# Patient Record
Sex: FEMALE | Race: WHITE
[De-identification: names, ages, dates, MRNs, and addresses within clinical notes are randomized per-mention and may not be internally consistent; named-entity substitution may affect disease eponyms.]

---

## 2017-04-10 ENCOUNTER — HOSPITAL ENCOUNTER (OUTPATIENT)
Dept: HOSPITAL 23 - SRAD | Age: 67
Discharge: HOME | End: 2017-04-10
Payer: SELF-PAY

## 2017-04-10 DIAGNOSIS — M51.36: ICD-10-CM

## 2017-04-10 DIAGNOSIS — M41.9: ICD-10-CM

## 2017-04-10 DIAGNOSIS — M54.5: Primary | ICD-10-CM

## 2017-06-16 ENCOUNTER — TRANSCRIBE ORDERS (OUTPATIENT)
Dept: PHYSICAL THERAPY | Facility: HOSPITAL | Age: 67
End: 2017-06-16

## 2017-06-16 DIAGNOSIS — M25.571 RIGHT ANKLE PAIN, UNSPECIFIED CHRONICITY: Primary | ICD-10-CM

## 2017-06-19 ENCOUNTER — HOSPITAL ENCOUNTER (OUTPATIENT)
Dept: HOSPITAL 23 - SRAD | Age: 67
Discharge: HOME | End: 2017-06-19
Attending: FAMILY MEDICINE
Payer: MEDICARE

## 2017-06-19 DIAGNOSIS — R06.02: Primary | ICD-10-CM

## 2017-07-12 ENCOUNTER — HOSPITAL ENCOUNTER (OUTPATIENT)
Dept: PHYSICAL THERAPY | Facility: HOSPITAL | Age: 67
Setting detail: THERAPIES SERIES
Discharge: HOME | End: 2017-07-12
Attending: ANESTHESIOLOGY

## 2017-07-12 DIAGNOSIS — R26.2 DIFFICULTY WALKING: ICD-10-CM

## 2017-07-12 DIAGNOSIS — M25.571 CHRONIC PAIN OF RIGHT ANKLE: Primary | ICD-10-CM

## 2017-07-12 DIAGNOSIS — M25.671 ANKLE JOINT STIFFNESS, BILATERAL: ICD-10-CM

## 2017-07-12 DIAGNOSIS — G89.29 CHRONIC PAIN OF RIGHT ANKLE: Primary | ICD-10-CM

## 2017-07-12 DIAGNOSIS — M25.672 ANKLE JOINT STIFFNESS, BILATERAL: ICD-10-CM

## 2017-07-12 DIAGNOSIS — R29.898 ANKLE WEAKNESS: ICD-10-CM

## 2017-07-12 PROCEDURE — 97116 GAIT TRAINING THERAPY: CPT | Performed by: PHYSICAL THERAPIST

## 2017-07-12 PROCEDURE — G8978 MOBILITY CURRENT STATUS: HCPCS

## 2017-07-12 PROCEDURE — 97162 PT EVAL MOD COMPLEX 30 MIN: CPT | Performed by: PHYSICAL THERAPIST

## 2017-07-12 PROCEDURE — G8979 MOBILITY GOAL STATUS: HCPCS

## 2017-07-13 NOTE — THERAPY EVALUATION
Outpatient Physical Therapy Ortho Initial Evaluation  [facility]     Patient Name: Marion Anderson  : 1950  MRN: [id number]  Today's Date: 2017      Visit Date: 2017    There is no problem list on file for this patient.       No past medical history on file.     No past surgical history on file.    Visit Dx:     ICD-10-CM ICD-9-CM   1. Chronic pain of right ankle M25.571 719.47    G89.29 338.29   2. Ankle joint stiffness, bilateral M25.671 719.57    M25.672    3. Ankle weakness M62.81 719.67   4. Difficulty walking R26.2 719.7             Patient History       17 1100          History    Chief Complaint Pain;Difficulty Walking;Balance Problems  -RA      Type of Pain Ankle pain  -RA      Date Current Problem(s) Began --   chronic  -RA      Brief Description of Current Complaint Patient sustained a bi-malleolar fracture at work in 2015 and underwent ankle 2 surgeries (ORIF).  She reports NWB x 6 months post surgery then issues with WC denying her therapy.  She finally did have HHPT and then OP PT at [facility] but again had issues with WC denial of further coverage.  She denies doing any exercises at home other than trying to walk but is limited by pain and SOA.  She reports wearing an ankle brace most of the time. She reports hx of L ankle fracture about a year before the R ankle fracture but it did not require surgery.    -RA      Previous treatment for THIS PROBLEM Surgery;Medication  -RA      Onset Date- PT 17  -RA      Patient/Caregiver Goals Relieve pain;Improve mobility;Improve strength  -RA      Occupation/sports/leisure activities enjoys movies, pets, and grandchildren  -RA      Patient seeing anyone else for problem(s)? Yes  -RA      How has patient tried to help current problem? --   meds, elevating leg  -RA      Results of Clinical Tests no recent imaging  -RA      Pain     Pain Location Ankle  -RA      Pain at Present 5  -RA      Pain at Best 2  -RA      Pain at  Worst 7  -RA      Pain Frequency Constant/continuous  -RA      Pain Description Sharp;Shooting;Throbbing;Tingling;Numbness  -RA      What Performance Factors Make the Current Problem(s) WORSE? driving, prolonged standing/walking  -RA      What Performance Factors Make the Current Problem(s) BETTER? changing positions, sitting, elevating leg  -RA      Tolerance Time- Standing 5-10 minutes  -RA      Tolerance Time- Walking 5-10 minutes  -RA      Difficulties with ADL's? moderate difficulty   -RA      Difficulties with recreational activities? unable to do much, limited WB activity tolerance  -RA      Services    Prior Rehab/Home Health Experiences Yes  -RA      When was the prior experience with Rehab/Home Health 2016  -RA      Where was the prior experience with Rehab/Home Health  PT   -RA      Are you currently receiving Home Health services No  -RA      Daily Activities    Primary Language English  -RA      Are you able to read Yes  -RA      Are you able to write Yes  -RA      How does patient learn best? Listening;Demonstration;Reading;Pictures/Video  -RA      Teaching needs identified Home Exercise Program;Management of Condition  -RA      Patient is concerned about/has problems with Walking;Transfers (getting out of a chair, bed);Standing;Difficulty with self care (i.e. bathing, dressing, toileting:;Climbing Stairs   balance  -RA      Pt Participated in POC and Goals Yes  -RA      Safety    Are you being hurt, hit, or frightened by anyone at home or in your life? No  -RA      Are you being neglected by a caregiver No  -RA        User Key  (r) = Recorded By, (t) = Taken By, (c) = Cosigned By    Initials Name Provider Type    RA Elissa Ortega, PT Physical Therapist                PT Ortho       07/12/17 1100    Posture/Observations    Alignment Options Genu valgus;Forward head;Rounded shoulders  -RA    Observations Edema   R>L foot/ankle/leg  -RA    Posture/Observations Comments FF trunk, increased lateral  "trunk sway B, R >L LE ER during gait  -RA    Sensation    Additional Comments hypersensitivity reported with light touch B feet/ankles/legs  -RA    Foot/Ankle Palpation    Foot/Ankle Palpation? Yes   global TTP B feet/ankles/legs  -RA    Left Ankle    Dorsiflexion ROM Details 9 degrees  -RA    Plantarflexion ROM Details 43 degrees  -RA    Inversion ROM Details 7 degrees  -RA    Eversion ROM Details 9 degrees  -RA    Right Ankle    Dorsiflexion ROM Details 0 degrees  -RA    Plantarflexion ROM Details 36 degrees  -RA    Inversion ROM Details 4 degrees  -RA    Eversion ROM Details 8 degrees  -RA    Lower Extremity    Lower Ext Manual Muscle Testing Detail B hip/knee weakness   -RA    Left Ankle/Foot    Ankle PF Gross Movement (2/5) poor;(2+/5) poor plus  -RA    Ankle Dorsiflexion Gross Movement (2+/5) poor plus;(3-/5) fair minus  -RA    Subtalar Inversion Gross Movement (2+/5) poor plus;(3-/5) fair minus  -RA    Subtalar Eversion Gross Movement (2/5) poor;(2+/5) poor plus  -RA    Right Ankle/Foot    Ankle PF Gross Movement (2/5) poor;(2-/5) poor minus  -RA    Ankle Dorsiflexion Gross Movement (2/5) poor  -RA    Subtalar Inversion Gross Movement (2/5) poor  -RA    Subtalar Eversion Gross Movement (2/5) poor  -RA    Lower Extremity Flexibility    LE Flexibility Comments tightness of B calf/leg tissues   -RA    Girth    Girth Measured? Ankle  -RA    Ankle Girth    Figure 8- Right 57.8cm  -RA    Figure 8- Left 56.7 cm  -RA    Transfers    Transfer, Comment requires B UE push to rise from sitting  -RA    Gait Assessment/Treatment    Gait, Comment slow, antalgic without AD, FF trunk, decresaed hip ext, decreased ankle DF, decreased push off, and increased lateral trunk sway B.  Suggested patient consider use of cane to help reduce WB strain on R ankle with gait - attempted use of SPC in clinic, difficulty with sequencing and stated \"it felt awkward\"  but could tell it lessened stress on R ankle/foot.  -RA    Stairs " Assessment/Treatment    Stairs, Comment NR with use of HR, Avoids if possible  -RA      User Key  (r) = Recorded By, (t) = Taken By, (c) = Cosigned By    Initials Name Provider Type    MANASA Ortega, PT Physical Therapist                            Therapy Education       07/12/17 1302          Therapy Education    Given HEP;Symptoms/condition management;Pain management;Mobility training;Edema management  -RA      Program New  -RA      How Provided Verbal;Demonstration  -RA      Provided to Patient  -RA      Level of Understanding Teach back education performed  -RA        User Key  (r) = Recorded By, (t) = Taken By, (c) = Cosigned By    Initials Name Provider Type    MANASA Ortega, PT Physical Therapist                PT OP Goals       07/12/17 1300       PT Short Term Goals    STG 1 Patient will be independent and compliant with initial HEP for ankle mobility without increased symptoms  -RA     STG 2 Patient will be able to safely enter/exit pool via steps and ambulate independently in pool.  -RA     STG 3 Patient will tolerate 40-45 minutes of aquatic exercise/activity with minimal to no increased pain/fatigue  -RA     STG 4 Patient will have >/= 5 degree improvement in ankle ROM for increased ease with functional activities  -RA     Long Term Goals    LTG 1 Patient will be independent with established HEP (aquatic and/or land) for strength/stabilization to facilitate self management of condition  -RA     LTG 2 Patient will tolerate walking/standing >/= 10-15 minutes with no AD without pain > 3-4/10 for increased ease/tolerance with community mobility/activities  -RA     LTG 3 Patient will have ankle strength >/= 3/5 in available ROM for improved stability with functional WB activity  -RA     LTG 4 Patient will report reduced functional limitations on FAAM with score >/= 30/84 (from 15/84 at al)  -RA     Time Calculation    PT Goal Re-Cert Due Date 08/11/17  -RA       User Key  (r) = Recorded By, (t)  "= Taken By, (c) = Cosigned By    Initials Name Provider Type    RA Elissa Ortega, PT Physical Therapist                PT Assessment/Plan       07/12/17 6712       PT Assessment    Functional Limitations Impaired gait;Limitation in home management;Limitations in community activities;Performance in leisure activities;Performance in self-care ADL  -RA     Impairments Gait;Pain;Muscle strength;Range of motion;Posture;Joint mobility;Endurance;Edema;Balance  -RA     Assessment Comments Patient is a 65yo F referred to therapy for chronic R ankle pain.  She has hx of R ankle fracture in November 2015 with surgical intervention (2 surgeries over in 2 weeks).  She also reports hx of L ankle fracture about a year earlier without surgical intervention.  She ambulates without AD with slow, antalgic, and compensated gait (wearing neoprene ankle brace on R).  She has visible foot/ankle/leg swelling R>L.  She has painful/decreased B ankle ROM/strength, decreased LE flexibility, and poor balance.  She has global tenderness with palpation of B feet/ankles/legs with light touch/pressure with increased tenderness around R ankle incision scars.  She also exhibits generalized core/LE weakness with functional mobility and SOA with exertion (walking to pool area).  Her FAAM score = 17.9% (where 100% = no perceived disability).  Discussed use of AD to help reduce pain with functional WB activity and had patient try using cane in clinic but indicated it \"felt awkward\" although it did seem to lessen discomfort/pressure on R ankle - explained it would take practice to get used to it.  Patient is a good candidate for trial of aquatic therapy to help reduce pain and improve ankle mobility, LE flexibility, core/LE strength/stabilization, and gait/balance.     -RA     Please refer to paper survey for additional self-reported information Yes   score = 15/84 or 17.9% (where 100% = no perceived functional disability)  -RA     Rehab Potential Good  " -RA     Patient/caregiver participated in establishment of treatment plan and goals Yes  -RA     Patient would benefit from skilled therapy intervention Yes  -RA     PT Plan    PT Frequency 2x/week  -RA     Planned CPT's? PT EVAL MOD COMPLELITY: 57954;PT GAIT TRAINING EA 15 MIN: 74083;PT THER PROC EA 15 MIN: 97772;PT NEUROMUSC RE-EDUCATION EA 15 MIN: 96550;PT MANUAL THERAPY EA 15 MIN: 54066;PT AQUATIC THERAPY EA 15 MIN: 25596;PT SELF CARE/HOME MGMT/TRAIN EA 15: 31607;PT HOT OR COLD PACK TREAT MCARE  -RA     PT Plan Comments Skilled aquatic therapy to reduce WB stress on joints with exercise/activity.  Work on ROM/flexibility, strength/stabilization, functional transfers, and gait/balance.  -RA       User Key  (r) = Recorded By, (t) = Taken By, (c) = Cosigned By    Initials Name Provider Type    MANASA Ortega PT Physical Therapist                  Exercises       07/12/17 1100          Exercise 1    Exercise Name 1 Had patient perform toe curls, 4 way ankle ROM to tolerance and encouraged to do this at home.  Patient to begin aquatic therapy to reduce stress/strain on joints with WB activity.  -RA        User Key  (r) = Recorded By, (t) = Taken By, (c) = Cosigned By    Initials Name Provider Type    MANASA Ortega PT Physical Therapist                              Outcome Measures       07/12/17 1300          FAAM    FAAM score = 15/84 or 17.9% (where 100% = no perceived functional disability)  -RA      Functional Assessment    Outcome Measure Options FAAM  -RA        User Key  (r) = Recorded By, (t) = Taken By, (c) = Cosigned By    Initials Name Provider Type    MANASA Ortega PT Physical Therapist            Time Calculation:   Start Time: 1145  Stop Time: 1240  Time Calculation (min): 55 min     Therapy Charges for Today     Code Description Service Date Service Provider Modifiers Qty    15575842260 HC GAIT TRAINING EA 15 MIN 7/12/2017 Elissa Ortega PT GP 1    31896598995 HC PT EVAL MOD  COMPLEXITY 3 7/12/2017 Elissa Ortega, PT GP 1          PT G-Codes  Outcome Measure Options: FAAM         Elissa Ortega, PT  7/12/2017

## 2017-08-01 ENCOUNTER — HOSPITAL ENCOUNTER (OUTPATIENT)
Dept: PHYSICAL THERAPY | Facility: HOSPITAL | Age: 67
Setting detail: THERAPIES SERIES
Discharge: HOME | End: 2017-08-01

## 2017-08-01 DIAGNOSIS — M25.672 ANKLE JOINT STIFFNESS, BILATERAL: ICD-10-CM

## 2017-08-01 DIAGNOSIS — R26.2 DIFFICULTY WALKING: ICD-10-CM

## 2017-08-01 DIAGNOSIS — M25.671 ANKLE JOINT STIFFNESS, BILATERAL: ICD-10-CM

## 2017-08-01 DIAGNOSIS — R29.898 ANKLE WEAKNESS: ICD-10-CM

## 2017-08-01 DIAGNOSIS — M25.571 CHRONIC PAIN OF RIGHT ANKLE: Primary | ICD-10-CM

## 2017-08-01 DIAGNOSIS — G89.29 CHRONIC PAIN OF RIGHT ANKLE: Primary | ICD-10-CM

## 2017-08-01 PROCEDURE — 97113 AQUATIC THERAPY/EXERCISES: CPT | Performed by: PHYSICAL THERAPIST

## 2017-08-01 NOTE — THERAPY TREATMENT NOTE
Outpatient Physical Therapy Ortho Treatment Note  [facility]     Patient Name: Marion Anderson  : 1950  MRN: [id number]  Today's Date: 2017      Visit Date: 2017    Visit Dx:    ICD-10-CM ICD-9-CM   1. Chronic pain of right ankle M25.571 719.47    G89.29 338.29   2. Ankle joint stiffness, bilateral M25.671 719.57    M25.672    3. Ankle weakness M62.81 719.67   4. Difficulty walking R26.2 719.7       There is no problem list on file for this patient.       No past medical history on file.     No past surgical history on file.                          PT Assessment/Plan       17 1213       PT Assessment    Assessment Comments Marion required HHA in and out of pool via rail nonreciprocally and HHA to rail assist for all mobility in pool.  -PB       User Key  (r) = Recorded By, (t) = Taken By, (c) = Cosigned By    Initials Name Provider Type    JOHN Moreno PT Physical Therapist                    Exercises       17 1100          Subjective Comments    Subjective Comments Patient nervous about not knowing what to expect first time in pool  -PB      Subjective Pain    Able to rate subjective pain? yes  -PB      Pre-Treatment Pain Level 5  -PB      Post-Treatment Pain Level 5  -PB      Aquatics    Aquatics performed? Yes  -PB      Aquatics LE    Water Walk forward;side;backward   Rail for balance  -PB      Stretch 1 Heel to toe walk at tail x 15'  -PB      Stretch 2 Seated ankle AROM 4-way x 10 B  -PB      Clams Hip circles 10/2  -PB      Hip Abd/Add 15 B  -PB      March in Place 15 B  -PB      Mini Squat 15   -PB      Toe/Heel Raises 10 B  -PB      Bicycle Seated on bench x 1 min   -PB        User Key  (r) = Recorded By, (t) = Taken By, (c) = Cosigned By    Initials Name Provider Type    JOHN Moreno PT Physical Therapist                                       Time Calculation:   Start Time: 1117  Stop Time: 1202  Time Calculation (min): 45 min    Therapy Charges for  Today     Code Description Service Date Service Provider Modifiers Qty    57996116326 HC PT AQUATIC THERAPY EA 15 MIN 8/1/2017 Jennifer Moreno, PT GP 3                    Jennifer Moreno, PT  8/1/2017

## 2017-08-03 ENCOUNTER — HOSPITAL ENCOUNTER (OUTPATIENT)
Dept: PHYSICAL THERAPY | Facility: HOSPITAL | Age: 67
Setting detail: THERAPIES SERIES
Discharge: HOME | End: 2017-08-03

## 2017-08-03 DIAGNOSIS — R26.2 DIFFICULTY WALKING: ICD-10-CM

## 2017-08-03 DIAGNOSIS — M25.672 ANKLE JOINT STIFFNESS, BILATERAL: ICD-10-CM

## 2017-08-03 DIAGNOSIS — M25.671 ANKLE JOINT STIFFNESS, BILATERAL: ICD-10-CM

## 2017-08-03 DIAGNOSIS — M25.571 CHRONIC PAIN OF RIGHT ANKLE: Primary | ICD-10-CM

## 2017-08-03 DIAGNOSIS — G89.29 CHRONIC PAIN OF RIGHT ANKLE: Primary | ICD-10-CM

## 2017-08-03 DIAGNOSIS — R29.898 ANKLE WEAKNESS: ICD-10-CM

## 2017-08-03 PROCEDURE — 97113 AQUATIC THERAPY/EXERCISES: CPT | Performed by: PHYSICAL THERAPIST

## 2017-08-03 NOTE — THERAPY TREATMENT NOTE
Outpatient Physical Therapy Ortho Treatment Note  [facility]     Patient Name: Marion Anderson  : 1950  MRN: [id number]  Today's Date: 8/3/2017      Visit Date: 2017    Visit Dx:    ICD-10-CM ICD-9-CM   1. Chronic pain of right ankle M25.571 719.47    G89.29 338.29   2. Ankle joint stiffness, bilateral M25.671 719.57    M25.672    3. Ankle weakness M62.81 719.67   4. Difficulty walking R26.2 719.7       There is no problem list on file for this patient.       No past medical history on file.     No past surgical history on file.                          PT Assessment/Plan       17 1208       PT Assessment    Assessment Comments Marion required moderate HHA support to enter pool via stairs nonreciprocally and rail and SBA with rail to exit.  She is very slow with all pool movements.  She did feel hot with exercises but better with rest and feeling air vents in room.  She is scheduled for cardiac stress test Monday and will cancel aquatic appt Tues.   -PB       User Key  (r) = Recorded By, (t) = Taken By, (c) = Cosigned By    Initials Name Provider Type    PB Jennifer Moreno, PT Physical Therapist                    Exercises       17 1200          Subjective Comments    Subjective Comments My knees were sore from squats if we cannot do them please  -PB      Subjective Pain    Able to rate subjective pain? yes  -PB      Pre-Treatment Pain Level 4  -PB      Post-Treatment Pain Level 4  -PB      Subjective Pain Comment Patient warm in pool today requiring seated rest on pool bench and sitting rest after getting out of pool  -PB      Aquatics    Aquatics performed? Yes  -PB      Aquatics LE    Water Walk forward;side;backward   Rail for balance, cues to increase step length  -PB      Stretch 1 Heel to toe walk at rail x 15'  -PB      Stretch 2 Seated ankle AROM 4-way x 10 B  -PB      Hip Abd/Add 15 B  -PB      Hip Flex/Ext 15 B  -PB      March in Place 15 B  -PB      Mini Squat Held    -PB      Toe/Heel Raises 10 B  -PB      Bicycle Seated on bench x 20  -PB      Flutter/Scissor Seated on bench x 10, 10   -PB        User Key  (r) = Recorded By, (t) = Taken By, (c) = Cosigned By    Initials Name Provider Type    PB Jennifer Moreno, PT Physical Therapist                                       Time Calculation:   Start Time: 1116  Stop Time: 1158  Time Calculation (min): 42 min    Therapy Charges for Today     Code Description Service Date Service Provider Modifiers Qty    39316421231  PT AQUATIC THERAPY EA 15 MIN 8/3/2017 Jennifer Moreno, PT GP 3                    Jennifer Moreno, PT  8/3/2017

## 2017-08-08 ENCOUNTER — APPOINTMENT (OUTPATIENT)
Dept: PHYSICAL THERAPY | Facility: HOSPITAL | Age: 67
End: 2017-08-08

## 2017-08-10 ENCOUNTER — HOSPITAL ENCOUNTER (OUTPATIENT)
Dept: PHYSICAL THERAPY | Facility: HOSPITAL | Age: 67
Setting detail: THERAPIES SERIES
Discharge: HOME | End: 2017-08-10

## 2017-08-10 DIAGNOSIS — M25.671 ANKLE JOINT STIFFNESS, BILATERAL: ICD-10-CM

## 2017-08-10 DIAGNOSIS — M25.571 CHRONIC PAIN OF RIGHT ANKLE: Primary | ICD-10-CM

## 2017-08-10 DIAGNOSIS — R26.2 DIFFICULTY WALKING: ICD-10-CM

## 2017-08-10 DIAGNOSIS — R29.898 ANKLE WEAKNESS: ICD-10-CM

## 2017-08-10 DIAGNOSIS — G89.29 CHRONIC PAIN OF RIGHT ANKLE: Primary | ICD-10-CM

## 2017-08-10 DIAGNOSIS — M25.672 ANKLE JOINT STIFFNESS, BILATERAL: ICD-10-CM

## 2017-08-10 PROCEDURE — 97113 AQUATIC THERAPY/EXERCISES: CPT | Performed by: PHYSICAL THERAPIST

## 2017-08-10 NOTE — THERAPY TREATMENT NOTE
Outpatient Physical Therapy Ortho Treatment Note  [facility]     Patient Name: Marion Anderson  : 1950  MRN: [id number]  Today's Date: 8/10/2017      Visit Date: 08/10/2017    Visit Dx:    ICD-10-CM ICD-9-CM   1. Chronic pain of right ankle M25.571 719.47    G89.29 338.29   2. Ankle joint stiffness, bilateral M25.671 719.57    M25.672    3. Ankle weakness M62.81 719.67   4. Difficulty walking R26.2 719.7       There is no problem list on file for this patient.       No past medical history on file.     No past surgical history on file.                          PT Assessment/Plan       08/10/17 1317       PT Assessment    Assessment Comments pt in and out of pool indep. Slow moving and tried to get her deep for better gait due to R knee and both ankles, but this apparently doesn't work with her BP and stent hx. Agrees to do next rx in cool water.   -VALERYK       User Key  (r) = Recorded By, (t) = Taken By, (c) = Cosigned By    Initials Name Provider Type    ZK Zorre Zeno Kimura, PT Physical Therapist                    Exercises       08/10/17 1300          Subjective Comments    Subjective Comments pt stated she got too hot last time from BP and not taking her meds but took them today prior to PT. Here 45 minutes early thinking appt was 1115.   -ZK      Subjective Pain    Able to rate subjective pain? yes  -ZK      Pre-Treatment Pain Level 4  -ZK      Post-Treatment Pain Level 3  -ZK      Subjective Pain Comment had to stop again today early after deep warm water biking. HR good at 76 and SATS 98% but BP was up to 145/97 in sitting L arm.   -ZK      Aquatics    Aquatics performed? Yes  -ZK      Aquatics LE    Water Walk forward   crowded pool today  -ZK      Stretch 3 standing gastroc  -ZK      Clams Hip circles 10/  -ZK      Hip Abd/Add 15 B  -ZK      Hip Flex/Ext 15 B  -ZK      March in Place 15 B  -ZK      Mini Squat --   L knee only  -ZK      Toe/Heel Raises 10 B  -ZK      Bicycle On LN for 5'  "then got \"overheated\"  -RICHARD        User Key  (r) = Recorded By, (t) = Taken By, (c) = Cosigned By    Initials Name Provider Type    ZK Zorre Zeno Kimura, PT Physical Therapist                                       Time Calculation:   Start Time: 1200  Stop Time: 1230  Time Calculation (min): 30 min    Therapy Charges for Today     Code Description Service Date Service Provider Modifiers Qty    12889936144  PT AQUATIC THERAPY EA 15 MIN 8/10/2017 Zorre Zeno Kimura, PT GP 2                    Zorre Zeno Kimura, PT  8/10/2017       "

## 2017-08-15 ENCOUNTER — HOSPITAL ENCOUNTER (OUTPATIENT)
Dept: PHYSICAL THERAPY | Facility: HOSPITAL | Age: 67
Setting detail: THERAPIES SERIES
Discharge: HOME | End: 2017-08-15

## 2017-08-15 DIAGNOSIS — G89.29 CHRONIC PAIN OF RIGHT ANKLE: Primary | ICD-10-CM

## 2017-08-15 DIAGNOSIS — R29.898 ANKLE WEAKNESS: ICD-10-CM

## 2017-08-15 DIAGNOSIS — M25.671 ANKLE JOINT STIFFNESS, BILATERAL: ICD-10-CM

## 2017-08-15 DIAGNOSIS — M25.571 CHRONIC PAIN OF RIGHT ANKLE: Primary | ICD-10-CM

## 2017-08-15 DIAGNOSIS — M25.672 ANKLE JOINT STIFFNESS, BILATERAL: ICD-10-CM

## 2017-08-15 DIAGNOSIS — R26.2 DIFFICULTY WALKING: ICD-10-CM

## 2017-08-15 PROCEDURE — 97113 AQUATIC THERAPY/EXERCISES: CPT | Performed by: PHYSICAL THERAPIST

## 2017-08-15 NOTE — THERAPY RE-EVALUATION
Outpatient Physical Therapy Ortho Re-Assessment  [facility]     Patient Name: Marion Anderson  : 1950  MRN: [id number]  Today's Date: 8/15/2017      Visit Date: 08/15/2017    There is no problem list on file for this patient.       No past medical history on file.     No past surgical history on file.    Visit Dx:     ICD-10-CM ICD-9-CM   1. Chronic pain of right ankle M25.571 719.47    G89.29 338.29   2. Ankle joint stiffness, bilateral M25.671 719.57    M25.672    3. Ankle weakness M62.81 719.67   4. Difficulty walking R26.2 719.7                                     PT OP Goals       08/15/17 1100       PT Short Term Goals    STG 1 Patient will be independent and compliant with initial HEP for ankle mobility without increased symptoms  -PB     STG 1 Progress Partially Met  -PB     STG 1 Progress Comments Patient performing basic ankle HEP  -PB     STG 2 Patient will be able to safely enter/exit pool via steps and ambulate independently in pool.  -PB     STG 2 Progress Progressing  -PB     STG 2 Progress Comments SBA to CGA using rail non reciprocally  -PB     STG 3 Patient will tolerate 40-45 minutes of aquatic exercise/activity with minimal to no increased pain/fatigue  -PB     STG 3 Progress Ongoing  -PB     STG 3 Progress Comments Patient with higher BP with exercise and feeling bad in warm pool  -PB     STG 4 Patient will have >/= 5 degree improvement in ankle ROM for increased ease with functional activities  -PB     STG 4 Progress Ongoing  -PB     STG 4 Progress Comments DF -6; PF 45; Inv 11, Ev 5  -PB     Long Term Goals    LTG 1 Patient will be independent with established HEP (aquatic and/or land) for strength/stabilization to facilitate self management of condition  -PB     LTG 1 Progress Ongoing  -PB     LTG 2 Patient will tolerate walking/standing >/= 10-15 minutes with no AD without pain > 3-4/10 for increased ease/tolerance with community mobility/activities  -PB     LTG 2 Progress  Ongoing  -PB     LTG 2 Progress Comments 10 min walking with aerobic fatigue and standing 10-15 min with ankle pain  -PB     LTG 3 Patient will have ankle strength >/= 3/5 in available ROM for improved stability with functional WB activity  -PB     LTG 3 Progress Comments DF 3-, PF 3-, INV 2-, EV 2-  -PB     LTG 4 Patient will report reduced functional limitations on FAAM with score >/= 30/84 (from 15/84 at eval)  -PB       User Key  (r) = Recorded By, (t) = Taken By, (c) = Cosigned By    Initials Name Provider Type    PB Jennifer Moreno, PT Physical Therapist                PT Assessment/Plan       08/15/17 1227 08/15/17 1221    PT Assessment    Functional Limitations  Impaired gait;Limitation in home management;Limitations in community activities;Performance in leisure activities;Performance in self-care ADL  -PB    Impairments  Gait;Pain;Muscle strength;Range of motion;Posture;Joint mobility;Endurance;Edema;Balance  -PB    Assessment Comments  Marion has been seen for initial eval and 4 aquatic therapy sessions for ankle rehab. She has not been able to tolerate warm water pool without increased blood pressure and feeling physically bad. Exercises were performed in cooler pool today without her feeling bad but blood pressure still increased the returning back down to near baseline.  She is progressing slowly with ankle and overall deconditioned status. She will benefit to comtinue aquatic therapy in cooler pool.  -PB    Please refer to paper survey for additional self-reported information  Yes  -PB    Rehab Potential  Good  -PB    Patient/caregiver participated in establishment of treatment plan and goals  Yes  -PB    Patient would benefit from skilled therapy intervention  Yes  -PB    PT Plan    PT Frequency  2x/week  -PB    Predicted Duration of Therapy Intervention (days/wks)  30 days   -PB    Planned CPT's? PT THER PROC EA 15 MIN: 97265;PT GAIT TRAINING EA 15 MIN: 77290;PT HOT OR COLD PACK TREAT MCARE;PT  "MANUAL THERAPY EA 15 MIN: 41494;PT AQUATIC THERAPY EA 15 MIN: 84527  -PB       User Key  (r) = Recorded By, (t) = Taken By, (c) = Cosigned By    Initials Name Provider Type    JOHN Moreno PT Physical Therapist                  Exercises       08/15/17 1100          Subjective Comments    Subjective Comments Ankle aches.    -PB      Subjective Pain    Able to rate subjective pain? yes  -PB      Pre-Treatment Pain Level 3  -PB      Subjective Pain Comment q  -PB      Aquatics    Aquatics performed? Yes  -PB      Aquatics LE    Water Walk forward;side;backward  -PB      Stretch 1 Exercise in cool pool  -PB      Stretch 2 Ankle AROM circles x 10 and DF/PF x 10  -PB      Stretch 3 Gastroc x 30\" ?  -PB      Hip Abd/Add 20 B  -PB      March in Place 20 B working on balance  -PB      Mini Squat 10  -PB      Toe/Heel Raises 10  -PB      Uni-Clock 10 with light hand support   -PB        User Key  (r) = Recorded By, (t) = Taken By, (c) = Cosigned By    Initials Name Provider Type    JOHN Moreno PT Physical Therapist                              Time Calculation:   Start Time: 5  Stop Time: 1215  Time Calculation (min): 780 min     Therapy Charges for Today     Code Description Service Date Service Provider Modifiers Qty    84794011070  PT AQUATIC THERAPY EA 15 MIN 8/15/2017 Jennifer Moreno, PT GP 4                    Jennifer Moreno, PT  8/15/2017Outpatient Rehabilitation - Wound/Debridement Initial al  [facility]     Patient Name: Marion Anderson  : 1950  MRN: [id number]  Today's Date: 8/15/2017                Admit Date: 8/15/2017    Visit Dx:    ICD-10-CM ICD-9-CM   1. Chronic pain of right ankle M25.571 719.47    G89.29 338.29   2. Ankle joint stiffness, bilateral M25.671 719.57    M25.672    3. Ankle weakness M62.81 719.67   4. Difficulty walking R26.2 719.7       There is no problem list on file for this patient.       No past medical history on file.     No past surgical history on " file.        EVALUATION            WOUND DEBRIDEMENT                    Recommendation and Plan        PT Assessment/Plan       08/15/17 1227 08/15/17 1221    PT Assessment    Functional Limitations  Impaired gait;Limitation in home management;Limitations in community activities;Performance in leisure activities;Performance in self-care ADL  -PB    Impairments  Gait;Pain;Muscle strength;Range of motion;Posture;Joint mobility;Endurance;Edema;Balance  -PB    Assessment Comments  Marion has been seen for initial eval and 4 aquatic therapy sessions for ankle rehab. She has not been able to tolerate warm water pool without increased blood pressure and feeling physically bad. Exercises were performed in cooler pool today without her feeling bad but blood pressure still increased the returning back down to near baseline.  She is progressing slowly with ankle and overall deconditioned status. She will benefit to comtinue aquatic therapy in cooler pool.  -PB    Please refer to paper survey for additional self-reported information  Yes  -PB    Rehab Potential  Good  -PB    Patient/caregiver participated in establishment of treatment plan and goals  Yes  -PB    Patient would benefit from skilled therapy intervention  Yes  -PB    PT Plan    PT Frequency  2x/week  -PB    Predicted Duration of Therapy Intervention (days/wks)  30 days   -PB    Planned CPT's? PT THER PROC EA 15 MIN: 86264;PT GAIT TRAINING EA 15 MIN: 01461;PT HOT OR COLD PACK TREAT MCARE;PT MANUAL THERAPY EA 15 MIN: 65830;PT AQUATIC THERAPY EA 15 MIN: 63675  -PB       User Key  (r) = Recorded By, (t) = Taken By, (c) = Cosigned By    Initials Name Provider Type    PB Jennifer Moreno, PT Physical Therapist            Goals      First Last          PT Short Term Goals  STG 1: Patient will be independent and compliant with initial HEP for ankle mobility without increased symptoms  STG 1 Progress: Partially Met  STG 1 Progress Comments: Patient performing basic ankle  HEP  STG 2: Patient will be able to safely enter/exit pool via steps and ambulate independently in pool.  STG 2 Progress: Progressing  STG 2 Progress Comments: SBA to CGA using rail non reciprocally  STG 3: Patient will tolerate 40-45 minutes of aquatic exercise/activity with minimal to no increased pain/fatigue  STG 3 Progress: Ongoing  STG 3 Progress Comments: Patient with higher BP with exercise and feeling bad in warm pool  STG 4: Patient will have >/= 5 degree improvement in ankle ROM for increased ease with functional activities  STG 4 Progress: Ongoing  STG 4 Progress Comments: DF -6; PF 45; Inv 11, Ev 5 PT Short Term Goals  STG 1: Patient will be independent and compliant with initial HEP for ankle mobility without increased symptoms  STG 1 Progress: Partially Met  STG 1 Progress Comments: Patient performing basic ankle HEP  STG 2: Patient will be able to safely enter/exit pool via steps and ambulate independently in pool.  STG 2 Progress: Progressing  STG 2 Progress Comments: SBA to CGA using rail non reciprocally  STG 3: Patient will tolerate 40-45 minutes of aquatic exercise/activity with minimal to no increased pain/fatigue  STG 3 Progress: Ongoing  STG 3 Progress Comments: Patient with higher BP with exercise and feeling bad in warm pool  STG 4: Patient will have >/= 5 degree improvement in ankle ROM for increased ease with functional activities  STG 4 Progress: Ongoing  STG 4 Progress Comments: DF -6; PF 45; Inv 11, Ev 5   Long Term Goals  LTG 1: Patient will be independent with established HEP (aquatic and/or land) for strength/stabilization to facilitate self management of condition  LTG 1 Progress: Ongoing  LTG 2: Patient will tolerate walking/standing >/= 10-15 minutes with no AD without pain > 3-4/10 for increased ease/tolerance with community mobility/activities  LTG 2 Progress: Ongoing  LTG 2 Progress Comments: 10 min walking with aerobic fatigue and standing 10-15 min with ankle pain  LTG 3:  Patient will have ankle strength >/= 3/5 in available ROM for improved stability with functional WB activity  LTG 3 Progress Comments: DF 3-, PF 3-, INV 2-, EV 2-  LTG 4: Patient will report reduced functional limitations on FAAM with score >/= 30/84 (from 15/84 at [facility])  Long Term Goals  LTG 1: Patient will be independent with established HEP (aquatic and/or land) for strength/stabilization to facilitate self management of condition  LTG 1 Progress: Ongoing  LTG 2: Patient will tolerate walking/standing >/= 10-15 minutes with no AD without pain > 3-4/10 for increased ease/tolerance with community mobility/activities  LTG 2 Progress: Ongoing  LTG 2 Progress Comments: 10 min walking with aerobic fatigue and standing 10-15 min with ankle pain  LTG 3: Patient will have ankle strength >/= 3/5 in available ROM for improved stability with functional WB activity  LTG 3 Progress Comments: DF 3-, PF 3-, INV 2-, EV 2-  LTG 4: Patient will report reduced functional limitations on FAAM with score >/= 30/84 (from 15/84 at [facility])                   PT OP Goals       08/15/17 1100       PT Short Term Goals    STG 1 Patient will be independent and compliant with initial HEP for ankle mobility without increased symptoms  -PB     STG 1 Progress Partially Met  -PB     STG 1 Progress Comments Patient performing basic ankle HEP  -PB     STG 2 Patient will be able to safely enter/exit pool via steps and ambulate independently in pool.  -PB     STG 2 Progress Progressing  -PB     STG 2 Progress Comments SBA to CGA using rail non reciprocally  -PB     STG 3 Patient will tolerate 40-45 minutes of aquatic exercise/activity with minimal to no increased pain/fatigue  -PB     STG 3 Progress Ongoing  -PB     STG 3 Progress Comments Patient with higher BP with exercise and feeling bad in warm pool  -PB     STG 4 Patient will have >/= 5 degree improvement in ankle ROM for increased ease with functional activities  -PB     STG 4 Progress Ongoing  -PB      STG 4 Progress Comments DF -6; PF 45; Inv 11, Ev 5  -PB     Long Term Goals    LTG 1 Patient will be independent with established HEP (aquatic and/or land) for strength/stabilization to facilitate self management of condition  -PB     LTG 1 Progress Ongoing  -PB     LTG 2 Patient will tolerate walking/standing >/= 10-15 minutes with no AD without pain > 3-4/10 for increased ease/tolerance with community mobility/activities  -PB     LTG 2 Progress Ongoing  -PB     LTG 2 Progress Comments 10 min walking with aerobic fatigue and standing 10-15 min with ankle pain  -PB     LTG 3 Patient will have ankle strength >/= 3/5 in available ROM for improved stability with functional WB activity  -PB     LTG 3 Progress Comments DF 3-, PF 3-, INV 2-, EV 2-  -PB     LTG 4 Patient will report reduced functional limitations on FAAM with score >/= 30/84 (from 15/84 at eval)  -PB       User Key  (r) = Recorded By, (t) = Taken By, (c) = Cosigned By    Initials Name Provider Type    PB Jennifer Moreno, PT Physical Therapist          Time Calculation: Start Time: 2315  Stop Time: 1215  Time Calculation (min): 780 min    Therapy Charges for Today     Code Description Service Date Service Provider Modifiers Qty    81191941669 HC PT AQUATIC THERAPY EA 15 MIN 8/15/2017 Jennifer Moreno, PT GP 4                Jennifer Moreno, PT  8/15/2017    and

## 2017-08-17 ENCOUNTER — HOSPITAL ENCOUNTER (OUTPATIENT)
Dept: PHYSICAL THERAPY | Facility: HOSPITAL | Age: 67
Setting detail: THERAPIES SERIES
Discharge: HOME | End: 2017-08-17

## 2017-08-17 DIAGNOSIS — R26.2 DIFFICULTY WALKING: ICD-10-CM

## 2017-08-17 DIAGNOSIS — G89.29 CHRONIC PAIN OF RIGHT ANKLE: Primary | ICD-10-CM

## 2017-08-17 DIAGNOSIS — R29.898 ANKLE WEAKNESS: ICD-10-CM

## 2017-08-17 DIAGNOSIS — M25.571 CHRONIC PAIN OF RIGHT ANKLE: Primary | ICD-10-CM

## 2017-08-17 DIAGNOSIS — M25.671 ANKLE JOINT STIFFNESS, BILATERAL: ICD-10-CM

## 2017-08-17 DIAGNOSIS — M25.672 ANKLE JOINT STIFFNESS, BILATERAL: ICD-10-CM

## 2017-08-17 PROCEDURE — 97113 AQUATIC THERAPY/EXERCISES: CPT | Performed by: PHYSICAL THERAPIST

## 2017-08-17 NOTE — THERAPY TREATMENT NOTE
Outpatient Physical Therapy Ortho Treatment Note  [facility]     Patient Name: Marion Anderson  : 1950  MRN: [id number]  Today's Date: 2017      Visit Date: 2017    Visit Dx:    ICD-10-CM ICD-9-CM   1. Chronic pain of right ankle M25.571 719.47    G89.29 338.29   2. Ankle joint stiffness, bilateral M25.671 719.57    M25.672    3. Ankle weakness M62.81 719.67   4. Difficulty walking R26.2 719.7       There is no problem list on file for this patient.       No past medical history on file.     No past surgical history on file.                          PT Assessment/Plan       17 1242       PT Assessment    Assessment Comments Marion's BP was normal through sessions today in cooler pool.  She feels able to walk with better balance in pool without need to hold on but still maintains a slow speed.  -PB       User Key  (r) = Recorded By, (t) = Taken By, (c) = Cosigned By    Initials Name Provider Type    JOHN Moreno, BRO Physical Therapist                    Exercises       17 1100          Subjective Comments    Subjective Comments Having trouble sleeping  -PB      Subjective Pain    Able to rate subjective pain? yes  -PB      Pre-Treatment Pain Level 4  -PB      Subjective Pain Comment Pre exercise /83; after 20 min ex 124/79; post exercise 121/79  -PB      Aquatics    Aquatics performed? Yes  -PB      Aquatics LE    Water Walk forward;side;backward  -PB      Stretch 1 Exercise in cool pool  -PB      Stretch 2 Ankle AROM circles x 10 and DF/PF x 10  -PB      Stretch 3 --  -PB      Hip Abd/Add 10 B  -PB      March in Place --  -PB      Mini Squat --  -PB      Toe/Heel Raises 10  -PB      Uni-Clock --  -PB        User Key  (r) = Recorded By, (t) = Taken By, (c) = Cosigned By    Initials Name Provider Type    JOHN Moreno PT Physical Therapist                                       Time Calculation:   Start Time: 1120  Stop Time: 1205  Time Calculation (min): 45  min    Therapy Charges for Today     Code Description Service Date Service Provider Modifiers Qty    44871070599 HC PT AQUATIC THERAPY EA 15 MIN 8/17/2017 Jennifer Moreno, PT GP 3                    Jennifer Moreno, PT  8/17/2017

## 2017-08-22 ENCOUNTER — HOSPITAL ENCOUNTER (OUTPATIENT)
Dept: PHYSICAL THERAPY | Facility: HOSPITAL | Age: 67
Setting detail: THERAPIES SERIES
Discharge: HOME | End: 2017-08-22

## 2017-08-22 DIAGNOSIS — R26.2 DIFFICULTY WALKING: ICD-10-CM

## 2017-08-22 DIAGNOSIS — M25.671 ANKLE JOINT STIFFNESS, BILATERAL: ICD-10-CM

## 2017-08-22 DIAGNOSIS — R29.898 ANKLE WEAKNESS: ICD-10-CM

## 2017-08-22 DIAGNOSIS — M25.571 CHRONIC PAIN OF RIGHT ANKLE: Primary | ICD-10-CM

## 2017-08-22 DIAGNOSIS — G89.29 CHRONIC PAIN OF RIGHT ANKLE: Primary | ICD-10-CM

## 2017-08-22 DIAGNOSIS — M25.672 ANKLE JOINT STIFFNESS, BILATERAL: ICD-10-CM

## 2017-08-22 PROCEDURE — 97113 AQUATIC THERAPY/EXERCISES: CPT | Performed by: PHYSICAL THERAPIST

## 2017-08-22 NOTE — THERAPY TREATMENT NOTE
"    Outpatient Physical Therapy Ortho Treatment Note  [facility]     Patient Name: Marion Anderson  : 1950  MRN: [id number]  Today's Date: 2017      Visit Date: 2017    Visit Dx:    ICD-10-CM ICD-9-CM   1. Chronic pain of right ankle M25.571 719.47    G89.29 338.29   2. Ankle joint stiffness, bilateral M25.671 719.57    M25.672    3. Ankle weakness M62.81 719.67   4. Difficulty walking R26.2 719.7       There is no problem list on file for this patient.       No past medical history on file.     No past surgical history on file.                          PT Assessment/Plan       17 1310       PT Assessment    Assessment Comments Normal BP through session.  She demonstrates walking a slightly faster speed in cooler pool   -PB       User Key  (r) = Recorded By, (t) = Taken By, (c) = Cosigned By    Initials Name Provider Type    JOHN Moreno, PT Physical Therapist                    Exercises       17 1300          Subjective Comments    Subjective Comments Pain in ankle standing in kitchen for 20 min this morning   -PB      Subjective Pain    Able to rate subjective pain? yes  -PB      Pre-Treatment Pain Level 3  -PB      Post-Treatment Pain Level 3  -PB      Subjective Pain Comment BP pre ex 134/82 and after ex 134/84   -PB      Aquatics    Aquatics performed? Yes  -PB      Aquatics LE    Water Walk forward;side;backward   F x 2 laps, S x 1 lap each, back x 1 lap   -PB      Stretch 1 Exercise in cool pool  -PB      Stretch 2 Ankle AROM circles x 10 and DF/PF x 10  -PB      Stretch 3 Gastroc 30\"/2 B   -PB      March in Place 20  -PB      Mini Squat 5  -PB      Toe/Heel Raises 20  -PB      Uni-Clock 10  -PB        User Key  (r) = Recorded By, (t) = Taken By, (c) = Cosigned By    Initials Name Provider Type    JOHN Moreno PT Physical Therapist                                       Time Calculation:   Start Time: 1116  Stop Time: 1200  Time Calculation (min): 44 " min    Therapy Charges for Today     Code Description Service Date Service Provider Modifiers Qty    74541721297 HC PT AQUATIC THERAPY EA 15 MIN 8/22/2017 Jennifer Moreno, PT GP 3                    Jennifer Moreno, PT  8/22/2017

## 2017-08-24 ENCOUNTER — HOSPITAL ENCOUNTER (OUTPATIENT)
Dept: PHYSICAL THERAPY | Facility: HOSPITAL | Age: 67
Setting detail: THERAPIES SERIES
Discharge: HOME | End: 2017-08-24

## 2017-08-24 DIAGNOSIS — G89.29 CHRONIC PAIN OF RIGHT ANKLE: Primary | ICD-10-CM

## 2017-08-24 DIAGNOSIS — R29.898 ANKLE WEAKNESS: ICD-10-CM

## 2017-08-24 DIAGNOSIS — R26.2 DIFFICULTY WALKING: ICD-10-CM

## 2017-08-24 DIAGNOSIS — M25.571 CHRONIC PAIN OF RIGHT ANKLE: Primary | ICD-10-CM

## 2017-08-24 DIAGNOSIS — M25.671 ANKLE JOINT STIFFNESS, BILATERAL: ICD-10-CM

## 2017-08-24 DIAGNOSIS — M25.672 ANKLE JOINT STIFFNESS, BILATERAL: ICD-10-CM

## 2017-08-24 PROCEDURE — 97113 AQUATIC THERAPY/EXERCISES: CPT | Performed by: PHYSICAL THERAPIST

## 2017-08-24 NOTE — THERAPY TREATMENT NOTE
"    Outpatient Physical Therapy Ortho Treatment Note  [facility]     Patient Name: Marion Anderson  : 1950  MRN: [id number]  Today's Date: 2017      Visit Date: 2017    Visit Dx:    ICD-10-CM ICD-9-CM   1. Chronic pain of right ankle M25.571 719.47    G89.29 338.29   2. Ankle joint stiffness, bilateral M25.671 719.57    M25.672    3. Ankle weakness M62.81 719.67   4. Difficulty walking R26.2 719.7       There is no problem list on file for this patient.       No past medical history on file.     No past surgical history on file.                          PT Assessment/Plan       17 1424       PT Assessment    Assessment Comments BP a little higher today and she more fatigues when she arrived.  She is scheduled for stress test tomorrow and pulmonolgist next week.  She requires hand support in pool for balance specific exercises and has difficulty getting out of pool with single rail support requiring another hand at top step.   -PB       User Key  (r) = Recorded By, (t) = Taken By, (c) = Cosigned By    Initials Name Provider Type    PB Jennifer Moreno, PT Physical Therapist                    Exercises       17 1100          Subjective Comments    Subjective Comments Woke up with ankle throbbing  -PB      Subjective Pain    Able to rate subjective pain? yes  -PB      Pre-Treatment Pain Level 5  -PB      Subjective Pain Comment BP pre 122/89; post 139/90  -PB      Aquatics    Aquatics performed? Yes  -PB      Aquatics LE    Water Walk forward;side;backward   F x 1 lap, S x 1 lap each, back x 1 hemant, march x 1 length   -PB      Stretch 1 Exercise in cool pool  -PB      Stretch 2 Ankle AROM circles x 10 and DF/PF x 10  -PB      Stretch 3 Gastroc 20\"/3 B   -PB      Stretch Other 1 Heel to toe walk 10/2 with hand support   -PB      Stretch Other 2 Single leg stance 10\"/3 with hand support   -PB      March in Place 20  -PB      Mini Squat 5  -PB      Toe/Heel Raises 10/2  -PB      " PicBadges-Clock 10 with hand support   -PB        User Key  (r) = Recorded By, (t) = Taken By, (c) = Cosigned By    Initials Name Provider Type    PB Jennifer Moreno, PT Physical Therapist                                       Time Calculation:   Start Time: 1116  Stop Time: 1202  Time Calculation (min): 46 min    Therapy Charges for Today     Code Description Service Date Service Provider Modifiers Qty    96076398916 HC PT AQUATIC THERAPY EA 15 MIN 8/24/2017 Jennifer Moreno, PT GP 3                    Jennifer Moreno PT  8/24/2017

## 2017-08-29 ENCOUNTER — HOSPITAL ENCOUNTER (OUTPATIENT)
Dept: PHYSICAL THERAPY | Facility: HOSPITAL | Age: 67
Setting detail: THERAPIES SERIES
Discharge: HOME | End: 2017-08-29

## 2017-08-29 DIAGNOSIS — M25.672 ANKLE JOINT STIFFNESS, BILATERAL: ICD-10-CM

## 2017-08-29 DIAGNOSIS — G89.29 CHRONIC PAIN OF RIGHT ANKLE: Primary | ICD-10-CM

## 2017-08-29 DIAGNOSIS — R29.898 ANKLE WEAKNESS: ICD-10-CM

## 2017-08-29 DIAGNOSIS — R26.2 DIFFICULTY WALKING: ICD-10-CM

## 2017-08-29 DIAGNOSIS — M25.571 CHRONIC PAIN OF RIGHT ANKLE: Primary | ICD-10-CM

## 2017-08-29 DIAGNOSIS — M25.671 ANKLE JOINT STIFFNESS, BILATERAL: ICD-10-CM

## 2017-08-29 PROCEDURE — 97113 AQUATIC THERAPY/EXERCISES: CPT | Performed by: PHYSICAL THERAPIST

## 2017-08-29 NOTE — THERAPY TREATMENT NOTE
"    Outpatient Physical Therapy Ortho Treatment Note  [facility]     Patient Name: Marion Anderson  : 1950  MRN: [id number]  Today's Date: 2017      Visit Date: 2017    Visit Dx:    ICD-10-CM ICD-9-CM   1. Chronic pain of right ankle M25.571 719.47    G89.29 338.29   2. Ankle joint stiffness, bilateral M25.671 719.57    M25.672    3. Ankle weakness M62.81 719.67   4. Difficulty walking R26.2 719.7       There is no problem list on file for this patient.       No past medical history on file.     No past surgical history on file.                          PT Assessment/Plan       17 1206       PT Assessment    Assessment Comments Marion tolerates more activity in the pool with greater ease with the exercise.  -PB       User Key  (r) = Recorded By, (t) = Taken By, (c) = Cosigned By    Initials Name Provider Type    PB Jennifer Moreno, PT Physical Therapist                    Exercises       17 1200 17 1100       Subjective Comments    Subjective Comments  Ankle hurting when I went to bed last night. I did not get a chance to prop it up.  -PB     Subjective Pain    Able to rate subjective pain?  yes  -PB     Pre-Treatment Pain Level  4  -PB     Post-Treatment Pain Level  4  -PB     Subjective Pain Comment  BP pre 124/81; post 143/88  -PB     Aquatics    Aquatics performed?  Yes  -PB     Aquatics LE    Water Walk forward;side;backward   F x 2 lap, S x 1 lap each, back x 1 lap  -PB      Stretch 1 Exercise in cool pool  -PB      Stretch 2 Ankle AROM circles x 10 and DF/PF x 10  -PB      Stretch 3 Gastroc 20\"/3 B   -PB      Stretch Other 1 Heel to toe walk 10/2 with hand support   -PB      Stretch Other 2 Single leg stance 10\"/3 with hand support   -PB      March in Place 20  -PB      Mini Squat 10  -PB      Toe/Heel Raises 10/2  -PB      Uni-Clock 10 with hand support   -PB      Step Ups Bottom step x 5 each   -PB      Exercise 1    Exercise Name 1 Issued ankle HEP  -PB      "   User Key  (r) = Recorded By, (t) = Taken By, (c) = Cosigned By    Initials Name Provider Type    PB Jennifer Moreno, PT Physical Therapist                                       Time Calculation:   Start Time: 1115  Stop Time: 1205  Time Calculation (min): 50 min    Therapy Charges for Today     Code Description Service Date Service Provider Modifiers Qty    58921947952 HC PT AQUATIC THERAPY EA 15 MIN 8/29/2017 Jennifer Moreno, PT GP 3                    Jennifer Moreno PT  8/29/2017

## 2017-08-31 ENCOUNTER — HOSPITAL ENCOUNTER (OUTPATIENT)
Dept: PHYSICAL THERAPY | Facility: HOSPITAL | Age: 67
Setting detail: THERAPIES SERIES
Discharge: HOME | End: 2017-08-31

## 2017-08-31 DIAGNOSIS — G89.29 CHRONIC PAIN OF RIGHT ANKLE: Primary | ICD-10-CM

## 2017-08-31 DIAGNOSIS — M25.671 ANKLE JOINT STIFFNESS, BILATERAL: ICD-10-CM

## 2017-08-31 DIAGNOSIS — M25.672 ANKLE JOINT STIFFNESS, BILATERAL: ICD-10-CM

## 2017-08-31 DIAGNOSIS — M25.571 CHRONIC PAIN OF RIGHT ANKLE: Primary | ICD-10-CM

## 2017-08-31 DIAGNOSIS — R26.2 DIFFICULTY WALKING: ICD-10-CM

## 2017-08-31 DIAGNOSIS — R29.898 ANKLE WEAKNESS: ICD-10-CM

## 2017-08-31 PROCEDURE — 97113 AQUATIC THERAPY/EXERCISES: CPT | Performed by: PHYSICAL THERAPIST

## 2017-08-31 PROCEDURE — G8980 MOBILITY D/C STATUS: HCPCS | Performed by: PHYSICAL THERAPIST

## 2017-08-31 PROCEDURE — G8979 MOBILITY GOAL STATUS: HCPCS | Performed by: PHYSICAL THERAPIST

## 2017-10-06 ENCOUNTER — HOSPITAL ENCOUNTER (INPATIENT)
Facility: HOSPITAL | Age: 67
LOS: 11 days | End: 2017-10-18
Attending: THORACIC SURGERY (CARDIOTHORACIC VASCULAR SURGERY) | Admitting: THORACIC SURGERY (CARDIOTHORACIC VASCULAR SURGERY)

## 2017-10-06 DIAGNOSIS — R53.1 GENERALIZED WEAKNESS: ICD-10-CM

## 2017-10-06 DIAGNOSIS — R53.81 PHYSICAL DECONDITIONING: Primary | ICD-10-CM

## 2017-10-06 DIAGNOSIS — I25.118 CORONARY ARTERY DISEASE OF NATIVE HEART WITH STABLE ANGINA PECTORIS, UNSPECIFIED VESSEL OR LESION TYPE (HCC): ICD-10-CM

## 2017-10-06 DIAGNOSIS — Z95.1 S/P CABG X 4: ICD-10-CM

## 2017-10-06 LAB
BH CV XLRA MEAS - DIST GSV THIGH DIST LEFT: 0.29 CM
BH CV XLRA MEAS - DIST GSV THIGH DIST RIGHT: 0.19 CM
BH CV XLRA MEAS - GSV KNEE DIST LEFT: 0.33 CM
BH CV XLRA MEAS - GSV KNEE DIST RIGHT: 0.21 CM
BH CV XLRA MEAS - GSV ORIGIN DIST LEFT: 0.33 CM
BH CV XLRA MEAS - GSV ORIGIN DIST RIGHT: 0.41 CM
BH CV XLRA MEAS - MID GSV THIGH  LEFT: 0.27 CM
BH CV XLRA MEAS - MID GSV THIGH  RIGHT: 0.38 CM
BH CV XLRA MEAS - PROX GSV CALF DIST RIGHT: 0.18 CM
BH CV XLRA MEAS - PROX GSV THIGH  LEFT: 0.3 CM
BH CV XLRA MEAS - PROX GSV THIGH  RIGHT: 0.32 CM

## 2017-10-07 ENCOUNTER — APPOINTMENT (OUTPATIENT)
Dept: CARDIOLOGY | Facility: HOSPITAL | Age: 67
End: 2017-10-07
Attending: THORACIC SURGERY (CARDIOTHORACIC VASCULAR SURGERY)

## 2017-10-07 ENCOUNTER — APPOINTMENT (OUTPATIENT)
Dept: GENERAL RADIOLOGY | Facility: HOSPITAL | Age: 67
End: 2017-10-07

## 2017-10-07 ENCOUNTER — APPOINTMENT (OUTPATIENT)
Dept: CARDIOLOGY | Facility: HOSPITAL | Age: 67
End: 2017-10-07

## 2017-10-07 PROBLEM — I25.118 CORONARY ARTERY DISEASE OF NATIVE HEART WITH STABLE ANGINA PECTORIS (HCC): Status: ACTIVE | Noted: 2017-10-07

## 2017-10-07 LAB
ABO GROUP BLD: NORMAL
ABO GROUP BLD: NORMAL
ALBUMIN SERPL-MCNC: 3.8 G/DL (ref 3.5–5.2)
ALBUMIN/GLOB SERPL: 1.3 G/DL
ALP SERPL-CCNC: 87 U/L (ref 39–117)
ALT SERPL W P-5'-P-CCNC: 19 U/L (ref 1–33)
ANION GAP SERPL CALCULATED.3IONS-SCNC: 13.6 MMOL/L
APTT PPP: 32 SECONDS (ref 22.7–35.4)
AST SERPL-CCNC: 23 U/L (ref 1–32)
BH CV XLRA MEAS LEFT DIST CCA EDV: 11 CM/SEC
BH CV XLRA MEAS LEFT DIST CCA PSV: 58.1 CM/SEC
BH CV XLRA MEAS LEFT PROX CCA EDV: 10.2 CM/SEC
BH CV XLRA MEAS LEFT PROX CCA PSV: 62.1 CM/SEC
BH CV XLRA MEAS LEFT PROX ECA EDV: -17.3 CM/SEC
BH CV XLRA MEAS LEFT PROX ECA PSV: -127 CM/SEC
BH CV XLRA MEAS LEFT PROX ICA PSV: 0 CM/SEC
BH CV XLRA MEAS LEFT PROX SCLA PSV: 164 CM/SEC
BH CV XLRA MEAS LEFT VERTEBRAL A EDV: 33 CM/SEC
BH CV XLRA MEAS LEFT VERTEBRAL A PSV: 88.8 CM/SEC
BH CV XLRA MEAS RIGHT DIST CCA EDV: 7.9 CM/SEC
BH CV XLRA MEAS RIGHT DIST CCA PSV: 38.5 CM/SEC
BH CV XLRA MEAS RIGHT PROX CCA EDV: 10.6 CM/SEC
BH CV XLRA MEAS RIGHT PROX CCA PSV: 84.4 CM/SEC
BH CV XLRA MEAS RIGHT PROX ECA EDV: -30.1 CM/SEC
BH CV XLRA MEAS RIGHT PROX ECA PSV: -203 CM/SEC
BH CV XLRA MEAS RIGHT PROX ICA PSV: 0 CM/SEC
BH CV XLRA MEAS RIGHT PROX SCLA PSV: 153 CM/SEC
BH CV XLRA MEAS RIGHT VERTEBRAL A EDV: 6.7 CM/SEC
BH CV XLRA MEAS RIGHT VERTEBRAL A PSV: 34.2 CM/SEC
BILIRUB SERPL-MCNC: 0.2 MG/DL (ref 0.1–1.2)
BLD GP AB SCN SERPL QL: NEGATIVE
BUN BLD-MCNC: 18 MG/DL (ref 8–23)
BUN/CREAT SERPL: 17 (ref 7–25)
CALCIUM SPEC-SCNC: 9.7 MG/DL (ref 8.6–10.5)
CHLORIDE SERPL-SCNC: 104 MMOL/L (ref 98–107)
CLOSE TME COLL+ADP + EPINEP PNL BLD: 34 % (ref 86–100)
CO2 SERPL-SCNC: 24.4 MMOL/L (ref 22–29)
CREAT BLD-MCNC: 1.06 MG/DL (ref 0.57–1)
DEPRECATED RDW RBC AUTO: 42.5 FL (ref 37–54)
ERYTHROCYTE [DISTWIDTH] IN BLOOD BY AUTOMATED COUNT: 12.6 % (ref 11.7–13)
GFR SERPL CREATININE-BSD FRML MDRD: 52 ML/MIN/1.73
GLOBULIN UR ELPH-MCNC: 3 GM/DL
GLUCOSE BLD-MCNC: 132 MG/DL (ref 65–99)
HBA1C MFR BLD: 5.64 % (ref 4.8–5.6)
HCT VFR BLD AUTO: 39 % (ref 35.6–45.5)
HGB BLD-MCNC: 13 G/DL (ref 11.9–15.5)
INR PPP: 0.99 (ref 0.9–1.1)
LEFT ARM BP: NORMAL MMHG
MCH RBC QN AUTO: 30.7 PG (ref 26.9–32)
MCHC RBC AUTO-ENTMCNC: 33.3 G/DL (ref 32.4–36.3)
MCV RBC AUTO: 92.2 FL (ref 80.5–98.2)
PLATELET # BLD AUTO: 225 10*3/MM3 (ref 140–500)
PMV BLD AUTO: 9.7 FL (ref 6–12)
POTASSIUM BLD-SCNC: 4 MMOL/L (ref 3.5–5.2)
PROT SERPL-MCNC: 6.8 G/DL (ref 6–8.5)
PROTHROMBIN TIME: 12.7 SECONDS (ref 11.7–14.2)
RBC # BLD AUTO: 4.23 10*6/MM3 (ref 3.9–5.2)
RH BLD: POSITIVE
RH BLD: POSITIVE
RIGHT ARM BP: NORMAL MMHG
SODIUM BLD-SCNC: 142 MMOL/L (ref 136–145)
WBC NRBC COR # BLD: 11.92 10*3/MM3 (ref 4.5–10.7)

## 2017-10-07 PROCEDURE — 86900 BLOOD TYPING SEROLOGIC ABO: CPT

## 2017-10-07 PROCEDURE — 93010 ELECTROCARDIOGRAM REPORT: CPT | Performed by: INTERNAL MEDICINE

## 2017-10-07 PROCEDURE — 86923 COMPATIBILITY TEST ELECTRIC: CPT

## 2017-10-07 PROCEDURE — G0378 HOSPITAL OBSERVATION PER HR: HCPCS

## 2017-10-07 PROCEDURE — 86900 BLOOD TYPING SEROLOGIC ABO: CPT | Performed by: THORACIC SURGERY (CARDIOTHORACIC VASCULAR SURGERY)

## 2017-10-07 PROCEDURE — 71020 HC CHEST PA AND LATERAL: CPT

## 2017-10-07 PROCEDURE — 80053 COMPREHEN METABOLIC PANEL: CPT | Performed by: THORACIC SURGERY (CARDIOTHORACIC VASCULAR SURGERY)

## 2017-10-07 PROCEDURE — 86850 RBC ANTIBODY SCREEN: CPT | Performed by: THORACIC SURGERY (CARDIOTHORACIC VASCULAR SURGERY)

## 2017-10-07 PROCEDURE — 85576 BLOOD PLATELET AGGREGATION: CPT | Performed by: THORACIC SURGERY (CARDIOTHORACIC VASCULAR SURGERY)

## 2017-10-07 PROCEDURE — 93970 EXTREMITY STUDY: CPT

## 2017-10-07 PROCEDURE — 93880 EXTRACRANIAL BILAT STUDY: CPT

## 2017-10-07 PROCEDURE — 86901 BLOOD TYPING SEROLOGIC RH(D): CPT | Performed by: THORACIC SURGERY (CARDIOTHORACIC VASCULAR SURGERY)

## 2017-10-07 PROCEDURE — 85610 PROTHROMBIN TIME: CPT | Performed by: THORACIC SURGERY (CARDIOTHORACIC VASCULAR SURGERY)

## 2017-10-07 PROCEDURE — 85730 THROMBOPLASTIN TIME PARTIAL: CPT | Performed by: THORACIC SURGERY (CARDIOTHORACIC VASCULAR SURGERY)

## 2017-10-07 PROCEDURE — 93005 ELECTROCARDIOGRAM TRACING: CPT | Performed by: THORACIC SURGERY (CARDIOTHORACIC VASCULAR SURGERY)

## 2017-10-07 PROCEDURE — 85027 COMPLETE CBC AUTOMATED: CPT | Performed by: THORACIC SURGERY (CARDIOTHORACIC VASCULAR SURGERY)

## 2017-10-07 PROCEDURE — 86901 BLOOD TYPING SEROLOGIC RH(D): CPT

## 2017-10-07 PROCEDURE — 83036 HEMOGLOBIN GLYCOSYLATED A1C: CPT | Performed by: THORACIC SURGERY (CARDIOTHORACIC VASCULAR SURGERY)

## 2017-10-07 RX ORDER — NITROGLYCERIN 0.4 MG/1
0.4 TABLET SUBLINGUAL
COMMUNITY
End: 2018-04-29

## 2017-10-07 RX ORDER — ALPRAZOLAM 0.25 MG/1
0.25 TABLET ORAL EVERY 8 HOURS PRN
Status: DISCONTINUED | OUTPATIENT
Start: 2017-10-07 | End: 2017-10-11

## 2017-10-07 RX ORDER — NITROGLYCERIN 0.4 MG/1
0.4 TABLET SUBLINGUAL
Status: DISCONTINUED | OUTPATIENT
Start: 2017-10-07 | End: 2017-10-12

## 2017-10-07 RX ORDER — LOSARTAN POTASSIUM 25 MG/1
25 TABLET ORAL DAILY
Status: DISCONTINUED | OUTPATIENT
Start: 2017-10-07 | End: 2017-10-12

## 2017-10-07 RX ORDER — LEVOTHYROXINE SODIUM 137 UG/1
137 TABLET ORAL DAILY
Status: DISCONTINUED | OUTPATIENT
Start: 2017-10-07 | End: 2017-10-18 | Stop reason: HOSPADM

## 2017-10-07 RX ORDER — RANOLAZINE 500 MG/1
500 TABLET, EXTENDED RELEASE ORAL 2 TIMES DAILY
COMMUNITY
End: 2017-10-31 | Stop reason: HOSPADM

## 2017-10-07 RX ORDER — ERGOCALCIFEROL 1.25 MG/1
50000 CAPSULE ORAL DAILY
COMMUNITY
End: 2017-10-31 | Stop reason: HOSPADM

## 2017-10-07 RX ORDER — POTASSIUM CHLORIDE 7.45 MG/ML
10 INJECTION INTRAVENOUS
Status: DISCONTINUED | OUTPATIENT
Start: 2017-10-07 | End: 2017-10-12

## 2017-10-07 RX ORDER — POTASSIUM CHLORIDE 750 MG/1
40 CAPSULE, EXTENDED RELEASE ORAL AS NEEDED
Status: DISCONTINUED | OUTPATIENT
Start: 2017-10-07 | End: 2017-10-12

## 2017-10-07 RX ORDER — TEMAZEPAM 7.5 MG/1
7.5 CAPSULE ORAL NIGHTLY PRN
Status: DISCONTINUED | OUTPATIENT
Start: 2017-10-07 | End: 2017-10-12

## 2017-10-07 RX ORDER — TRAMADOL HYDROCHLORIDE 50 MG/1
50 TABLET ORAL 2 TIMES DAILY
Status: DISCONTINUED | OUTPATIENT
Start: 2017-10-07 | End: 2017-10-08

## 2017-10-07 RX ORDER — ASPIRIN 81 MG/1
81 TABLET, CHEWABLE ORAL DAILY
COMMUNITY

## 2017-10-07 RX ORDER — LOSARTAN POTASSIUM 25 MG/1
25 TABLET ORAL DAILY
COMMUNITY
End: 2017-10-31 | Stop reason: HOSPADM

## 2017-10-07 RX ORDER — ROSUVASTATIN CALCIUM 20 MG/1
40 TABLET, COATED ORAL NIGHTLY
COMMUNITY
End: 2019-01-17

## 2017-10-07 RX ORDER — LEVOTHYROXINE SODIUM 137 UG/1
137 TABLET ORAL DAILY
COMMUNITY
End: 2019-01-17

## 2017-10-07 RX ORDER — DIPHENHYDRAMINE HCL 25 MG
25 CAPSULE ORAL NIGHTLY PRN
Status: DISCONTINUED | OUTPATIENT
Start: 2017-10-07 | End: 2017-10-12

## 2017-10-07 RX ORDER — ASPIRIN 81 MG/1
81 TABLET, CHEWABLE ORAL DAILY
Status: DISCONTINUED | OUTPATIENT
Start: 2017-10-07 | End: 2017-10-12

## 2017-10-07 RX ORDER — CHLORHEXIDINE GLUCONATE 0.12 MG/ML
15 RINSE ORAL EVERY 12 HOURS SCHEDULED
Status: DISPENSED | OUTPATIENT
Start: 2017-10-08 | End: 2017-10-09

## 2017-10-07 RX ORDER — POTASSIUM CHLORIDE 1.5 G/1.77G
40 POWDER, FOR SOLUTION ORAL AS NEEDED
Status: DISCONTINUED | OUTPATIENT
Start: 2017-10-07 | End: 2017-10-12

## 2017-10-07 RX ORDER — TRAMADOL HYDROCHLORIDE 50 MG/1
50 TABLET ORAL 2 TIMES DAILY
COMMUNITY
End: 2018-04-29

## 2017-10-07 RX ORDER — ATORVASTATIN CALCIUM 20 MG/1
40 TABLET, FILM COATED ORAL NIGHTLY
Status: DISCONTINUED | OUTPATIENT
Start: 2017-10-07 | End: 2017-10-08

## 2017-10-07 RX ORDER — ACETAMINOPHEN 325 MG/1
650 TABLET ORAL EVERY 4 HOURS PRN
Status: DISCONTINUED | OUTPATIENT
Start: 2017-10-07 | End: 2017-10-12

## 2017-10-07 RX ORDER — CEFAZOLIN SODIUM 2 G/100ML
2 INJECTION, SOLUTION INTRAVENOUS
Status: ACTIVE | OUTPATIENT
Start: 2017-10-07 | End: 2017-10-08

## 2017-10-07 RX ORDER — SODIUM CHLORIDE 0.9 % (FLUSH) 0.9 %
1-10 SYRINGE (ML) INJECTION AS NEEDED
Status: DISCONTINUED | OUTPATIENT
Start: 2017-10-07 | End: 2017-10-12

## 2017-10-07 RX ADMIN — ASPIRIN 81 MG: 81 TABLET, CHEWABLE ORAL at 09:31

## 2017-10-07 RX ADMIN — TRAMADOL HYDROCHLORIDE 50 MG: 50 TABLET ORAL at 18:31

## 2017-10-07 RX ADMIN — TRAMADOL HYDROCHLORIDE 50 MG: 50 TABLET ORAL at 09:30

## 2017-10-07 RX ADMIN — LOSARTAN POTASSIUM 25 MG: 25 TABLET, FILM COATED ORAL at 09:31

## 2017-10-07 RX ADMIN — METOPROLOL TARTRATE 25 MG: 25 TABLET ORAL at 18:31

## 2017-10-07 RX ADMIN — TRAMADOL HYDROCHLORIDE 50 MG: 50 TABLET ORAL at 02:16

## 2017-10-07 RX ADMIN — LEVOTHYROXINE SODIUM 137 MCG: 137 TABLET ORAL at 21:49

## 2017-10-07 RX ADMIN — ATORVASTATIN CALCIUM 40 MG: 20 TABLET, FILM COATED ORAL at 20:32

## 2017-10-07 RX ADMIN — METOPROLOL TARTRATE 25 MG: 25 TABLET ORAL at 09:31

## 2017-10-07 NOTE — PLAN OF CARE
Problem: Patient Care Overview (Adult)  Goal: Plan of Care Review  Outcome: Ongoing (interventions implemented as appropriate)    10/07/17 2293   Coping/Psychosocial Response Interventions   Plan Of Care Reviewed With patient   Patient Care Overview   Progress no change   Outcome Evaluation   Outcome Summary/Follow up Plan No cp today, vss, brilinta held vss, will continue to monitor.        Goal: Adult Individualization and Mutuality  Outcome: Ongoing (interventions implemented as appropriate)  Goal: Discharge Needs Assessment  Outcome: Ongoing (interventions implemented as appropriate)    Problem: Acute Coronary Syndrome (ACS) (Adult)  Goal: Signs and Symptoms of Listed Potential Problems Will be Absent or Manageable (Acute Coronary Syndrome)  Outcome: Ongoing (interventions implemented as appropriate)

## 2017-10-07 NOTE — PROGRESS NOTES
Patient transferred from [place]. Left Main disease; Stable but on Brlinta. Check Platelet Agg.

## 2017-10-07 NOTE — H&P
"  Patient Care Team:  Narciso Camargo MD as PCP - General (Family Medicine)    Chief complaint: chest pain    Subjective     History of Present Illness  Patient was transferred from [facility] for surgical evaluation after cardiac catheterization.  Patient has a PMH of HTN, HLD, CAD sp stents 2006, and hypothyroidism.  Patient states that she has had chest \"tightness\" along with SOB for the past year.  She stated that it occasionally would occur at rest however, mostly during exertion.  Two years ago the patient broke right ankle and right leg which has made her deconditioned so she \"chalked\" the shortness of breath to that.   She presented to her PCP with those complaints and a pulmonology work up was negative, she presented to Dr. Grant for cardiac work up and underwent a stress test and cardiac cath which prompted her transfer to [facility] for surgical work up.  Patient currently denies chest pain, SOB, or discomfort.    Review of Systems   Constitutional: Positive for activity change and fatigue. Negative for appetite change, diaphoresis, fever and unexpected weight change.   HENT: Positive for congestion and sneezing. Negative for mouth sores, nosebleeds, postnasal drip, rhinorrhea, sinus pain, trouble swallowing and voice change.    Eyes: Negative.    Respiratory: Positive for chest tightness and shortness of breath. Negative for cough, choking, wheezing and stridor.    Cardiovascular: Positive for chest pain. Negative for palpitations and leg swelling.   Gastrointestinal: Negative for abdominal distention, abdominal pain, anal bleeding, blood in stool, constipation, diarrhea, nausea and rectal pain.   Endocrine: Negative.    Genitourinary: Negative.    Musculoskeletal: Positive for arthralgias, back pain, gait problem, joint swelling and myalgias. Negative for neck pain and neck stiffness.   Skin: Negative.    Allergic/Immunologic: Negative.    Neurological: Positive for weakness and " light-headedness. Negative for dizziness, tremors, seizures, syncope, facial asymmetry, speech difficulty and headaches.   Hematological: Negative.    Psychiatric/Behavioral: Negative.         Past Medical History:   Diagnosis Date   • Arthritis    • Coronary artery disease    • Disease of thyroid gland    • Hypertension      Past Surgical History:   Procedure Laterality Date   • CARDIAC SURGERY     • FRACTURE SURGERY     • TONSILLECTOMY       Family History   Problem Relation Age of Onset   • Heart disease Mother    • Hypertension Mother    • Heart disease Father    • Hypertension Father    • Cancer Brother    • Hypertension Brother      Social History   Substance Use Topics   • Smoking status: Never Smoker   • Smokeless tobacco: Never Used   • Alcohol use No     Prescriptions Prior to Admission   Medication Sig Dispense Refill Last Dose   • aspirin 81 MG chewable tablet Chew 81 mg Daily.      • levothyroxine (SYNTHROID, LEVOTHROID) 137 MCG tablet Take 137 mcg by mouth Daily.      • losartan (COZAAR) 25 MG tablet Take 25 mg by mouth Daily.      • metoprolol tartrate (LOPRESSOR) 25 MG tablet Take 25 mg by mouth 2 (Two) Times a Day.      • nitroglycerin (NITROSTAT) 0.4 MG SL tablet Place 0.4 mg under the tongue Every 5 (Five) Minutes As Needed for Chest Pain. Take no more than 3 doses in 15 minutes.      • ranolazine (RANEXA) 500 MG 12 hr tablet Take 500 mg by mouth 2 (Two) Times a Day.      • traMADol (ULTRAM) 50 MG tablet Take 50 mg by mouth 2 (Two) Times a Day.          aspirin 81 mg Oral Daily   atorvastatin 40 mg Oral Nightly   ceFAZolin 2 g Intravenous On Call to OR   [START ON 10/8/2017] chlorhexidine 15 mL Mouth/Throat Q12H   levothyroxine 137 mcg Oral Daily   losartan 25 mg Oral Daily   [START ON 10/9/2017] metoprolol tartrate 12.5 mg Oral On Call to OR   metoprolol tartrate 25 mg Oral BID   mupirocin 1 application Each Nare Q12H   traMADol 50 mg Oral BID     Allergies:  Latex    Objective      Vital  Signs  Temp:  [97.3 °F (36.3 °C)-97.7 °F (36.5 °C)] 97.4 °F (36.3 °C)  Heart Rate:  [63-96] 68  Resp:  [12-18] 12  BP: (140-152)/(81-97) 149/81    Flowsheet Rows         First Filed Value    Admission Height      Admission Weight  216 lb 5 oz (98.1 kg) Documented at 10/07/2017 0019             Physical Exam   Constitutional: She is oriented to person, place, and time. She appears well-developed and well-nourished.   HENT:   Head: Normocephalic and atraumatic.   Mouth/Throat: No oropharyngeal exudate.   Eyes: Conjunctivae and EOM are normal. Pupils are equal, round, and reactive to light. No scleral icterus.   Neck: Normal range of motion. Neck supple.   Cardiovascular: Normal rate, regular rhythm, normal heart sounds and intact distal pulses.    Pulmonary/Chest: Effort normal and breath sounds normal.   Abdominal: Soft.   Genitourinary:   Genitourinary Comments: No lira   Neurological: She is alert and oriented to person, place, and time. No cranial nerve deficit.   Skin: Skin is warm and dry.       Results Review:   Lab Results (last 24 hours)     Procedure Component Value Units Date/Time    CBC (No Diff) [[id number]]  (Abnormal) Collected:  10/07/17 0311    Specimen:  Blood Updated:  10/07/17 0322     WBC 11.92 (H) 10*3/mm3      RBC 4.23 10*6/mm3      Hemoglobin 13.0 g/dL      Hematocrit 39.0 %      MCV 92.2 fL      MCH 30.7 pg      MCHC 33.3 g/dL      RDW 12.6 %      RDW-SD 42.5 fl      MPV 9.7 fL      Platelets 225 10*3/mm3     Platelet Function ADP [[id number]]  (Abnormal) Collected:  10/07/17 0311    Specimen:  Blood Updated:  10/07/17 0323     ADP Aggregation, % Platelet 34 (L) %     Protime-INR [[id number]]  (Normal) Collected:  10/07/17 0311    Specimen:  Blood Updated:  10/07/17 0337     Protime 12.7 Seconds      INR 0.99    aPTT [[id number]]  (Normal) Collected:  10/07/17 0311    Specimen:  Blood Updated:  10/07/17 0337     PTT 32.0 seconds     Comprehensive Metabolic Panel [[id number]]  (Abnormal) Collected:   10/07/17 0311    Specimen:  Blood Updated:  10/07/17 0349     Glucose 132 (H) mg/dL      BUN 18 mg/dL      Creatinine 1.06 (H) mg/dL      Sodium 142 mmol/L      Potassium 4.0 mmol/L      Chloride 104 mmol/L      CO2 24.4 mmol/L      Calcium 9.7 mg/dL      Total Protein 6.8 g/dL      Albumin 3.80 g/dL      ALT (SGPT) 19 U/L      AST (SGOT) 23 U/L      Alkaline Phosphatase 87 U/L      Total Bilirubin 0.2 mg/dL      eGFR Non African Amer 52 (L) mL/min/1.73      Globulin 3.0 gm/dL      A/G Ratio 1.3 g/dL      BUN/Creatinine Ratio 17.0     Anion Gap 13.6 mmol/L     Hemoglobin A1c [[id number]]  (Abnormal) Collected:  10/07/17 0311    Specimen:  Blood Updated:  10/07/17 0425     Hemoglobin A1C 5.64 (H) %     Narrative:       Hemoglobin A1C Ranges:    Increased Risk for Diabetes  5.7% to 6.4%  Diabetes                     >= 6.5%  Diabetic Goal                < 7.0%              Assessment/Plan     Active Problems:    Coronary artery disease of native heart with stable angina pectoris      Assessment & Plan    CAD- with stents 2006- on Brilinta (last dose 10/6) multi vessel disease   Left main disease   Angina   Abnormal stress test  HTN  HLD- on statin  Hypothyroidism  Frequent falls - recent fall at work 2 years ago patient broke right ankle   Deconditioned d/t fall      Platelet agg 34%   Vein mapping- ordered  Carotid duplex- ordered  U/a- ordered  A1C - 5.64    Due to patient on brilinta I foresee surgery being later next week.      Una Reddy, PANKAJ  10/07/17  11:36 AM

## 2017-10-08 PROBLEM — I25.10 CAD (CORONARY ARTERY DISEASE): Status: ACTIVE | Noted: 2017-10-08

## 2017-10-08 LAB — CLOSE TME COLL+ADP + EPINEP PNL BLD: 15 % (ref 86–100)

## 2017-10-08 PROCEDURE — 99231 SBSQ HOSP IP/OBS SF/LOW 25: CPT | Performed by: NURSE PRACTITIONER

## 2017-10-08 PROCEDURE — 85576 BLOOD PLATELET AGGREGATION: CPT | Performed by: THORACIC SURGERY (CARDIOTHORACIC VASCULAR SURGERY)

## 2017-10-08 PROCEDURE — G8979 MOBILITY GOAL STATUS: HCPCS

## 2017-10-08 PROCEDURE — G8978 MOBILITY CURRENT STATUS: HCPCS

## 2017-10-08 PROCEDURE — 97162 PT EVAL MOD COMPLEX 30 MIN: CPT

## 2017-10-08 RX ORDER — TRAMADOL HYDROCHLORIDE 50 MG/1
50 TABLET ORAL EVERY 6 HOURS PRN
Status: DISCONTINUED | OUTPATIENT
Start: 2017-10-08 | End: 2017-10-12

## 2017-10-08 RX ORDER — ROSUVASTATIN CALCIUM 40 MG/1
40 TABLET, COATED ORAL NIGHTLY
Status: DISCONTINUED | OUTPATIENT
Start: 2017-10-08 | End: 2017-10-12

## 2017-10-08 RX ORDER — ROSUVASTATIN CALCIUM 40 MG/1
40 TABLET, COATED ORAL NIGHTLY
Status: DISCONTINUED | OUTPATIENT
Start: 2017-10-08 | End: 2017-10-08

## 2017-10-08 RX ADMIN — LOSARTAN POTASSIUM 25 MG: 25 TABLET, FILM COATED ORAL at 09:46

## 2017-10-08 RX ADMIN — TEMAZEPAM 7.5 MG: 7.5 CAPSULE ORAL at 22:47

## 2017-10-08 RX ADMIN — METOPROLOL TARTRATE 25 MG: 25 TABLET ORAL at 09:46

## 2017-10-08 RX ADMIN — ACETAMINOPHEN 650 MG: 325 TABLET ORAL at 04:02

## 2017-10-08 RX ADMIN — ROSUVASTATIN CALCIUM 40 MG: 40 TABLET, COATED ORAL at 20:06

## 2017-10-08 RX ADMIN — ASPIRIN 81 MG: 81 TABLET, CHEWABLE ORAL at 09:46

## 2017-10-08 RX ADMIN — LEVOTHYROXINE SODIUM 137 MCG: 137 TABLET ORAL at 20:06

## 2017-10-08 RX ADMIN — METOPROLOL TARTRATE 37.5 MG: 25 TABLET ORAL at 18:17

## 2017-10-08 RX ADMIN — ALPRAZOLAM 0.25 MG: 0.25 TABLET ORAL at 20:09

## 2017-10-08 RX ADMIN — TRAMADOL HYDROCHLORIDE 50 MG: 50 TABLET ORAL at 18:03

## 2017-10-08 NOTE — PROGRESS NOTES
" LOS: 1 day   Patient Care Team:  Narciso Camargo MD as PCP - General (Family Medicine)    Chief Complaint: CAD    Subjective:  Symptoms:  She reports anxiety (due to financial stress).  No shortness of breath, chest pain or chest pressure.    Diet:  No nausea or vomiting.    Activity level: Normal with assistance.    Pain:  She reports no pain.          Vital Signs  Temp:  [97.4 °F (36.3 °C)-98.2 °F (36.8 °C)] 97.6 °F (36.4 °C)  Heart Rate:  [61-77] 65  Resp:  [16-20] 20  BP: (136-156)/(74-89) 141/75  Body mass index is 36 kg/(m^2).    Intake/Output Summary (Last 24 hours) at 10/08/17 0836  Last data filed at 10/07/17 2300   Gross per 24 hour   Intake              560 ml   Output              600 ml   Net              -40 ml          Last 3 weights    10/07/17  0019   Weight: 216 lb 5 oz (98.1 kg)         Objective:  General Appearance:  Comfortable and in no acute distress.    Vital signs: (most recent): Blood pressure 141/75, pulse 65, temperature 97.6 °F (36.4 °C), temperature source Oral, resp. rate 20, height 65\" (165.1 cm), weight 216 lb 5 oz (98.1 kg), SpO2 92 %.  Vital signs are normal.  No fever.              Results Review:        WBC WBC   Date Value Ref Range Status   10/07/2017 11.92 (H) 4.50 - 10.70 10*3/mm3 Final      HGB Hemoglobin   Date Value Ref Range Status   10/07/2017 13.0 11.9 - 15.5 g/dL Final      HCT Hematocrit   Date Value Ref Range Status   10/07/2017 39.0 35.6 - 45.5 % Final      Platelets Platelets   Date Value Ref Range Status   10/07/2017 225 140 - 500 10*3/mm3 Final        PT/INR:    Protime   Date Value Ref Range Status   10/07/2017 12.7 11.7 - 14.2 Seconds Final   /  INR   Date Value Ref Range Status   10/07/2017 0.99 0.90 - 1.10 Final       Sodium Sodium   Date Value Ref Range Status   10/07/2017 142 136 - 145 mmol/L Final      Potassium Potassium   Date Value Ref Range Status   10/07/2017 4.0 3.5 - 5.2 mmol/L Final      Chloride Chloride   Date Value Ref Range Status "   10/07/2017 104 98 - 107 mmol/L Final      Bicarbonate CO2   Date Value Ref Range Status   10/07/2017 24.4 22.0 - 29.0 mmol/L Final      BUN BUN   Date Value Ref Range Status   10/07/2017 18 8 - 23 mg/dL Final      Creatinine Creatinine   Date Value Ref Range Status   10/07/2017 1.06 (H) 0.57 - 1.00 mg/dL Final      Calcium Calcium   Date Value Ref Range Status   10/07/2017 9.7 8.6 - 10.5 mg/dL Final      Magnesium No results found for: MG         aspirin 81 mg Oral Daily   atorvastatin 40 mg Oral Nightly   chlorhexidine 15 mL Mouth/Throat Q12H   levothyroxine 137 mcg Oral Daily   losartan 25 mg Oral Daily   [START ON 10/9/2017] metoprolol tartrate 12.5 mg Oral On Call to OR   metoprolol tartrate 25 mg Oral BID   mupirocin 1 application Each Nare Q12H   traMADol 50 mg Oral BID            Patient Active Problem List   Diagnosis Code   • Coronary artery disease of native heart with stable angina pectoris I25.118       Assessment & Plan  CAD- with stents 2006- on Brilinta (last dose 10/6) multi vessel disease                        Left main disease - 50%             LAD proximal 100%             Circumflex 70%             RCA 99%  Angina   Abnormal stress test  HTN  HLD- on statin  Hypothyroidism  Frequent falls - recent fall at work 2 years ago patient broke right ankle   Deconditioned d/t fall - will consult PT to evaluate pre surgery          Vein mapping- adequate for bypass  Carotid duplex- Proximal right internal carotid artery occlusion.     Right internal carotid artery stenosis totally occluded.     Proximal left internal carotid artery occlusion.  U/a- ordered  A1C - 5.64       Platelet agg 15% - will have to wait for surgery   Currently denies chest pain, hemodynamically stable  Worried about her not being able to cash SS check and not paying her bills d/t admission to the hospital -will speak with nurse about checking with social work       PANKAJ Crouch  10/08/17  8:36 AM    Patient was on  Brilinta, usually requires up to 7 days for effect to dissipate; will follow with serial platelet agg assessments.

## 2017-10-08 NOTE — PLAN OF CARE
Problem: Patient Care Overview (Adult)  Goal: Plan of Care Review  Outcome: Ongoing (interventions implemented as appropriate)    10/08/17 0244   Coping/Psychosocial Response Interventions   Plan Of Care Reviewed With patient   Patient Care Overview   Progress no change   Outcome Evaluation   Outcome Summary/Follow up Plan patient denies any complaints at this time, VSS, awaiting plan for CABG, patient remains SR on the monitor. will continue to monitor.

## 2017-10-08 NOTE — PLAN OF CARE
Problem: Patient Care Overview (Adult)  Goal: Plan of Care Review  Outcome: Outcome(s) achieved Date Met:  10/08/17    10/08/17 1122   Coping/Psychosocial Response Interventions   Plan Of Care Reviewed With patient   Patient Care Overview   Progress improving   Outcome Evaluation   Outcome Summary/Follow up Plan PT eval completed and it is suspected that pt is at or very close to baseline activity. Pt had been seen by OPPT prior to intial admit to imprve mobility and inc fucntinal indep. Pt had progressed from rwx to no AD and has been without an device x 6 weeks. Gait is slow and guarded but functional. Pt was strongly encouraged to increase mobility while in hospital and amb in hallway with staff or family. Pt with no skilled needs for acute PT at this time but would recommend a reorder after surgery. Pt is in full agreement.

## 2017-10-08 NOTE — PLAN OF CARE
Problem: Patient Care Overview (Adult)  Goal: Plan of Care Review  Outcome: Ongoing (interventions implemented as appropriate)    10/08/17 1750   Coping/Psychosocial Response Interventions   Plan Of Care Reviewed With patient   Patient Care Overview   Progress improving   Outcome Evaluation   Outcome Summary/Follow up Plan VSS, PT TO GO TO FOR HEART SURGERY AS SOON AS KAVYA IS OUT OF HER SYSTEM.. NO C/O CP TODAY. WILL CONTINUE TO MONITOR.        Goal: Adult Individualization and Mutuality  Outcome: Ongoing (interventions implemented as appropriate)  Goal: Discharge Needs Assessment  Outcome: Ongoing (interventions implemented as appropriate)    Problem: Acute Coronary Syndrome (ACS) (Adult)  Goal: Signs and Symptoms of Listed Potential Problems Will be Absent or Manageable (Acute Coronary Syndrome)  Outcome: Ongoing (interventions implemented as appropriate)    Problem: Pain, Acute (Adult)  Goal: Identify Related Risk Factors and Signs and Symptoms  Outcome: Ongoing (interventions implemented as appropriate)  Goal: Acceptable Pain Control/Comfort Level  Outcome: Ongoing (interventions implemented as appropriate)

## 2017-10-08 NOTE — THERAPY DISCHARGE NOTE
Acute Care - Physical Therapy Initial Eval/Discharge  [facility]     Patient Name: Marion Anderson  : 1950  MRN: [id number]  Today's Date: 10/8/2017   Onset of Illness/Injury or Date of Surgery Date: 10/06/17  Date of Referral to PT: 10/08/17  Referring Physician: Wanda      Admit Date: 10/6/2017    Visit Dx:    ICD-10-CM ICD-9-CM   1. Physical deconditioning R53.81 799.3   2. Coronary artery disease of native heart with stable angina pectoris, unspecified vessel or lesion type I25.118 414.01     413.9     Patient Active Problem List   Diagnosis   • Coronary artery disease of native heart with stable angina pectoris     Past Medical History:   Diagnosis Date   • Arthritis    • Coronary artery disease    • Disease of thyroid gland    • Hypertension      Past Surgical History:   Procedure Laterality Date   • CARDIAC SURGERY     • FRACTURE SURGERY      ankle. leg   • TONSILLECTOMY            PT ASSESSMENT (last 72 hours)      PT Evaluation       10/08/17 1030 10/07/17 0053    Rehab Evaluation    Document Type evaluation;discharge summary  -     Subjective Information no complaints;agree to therapy   Pt anxious re electric bill.  -     Patient Effort, Rehab Treatment good  -     Patient Effort, Rehab Treatment Comment limited by dec endurance  -     Symptoms Noted During/After Treatment none  -     General Information    Patient Profile Review yes  -     Onset of Illness/Injury or Date of Surgery Date 10/06/17  -     Referring Physician Wanda  -     General Observations Pt resting comfortably in chair.  Per nursing, pt has been up ad jack in room without concerns.  -     Pertinent History Of Current Problem Pt underwent cardiac cath in [place] earlier in week, transferred to [facility] for surgical workup.  -     Prior Level of Function independent:;all household mobility;community mobility   mobility was guarded but safe - denied falls.    -     Equipment Currently Used at Home  wheelchair;walker, standard   per pt, she only uses raised toilet seat.  -     Plans/Goals Discussed With patient;agreed upon  -     Risks Reviewed patient:;increased discomfort;change in vital signs;LOB  -     Benefits Reviewed patient:;improve function;increase independence;increase strength;increase balance  -     Barriers to Rehab medically complex;previous functional deficit   Mobility limited x 2 years.  Off of walker 6 weeks.  -     Living Environment    Lives With significant other  - significant other  -DF    Living Arrangements house  -KP house  -DF    Home Accessibility no concerns  -KP no concerns  -DF    Stair Railings at Home none  - none  -DF    Type of Financial/Environmental Concern --   worried about electric bill  - none  -DF    Transportation Available car;family or friend will provide  - car;family or friend will provide  -    Clinical Impression    Date of Referral to PT 10/08/17  -     PT Diagnosis deconditioning  -     Functional Level At Time Of Evaluation Sup/Mod indep  -     Patient/Family Goals Statement Go home prior to surgery  -     Criteria for Skilled Therapeutic Interventions Met current level of function same as previous level of function;no;does not meet criteria for skilled intervention  -     Pain Assessment    Pain Assessment No/denies pain  -     Vision Assessment/Intervention    Visual Impairment WFL  -     Cognitive Assessment/Intervention    Current Cognitive/Communication Assessment functional  -     Orientation Status oriented x 4  -     Follows Commands/Answers Questions able to follow single-step instructions;able to follow multi-step instructions;100% of the time  -     Personal Safety fully aware of deficits   PT suggested possible use of cane but pt refused.  -     Personal Safety Interventions supervised activity;nonskid shoes/slippers when out of bed  -     Short/Long Term Memory intact short term memory;intact long term  memory  -     ROM (Range of Motion)    General ROM no range of motion deficits identified   Min dec ROM in L LE due to multiple surgeries.  -     MMT (Manual Muscle Testing)    General MMT Assessment no strength deficits identified   L LE more weak than R due to multiple surgeries.  -     Bed Mobility, Assessment/Treatment    Bed Mobility, Comment Per pt and nursing, she is indep with bed mobility.   Up in chair for eval.  -     Transfer Assessment/Treatment    Transfers, Sit-Stand Green conditional independence  -KP     Transfers, Stand-Sit Green conditional independence  -KP     Transfer, Safety Issues step length decreased;weight-shifting ability decreased  -     Transfer, Comment guarded but safe  -     Gait Assessment/Treatment    Gait, Green Level supervision required  -     Gait, Distance (Feet) 120  -     Gait, Gait Deviations shane decreased;double stance time increased;stride width increased;toe-to-floor clearance decreased;weight-shifting ability decreased  -     Gait, Safety Issues step length decreased;weight-shifting ability decreased  -     Gait, Comment Wide NADEEN, guarded gait with little push off or heel strike.  -     Stairs Assessment/Treatment    Stairs, Green Level not tested   pt has no stairs at home.  -     Sensory Assessment/Intervention    Sensory Impairment --   denies deficits  -KP     Positioning and Restraints    Pre-Treatment Position sitting in chair/recliner  -     Post Treatment Position chair  -KP     In Chair sitting;call light within reach;encouraged to call for assist;[place] [facility]  -       10/07/17 0032       General Information    Equipment Currently Used at Home none  -DF       User Key  (r) = Recorded By, (t) = Taken By, (c) = Cosigned By    Initials Name Provider Type    RAHAT Brady, BRO Physical Therapist    NATHANAEL Kay, RN Registered Nurse          Physical Therapy Education     Title: PT OT SLP Therapies  (Done)     Topic: Physical Therapy (Done)     Point: Mobility training (Done)    Learning Progress Summary    Learner Readiness Method Response Comment Documented by Status   Patient Eager E,TB JUAN ANTONIO ANGEL Encouraged pt to ambulate in hallways with staff or family at least tid to improve activity tolerance.  Discussed use of AD but pt feel that that would be a step backward and wpould prefer to go without 9we will revisit this after surgery).  10/08/17 1120 Done               Point: Precautions (Done)    Learning Progress Summary    Learner Readiness Method Response Comment Documented by Status   Patient Eager E,TB JUAN ANTONIO ANGEL Encouraged pt to ambulate in hallways with staff or family at least tid to improve activity tolerance.  Discussed use of AD but pt feel that that would be a step backward and wpould prefer to go without 9we will revisit this after surgery).  10/08/17 1120 Done                      User Key     Initials Effective Dates Name Provider Type Discipline     12/01/15 -  Faiza Brady, PT Physical Therapist PT                PT Recommendation and Plan  Anticipated Discharge Disposition:  (TBD after surgery)  PT Frequency: evaluation only  Plan of Care Review  Plan Of Care Reviewed With: patient  Progress: improving  Outcome Summary/Follow up Plan: PT eval completed and it is suspected that pt is at or very close to baseline activity.  Pt had been seen by OPPT prior to intial admit to imprve mobility and inc fucntinal indep.  Pt had progressed from rwx to no AD and has been without an device x 6 weeks.  Gait is slow and guarded but functional.  Pt was strongly encouraged to increase mobility while in hospital and amb in hallway with staff or family.  Pt with no skilled needs for aute PT at this time but would recommend a reorder after surgery.                  Outcome Measures       10/08/17 1100          How much help from another person do you currently need...    Turning from your back to your side  while in flat bed without using bedrails? 4  -KP      Moving from lying on back to sitting on the side of a flat bed without bedrails? 4  -KP      Moving to and from a bed to a chair (including a wheelchair)? 4  -KP      Standing up from a chair using your arms (e.g., wheelchair, bedside chair)? 4  -KP      Climbing 3-5 steps with a railing? 3  -KP      To walk in hospital room? 4  -KP      AM-PAC 6 Clicks Score 23  -KP      Functional Assessment    Outcome Measure Options AM-PAC 6 Clicks Basic Mobility (PT)  -KP        User Key  (r) = Recorded By, (t) = Taken By, (c) = Cosigned By    Initials Name Provider Type    RAHAT Brady PT Physical Therapist           Time Calculation:         PT Charges       10/08/17 1127          Time Calculation    Start Time 1030  -KP      Stop Time 1055  -KP      Time Calculation (min) 25 min  -KP      PT Received On 10/08/17  -KP        User Key  (r) = Recorded By, (t) = Taken By, (c) = Cosigned By    Initials Name Provider Type    RAHAT Brady PT Physical Therapist          Therapy Charges for Today     Code Description Service Date Service Provider Modifiers Qty    95642616822 HC PT MOBILITY CURRENT 10/8/2017 Faiza Brady, PT GP, CI 1    66387371143 HC PT MOBILITY PROJECTED 10/8/2017 Faiza Brady PT GP, CI 1    09381836982 HC PT EVAL MOD COMPLEXITY 2 10/8/2017 Faiza Brady, BRO GP 1          PT G-Codes  PT Professional Judgement Used?: Yes  Outcome Measure Options: AM-PAC 6 Clicks Basic Mobility (PT)  Score: 23  Functional Limitation: Mobility: Walking and moving around  Mobility: Walking and Moving Around Current Status (): At least 1 percent but less than 20 percent impaired, limited or restricted  Mobility: Walking and Moving Around Goal Status (): At least 1 percent but less than 20 percent impaired, limited or restricted    PT Discharge Summary  Anticipated Discharge Disposition:  (TBD after surgery)  Reason for Discharge: At baseline  function  Discharge Destination:  (TBD)    Faiza Brady, PT  10/8/2017

## 2017-10-09 LAB — CLOSE TME COLL+ADP + EPINEP PNL BLD: 26 % (ref 86–100)

## 2017-10-09 PROCEDURE — 85576 BLOOD PLATELET AGGREGATION: CPT | Performed by: THORACIC SURGERY (CARDIOTHORACIC VASCULAR SURGERY)

## 2017-10-09 RX ADMIN — LEVOTHYROXINE SODIUM 137 MCG: 137 TABLET ORAL at 20:22

## 2017-10-09 RX ADMIN — ASPIRIN 81 MG: 81 TABLET, CHEWABLE ORAL at 09:08

## 2017-10-09 RX ADMIN — METOPROLOL TARTRATE 37.5 MG: 25 TABLET ORAL at 09:08

## 2017-10-09 RX ADMIN — ALPRAZOLAM 0.25 MG: 0.25 TABLET ORAL at 18:50

## 2017-10-09 RX ADMIN — ROSUVASTATIN CALCIUM 40 MG: 40 TABLET, COATED ORAL at 20:22

## 2017-10-09 RX ADMIN — METOPROLOL TARTRATE 37.5 MG: 25 TABLET ORAL at 17:46

## 2017-10-09 RX ADMIN — LOSARTAN POTASSIUM 25 MG: 25 TABLET, FILM COATED ORAL at 09:09

## 2017-10-09 NOTE — PROGRESS NOTES
" LOS: 2 days   Patient Care Team:  Narciso Camargo MD as PCP - General (Family Medicine)    Chief Complaint: CAD    Subjective  No complaints  Vital Signs  Temp:  [97.5 °F (36.4 °C)-98.4 °F (36.9 °C)] 98.4 °F (36.9 °C)  Heart Rate:  [75-80] 75  Resp:  [18] 18  BP: (135-161)/(65-94) 135/65  Body mass index is 36 kg/(m^2).    Intake/Output Summary (Last 24 hours) at 10/09/17 1544  Last data filed at 10/08/17 1726   Gross per 24 hour   Intake                0 ml   Output              300 ml   Net             -300 ml            Last 3 weights    10/07/17  0019   Weight: 216 lb 5 oz (98.1 kg)         Objective:  General Appearance:  Comfortable and in no acute distress.    Vital signs: (most recent): Blood pressure 135/65, pulse 75, temperature 98.4 °F (36.9 °C), temperature source Oral, resp. rate 18, height 65\" (165.1 cm), weight 216 lb 5 oz (98.1 kg), SpO2 92 %.  Vital signs are normal.  No fever.    Output: Producing urine and producing stool.    HEENT: Normal HEENT exam.    Lungs:  Normal respiratory rate and normal effort.  She is not in respiratory distress.  Breath sounds clear to auscultation.  No wheezes.    Heart: Normal rate.  Regular rhythm.  S1 normal and S2 normal.    Abdomen: Abdomen is soft and non-distended.  Bowel sounds are normal.   There is no epigastric area or no suprapubic area tenderness.  There is no guarding.   There is no mass. There is no splenomegaly. There is no hepatomegaly.   Extremities: Normal range of motion.  There is no deformity or dependent edema.    Pulses: Distal pulses are intact.    Neurological: Patient is alert and oriented to person, place and time.  Patient has normal reflexes, normal muscle tone and normal coordination.    Pupils:  Pupils are equal, round, and reactive to light.  Pupils are equal.   Skin:  Warm and dry.  No rash or ecchymosis.             Results Review:        WBC WBC   Date Value Ref Range Status   10/07/2017 11.92 (H) 4.50 - 10.70 10*3/mm3 Final    "   HGB Hemoglobin   Date Value Ref Range Status   10/07/2017 13.0 11.9 - 15.5 g/dL Final      HCT Hematocrit   Date Value Ref Range Status   10/07/2017 39.0 35.6 - 45.5 % Final      Platelets Platelets   Date Value Ref Range Status   10/07/2017 225 140 - 500 10*3/mm3 Final        PT/INR:    Protime   Date Value Ref Range Status   10/07/2017 12.7 11.7 - 14.2 Seconds Final   /  INR   Date Value Ref Range Status   10/07/2017 0.99 0.90 - 1.10 Final       Sodium Sodium   Date Value Ref Range Status   10/07/2017 142 136 - 145 mmol/L Final      Potassium Potassium   Date Value Ref Range Status   10/07/2017 4.0 3.5 - 5.2 mmol/L Final      Chloride Chloride   Date Value Ref Range Status   10/07/2017 104 98 - 107 mmol/L Final      Bicarbonate CO2   Date Value Ref Range Status   10/07/2017 24.4 22.0 - 29.0 mmol/L Final      BUN BUN   Date Value Ref Range Status   10/07/2017 18 8 - 23 mg/dL Final      Creatinine Creatinine   Date Value Ref Range Status   10/07/2017 1.06 (H) 0.57 - 1.00 mg/dL Final      Calcium Calcium   Date Value Ref Range Status   10/07/2017 9.7 8.6 - 10.5 mg/dL Final      Magnesium No results found for: MG         aspirin 81 mg Oral Daily   levothyroxine 137 mcg Oral Daily   losartan 25 mg Oral Daily   metoprolol tartrate 37.5 mg Oral BID   rosuvastatin 40 mg Oral Nightly              Patient Active Problem List   Diagnosis Code   • Coronary artery disease of native heart with stable angina pectoris I25.118   • CAD (coronary artery disease) I25.10       Assessment & Plan   CAD- with stents 2006- on Brilinta (last dose 10/6) multi vessel disease                        Left main disease - 50%                                  LAD proximal 100%                                  Circumflex 70%                                  RCA 99%  Angina   Abnormal stress test  HTN  HLD- on statin  Hypothyroidism  Frequent falls - recent fall at work 2 years ago patient broke right ankle   Deconditioned d/t fall - will consult  PT to evaluate pre surgery     Vein mapping- adequate for bypass  Carotid duplex- Proximal right internal carotid artery occlusion.                                               Right internal carotid artery stenosis totally occluded.                                               Proximal left internal carotid artery occlusion.  U/a- ordered  A1C - 5.64  Platelet agg 15% - will have to wait for surgery   Currently denies chest pain, hemodynamically stable        Una Reddy, PANKAJ  10/09/17  3:44 PM

## 2017-10-09 NOTE — PLAN OF CARE
Problem: Patient Care Overview (Adult)  Goal: Plan of Care Review  Outcome: Ongoing (interventions implemented as appropriate)    10/09/17 0351   Coping/Psychosocial Response Interventions   Plan Of Care Reviewed With patient   Patient Care Overview   Progress progress towards functional goals is fair   Outcome Evaluation   Outcome Summary/Follow up Plan pt has rested well. no c/o chest pain. for open heart surgery tentatively later this week willc ontt o monitor

## 2017-10-10 LAB
ARTERIAL PATENCY WRIST A: POSITIVE
ATMOSPHERIC PRESS: 750.7 MMHG
BACTERIA UR QL AUTO: ABNORMAL /HPF
BASE EXCESS BLDA CALC-SCNC: 6.2 MMOL/L (ref 0–2)
BDY SITE: ABNORMAL
BILIRUB UR QL STRIP: NEGATIVE
CLARITY UR: CLEAR
CLOSE TME COLL+ADP + EPINEP PNL BLD: 36 % (ref 86–100)
COD CRY URNS QL: ABNORMAL /HPF
COLOR UR: YELLOW
GLUCOSE UR STRIP-MCNC: NEGATIVE MG/DL
HCO3 BLDA-SCNC: 29.8 MMOL/L (ref 22–28)
HGB UR QL STRIP.AUTO: NEGATIVE
HYALINE CASTS UR QL AUTO: ABNORMAL /LPF
KETONES UR QL STRIP: NEGATIVE
LEUKOCYTE ESTERASE UR QL STRIP.AUTO: ABNORMAL
MODALITY: ABNORMAL
NITRITE UR QL STRIP: NEGATIVE
PCO2 BLDA: 38.6 MM HG (ref 35–45)
PH BLDA: 7.5 PH UNITS (ref 7.35–7.45)
PH UR STRIP.AUTO: 7 [PH] (ref 5–8)
PO2 BLDA: 83.2 MM HG (ref 80–100)
PROT UR QL STRIP: NEGATIVE
RBC # UR: ABNORMAL /HPF
REF LAB TEST METHOD: ABNORMAL
SAO2 % BLDCOA: 97.1 % (ref 92–99)
SP GR UR STRIP: 1.01 (ref 1–1.03)
SQUAMOUS #/AREA URNS HPF: ABNORMAL /HPF
TOTAL RATE: 16 BREATHS/MINUTE
TRANS CELLS #/AREA URNS HPF: ABNORMAL /HPF
UROBILINOGEN UR QL STRIP: ABNORMAL
WBC UR QL AUTO: ABNORMAL /HPF

## 2017-10-10 PROCEDURE — 82803 BLOOD GASES ANY COMBINATION: CPT

## 2017-10-10 PROCEDURE — 87186 SC STD MICRODIL/AGAR DIL: CPT | Performed by: THORACIC SURGERY (CARDIOTHORACIC VASCULAR SURGERY)

## 2017-10-10 PROCEDURE — 36600 WITHDRAWAL OF ARTERIAL BLOOD: CPT

## 2017-10-10 PROCEDURE — 85576 BLOOD PLATELET AGGREGATION: CPT | Performed by: THORACIC SURGERY (CARDIOTHORACIC VASCULAR SURGERY)

## 2017-10-10 PROCEDURE — 87086 URINE CULTURE/COLONY COUNT: CPT | Performed by: THORACIC SURGERY (CARDIOTHORACIC VASCULAR SURGERY)

## 2017-10-10 PROCEDURE — 94799 UNLISTED PULMONARY SVC/PX: CPT

## 2017-10-10 PROCEDURE — 87147 CULTURE TYPE IMMUNOLOGIC: CPT | Performed by: THORACIC SURGERY (CARDIOTHORACIC VASCULAR SURGERY)

## 2017-10-10 PROCEDURE — 81001 URINALYSIS AUTO W/SCOPE: CPT | Performed by: THORACIC SURGERY (CARDIOTHORACIC VASCULAR SURGERY)

## 2017-10-10 RX ADMIN — ROSUVASTATIN CALCIUM 40 MG: 40 TABLET, COATED ORAL at 20:24

## 2017-10-10 RX ADMIN — METOPROLOL TARTRATE 37.5 MG: 25 TABLET ORAL at 17:55

## 2017-10-10 RX ADMIN — METOPROLOL TARTRATE 37.5 MG: 25 TABLET ORAL at 08:12

## 2017-10-10 RX ADMIN — ALPRAZOLAM 0.25 MG: 0.25 TABLET ORAL at 22:33

## 2017-10-10 RX ADMIN — LEVOTHYROXINE SODIUM 137 MCG: 137 TABLET ORAL at 20:24

## 2017-10-10 RX ADMIN — ASPIRIN 81 MG: 81 TABLET, CHEWABLE ORAL at 08:12

## 2017-10-10 RX ADMIN — LOSARTAN POTASSIUM 25 MG: 25 TABLET, FILM COATED ORAL at 08:12

## 2017-10-10 RX ADMIN — TRAMADOL HYDROCHLORIDE 50 MG: 50 TABLET ORAL at 00:58

## 2017-10-10 NOTE — PROGRESS NOTES
" LOS: 3 days   Patient Care Team:  Narciso Camargo MD as PCP - General (Family Medicine)    Chief Complaint: CAD    Subjective  Patient is anxious about SS check and being in the hospital     Vital Signs  Temp:  [97.5 °F (36.4 °C)-98.4 °F (36.9 °C)] 98.4 °F (36.9 °C)  Heart Rate:  [70-78] 70  Resp:  [16-18] 18  BP: (142-156)/(69-84) 142/69  Body mass index is 36 kg/(m^2).    Intake/Output Summary (Last 24 hours) at 10/10/17 1331  Last data filed at 10/09/17 2046   Gross per 24 hour   Intake                0 ml   Output              300 ml   Net             -300 ml              Last 3 weights    10/07/17  0019   Weight: 216 lb 5 oz (98.1 kg)         Objective:  General Appearance:  Comfortable and in no acute distress.    Vital signs: (most recent): Blood pressure 142/69, pulse 70, temperature 98.4 °F (36.9 °C), temperature source Oral, resp. rate 18, height 65\" (165.1 cm), weight 216 lb 5 oz (98.1 kg), SpO2 100 %.  Vital signs are normal.  No fever.    Output: Producing urine and producing stool.    HEENT: Normal HEENT exam.    Lungs:  Normal respiratory rate and normal effort.  Breath sounds clear to auscultation.    Heart: Normal rate.  Regular rhythm.  S1 normal and S2 normal.  No murmur, gallop or friction rub.   Chest: No chest wall tenderness.  Symmetric chest wall expansion.   Abdomen: Abdomen is soft and non-distended.  There are no signs of ascites.  Bowel sounds are normal.   There is no abdominal tenderness.   There is no mass. There is no splenomegaly. There is no hepatomegaly.   Extremities: Normal range of motion.    Pulses: Distal pulses are intact.    Neurological: Patient is alert and oriented to person, place and time.  Patient has normal reflexes, normal muscle tone and normal coordination.    Pupils:  Pupils are equal, round, and reactive to light.  Pupils are equal.   Skin:  Warm and dry.  No rash or cyanosis.             Results Review:        WBC No results found for: WBC   HGB No results " found for: HGB   HCT No results found for: HCT   Platelets No results found for: PLT     PT/INR:  No results found for: PROTIME/No results found for: INR    Sodium No results found for: NA   Potassium No results found for: K   Chloride No results found for: CL   Bicarbonate No results found for: CO2   BUN No results found for: BUN   Creatinine No results found for: CREATININE   Calcium No results found for: CALCIUM   Magnesium No results found for: MG         aspirin 81 mg Oral Daily   levothyroxine 137 mcg Oral Daily   losartan 25 mg Oral Daily   metoprolol tartrate 37.5 mg Oral BID   rosuvastatin 40 mg Oral Nightly              Patient Active Problem List   Diagnosis Code   • Coronary artery disease of native heart with stable angina pectoris I25.118   • CAD (coronary artery disease) I25.10       Assessment & Plan    CAD- with stents 2006- on Brilinta (last dose 10/6) multi vessel disease                        Left main disease - 50%                                  LAD proximal 100%                                  Circumflex 70%                                  RCA 99%  Angina   Abnormal stress test  HTN  HLD- on statin  Hypothyroidism  Frequent falls - recent fall at work 2 years ago patient broke right ankle   Deconditioned d/t fall - will consult PT to evaluate pre surgery      Vein mapping- adequate for bypass  Carotid duplex- Proximal right internal carotid artery occlusion.                                               Right internal carotid artery stenosis totally occluded.                                               Proximal left internal carotid artery occlusion.  U/a- large+3 leuk eterase, trace bacteria, negative nitrites-patient asymptomatic   A1C - 5.64  Platelet agg 36% - will have to wait for surgery   Currently denies chest pain, hemodynamically stable         PANKAJ Crouch  10/10/17  1:31 PM

## 2017-10-10 NOTE — PLAN OF CARE
Problem: Patient Care Overview (Adult)  Goal: Plan of Care Review  Outcome: Ongoing (interventions implemented as appropriate)    10/10/17 0409   Coping/Psychosocial Response Interventions   Plan Of Care Reviewed With patient   Patient Care Overview   Progress progress towards functional goals is fair   Outcome Evaluation   Outcome Summary/Follow up Plan pt vital signs stable no c/o chest pain will cont to monitor .

## 2017-10-10 NOTE — PLAN OF CARE
Problem: Patient Care Overview (Adult)  Goal: Plan of Care Review  Outcome: Ongoing (interventions implemented as appropriate)    10/10/17 2636   Coping/Psychosocial Response Interventions   Plan Of Care Reviewed With patient   Patient Care Overview   Progress no change   Outcome Evaluation   Outcome Summary/Follow up Plan UA sent; appears positive and sent off for micro. Results called to [facility] with Dr. Muñoz; no new orders at this time. VSS. ABG ordered. PT appears flat today. Spoke with daughter and wilmer about Living will.        Goal: Adult Individualization and Mutuality  Outcome: Ongoing (interventions implemented as appropriate)  Goal: Discharge Needs Assessment  Outcome: Ongoing (interventions implemented as appropriate)    Problem: Acute Coronary Syndrome (ACS) (Adult)  Goal: Signs and Symptoms of Listed Potential Problems Will be Absent or Manageable (Acute Coronary Syndrome)  Outcome: Ongoing (interventions implemented as appropriate)    Problem: Pain, Acute (Adult)  Goal: Identify Related Risk Factors and Signs and Symptoms  Outcome: Ongoing (interventions implemented as appropriate)  Goal: Acceptable Pain Control/Comfort Level  Outcome: Ongoing (interventions implemented as appropriate)

## 2017-10-11 ENCOUNTER — APPOINTMENT (OUTPATIENT)
Dept: CT IMAGING | Facility: HOSPITAL | Age: 67
End: 2017-10-11

## 2017-10-11 LAB
ABO + RH BLD: NORMAL
ABO + RH BLD: NORMAL
ABO GROUP BLD: NORMAL
BH BB BLOOD EXPIRATION DATE: NORMAL
BH BB BLOOD EXPIRATION DATE: NORMAL
BH BB BLOOD TYPE BARCODE: 5100
BH BB BLOOD TYPE BARCODE: 5100
BH BB DISPENSE STATUS: NORMAL
BH BB DISPENSE STATUS: NORMAL
BH BB PRODUCT CODE: NORMAL
BH BB PRODUCT CODE: NORMAL
BH BB UNIT NUMBER: NORMAL
BH BB UNIT NUMBER: NORMAL
BLD GP AB SCN SERPL QL: NEGATIVE
CLOSE TME COLL+ADP + EPINEP PNL BLD: 60 % (ref 86–100)
RH BLD: POSITIVE
UNIT  ABO: NORMAL
UNIT  ABO: NORMAL
UNIT  RH: NORMAL
UNIT  RH: NORMAL

## 2017-10-11 PROCEDURE — 86923 COMPATIBILITY TEST ELECTRIC: CPT

## 2017-10-11 PROCEDURE — 70498 CT ANGIOGRAPHY NECK: CPT

## 2017-10-11 PROCEDURE — 86900 BLOOD TYPING SEROLOGIC ABO: CPT | Performed by: NURSE PRACTITIONER

## 2017-10-11 PROCEDURE — 85576 BLOOD PLATELET AGGREGATION: CPT | Performed by: THORACIC SURGERY (CARDIOTHORACIC VASCULAR SURGERY)

## 2017-10-11 PROCEDURE — 70496 CT ANGIOGRAPHY HEAD: CPT

## 2017-10-11 PROCEDURE — 86850 RBC ANTIBODY SCREEN: CPT | Performed by: NURSE PRACTITIONER

## 2017-10-11 PROCEDURE — 0 IOPAMIDOL 61 % SOLUTION: Performed by: THORACIC SURGERY (CARDIOTHORACIC VASCULAR SURGERY)

## 2017-10-11 PROCEDURE — 86901 BLOOD TYPING SEROLOGIC RH(D): CPT | Performed by: NURSE PRACTITIONER

## 2017-10-11 RX ORDER — ALPRAZOLAM 0.25 MG/1
0.25 TABLET ORAL EVERY 6 HOURS PRN
Status: DISCONTINUED | OUTPATIENT
Start: 2017-10-11 | End: 2017-10-12

## 2017-10-11 RX ORDER — CHLORHEXIDINE GLUCONATE 0.12 MG/ML
15 RINSE ORAL EVERY 12 HOURS SCHEDULED
Status: COMPLETED | OUTPATIENT
Start: 2017-10-11 | End: 2017-10-12

## 2017-10-11 RX ORDER — CEFAZOLIN SODIUM 2 G/100ML
2 INJECTION, SOLUTION INTRAVENOUS
Status: DISCONTINUED | OUTPATIENT
Start: 2017-10-11 | End: 2017-10-11

## 2017-10-11 RX ORDER — CEFAZOLIN SODIUM 2 G/100ML
2 INJECTION, SOLUTION INTRAVENOUS
Status: DISCONTINUED | OUTPATIENT
Start: 2017-10-12 | End: 2017-10-12

## 2017-10-11 RX ORDER — CHLORHEXIDINE GLUCONATE 500 MG/1
1 CLOTH TOPICAL EVERY 12 HOURS
Status: COMPLETED | OUTPATIENT
Start: 2017-10-11 | End: 2017-10-12

## 2017-10-11 RX ADMIN — LEVOTHYROXINE SODIUM 137 MCG: 137 TABLET ORAL at 23:55

## 2017-10-11 RX ADMIN — METOPROLOL TARTRATE 37.5 MG: 25 TABLET ORAL at 18:00

## 2017-10-11 RX ADMIN — ALPRAZOLAM 0.25 MG: 0.25 TABLET ORAL at 10:50

## 2017-10-11 RX ADMIN — ROSUVASTATIN CALCIUM 40 MG: 40 TABLET, COATED ORAL at 22:29

## 2017-10-11 RX ADMIN — LOSARTAN POTASSIUM 25 MG: 25 TABLET, FILM COATED ORAL at 08:48

## 2017-10-11 RX ADMIN — TRAMADOL HYDROCHLORIDE 50 MG: 50 TABLET ORAL at 08:47

## 2017-10-11 RX ADMIN — METOPROLOL TARTRATE 37.5 MG: 25 TABLET ORAL at 08:47

## 2017-10-11 RX ADMIN — NITROGLYCERIN 0.4 MG: 0.4 TABLET SUBLINGUAL at 09:02

## 2017-10-11 RX ADMIN — CHLORHEXIDINE GLUCONATE 15 ML: 1.2 RINSE ORAL at 23:55

## 2017-10-11 RX ADMIN — ASPIRIN 81 MG: 81 TABLET, CHEWABLE ORAL at 08:48

## 2017-10-11 RX ADMIN — IOPAMIDOL 95 ML: 612 INJECTION, SOLUTION INTRAVENOUS at 11:47

## 2017-10-11 RX ADMIN — ALPRAZOLAM 0.25 MG: 0.25 TABLET ORAL at 21:10

## 2017-10-11 NOTE — PLAN OF CARE
Problem: Patient Care Overview (Adult)  Goal: Plan of Care Review  Outcome: Ongoing (interventions implemented as appropriate)    10/11/17 0339   Coping/Psychosocial Response Interventions   Plan Of Care Reviewed With patient   Patient Care Overview   Progress progress towards functional goals is fair   Outcome Evaluation   Outcome Summary/Follow up Plan pt for surgery thursday depending on pts platelet aggregation level

## 2017-10-11 NOTE — CONSULTS
Name: Marion Anderson ADMIT: 10/6/2017   : 1950  PCP: Narciso Camargo MD    MRN: [id number] LOS: 4 days   AGE/SEX: 67 y.o. female  ROOM: 8/1     Inpatient Consult to Vascular Surgery  Consult performed by: SAMMY CRUZ  Consult ordered by: SUZANNE DONATO MD     LOS: 4 days   Patient Care Team:  Narciso Camargo MD as PCP - General (Family Medicine)    Subjective     History of Present Illness  67 y.o. female with coronary artery disease who is planning on having coronary artery bypass surgery.  She was originally transferred from an outside hospital in [place] on 10/6/2017 for surgical reconstruction.  She has a long-standing history of carotid artery disease.  She had a single carotid artery occlusion and she was followed by Dr. [place] until she was no longer able to see him because of some sort of insurance issue.  The patient also has a long-standing history of hypercholesterolemia but admits to not taking her medication over time because of cost concerns.  She has multiple family members who had peripheral vascular and cardiovascular disease.  She was originally told that they wanted to monitor her single open internal carotid artery but she doesn't remember which side.  She then reestablish care with Dr. Jus Dykes at [facility] and was found to have bilateral internal carotid artery occlusions.  He appropriately recommend no surgical reconstructions for arteries that were already completely occluded.  We will consulted to discuss with the patient risk involved with regards to her carotid disease and if any options were available to her.  She denies any signs or symptoms of the consistent with remote or recent CVA, TIA, or amaurosis fugax.  She does have some lightheadedness occasionally but should believe that's associated with her blood pressure.  She says she has reasonable blood pressure control at home and feels like it's been a bit  elevated since she's been in the hospital this time.        Review of Systems   Constitutional: Positive for fatigue. Negative for fever.   HENT: Negative.    Eyes: Negative.    Respiratory: Positive for chest tightness and shortness of breath.    Cardiovascular: Positive for chest pain. Negative for leg swelling.   Gastrointestinal: Negative.    Endocrine: Negative.    Genitourinary: Negative.    Musculoskeletal: Negative.    Skin: Negative.    Allergic/Immunologic: Negative.    Neurological: Negative.    Hematological: Negative.    Psychiatric/Behavioral: Negative.        Past Medical History:   Diagnosis Date   • Arthritis    • Coronary artery disease    • Disease of thyroid gland    • Hypertension        Past Surgical History:   Procedure Laterality Date   • CARDIAC SURGERY     • FRACTURE SURGERY      ankle. leg   • TONSILLECTOMY         Family History   Problem Relation Age of Onset   • Heart disease Mother    • Hypertension Mother    • Heart disease Father    • Hypertension Father    • Cancer Brother    • Hypertension Brother        Social History   Substance Use Topics   • Smoking status: Never Smoker   • Smokeless tobacco: Never Used   • Alcohol use No       Allergies: Latex    Prescriptions Prior to Admission   Medication Sig Dispense Refill Last Dose   • aspirin 81 MG chewable tablet Chew 81 mg Daily.      • levothyroxine (SYNTHROID, LEVOTHROID) 137 MCG tablet Take 137 mcg by mouth Daily.      • losartan (COZAAR) 25 MG tablet Take 25 mg by mouth Daily.      • metoprolol tartrate (LOPRESSOR) 25 MG tablet Take 25 mg by mouth 2 (Two) Times a Day.      • Multiple Vitamins-Minerals (MULTIVITAMIN ADULT PO) Take 1 tablet by mouth Daily.      • nitroglycerin (NITROSTAT) 0.4 MG SL tablet Place 0.4 mg under the tongue Every 5 (Five) Minutes As Needed for Chest Pain. Take no more than 3 doses in 15 minutes.      • ranolazine (RANEXA) 500 MG 12 hr tablet Take 500 mg by mouth 2 (Two) Times a Day.      • rosuvastatin  (CRESTOR) 20 MG tablet Take 40 mg by mouth Every Night.      • traMADol (ULTRAM) 50 MG tablet Take 50 mg by mouth 2 (Two) Times a Day.      • vitamin D (ERGOCALCIFEROL) 10940 units capsule capsule Take 50,000 Units by mouth Daily.          aspirin 81 mg Oral Daily   [START ON 10/12/2017] ceFAZolin 2 g Intravenous On Call to OR   chlorhexidine 15 mL Mouth/Throat Q12H   Chlorhexidine Gluconate Cloth 1 application Topical Q12H   levothyroxine 137 mcg Oral Daily   losartan 25 mg Oral Daily   [START ON 10/12/2017] metoprolol tartrate 12.5 mg Oral On Call to OR   metoprolol tartrate 37.5 mg Oral BID   mupirocin 1 application Each Nare Q12H   rosuvastatin 40 mg Oral Nightly        •  acetaminophen  •  ALPRAZolam  •  diphenhydrAMINE  •  nitroglycerin  •  nitroglycerin  •  potassium chloride **OR** potassium chloride **OR** potassium chloride  •  sodium chloride  •  temazepam  •  traMADol      Objective   Temp:  [97.7 °F (36.5 °C)-98.4 °F (36.9 °C)] 97.8 °F (36.6 °C)  Heart Rate:  [70-99] 99  Resp:  [16-18] 16  BP: (113-168)/(69-93) 113/79    I/O this shift:  In: 240 [P.O.:240]  Out: -        Physical Exam   Constitutional: She is oriented to person, place, and time. She appears well-developed and well-nourished.   HENT:   Head: Normocephalic and atraumatic.   Eyes: EOM are normal. Pupils are equal, round, and reactive to light.   Neck: Neck supple.   Cardiovascular: Normal rate and regular rhythm.    Pulses:       Carotid pulses are 2+ on the right side, and 2+ on the left side.       Radial pulses are 2+ on the right side, and 2+ on the left side.        Dorsalis pedis pulses are 2+ on the right side, and 2+ on the left side.   Pulmonary/Chest: Effort normal. No respiratory distress. She has no wheezes.   Abdominal: Soft. She exhibits no distension. There is no tenderness.   Musculoskeletal: She exhibits no edema or tenderness.   Neurological: She is alert and oriented to person, place, and time.   Skin: Skin is warm and  dry.         Results from last 7 days  Lab Units 10/07/17  0311   WBC 10*3/mm3 11.92*   HEMOGLOBIN g/dL 13.0   PLATELETS 10*3/mm3 225     Results from last 7 days  Lab Units 10/07/17  0311   SODIUM mmol/L 142   POTASSIUM mmol/L 4.0   CHLORIDE mmol/L 104   CO2 mmol/L 24.4   BUN mg/dL 18   CREATININE mg/dL 1.06*   GLUCOSE mg/dL 132*   Estimated Creatinine Clearance: 60.5 mL/min (by C-G formula based on Cr of 1.06).  Results from last 7 days  Lab Units 10/07/17  0311   PROTIME Seconds 12.7   INR  0.99       Imaging Studies:  Carotid Duplex:  Interpretation Summary   · Proximal right internal carotid artery occlusion.  · Right internal carotid artery stenosis totally occluded.  · Proximal left internal carotid artery occlusion.     CTA    1. There is a 2.2 x 1 cm old infarct in the anterior inferior lateral  right frontal lobe in the right middle cerebral artery territory and  there is an additional 1.8 x 1.0 cm old lacunar type infarct extending  from the head of right caudate nucleus through the anterior limb of the  right internal capsule into the anteromedial right putamen.      2. There are prominent calcified plaques involving the aortic arch  extending into the origin proximal left subclavian artery mildly  narrowing the origin proximal left subclavian artery and there is an  ulcerated plaque mild to moderately narrowing the proximal left  subclavian artery. There is calcified plaque mildly narrowing the left  vertebral artery origin. The left vertebral artery is a dominant  vertebral artery and there is an additional mixed calcified and  noncalcified plaque involving the posterior wall of the proximal  intracranial segment of the left vertebral artery that mildly narrows  the dominant left vertebral artery at this location. The contralateral  right vertebral artery is smaller in diameter than the left but is  widely patent from its origin to where it pierces the dura and extends  intracranially and at this  location there is a mixed calcified and  noncalcified plaque that moderate to severely narrows the right  vertebral artery. Beyond this plaque it is widely patent to the  vertebrobasilar junction.      3. There are scattered calcified and noncalcified plaques mildly  narrowing the mid and distal common carotid arteries. There is a  circumferential mixed calcified and noncalcified plaque that moderately  narrows the left common carotid bifurcation by 60-70% and extends into  the origin of the left internal carotid artery and occludes the left  internal carotid artery. The cervical, petrous, cavernous and immediate  supracavernous segment of the left internal carotid artery is completely  occluded. There is reconstitution of the juxta clinoid segment of the  left internal carotid artery by collateral circulation from the [place]. There is a circumferential mixed calcified and noncalcified  plaque that moderately narrows the right common carotid bifurcation by  40-50%, extends into the origin and occludes the right internal carotid  artery at its origin and the cervical, petrous and cavernous segment of  the right internal carotid artery is occluded and there is  reconstitution of the right internal carotid artery at the junction of  cavernous and supracavernous segments by a right ophthalmic artery.  Therefore the blood supply to the brain is very tenuous as there is  bilateral cervical, petrous and cavernous segment internal carotid  occlusion, moderate to severe stenosis at the junction of the cervical  and intracranial segment of the right vertebral artery and mild stenosis  in the dominant left vertebral artery. There are hypoplastic or atretic  posterior communicating arteries. There are small diameter A1 segments  to the anterior cerebral arteries with a small anterior communicating  artery.         Active Hospital Problems (** Indicates Principal Problem)    Diagnosis Date Noted   • CAD (coronary  artery disease) [I25.10] 10/08/2017   • Coronary artery disease of native heart with stable angina pectoris [I25.118] 10/07/2017      Resolved Hospital Problems    Diagnosis Date Noted Date Resolved   No resolved problems to display.     Problem Points:  4:  Patient has a new problem, with additional work-up planned  Total problem points:4 or more    Data Points:  1:  I have reviewed or order clinical lab test  1:  I have reviewed or order radiology test (except heart catheterization or echo)  2:  I have reviewed and summation of old records and/or discussed the patients care with another health care provider  Total data points:4 or more    Risk Points:  High:  Major elective surgery with risk factors or emergent surgery    MDM Prob point Data point Risk   SF 1 1 Minimal   Low 2 2 Low   Mod 3 3 Moderate   High 4 4 High     Code MDM History Exam Time   49239 SF/Low Detailed Detailed 30   33133 Mod Comprehensive Comprehensive 50   84930 High Comprehensive Comprehensive 70     Detailed history:  4 elements HPI or status of 3 chronic problems; 2-9 system ROS  Comprehensive:  4 elements HPI or status of 3 chronic problems;  10 system ROS    Detailed Exam:  12 findings from any organ system  Comprehensive Exam:  2 findings from each of 9 systems.     [place]    Assessment/Plan     Active Problems:    Coronary artery disease of native heart with stable angina pectoris    CAD (coronary artery disease)        67 y.o. female with coronary artery disease and known bilateral internal carotid artery occlusions.  The patient has never had symptoms of a stroke.  This is despite long-standing carotid artery disease.  I reviewed the CT angiogram today.  Her bilateral internal carotid arteries are occluded in the cervical, petrous, and cavernous segments.  There are no real options for reconstruction here.  The majority of her intracranial circulation is based from vertebral arteries of which the left is dominant.    From a  carotid surgery standpoint there are no options for reconstruction.  She will be at increased risk for stroke, particularly if she becomes hypotensive.  I will discuss her situation with Dr. Muñoz to assist with him making decisions about how to manage her perioperatively.    I discussed the patients findings and my recommendations with patient, family and nursing staff.  Please call my office with any question: [phone]    Jimenez Prado MD  10/11/17  10:54 AM

## 2017-10-11 NOTE — PROGRESS NOTES
" LOS: 4 days   Patient Care Team:  Narciso Camargo MD as PCP - General (Family Medicine)    Chief Complaint: CAD  Subjective  Patient denies chest pain  Vital Signs  Temp:  [97.7 °F (36.5 °C)-98.1 °F (36.7 °C)] 97.7 °F (36.5 °C)  Heart Rate:  [70-99] 99  Resp:  [16-17] 16  BP: (113-168)/() 126/102  Body mass index is 36 kg/(m^2).    Intake/Output Summary (Last 24 hours) at 10/11/17 1453  Last data filed at 10/11/17 0858   Gross per 24 hour   Intake              240 ml   Output                0 ml   Net              240 ml     I/O this shift:  In: 240 [P.O.:240]  Out: -       Last 3 weights    10/07/17  0019   Weight: 216 lb 5 oz (98.1 kg)         Objective:  General Appearance:  Comfortable and in no acute distress.    Vital signs: (most recent): Blood pressure (!) 126/102, pulse 99, temperature 97.7 °F (36.5 °C), temperature source Oral, resp. rate 16, height 65\" (165.1 cm), weight 216 lb 5 oz (98.1 kg), SpO2 93 %.  Vital signs are normal.  No fever.    Output: Producing urine and producing stool.    HEENT: Normal HEENT exam.    Lungs:  Normal respiratory rate.  She is not in respiratory distress.  Breath sounds clear to auscultation.    Heart: Normal rate.  Regular rhythm.  S1 normal and S2 normal.  No murmur.   Abdomen: Abdomen is soft.  Bowel sounds are normal.   There is no abdominal tenderness.     Extremities: Normal range of motion.    Pulses: Distal pulses are intact.    Neurological: Patient is alert and oriented to person, place and time.    Pupils:  Pupils are equal, round, and reactive to light.    Skin:  Warm and dry.              Results Review:        WBC No results found for: WBC   HGB No results found for: HGB   HCT No results found for: HCT   Platelets No results found for: PLT     PT/INR:  No results found for: PROTIME/No results found for: INR    Sodium No results found for: NA   Potassium No results found for: K   Chloride No results found for: CL   Bicarbonate No results found for: " CO2   BUN No results found for: BUN   Creatinine No results found for: CREATININE   Calcium No results found for: CALCIUM   Magnesium No results found for: MG         aspirin 81 mg Oral Daily   [START ON 10/12/2017] ceFAZolin 2 g Intravenous On Call to OR   chlorhexidine 15 mL Mouth/Throat Q12H   Chlorhexidine Gluconate Cloth 1 application Topical Q12H   levothyroxine 137 mcg Oral Daily   losartan 25 mg Oral Daily   [START ON 10/12/2017] metoprolol tartrate 12.5 mg Oral On Call to OR   metoprolol tartrate 37.5 mg Oral BID   mupirocin 1 application Each Nare Q12H   rosuvastatin 40 mg Oral Nightly              Patient Active Problem List   Diagnosis Code   • Coronary artery disease of native heart with stable angina pectoris I25.118   • CAD (coronary artery disease) I25.10       Assessment & Plan    CAD- with stents 2006- on Brilinta (last dose 10/6) multi vessel disease                                  Left main disease - 50%                                  LAD proximal 100%                                  Circumflex 70%                                  RCA 99%  Angina   Abnormal stress test  HTN  HLD- on statin  Hypothyroidism  Frequent falls - recent fall at work 2 years ago patient broke right ankle   Deconditioned d/t fall - will consult PT to evaluate pre surgery    Vein mapping- adequate for bypass  Carotid duplex-       Proximal right internal carotid artery occlusion.      Right internal carotid artery stenosis totally occluded.      Proximal left internal carotid artery occlusion.      CTA carotids and head- bilateral internal carotid arteries are occluded in the cervical, petrous, and cavernous segments   Vertebral arteries providing majority of intracranial circulation-- Left is dominant  Vascular consulted- no options from there standpoint for revascularization increased risk for stroke especially if she gets hypotensive    U/a- large+3 leuk eterase, trace bacteria, negative nitrites-patient  asymptomatic   A1C - 5.64  Platelet agg 60% - planning on surgery tomorrow  Currently denies chest pain, hemodynamically stable       PANKAJ Crouch  10/11/17  2:53 PM

## 2017-10-11 NOTE — PROGRESS NOTES
Discharge Planning Assessment  [facility]     Patient Name: Marion Anderson  MRN: [id number]  Today's Date: 10/11/2017    Admit Date: 10/6/2017          Discharge Needs Assessment       10/11/17 0733    Living Environment    Lives With significant other    Living Arrangements house    Home Accessibility no concerns    Stair Railings at Home none    Type of Financial/Environmental Concern other (see comments)   concerned she will not have access to her social security check while she is hospitalized.    Transportation Available car;family or friend will provide    Living Environment    Provides Primary Care For pet(s)    Quality Of Family Relationships supportive;involved;helpful    Able to Return to Prior Living Arrangements yes    Discharge Needs Assessment    Concerns To Be Addressed financial/insurance concerns    Equipment Currently Used at Home raised toilet;bath bench;walker, standard;wheelchair   Pt does not uese the walker or w/c all the time.    Discharge Facility/Level Of Care Needs home with home health    Discharge Disposition still a patient    Discharge Contact Information if Applicable Fadi Thomas s.o. [phone]            Discharge Plan       10/11/17 0717    Case Management/Social Work Plan    Plan F/U after sx for HH choice.  Plan is home with HH.      Patient/Family In Agreement With Plan yes    Additional Comments Spoke with Pt and s.o. Fadi Thomas [phone] at bedside.  CCP introduced self and role.  Pt confirmed information on face sheet.  Pt stated she is [employer], retired & drives at times.  Pt lives with s.o. in [place], [place].  Pt states she has used HH in past however does not remember the agency name.  Pt denies past subacute rehab and DME.  Pt confirms pharmacy as [facility] in [address] [facility].  Pt is requesting assistance with helping her daughter Carly Fernández obtain access to her social security check.  CCP explained to Pt she would have to contact her  bank/financial institution to ask what they would need from her to allow dtr to access Pt funds.   was called to Pt room to assist in completing a POA and notarizing it for Pt.  CCP advised if her bank needed a letter stating she was admitted then CCP would assist in obtaining this letter for her.  Hospital plan is for Pt to have open heart surgery one day this week.  Pt states she hopes to discharge home with 24 hour assistance of her sabbey Aponte.  Pt would home health to follow.  Pt has not decided on a HH agency at this time.  HH list provided to Pt at bedside.  CCP following         Discharge Placement     No information found                Demographic Summary       10/11/17 0732    Referral Information    Admission Type inpatient    Arrived From admitted as an inpatient    Referral Source nursing    Reason For Consult discharge planning;financial concerns    Contact Information    Permission Granted to Share Information With family/designee    Primary Care Physician Information    Name Narciso Camargo MD            Functional Status       10/11/17 0732    Functional Status Current    Ambulation 0-->independent    Transferring 0-->independent    Toileting 0-->independent    Bathing 0-->independent    Dressing 0-->independent    Eating 0-->independent    Communication 0-->understands/communicates without difficulty    Swallowing (if score 2 or more for any item, consult Rehab Services) 0-->swallows foods/liquids without difficulty    Change in Functional Status Since Onset of Current Illness/Injury no    Functional Status Prior    Ambulation 0-->independent    Transferring 0-->independent    Toileting 0-->independent    Bathing 0-->independent    Dressing 0-->independent    Eating 0-->independent    Communication 0-->understands/communicates without difficulty    Cognitive/Perceptual/Developmental    Current Mental Status/Cognitive Functioning no deficits noted            Psychosocial     None             Abuse/Neglect     None            Legal     None            Substance Abuse     None            Patient Forms     None          María Tiwari RN

## 2017-10-11 NOTE — NURSING NOTE
"Date of First Steps ACP interview: 10/10/2017  Location of interview: 2208  First Steps ACP Facilitator: Mary Ramirez  Referral Source: [facility] patient - Cardiology Service  Present for facilitation: Patient and Healthcare Agent - Caleb    SUMMARY OF ADVANCE CARE PLANNING DISCUSSION:  Marion presents for First Steps facilitation. We reviewed purpose and goals for advance care planning.    I reviewed with Marion qualities to consider when choosing a healthcare agent. Marion had selected  Carly as her healthcare agent. I encouraged Marion to discuss her preferences for future care with healthcare agents and others close to her.    Marion describes past experiences dealing with friends or family who have been seriously ill: her mother.   From these experiences Marion learned people that have to make the final decisions need support.  She felt alone and very pressured to make a decision for her mother about a feeding tube placement.  She got the feeding tube and had it for four years, never got \"better\".  Marion now feels that she \"didn't make a good decision.\"    Each section of the advance care/living will document was reviewed and discussed.    Advance care/living will document finished during this facilitation? yes    Time spent on preparation, facilitation and documentation was over 120 minutes.      Mary Ramirez  "

## 2017-10-11 NOTE — PLAN OF CARE
Problem: Patient Care Overview (Adult)  Goal: Plan of Care Review  Outcome: Ongoing (interventions implemented as appropriate)    10/11/17 1816   Coping/Psychosocial Response Interventions   Plan Of Care Reviewed With patient   Patient Care Overview   Progress no change   Outcome Evaluation   Outcome Summary/Follow up Plan PT has had all of pre-op studies. Consents signed. VSS. Dr. Prado with vascular consulted for bilateral carotid stenosis. He is going to speak with Dr. Muñoz and discuss surgery. She is not a carotid surgery candidate but plan is to still proceed with CABG. Dr. Muñoz still to come see this evening. May have Xanax Q6H PRN and Tramadol for pain.        Goal: Adult Individualization and Mutuality  Outcome: Ongoing (interventions implemented as appropriate)  Goal: Discharge Needs Assessment  Outcome: Ongoing (interventions implemented as appropriate)    Problem: Acute Coronary Syndrome (ACS) (Adult)  Goal: Signs and Symptoms of Listed Potential Problems Will be Absent or Manageable (Acute Coronary Syndrome)  Outcome: Ongoing (interventions implemented as appropriate)    Problem: Pain, Acute (Adult)  Goal: Identify Related Risk Factors and Signs and Symptoms  Outcome: Ongoing (interventions implemented as appropriate)  Goal: Acceptable Pain Control/Comfort Level  Outcome: Ongoing (interventions implemented as appropriate)

## 2017-10-12 ENCOUNTER — ANESTHESIA (OUTPATIENT)
Dept: PERIOP | Facility: HOSPITAL | Age: 67
End: 2017-10-12

## 2017-10-12 ENCOUNTER — APPOINTMENT (OUTPATIENT)
Dept: GENERAL RADIOLOGY | Facility: HOSPITAL | Age: 67
End: 2017-10-12

## 2017-10-12 ENCOUNTER — ANESTHESIA EVENT (OUTPATIENT)
Dept: PERIOP | Facility: HOSPITAL | Age: 67
End: 2017-10-12

## 2017-10-12 ENCOUNTER — APPOINTMENT (OUTPATIENT)
Dept: PERIOP | Facility: HOSPITAL | Age: 67
End: 2017-10-12
Attending: ANESTHESIOLOGY

## 2017-10-12 LAB
ACT BLD: 136 SECONDS (ref 82–152)
ACT BLD: 164 SECONDS (ref 82–152)
ACT BLD: 610 SECONDS (ref 82–152)
ACT BLD: 670 SECONDS (ref 82–152)
ACT BLD: 747 SECONDS (ref 82–152)
ACT BLD: 786 SECONDS (ref 82–152)
ALBUMIN SERPL-MCNC: 3.5 G/DL (ref 3.5–5.2)
ALBUMIN SERPL-MCNC: 3.7 G/DL (ref 3.5–5.2)
ALBUMIN SERPL-MCNC: 4.3 G/DL (ref 3.5–5.2)
ALBUMIN/GLOB SERPL: 1 G/DL
ALP SERPL-CCNC: 87 U/L (ref 39–117)
ALT SERPL W P-5'-P-CCNC: 20 U/L (ref 1–33)
ANION GAP SERPL CALCULATED.3IONS-SCNC: 10.2 MMOL/L
ANION GAP SERPL CALCULATED.3IONS-SCNC: 12.1 MMOL/L
ANION GAP SERPL CALCULATED.3IONS-SCNC: 14.1 MMOL/L
ANION GAP SERPL CALCULATED.3IONS-SCNC: 15 MMOL/L
APTT PPP: 43.8 SECONDS (ref 22.7–35.4)
ARTERIAL PATENCY WRIST A: ABNORMAL
AST SERPL-CCNC: 19 U/L (ref 1–32)
ATMOSPHERIC PRESS: 754 MMHG
BACTERIA SPEC AEROBE CULT: ABNORMAL
BASE EXCESS BLDA CALC-SCNC: -1 MMOL/L (ref -5–5)
BASE EXCESS BLDA CALC-SCNC: 2 MMOL/L (ref -5–5)
BASE EXCESS BLDA CALC-SCNC: 2.4 MMOL/L (ref 0–2)
BASE EXCESS BLDA CALC-SCNC: 3 MMOL/L (ref -5–5)
BASE EXCESS BLDA CALC-SCNC: 3 MMOL/L (ref -5–5)
BASE EXCESS BLDA CALC-SCNC: 4 MMOL/L (ref -5–5)
BASE EXCESS BLDA CALC-SCNC: 5 MMOL/L (ref -5–5)
BASOPHILS # BLD AUTO: 0.03 10*3/MM3 (ref 0–0.2)
BASOPHILS # BLD AUTO: 0.04 10*3/MM3 (ref 0–0.2)
BASOPHILS # BLD AUTO: 0.04 10*3/MM3 (ref 0–0.2)
BASOPHILS NFR BLD AUTO: 0.2 % (ref 0–1.5)
BASOPHILS NFR BLD AUTO: 0.2 % (ref 0–1.5)
BASOPHILS NFR BLD AUTO: 0.5 % (ref 0–1.5)
BDY SITE: ABNORMAL
BILIRUB SERPL-MCNC: 0.3 MG/DL (ref 0.1–1.2)
BUN BLD-MCNC: 14 MG/DL (ref 8–23)
BUN BLD-MCNC: 15 MG/DL (ref 8–23)
BUN BLD-MCNC: 19 MG/DL (ref 8–23)
BUN BLD-MCNC: 19 MG/DL (ref 8–23)
BUN/CREAT SERPL: 14.4 (ref 7–25)
BUN/CREAT SERPL: 16.1 (ref 7–25)
BUN/CREAT SERPL: 17.9 (ref 7–25)
BUN/CREAT SERPL: 18.1 (ref 7–25)
CA-I BLD-MCNC: 5.1 MG/DL (ref 4.6–5.4)
CA-I SERPL ISE-MCNC: 1.28 MMOL/L (ref 1.1–1.35)
CALCIUM SPEC-SCNC: 9 MG/DL (ref 8.6–10.5)
CALCIUM SPEC-SCNC: 9.1 MG/DL (ref 8.6–10.5)
CALCIUM SPEC-SCNC: 9.6 MG/DL (ref 8.6–10.5)
CALCIUM SPEC-SCNC: 9.9 MG/DL (ref 8.6–10.5)
CHLORIDE SERPL-SCNC: 102 MMOL/L (ref 98–107)
CHLORIDE SERPL-SCNC: 103 MMOL/L (ref 98–107)
CHLORIDE SERPL-SCNC: 107 MMOL/L (ref 98–107)
CHLORIDE SERPL-SCNC: 108 MMOL/L (ref 98–107)
CLOSE TME COLL+ADP + EPINEP PNL BLD: 82 % (ref 86–100)
CO2 BLDA-SCNC: 26 MMOL/L (ref 24–29)
CO2 BLDA-SCNC: 28 MMOL/L (ref 24–29)
CO2 BLDA-SCNC: 28 MMOL/L (ref 24–29)
CO2 BLDA-SCNC: 29 MMOL/L (ref 24–29)
CO2 BLDA-SCNC: 29 MMOL/L (ref 24–29)
CO2 BLDA-SCNC: 30 MMOL/L (ref 24–29)
CO2 SERPL-SCNC: 22 MMOL/L (ref 22–29)
CO2 SERPL-SCNC: 22.9 MMOL/L (ref 22–29)
CO2 SERPL-SCNC: 26.9 MMOL/L (ref 22–29)
CO2 SERPL-SCNC: 29.8 MMOL/L (ref 22–29)
CREAT BLD-MCNC: 0.93 MG/DL (ref 0.57–1)
CREAT BLD-MCNC: 0.97 MG/DL (ref 0.57–1)
CREAT BLD-MCNC: 1.05 MG/DL (ref 0.57–1)
CREAT BLD-MCNC: 1.06 MG/DL (ref 0.57–1)
DEPRECATED RDW RBC AUTO: 39.7 FL (ref 37–54)
DEPRECATED RDW RBC AUTO: 40.1 FL (ref 37–54)
DEPRECATED RDW RBC AUTO: 41.4 FL (ref 37–54)
EOSINOPHIL # BLD AUTO: 0.02 10*3/MM3 (ref 0–0.7)
EOSINOPHIL # BLD AUTO: 0.15 10*3/MM3 (ref 0–0.7)
EOSINOPHIL # BLD AUTO: 0.54 10*3/MM3 (ref 0–0.7)
EOSINOPHIL NFR BLD AUTO: 0.1 % (ref 0.3–6.2)
EOSINOPHIL NFR BLD AUTO: 0.8 % (ref 0.3–6.2)
EOSINOPHIL NFR BLD AUTO: 6.5 % (ref 0.3–6.2)
ERYTHROCYTE [DISTWIDTH] IN BLOOD BY AUTOMATED COUNT: 12.3 % (ref 11.7–13)
ERYTHROCYTE [DISTWIDTH] IN BLOOD BY AUTOMATED COUNT: 12.4 % (ref 11.7–13)
ERYTHROCYTE [DISTWIDTH] IN BLOOD BY AUTOMATED COUNT: 12.5 % (ref 11.7–13)
GFR SERPL CREATININE-BSD FRML MDRD: 52 ML/MIN/1.73
GFR SERPL CREATININE-BSD FRML MDRD: 52 ML/MIN/1.73
GFR SERPL CREATININE-BSD FRML MDRD: 57 ML/MIN/1.73
GFR SERPL CREATININE-BSD FRML MDRD: 60 ML/MIN/1.73
GLOBULIN UR ELPH-MCNC: 3.5 GM/DL
GLUCOSE BLD-MCNC: 108 MG/DL (ref 65–99)
GLUCOSE BLD-MCNC: 108 MG/DL (ref 65–99)
GLUCOSE BLD-MCNC: 178 MG/DL (ref 65–99)
GLUCOSE BLD-MCNC: 181 MG/DL (ref 65–99)
GLUCOSE BLDC GLUCOMTR-MCNC: 106 MG/DL (ref 70–130)
GLUCOSE BLDC GLUCOMTR-MCNC: 117 MG/DL (ref 70–130)
GLUCOSE BLDC GLUCOMTR-MCNC: 117 MG/DL (ref 70–130)
GLUCOSE BLDC GLUCOMTR-MCNC: 132 MG/DL (ref 70–130)
GLUCOSE BLDC GLUCOMTR-MCNC: 136 MG/DL (ref 70–130)
GLUCOSE BLDC GLUCOMTR-MCNC: 142 MG/DL (ref 70–130)
GLUCOSE BLDC GLUCOMTR-MCNC: 173 MG/DL (ref 70–130)
GLUCOSE BLDC GLUCOMTR-MCNC: 184 MG/DL (ref 70–130)
GLUCOSE BLDC GLUCOMTR-MCNC: 199 MG/DL (ref 70–130)
HCO3 BLDA-SCNC: 24.9 MMOL/L (ref 22–26)
HCO3 BLDA-SCNC: 25.8 MMOL/L (ref 22–28)
HCO3 BLDA-SCNC: 27 MMOL/L (ref 22–26)
HCO3 BLDA-SCNC: 27.1 MMOL/L (ref 22–26)
HCO3 BLDA-SCNC: 27.7 MMOL/L (ref 22–26)
HCO3 BLDA-SCNC: 27.7 MMOL/L (ref 22–26)
HCO3 BLDA-SCNC: 29 MMOL/L (ref 22–26)
HCT VFR BLD AUTO: 31.3 % (ref 35.6–45.5)
HCT VFR BLD AUTO: 32.3 % (ref 35.6–45.5)
HCT VFR BLD AUTO: 37.6 % (ref 35.6–45.5)
HCT VFR BLDA CALC: 25 % (ref 38–51)
HCT VFR BLDA CALC: 25 % (ref 38–51)
HCT VFR BLDA CALC: 26 % (ref 38–51)
HCT VFR BLDA CALC: 26 % (ref 38–51)
HCT VFR BLDA CALC: 27 % (ref 38–51)
HCT VFR BLDA CALC: 34 % (ref 38–51)
HGB BLD-MCNC: 10.6 G/DL (ref 11.9–15.5)
HGB BLD-MCNC: 11 G/DL (ref 11.9–15.5)
HGB BLD-MCNC: 12.4 G/DL (ref 11.9–15.5)
HGB BLDA-MCNC: 11.6 G/DL (ref 12–17)
HGB BLDA-MCNC: 8.5 G/DL (ref 12–17)
HGB BLDA-MCNC: 8.5 G/DL (ref 12–17)
HGB BLDA-MCNC: 8.8 G/DL (ref 12–17)
HGB BLDA-MCNC: 8.8 G/DL (ref 12–17)
HGB BLDA-MCNC: 9.2 G/DL (ref 12–17)
HOROWITZ INDEX BLD+IHG-RTO: 100 %
IMM GRANULOCYTES # BLD: 0.02 10*3/MM3 (ref 0–0.03)
IMM GRANULOCYTES # BLD: 0.07 10*3/MM3 (ref 0–0.03)
IMM GRANULOCYTES # BLD: 0.08 10*3/MM3 (ref 0–0.03)
IMM GRANULOCYTES NFR BLD: 0.2 % (ref 0–0.5)
IMM GRANULOCYTES NFR BLD: 0.4 % (ref 0–0.5)
IMM GRANULOCYTES NFR BLD: 0.4 % (ref 0–0.5)
INR PPP: 1.26 (ref 0.9–1.1)
INR PPP: 1.39 (ref 0.9–1.1)
LYMPHOCYTES # BLD AUTO: 0.98 10*3/MM3 (ref 0.9–4.8)
LYMPHOCYTES # BLD AUTO: 1.25 10*3/MM3 (ref 0.9–4.8)
LYMPHOCYTES # BLD AUTO: 2.26 10*3/MM3 (ref 0.9–4.8)
LYMPHOCYTES NFR BLD AUTO: 27.3 % (ref 19.6–45.3)
LYMPHOCYTES NFR BLD AUTO: 5.1 % (ref 19.6–45.3)
LYMPHOCYTES NFR BLD AUTO: 6.6 % (ref 19.6–45.3)
MAGNESIUM SERPL-MCNC: 2.6 MG/DL (ref 1.6–2.4)
MAGNESIUM SERPL-MCNC: 2.9 MG/DL (ref 1.6–2.4)
MCH RBC QN AUTO: 30 PG (ref 26.9–32)
MCH RBC QN AUTO: 30.1 PG (ref 26.9–32)
MCH RBC QN AUTO: 30.2 PG (ref 26.9–32)
MCHC RBC AUTO-ENTMCNC: 33 G/DL (ref 32.4–36.3)
MCHC RBC AUTO-ENTMCNC: 33.9 G/DL (ref 32.4–36.3)
MCHC RBC AUTO-ENTMCNC: 34.1 G/DL (ref 32.4–36.3)
MCV RBC AUTO: 88.7 FL (ref 80.5–98.2)
MCV RBC AUTO: 88.7 FL (ref 80.5–98.2)
MCV RBC AUTO: 91.3 FL (ref 80.5–98.2)
MODALITY: ABNORMAL
MONOCYTES # BLD AUTO: 0.92 10*3/MM3 (ref 0.2–1.2)
MONOCYTES # BLD AUTO: 1.33 10*3/MM3 (ref 0.2–1.2)
MONOCYTES # BLD AUTO: 1.72 10*3/MM3 (ref 0.2–1.2)
MONOCYTES NFR BLD AUTO: 11.1 % (ref 5–12)
MONOCYTES NFR BLD AUTO: 7 % (ref 5–12)
MONOCYTES NFR BLD AUTO: 9 % (ref 5–12)
NEUTROPHILS # BLD AUTO: 16.21 10*3/MM3 (ref 1.9–8.1)
NEUTROPHILS # BLD AUTO: 16.35 10*3/MM3 (ref 1.9–8.1)
NEUTROPHILS # BLD AUTO: 4.49 10*3/MM3 (ref 1.9–8.1)
NEUTROPHILS NFR BLD AUTO: 54.4 % (ref 42.7–76)
NEUTROPHILS NFR BLD AUTO: 85 % (ref 42.7–76)
NEUTROPHILS NFR BLD AUTO: 85.2 % (ref 42.7–76)
O2 A-A PPRESDIFF RESPIRATORY: 0.6 MMHG
PCO2 BLDA: 34.6 MM HG (ref 35–45)
PCO2 BLDA: 35 MM HG (ref 35–45)
PCO2 BLDA: 40.1 MM HG (ref 35–45)
PCO2 BLDA: 44.5 MM HG (ref 35–45)
PCO2 BLDA: 44.6 MM HG (ref 35–45)
PCO2 BLDA: 45.6 MM HG (ref 35–45)
PCO2 BLDA: 46.2 MM HG (ref 35–45)
PEEP RESPIRATORY: 7.5 CM[H2O]
PH BLDA: 7.36 PH UNITS (ref 7.35–7.6)
PH BLDA: 7.39 PH UNITS (ref 7.35–7.6)
PH BLDA: 7.39 PH UNITS (ref 7.35–7.6)
PH BLDA: 7.4 PH UNITS (ref 7.35–7.6)
PH BLDA: 7.44 PH UNITS (ref 7.35–7.6)
PH BLDA: 7.48 PH UNITS (ref 7.35–7.45)
PH BLDA: 7.51 PH UNITS (ref 7.35–7.6)
PHOSPHATE SERPL-MCNC: 2 MG/DL (ref 2.5–4.5)
PHOSPHATE SERPL-MCNC: 3.5 MG/DL (ref 2.5–4.5)
PLAT MORPH BLD: NORMAL
PLATELET # BLD AUTO: 194 10*3/MM3 (ref 140–500)
PLATELET # BLD AUTO: 213 10*3/MM3 (ref 140–500)
PLATELET # BLD AUTO: 236 10*3/MM3 (ref 140–500)
PMV BLD AUTO: 9.4 FL (ref 6–12)
PMV BLD AUTO: 9.7 FL (ref 6–12)
PMV BLD AUTO: 9.7 FL (ref 6–12)
PO2 BLDA: 108 MMHG (ref 80–105)
PO2 BLDA: 404 MMHG (ref 80–105)
PO2 BLDA: 459 MMHG (ref 80–105)
PO2 BLDA: 459.6 MM HG (ref 80–100)
PO2 BLDA: 510 MMHG (ref 80–105)
PO2 BLDA: 535 MMHG (ref 80–105)
PO2 BLDA: 64 MMHG (ref 80–105)
POTASSIUM BLD-SCNC: 3.6 MMOL/L (ref 3.5–5.2)
POTASSIUM BLD-SCNC: 4 MMOL/L (ref 3.5–5.2)
POTASSIUM BLD-SCNC: 4.2 MMOL/L (ref 3.5–5.2)
POTASSIUM BLD-SCNC: 4.3 MMOL/L (ref 3.5–5.2)
POTASSIUM BLDA-SCNC: 4 MMOL/L (ref 3.5–4.9)
POTASSIUM BLDA-SCNC: 4.1 MMOL/L (ref 3.5–4.9)
POTASSIUM BLDA-SCNC: 4.3 MMOL/L (ref 3.5–4.9)
POTASSIUM BLDA-SCNC: 4.3 MMOL/L (ref 3.5–4.9)
POTASSIUM BLDA-SCNC: 4.4 MMOL/L (ref 3.5–4.9)
POTASSIUM BLDA-SCNC: 5.1 MMOL/L (ref 3.5–4.9)
PROT SERPL-MCNC: 7 G/DL (ref 6–8.5)
PROTHROMBIN TIME: 15.3 SECONDS (ref 11.7–14.2)
PROTHROMBIN TIME: 16.6 SECONDS (ref 11.7–14.2)
RBC # BLD AUTO: 3.53 10*6/MM3 (ref 3.9–5.2)
RBC # BLD AUTO: 3.64 10*6/MM3 (ref 3.9–5.2)
RBC # BLD AUTO: 4.12 10*6/MM3 (ref 3.9–5.2)
RBC MORPH BLD: NORMAL
SAO2 % BLDA: 100 % (ref 95–98)
SAO2 % BLDA: 91 % (ref 95–98)
SAO2 % BLDA: 98 % (ref 95–98)
SAO2 % BLDCOA: 100 % (ref 92–99)
SET MECH RESP RATE: 14
SODIUM BLD-SCNC: 142 MMOL/L (ref 136–145)
SODIUM BLD-SCNC: 142 MMOL/L (ref 136–145)
SODIUM BLD-SCNC: 144 MMOL/L (ref 136–145)
SODIUM BLD-SCNC: 145 MMOL/L (ref 136–145)
TOTAL RATE: 14 BREATHS/MINUTE
VENTILATOR MODE: AC
VT ON VENT VENT: 550 ML
WBC MORPH BLD: NORMAL
WBC NRBC COR # BLD: 19.06 10*3/MM3 (ref 4.5–10.7)
WBC NRBC COR # BLD: 19.17 10*3/MM3 (ref 4.5–10.7)
WBC NRBC COR # BLD: 8.27 10*3/MM3 (ref 4.5–10.7)

## 2017-10-12 PROCEDURE — 02UG0JZ SUPPLEMENT MITRAL VALVE WITH SYNTHETIC SUBSTITUTE, OPEN APPROACH: ICD-10-PCS | Performed by: THORACIC SURGERY (CARDIOTHORACIC VASCULAR SURGERY)

## 2017-10-12 PROCEDURE — 25010000002 ALBUMIN HUMAN 5% PER 50 ML: Performed by: THORACIC SURGERY (CARDIOTHORACIC VASCULAR SURGERY)

## 2017-10-12 PROCEDURE — 93005 ELECTROCARDIOGRAM TRACING: CPT | Performed by: THORACIC SURGERY (CARDIOTHORACIC VASCULAR SURGERY)

## 2017-10-12 PROCEDURE — P9046 ALBUMIN (HUMAN), 25%, 20 ML: HCPCS

## 2017-10-12 PROCEDURE — 25010000002 PROPOFOL 10 MG/ML EMULSION: Performed by: ANESTHESIOLOGY

## 2017-10-12 PROCEDURE — 94799 UNLISTED PULMONARY SVC/PX: CPT

## 2017-10-12 PROCEDURE — 33519 CABG ARTERY-VEIN THREE: CPT | Performed by: THORACIC SURGERY (CARDIOTHORACIC VASCULAR SURGERY)

## 2017-10-12 PROCEDURE — 82803 BLOOD GASES ANY COMBINATION: CPT

## 2017-10-12 PROCEDURE — 93010 ELECTROCARDIOGRAM REPORT: CPT | Performed by: INTERNAL MEDICINE

## 2017-10-12 PROCEDURE — 85018 HEMOGLOBIN: CPT

## 2017-10-12 PROCEDURE — 85025 COMPLETE CBC W/AUTO DIFF WBC: CPT | Performed by: THORACIC SURGERY (CARDIOTHORACIC VASCULAR SURGERY)

## 2017-10-12 PROCEDURE — 82330 ASSAY OF CALCIUM: CPT | Performed by: THORACIC SURGERY (CARDIOTHORACIC VASCULAR SURGERY)

## 2017-10-12 PROCEDURE — 25010000002 HEPARIN (PORCINE) PER 1000 UNITS: Performed by: ANESTHESIOLOGY

## 2017-10-12 PROCEDURE — 80069 RENAL FUNCTION PANEL: CPT | Performed by: THORACIC SURGERY (CARDIOTHORACIC VASCULAR SURGERY)

## 2017-10-12 PROCEDURE — C1713 ANCHOR/SCREW BN/BN,TIS/BN: HCPCS | Performed by: THORACIC SURGERY (CARDIOTHORACIC VASCULAR SURGERY)

## 2017-10-12 PROCEDURE — 85730 THROMBOPLASTIN TIME PARTIAL: CPT | Performed by: THORACIC SURGERY (CARDIOTHORACIC VASCULAR SURGERY)

## 2017-10-12 PROCEDURE — 25010000002 INSULIN REGULAR HUMAN PER 5 UNITS: Performed by: THORACIC SURGERY (CARDIOTHORACIC VASCULAR SURGERY)

## 2017-10-12 PROCEDURE — 93318 ECHO TRANSESOPHAGEAL INTRAOP: CPT | Performed by: ANESTHESIOLOGY

## 2017-10-12 PROCEDURE — C1751 CATH, INF, PER/CENT/MIDLINE: HCPCS | Performed by: ANESTHESIOLOGY

## 2017-10-12 PROCEDURE — 25010000002 MAGNESIUM SULFATE PER 500 MG OF MAGNESIUM: Performed by: ANESTHESIOLOGY

## 2017-10-12 PROCEDURE — 71010 HC CHEST PA OR AP: CPT

## 2017-10-12 PROCEDURE — P9041 ALBUMIN (HUMAN),5%, 50ML: HCPCS | Performed by: THORACIC SURGERY (CARDIOTHORACIC VASCULAR SURGERY)

## 2017-10-12 PROCEDURE — 25010000002 MIDAZOLAM PER 1 MG: Performed by: ANESTHESIOLOGY

## 2017-10-12 PROCEDURE — C1729 CATH, DRAINAGE: HCPCS | Performed by: THORACIC SURGERY (CARDIOTHORACIC VASCULAR SURGERY)

## 2017-10-12 PROCEDURE — 25010000002 HEPARIN (PORCINE) PER 1000 UNITS

## 2017-10-12 PROCEDURE — 33533 CABG ARTERIAL SINGLE: CPT | Performed by: PHYSICIAN ASSISTANT

## 2017-10-12 PROCEDURE — 33519 CABG ARTERY-VEIN THREE: CPT | Performed by: PHYSICIAN ASSISTANT

## 2017-10-12 PROCEDURE — 25010000002 ONDANSETRON PER 1 MG: Performed by: ANESTHESIOLOGY

## 2017-10-12 PROCEDURE — 82947 ASSAY GLUCOSE BLOOD QUANT: CPT

## 2017-10-12 PROCEDURE — 85576 BLOOD PLATELET AGGREGATION: CPT | Performed by: THORACIC SURGERY (CARDIOTHORACIC VASCULAR SURGERY)

## 2017-10-12 PROCEDURE — 25010000002 ALBUMIN HUMAN 25% PER 50 ML

## 2017-10-12 PROCEDURE — 85347 COAGULATION TIME ACTIVATED: CPT

## 2017-10-12 PROCEDURE — 25010000002 HEPARIN (PORCINE) PER 1000 UNITS: Performed by: THORACIC SURGERY (CARDIOTHORACIC VASCULAR SURGERY)

## 2017-10-12 PROCEDURE — 25010000003 POTASSIUM CHLORIDE 10 MEQ/100ML SOLUTION: Performed by: THORACIC SURGERY (CARDIOTHORACIC VASCULAR SURGERY)

## 2017-10-12 PROCEDURE — A4648 IMPLANTABLE TISSUE MARKER: HCPCS | Performed by: THORACIC SURGERY (CARDIOTHORACIC VASCULAR SURGERY)

## 2017-10-12 PROCEDURE — 25010000002 CEFOXITIN PER 1 G: Performed by: ANESTHESIOLOGY

## 2017-10-12 PROCEDURE — 33508 ENDOSCOPIC VEIN HARVEST: CPT | Performed by: THORACIC SURGERY (CARDIOTHORACIC VASCULAR SURGERY)

## 2017-10-12 PROCEDURE — 3E080GC INTRODUCTION OF OTHER THERAPEUTIC SUBSTANCE INTO HEART, OPEN APPROACH: ICD-10-PCS | Performed by: THORACIC SURGERY (CARDIOTHORACIC VASCULAR SURGERY)

## 2017-10-12 PROCEDURE — 25010000003 CEFAZOLIN IN DEXTROSE 2-4 GM/100ML-% SOLUTION: Performed by: THORACIC SURGERY (CARDIOTHORACIC VASCULAR SURGERY)

## 2017-10-12 PROCEDURE — 85014 HEMATOCRIT: CPT

## 2017-10-12 PROCEDURE — 25010000003 PROTAMINE SULFATE PER 10 MG: Performed by: THORACIC SURGERY (CARDIOTHORACIC VASCULAR SURGERY)

## 2017-10-12 PROCEDURE — 5A1221Z PERFORMANCE OF CARDIAC OUTPUT, CONTINUOUS: ICD-10-PCS | Performed by: THORACIC SURGERY (CARDIOTHORACIC VASCULAR SURGERY)

## 2017-10-12 PROCEDURE — 85007 BL SMEAR W/DIFF WBC COUNT: CPT | Performed by: THORACIC SURGERY (CARDIOTHORACIC VASCULAR SURGERY)

## 2017-10-12 PROCEDURE — 82962 GLUCOSE BLOOD TEST: CPT

## 2017-10-12 PROCEDURE — 25010000002 PHENYLEPHRINE PER 1 ML: Performed by: ANESTHESIOLOGY

## 2017-10-12 PROCEDURE — 25010000002 VANCOMYCIN PER 500 MG

## 2017-10-12 PROCEDURE — 06BQ4ZZ EXCISION OF LEFT SAPHENOUS VEIN, PERCUTANEOUS ENDOSCOPIC APPROACH: ICD-10-PCS | Performed by: THORACIC SURGERY (CARDIOTHORACIC VASCULAR SURGERY)

## 2017-10-12 PROCEDURE — 33533 CABG ARTERIAL SINGLE: CPT | Performed by: THORACIC SURGERY (CARDIOTHORACIC VASCULAR SURGERY)

## 2017-10-12 PROCEDURE — 33426 REPAIR OF MITRAL VALVE: CPT | Performed by: THORACIC SURGERY (CARDIOTHORACIC VASCULAR SURGERY)

## 2017-10-12 PROCEDURE — 25010000002 DEXAMETHASONE PER 1 MG: Performed by: ANESTHESIOLOGY

## 2017-10-12 PROCEDURE — 25010000002 FENTANYL CITRATE (PF) 100 MCG/2ML SOLUTION: Performed by: ANESTHESIOLOGY

## 2017-10-12 PROCEDURE — 25010000002 MORPHINE PER 10 MG: Performed by: THORACIC SURGERY (CARDIOTHORACIC VASCULAR SURGERY)

## 2017-10-12 PROCEDURE — 02100Z9 BYPASS CORONARY ARTERY, ONE ARTERY FROM LEFT INTERNAL MAMMARY, OPEN APPROACH: ICD-10-PCS | Performed by: THORACIC SURGERY (CARDIOTHORACIC VASCULAR SURGERY)

## 2017-10-12 PROCEDURE — 82607 VITAMIN B-12: CPT | Performed by: PSYCHIATRY & NEUROLOGY

## 2017-10-12 PROCEDURE — 021209W BYPASS CORONARY ARTERY, THREE ARTERIES FROM AORTA WITH AUTOLOGOUS VENOUS TISSUE, OPEN APPROACH: ICD-10-PCS | Performed by: THORACIC SURGERY (CARDIOTHORACIC VASCULAR SURGERY)

## 2017-10-12 PROCEDURE — 33426 REPAIR OF MITRAL VALVE: CPT | Performed by: PHYSICIAN ASSISTANT

## 2017-10-12 PROCEDURE — 94002 VENT MGMT INPAT INIT DAY: CPT

## 2017-10-12 PROCEDURE — 25010000002 PAPAVERINE PER 60 MG: Performed by: THORACIC SURGERY (CARDIOTHORACIC VASCULAR SURGERY)

## 2017-10-12 PROCEDURE — 83735 ASSAY OF MAGNESIUM: CPT | Performed by: THORACIC SURGERY (CARDIOTHORACIC VASCULAR SURGERY)

## 2017-10-12 PROCEDURE — 80053 COMPREHEN METABOLIC PANEL: CPT | Performed by: THORACIC SURGERY (CARDIOTHORACIC VASCULAR SURGERY)

## 2017-10-12 PROCEDURE — 85610 PROTHROMBIN TIME: CPT | Performed by: THORACIC SURGERY (CARDIOTHORACIC VASCULAR SURGERY)

## 2017-10-12 PROCEDURE — 33508 ENDOSCOPIC VEIN HARVEST: CPT | Performed by: PHYSICIAN ASSISTANT

## 2017-10-12 PROCEDURE — 25010000002 METOCLOPRAMIDE PER 10 MG: Performed by: THORACIC SURGERY (CARDIOTHORACIC VASCULAR SURGERY)

## 2017-10-12 DEVICE — IMPLANTABLE DEVICE: Type: IMPLANTABLE DEVICE | Site: HEART | Status: FUNCTIONAL

## 2017-10-12 RX ORDER — PROPOFOL 10 MG/ML
VIAL (ML) INTRAVENOUS AS NEEDED
Status: DISCONTINUED | OUTPATIENT
Start: 2017-10-12 | End: 2017-10-12 | Stop reason: SURG

## 2017-10-12 RX ORDER — FUROSEMIDE 10 MG/ML
40 INJECTION INTRAMUSCULAR; INTRAVENOUS EVERY 6 HOURS PRN
Status: DISCONTINUED | OUTPATIENT
Start: 2017-10-12 | End: 2017-10-13

## 2017-10-12 RX ORDER — BISACODYL 10 MG
10 SUPPOSITORY, RECTAL RECTAL DAILY PRN
Status: DISCONTINUED | OUTPATIENT
Start: 2017-10-13 | End: 2017-10-18 | Stop reason: HOSPADM

## 2017-10-12 RX ORDER — ACETAMINOPHEN 325 MG/1
650 TABLET ORAL EVERY 4 HOURS PRN
Status: DISCONTINUED | OUTPATIENT
Start: 2017-10-12 | End: 2017-10-18 | Stop reason: HOSPADM

## 2017-10-12 RX ORDER — MAGNESIUM SULFATE HEPTAHYDRATE 40 MG/ML
4 INJECTION, SOLUTION INTRAVENOUS AS NEEDED
Status: DISCONTINUED | OUTPATIENT
Start: 2017-10-12 | End: 2017-10-18 | Stop reason: HOSPADM

## 2017-10-12 RX ORDER — POTASSIUM CHLORIDE 7.45 MG/ML
10 INJECTION INTRAVENOUS
Status: DISCONTINUED | OUTPATIENT
Start: 2017-10-12 | End: 2017-10-18 | Stop reason: HOSPADM

## 2017-10-12 RX ORDER — EPHEDRINE SULFATE 50 MG/ML
INJECTION, SOLUTION INTRAVENOUS AS NEEDED
Status: DISCONTINUED | OUTPATIENT
Start: 2017-10-12 | End: 2017-10-12 | Stop reason: SURG

## 2017-10-12 RX ORDER — ROCURONIUM BROMIDE 10 MG/ML
INJECTION, SOLUTION INTRAVENOUS AS NEEDED
Status: DISCONTINUED | OUTPATIENT
Start: 2017-10-12 | End: 2017-10-12 | Stop reason: SURG

## 2017-10-12 RX ORDER — MAGNESIUM SULFATE HEPTAHYDRATE 500 MG/ML
INJECTION, SOLUTION INTRAMUSCULAR; INTRAVENOUS AS NEEDED
Status: DISCONTINUED | OUTPATIENT
Start: 2017-10-12 | End: 2017-10-12 | Stop reason: SURG

## 2017-10-12 RX ORDER — BISACODYL 5 MG/1
10 TABLET, DELAYED RELEASE ORAL DAILY PRN
Status: DISCONTINUED | OUTPATIENT
Start: 2017-10-12 | End: 2017-10-18 | Stop reason: HOSPADM

## 2017-10-12 RX ORDER — HYDROCODONE BITARTRATE AND ACETAMINOPHEN 5; 325 MG/1; MG/1
2 TABLET ORAL EVERY 4 HOURS PRN
Status: DISCONTINUED | OUTPATIENT
Start: 2017-10-12 | End: 2017-10-13 | Stop reason: SDUPTHER

## 2017-10-12 RX ORDER — FAMOTIDINE 10 MG/ML
20 INJECTION, SOLUTION INTRAVENOUS
Status: DISCONTINUED | OUTPATIENT
Start: 2017-10-12 | End: 2017-10-12 | Stop reason: HOSPADM

## 2017-10-12 RX ORDER — POTASSIUM CHLORIDE 750 MG/1
40 CAPSULE, EXTENDED RELEASE ORAL AS NEEDED
Status: DISCONTINUED | OUTPATIENT
Start: 2017-10-12 | End: 2017-10-18 | Stop reason: HOSPADM

## 2017-10-12 RX ORDER — MAGNESIUM SULFATE HEPTAHYDRATE 40 MG/ML
2 INJECTION, SOLUTION INTRAVENOUS AS NEEDED
Status: DISCONTINUED | OUTPATIENT
Start: 2017-10-12 | End: 2017-10-18 | Stop reason: HOSPADM

## 2017-10-12 RX ORDER — MILRINONE LACTATE 0.2 MG/ML
.25-.75 INJECTION, SOLUTION INTRAVENOUS CONTINUOUS PRN
Status: DISCONTINUED | OUTPATIENT
Start: 2017-10-12 | End: 2017-10-13

## 2017-10-12 RX ORDER — POTASSIUM CHLORIDE 29.8 MG/ML
20 INJECTION INTRAVENOUS
Status: DISCONTINUED | OUTPATIENT
Start: 2017-10-12 | End: 2017-10-18 | Stop reason: HOSPADM

## 2017-10-12 RX ORDER — ALBUMIN, HUMAN INJ 5% 5 %
1500 SOLUTION INTRAVENOUS AS NEEDED
Status: DISPENSED | OUTPATIENT
Start: 2017-10-12 | End: 2017-10-13

## 2017-10-12 RX ORDER — SODIUM CHLORIDE, SODIUM LACTATE, POTASSIUM CHLORIDE, CALCIUM CHLORIDE 600; 310; 30; 20 MG/100ML; MG/100ML; MG/100ML; MG/100ML
9 INJECTION, SOLUTION INTRAVENOUS CONTINUOUS PRN
Status: DISCONTINUED | OUTPATIENT
Start: 2017-10-12 | End: 2017-10-12 | Stop reason: HOSPADM

## 2017-10-12 RX ORDER — SODIUM CHLORIDE 0.9 % (FLUSH) 0.9 %
1-10 SYRINGE (ML) INJECTION AS NEEDED
Status: DISCONTINUED | OUTPATIENT
Start: 2017-10-12 | End: 2017-10-12 | Stop reason: HOSPADM

## 2017-10-12 RX ORDER — PANTOPRAZOLE SODIUM 40 MG/1
40 TABLET, DELAYED RELEASE ORAL DAILY
Status: DISCONTINUED | OUTPATIENT
Start: 2017-10-12 | End: 2017-10-14 | Stop reason: ALTCHOICE

## 2017-10-12 RX ORDER — SODIUM CHLORIDE 9 MG/ML
30 INJECTION, SOLUTION INTRAVENOUS CONTINUOUS PRN
Status: DISCONTINUED | OUTPATIENT
Start: 2017-10-12 | End: 2017-10-18 | Stop reason: HOSPADM

## 2017-10-12 RX ORDER — PAPAVERINE HYDROCHLORIDE 30 MG/ML
INJECTION INTRAMUSCULAR; INTRAVENOUS AS NEEDED
Status: DISCONTINUED | OUTPATIENT
Start: 2017-10-12 | End: 2017-10-12 | Stop reason: HOSPADM

## 2017-10-12 RX ORDER — CHLORHEXIDINE GLUCONATE 0.12 MG/ML
15 RINSE ORAL EVERY 12 HOURS
Status: DISCONTINUED | OUTPATIENT
Start: 2017-10-12 | End: 2017-10-16

## 2017-10-12 RX ORDER — FAMOTIDINE 10 MG/ML
20 INJECTION, SOLUTION INTRAVENOUS DAILY
Status: DISCONTINUED | OUTPATIENT
Start: 2017-10-12 | End: 2017-10-14 | Stop reason: ALTCHOICE

## 2017-10-12 RX ORDER — MORPHINE SULFATE 2 MG/ML
1 INJECTION, SOLUTION INTRAMUSCULAR; INTRAVENOUS EVERY 4 HOURS PRN
Status: DISCONTINUED | OUTPATIENT
Start: 2017-10-12 | End: 2017-10-17

## 2017-10-12 RX ORDER — MIDAZOLAM HYDROCHLORIDE 1 MG/ML
2 INJECTION INTRAMUSCULAR; INTRAVENOUS
Status: DISCONTINUED | OUTPATIENT
Start: 2017-10-12 | End: 2017-10-13

## 2017-10-12 RX ORDER — DEXAMETHASONE SODIUM PHOSPHATE 4 MG/ML
INJECTION, SOLUTION INTRA-ARTICULAR; INTRALESIONAL; INTRAMUSCULAR; INTRAVENOUS; SOFT TISSUE AS NEEDED
Status: DISCONTINUED | OUTPATIENT
Start: 2017-10-12 | End: 2017-10-12 | Stop reason: SURG

## 2017-10-12 RX ORDER — SENNA AND DOCUSATE SODIUM 50; 8.6 MG/1; MG/1
2 TABLET, FILM COATED ORAL NIGHTLY
Status: DISCONTINUED | OUTPATIENT
Start: 2017-10-13 | End: 2017-10-18 | Stop reason: HOSPADM

## 2017-10-12 RX ORDER — PROTAMINE SULFATE 10 MG/ML
INJECTION, SOLUTION INTRAVENOUS AS NEEDED
Status: DISCONTINUED | OUTPATIENT
Start: 2017-10-12 | End: 2017-10-12 | Stop reason: HOSPADM

## 2017-10-12 RX ORDER — DOPAMINE HYDROCHLORIDE 160 MG/100ML
2-20 INJECTION, SOLUTION INTRAVENOUS CONTINUOUS PRN
Status: DISCONTINUED | OUTPATIENT
Start: 2017-10-12 | End: 2017-10-13

## 2017-10-12 RX ORDER — POTASSIUM CHLORIDE 1.5 G/1.77G
40 POWDER, FOR SOLUTION ORAL AS NEEDED
Status: DISCONTINUED | OUTPATIENT
Start: 2017-10-12 | End: 2017-10-18 | Stop reason: HOSPADM

## 2017-10-12 RX ORDER — PROPOFOL 10 MG/ML
VIAL (ML) INTRAVENOUS CONTINUOUS PRN
Status: DISCONTINUED | OUTPATIENT
Start: 2017-10-12 | End: 2017-10-12 | Stop reason: SURG

## 2017-10-12 RX ORDER — CEFAZOLIN SODIUM 2 G/100ML
2 INJECTION, SOLUTION INTRAVENOUS EVERY 8 HOURS
Status: COMPLETED | OUTPATIENT
Start: 2017-10-12 | End: 2017-10-14

## 2017-10-12 RX ORDER — ASPIRIN 81 MG/1
81 TABLET ORAL DAILY
Status: DISCONTINUED | OUTPATIENT
Start: 2017-10-13 | End: 2017-10-13

## 2017-10-12 RX ORDER — MAGNESIUM SULFATE 1 G/100ML
1 INJECTION INTRAVENOUS EVERY 8 HOURS
Status: DISCONTINUED | OUTPATIENT
Start: 2017-10-12 | End: 2017-10-13

## 2017-10-12 RX ORDER — SODIUM CHLORIDE 0.9 % (FLUSH) 0.9 %
30 SYRINGE (ML) INJECTION ONCE AS NEEDED
Status: DISCONTINUED | OUTPATIENT
Start: 2017-10-12 | End: 2017-10-18 | Stop reason: HOSPADM

## 2017-10-12 RX ORDER — SODIUM CHLORIDE 9 MG/ML
30 INJECTION, SOLUTION INTRAVENOUS CONTINUOUS
Status: DISCONTINUED | OUTPATIENT
Start: 2017-10-12 | End: 2017-10-18 | Stop reason: HOSPADM

## 2017-10-12 RX ORDER — DEXMEDETOMIDINE HYDROCHLORIDE 4 UG/ML
.2-1.5 INJECTION, SOLUTION INTRAVENOUS
Status: DISCONTINUED | OUTPATIENT
Start: 2017-10-12 | End: 2017-10-13

## 2017-10-12 RX ORDER — METOCLOPRAMIDE HYDROCHLORIDE 5 MG/ML
10 INJECTION INTRAMUSCULAR; INTRAVENOUS EVERY 6 HOURS
Status: DISCONTINUED | OUTPATIENT
Start: 2017-10-12 | End: 2017-10-13

## 2017-10-12 RX ORDER — PROMETHAZINE HYDROCHLORIDE 25 MG/ML
12.5 INJECTION, SOLUTION INTRAMUSCULAR; INTRAVENOUS EVERY 6 HOURS PRN
Status: DISCONTINUED | OUTPATIENT
Start: 2017-10-12 | End: 2017-10-18 | Stop reason: HOSPADM

## 2017-10-12 RX ORDER — ONDANSETRON 2 MG/ML
4 INJECTION INTRAMUSCULAR; INTRAVENOUS EVERY 6 HOURS PRN
Status: DISCONTINUED | OUTPATIENT
Start: 2017-10-12 | End: 2017-10-18 | Stop reason: HOSPADM

## 2017-10-12 RX ORDER — FENTANYL CITRATE 50 UG/ML
25 INJECTION, SOLUTION INTRAMUSCULAR; INTRAVENOUS
Status: DISCONTINUED | OUTPATIENT
Start: 2017-10-12 | End: 2017-10-12 | Stop reason: HOSPADM

## 2017-10-12 RX ORDER — NALOXONE HCL 0.4 MG/ML
0.4 VIAL (ML) INJECTION
Status: DISCONTINUED | OUTPATIENT
Start: 2017-10-12 | End: 2017-10-18 | Stop reason: HOSPADM

## 2017-10-12 RX ORDER — LIDOCAINE HYDROCHLORIDE 40 MG/ML
SOLUTION TOPICAL AS NEEDED
Status: DISCONTINUED | OUTPATIENT
Start: 2017-10-12 | End: 2017-10-12 | Stop reason: SURG

## 2017-10-12 RX ORDER — PROMETHAZINE HYDROCHLORIDE 25 MG/1
12.5 TABLET ORAL EVERY 6 HOURS PRN
Status: DISCONTINUED | OUTPATIENT
Start: 2017-10-12 | End: 2017-10-18 | Stop reason: HOSPADM

## 2017-10-12 RX ORDER — TRANEXAMIC ACID 100 MG/ML
INJECTION, SOLUTION INTRAVENOUS AS NEEDED
Status: DISCONTINUED | OUTPATIENT
Start: 2017-10-12 | End: 2017-10-12 | Stop reason: SURG

## 2017-10-12 RX ORDER — ALPRAZOLAM 0.25 MG/1
0.25 TABLET ORAL EVERY 8 HOURS PRN
Status: DISCONTINUED | OUTPATIENT
Start: 2017-10-12 | End: 2017-10-17

## 2017-10-12 RX ORDER — ONDANSETRON 2 MG/ML
INJECTION INTRAMUSCULAR; INTRAVENOUS AS NEEDED
Status: DISCONTINUED | OUTPATIENT
Start: 2017-10-12 | End: 2017-10-12 | Stop reason: SURG

## 2017-10-12 RX ORDER — SUFENTANIL CITRATE 50 UG/ML
INJECTION EPIDURAL; INTRAVENOUS AS NEEDED
Status: DISCONTINUED | OUTPATIENT
Start: 2017-10-12 | End: 2017-10-12 | Stop reason: SURG

## 2017-10-12 RX ORDER — ATORVASTATIN CALCIUM 20 MG/1
40 TABLET, FILM COATED ORAL NIGHTLY
Status: DISCONTINUED | OUTPATIENT
Start: 2017-10-12 | End: 2017-10-18 | Stop reason: HOSPADM

## 2017-10-12 RX ORDER — MIDAZOLAM HYDROCHLORIDE 1 MG/ML
1 INJECTION INTRAMUSCULAR; INTRAVENOUS
Status: DISCONTINUED | OUTPATIENT
Start: 2017-10-12 | End: 2017-10-12 | Stop reason: HOSPADM

## 2017-10-12 RX ORDER — CYCLOBENZAPRINE HCL 10 MG
10 TABLET ORAL EVERY 8 HOURS PRN
Status: DISCONTINUED | OUTPATIENT
Start: 2017-10-13 | End: 2017-10-13

## 2017-10-12 RX ORDER — OXYCODONE HYDROCHLORIDE 5 MG/1
10 TABLET ORAL EVERY 4 HOURS PRN
Status: DISCONTINUED | OUTPATIENT
Start: 2017-10-12 | End: 2017-10-18 | Stop reason: HOSPADM

## 2017-10-12 RX ORDER — CEFOXITIN 2 G/1
INJECTION, POWDER, FOR SOLUTION INTRAVENOUS AS NEEDED
Status: DISCONTINUED | OUTPATIENT
Start: 2017-10-12 | End: 2017-10-12 | Stop reason: SURG

## 2017-10-12 RX ORDER — SODIUM CHLORIDE 9 MG/ML
INJECTION, SOLUTION INTRAVENOUS CONTINUOUS PRN
Status: DISCONTINUED | OUTPATIENT
Start: 2017-10-12 | End: 2017-10-12 | Stop reason: SURG

## 2017-10-12 RX ORDER — HEPARIN SODIUM 1000 [USP'U]/ML
INJECTION, SOLUTION INTRAVENOUS; SUBCUTANEOUS AS NEEDED
Status: DISCONTINUED | OUTPATIENT
Start: 2017-10-12 | End: 2017-10-12 | Stop reason: SURG

## 2017-10-12 RX ORDER — HEPARIN SODIUM 5000 [USP'U]/ML
INJECTION, SOLUTION INTRAVENOUS; SUBCUTANEOUS AS NEEDED
Status: DISCONTINUED | OUTPATIENT
Start: 2017-10-12 | End: 2017-10-12 | Stop reason: HOSPADM

## 2017-10-12 RX ORDER — NITROGLYCERIN 20 MG/100ML
5-200 INJECTION INTRAVENOUS CONTINUOUS PRN
Status: DISCONTINUED | OUTPATIENT
Start: 2017-10-12 | End: 2017-10-13

## 2017-10-12 RX ORDER — ACETAMINOPHEN 650 MG/1
650 SUPPOSITORY RECTAL EVERY 4 HOURS PRN
Status: DISCONTINUED | OUTPATIENT
Start: 2017-10-12 | End: 2017-10-13

## 2017-10-12 RX ORDER — VECURONIUM BROMIDE 1 MG/ML
INJECTION, POWDER, LYOPHILIZED, FOR SOLUTION INTRAVENOUS AS NEEDED
Status: DISCONTINUED | OUTPATIENT
Start: 2017-10-12 | End: 2017-10-12 | Stop reason: SURG

## 2017-10-12 RX ORDER — MIDAZOLAM HYDROCHLORIDE 1 MG/ML
2 INJECTION INTRAMUSCULAR; INTRAVENOUS
Status: DISCONTINUED | OUTPATIENT
Start: 2017-10-12 | End: 2017-10-12 | Stop reason: HOSPADM

## 2017-10-12 RX ADMIN — METOPROLOL TARTRATE 12.5 MG: 25 TABLET ORAL at 09:36

## 2017-10-12 RX ADMIN — MIDAZOLAM 1 MG: 1 INJECTION INTRAMUSCULAR; INTRAVENOUS at 10:48

## 2017-10-12 RX ADMIN — FENTANYL CITRATE 25 MCG: 50 INJECTION INTRAMUSCULAR; INTRAVENOUS at 10:44

## 2017-10-12 RX ADMIN — CEFOXITIN 2 G: 2 INJECTION, POWDER, FOR SOLUTION INTRAVENOUS at 13:35

## 2017-10-12 RX ADMIN — PHENYLEPHRINE HYDROCHLORIDE 200 MCG: 10 INJECTION INTRAVENOUS at 13:11

## 2017-10-12 RX ADMIN — FENTANYL CITRATE 25 MCG: 50 INJECTION INTRAMUSCULAR; INTRAVENOUS at 10:48

## 2017-10-12 RX ADMIN — FAMOTIDINE 20 MG: 10 INJECTION INTRAVENOUS at 10:30

## 2017-10-12 RX ADMIN — TRANEXAMIC ACID 300 MG: 100 INJECTION, SOLUTION INTRAVENOUS at 14:59

## 2017-10-12 RX ADMIN — SUFENTANIL CITRATE 50 MCG: 50 INJECTION, SOLUTION EPIDURAL; INTRAVENOUS at 13:09

## 2017-10-12 RX ADMIN — CHLORHEXIDINE GLUCONATE 15 ML: 1.2 RINSE ORAL at 09:35

## 2017-10-12 RX ADMIN — MUPIROCIN 1 APPLICATION: 20 OINTMENT TOPICAL at 18:48

## 2017-10-12 RX ADMIN — ALBUMIN HUMAN 250 ML: 0.05 INJECTION, SOLUTION INTRAVENOUS at 22:01

## 2017-10-12 RX ADMIN — CEFOXITIN 2 G: 2 INJECTION, POWDER, FOR SOLUTION INTRAVENOUS at 16:09

## 2017-10-12 RX ADMIN — VECURONIUM BROMIDE 2 MG: 1 INJECTION, POWDER, LYOPHILIZED, FOR SOLUTION INTRAVENOUS at 14:00

## 2017-10-12 RX ADMIN — SUFENTANIL CITRATE 50 MCG: 50 INJECTION, SOLUTION EPIDURAL; INTRAVENOUS at 13:50

## 2017-10-12 RX ADMIN — PROPOFOL 120 MG: 10 INJECTION, EMULSION INTRAVENOUS at 13:09

## 2017-10-12 RX ADMIN — EPHEDRINE SULFATE 10 MG: 50 INJECTION INTRAMUSCULAR; INTRAVENOUS; SUBCUTANEOUS at 14:09

## 2017-10-12 RX ADMIN — PROPOFOL 50 MCG/KG/MIN: 10 INJECTION, EMULSION INTRAVENOUS at 14:20

## 2017-10-12 RX ADMIN — SODIUM CHLORIDE 30 ML/HR: 9 INJECTION, SOLUTION INTRAVENOUS at 18:30

## 2017-10-12 RX ADMIN — MUPIROCIN 1 APPLICATION: 20 OINTMENT TOPICAL at 00:58

## 2017-10-12 RX ADMIN — LIDOCAINE HYDROCHLORIDE 1 EACH: 40 SPRAY LARYNGEAL; TRANSTRACHEAL at 13:13

## 2017-10-12 RX ADMIN — MIDAZOLAM 5 MG: 1 INJECTION INTRAMUSCULAR; INTRAVENOUS at 13:09

## 2017-10-12 RX ADMIN — SODIUM CHLORIDE 2.8 UNITS/HR: 9 INJECTION, SOLUTION INTRAVENOUS at 20:56

## 2017-10-12 RX ADMIN — FENTANYL CITRATE 25 MCG: 50 INJECTION INTRAMUSCULAR; INTRAVENOUS at 11:02

## 2017-10-12 RX ADMIN — PHENYLEPHRINE HYDROCHLORIDE 100 MCG: 10 INJECTION INTRAVENOUS at 13:59

## 2017-10-12 RX ADMIN — PHENYLEPHRINE HYDROCHLORIDE 200 MCG: 10 INJECTION INTRAVENOUS at 13:15

## 2017-10-12 RX ADMIN — SUFENTANIL CITRATE 50 MCG: 50 INJECTION, SOLUTION EPIDURAL; INTRAVENOUS at 16:10

## 2017-10-12 RX ADMIN — VECURONIUM BROMIDE 5 MG: 1 INJECTION, POWDER, LYOPHILIZED, FOR SOLUTION INTRAVENOUS at 15:53

## 2017-10-12 RX ADMIN — MIDAZOLAM 1 MG: 1 INJECTION INTRAMUSCULAR; INTRAVENOUS at 10:43

## 2017-10-12 RX ADMIN — METOPROLOL TARTRATE 12.5 MG: 25 TABLET ORAL at 06:00

## 2017-10-12 RX ADMIN — CEFAZOLIN SODIUM 2 G: 2 INJECTION, SOLUTION INTRAVENOUS at 19:06

## 2017-10-12 RX ADMIN — TRANEXAMIC ACID 1000 MG: 100 INJECTION, SOLUTION INTRAVENOUS at 13:45

## 2017-10-12 RX ADMIN — MORPHINE SULFATE 0.5 MG: 2 INJECTION, SOLUTION INTRAMUSCULAR; INTRAVENOUS at 23:59

## 2017-10-12 RX ADMIN — PHENYLEPHRINE HYDROCHLORIDE 1 MCG/KG/MIN: 10 INJECTION, SOLUTION INTRAMUSCULAR; INTRAVENOUS; SUBCUTANEOUS at 13:10

## 2017-10-12 RX ADMIN — MIDAZOLAM 1 MG: 1 INJECTION INTRAMUSCULAR; INTRAVENOUS at 10:35

## 2017-10-12 RX ADMIN — CHLORHEXIDINE GLUCONATE 1 APPLICATION: 500 CLOTH TOPICAL at 08:30

## 2017-10-12 RX ADMIN — MIDAZOLAM 2 MG: 1 INJECTION INTRAMUSCULAR; INTRAVENOUS at 11:03

## 2017-10-12 RX ADMIN — VECURONIUM BROMIDE 10 MG: 1 INJECTION, POWDER, LYOPHILIZED, FOR SOLUTION INTRAVENOUS at 13:09

## 2017-10-12 RX ADMIN — POTASSIUM CHLORIDE 10 MEQ: 7.46 INJECTION, SOLUTION INTRAVENOUS at 23:28

## 2017-10-12 RX ADMIN — MUPIROCIN 1 APPLICATION: 20 OINTMENT TOPICAL at 09:35

## 2017-10-12 RX ADMIN — ONDANSETRON 4 MG: 2 INJECTION INTRAMUSCULAR; INTRAVENOUS at 16:09

## 2017-10-12 RX ADMIN — PHENYLEPHRINE HYDROCHLORIDE 100 MCG: 10 INJECTION INTRAVENOUS at 13:30

## 2017-10-12 RX ADMIN — CHLORHEXIDINE GLUCONATE 1 APPLICATION: 500 CLOTH TOPICAL at 00:02

## 2017-10-12 RX ADMIN — PHENYLEPHRINE HYDROCHLORIDE 200 MCG: 10 INJECTION INTRAVENOUS at 13:09

## 2017-10-12 RX ADMIN — DEXAMETHASONE SODIUM PHOSPHATE 8 MG: 4 INJECTION, SOLUTION INTRAMUSCULAR; INTRAVENOUS at 13:46

## 2017-10-12 RX ADMIN — PHENYLEPHRINE HYDROCHLORIDE 100 MCG: 10 INJECTION INTRAVENOUS at 14:20

## 2017-10-12 RX ADMIN — ALPRAZOLAM 0.25 MG: 0.25 TABLET ORAL at 06:00

## 2017-10-12 RX ADMIN — FAMOTIDINE 20 MG: 10 INJECTION, SOLUTION INTRAVENOUS at 18:45

## 2017-10-12 RX ADMIN — SUFENTANIL CITRATE 50 MCG: 50 INJECTION, SOLUTION EPIDURAL; INTRAVENOUS at 13:49

## 2017-10-12 RX ADMIN — ROCURONIUM BROMIDE 50 MG: 10 INJECTION INTRAVENOUS at 16:40

## 2017-10-12 RX ADMIN — SODIUM CHLORIDE: 9 INJECTION, SOLUTION INTRAVENOUS at 12:53

## 2017-10-12 RX ADMIN — TEMAZEPAM 7.5 MG: 7.5 CAPSULE ORAL at 01:17

## 2017-10-12 RX ADMIN — TRANEXAMIC ACID 300 MG: 100 INJECTION, SOLUTION INTRAVENOUS at 16:57

## 2017-10-12 RX ADMIN — PHENYLEPHRINE HYDROCHLORIDE 200 MCG: 10 INJECTION INTRAVENOUS at 13:24

## 2017-10-12 RX ADMIN — PROPOFOL 40 MG: 10 INJECTION, EMULSION INTRAVENOUS at 14:11

## 2017-10-12 RX ADMIN — MAGNESIUM SULFATE HEPTAHYDRATE 2 G: 500 INJECTION, SOLUTION INTRAMUSCULAR; INTRAVENOUS at 15:53

## 2017-10-12 RX ADMIN — TRANEXAMIC ACID 300 MG: 100 INJECTION, SOLUTION INTRAVENOUS at 15:59

## 2017-10-12 RX ADMIN — FENTANYL CITRATE 25 MCG: 50 INJECTION INTRAMUSCULAR; INTRAVENOUS at 10:31

## 2017-10-12 RX ADMIN — METOCLOPRAMIDE 10 MG: 5 INJECTION, SOLUTION INTRAMUSCULAR; INTRAVENOUS at 18:46

## 2017-10-12 RX ADMIN — SUFENTANIL CITRATE 50 MCG: 50 INJECTION, SOLUTION EPIDURAL; INTRAVENOUS at 14:12

## 2017-10-12 RX ADMIN — PROPOFOL 40 MG: 10 INJECTION, EMULSION INTRAVENOUS at 13:50

## 2017-10-12 RX ADMIN — VECURONIUM BROMIDE 3 MG: 1 INJECTION, POWDER, LYOPHILIZED, FOR SOLUTION INTRAVENOUS at 14:22

## 2017-10-12 RX ADMIN — HEPARIN SODIUM 30000 UNITS: 1000 INJECTION, SOLUTION INTRAVENOUS; SUBCUTANEOUS at 14:19

## 2017-10-12 NOTE — NURSING NOTE
"Met with daughters x 2 and pt's fiaguilar Fadi William.  Discussed benefits of cardiac rehab and provided Phase II information packet which includes a red cover sheet with general information about cardiac rehab, [facility] Cardiac Rehab Programs handout, and [place] [facility] Letter article entitled \" Cardiac Rehab is Often the Best Medicine for Recovery\" that stresses the importance of cardiac rehab after a heart event.  Also provided [employer] booklet \"Understanding Cardiac Rehabilitation.\" She lives out [facility] closest to [facility], so I provided contact information for that cardiac rehab program. Encouraged them to call to set up her first visit as soon as they know when home health will be concluding their visits.  I did discuss that pt cannot participate in outpt cardiac rehab while she is still being followed by home health and encouraged them to check with her insurance company re: copays, deductibles, etc.   They verbalized understanding of all information.    "

## 2017-10-12 NOTE — ANESTHESIA PROCEDURE NOTES
Procedure Performed: Emergent/Open-Heart Anesthesia FILEMON     Start Time:        End Time:        General Procedure Information  FILEMON Placed for monitoring purposes only -- This is not a diagnostic FILEMON

## 2017-10-12 NOTE — PLAN OF CARE
Problem: Patient Care Overview (Adult)  Goal: Plan of Care Review  Outcome: Ongoing (interventions implemented as appropriate)    10/12/17 2254   Coping/Psychosocial Response Interventions   Plan Of Care Reviewed With patient   Patient Care Overview   Progress progress toward functional goals as expected   Outcome Evaluation   Outcome Summary/Follow up Plan After seeing pt last night Dr. ferraro stated pt was not sure if she wanted to have surgery, approx 12am this morning pt stated she will proceed with surgery, prep completed, consent signed, pt noted anxious and having difficulty sleeping, xananx and sleeping med given, pt rested well , VSS       Goal: Discharge Needs Assessment  Outcome: Ongoing (interventions implemented as appropriate)    Problem: Acute Coronary Syndrome (ACS) (Adult)  Goal: Signs and Symptoms of Listed Potential Problems Will be Absent or Manageable (Acute Coronary Syndrome)  Outcome: Ongoing (interventions implemented as appropriate)    Problem: Pain, Acute (Adult)  Goal: Identify Related Risk Factors and Signs and Symptoms  Outcome: Ongoing (interventions implemented as appropriate)  Goal: Acceptable Pain Control/Comfort Level  Outcome: Ongoing (interventions implemented as appropriate)

## 2017-10-12 NOTE — OP NOTE
[facility] CARDIAC SURGERY OP NOTE    Preop Diagnosis: Severe coronary artery disease with in-stent stenosis and occlusion. Brilinta therapy.  Bilateral carotid 100% occlusion.  Old strokes.    Postop Diagnosis: Same with moderate severe ischemic mitral incompetence King 1.      IndicationsThis patient was incapacitated with shortness of air and chest pain from severe coronary artery disease.  She was on Brilinta therapy and we held it allowing the platelet aggregation to rise.  She had bilateral 100% carotid occlusions and no vascular therapies indicated with these lesions.  This was a high risk situation because of the neural vascular disease but there is no option for medical or percutaneous therapy so we discussed this risk in detail and she understood and decided to go ahead with surgery.STS Risk was discussed with the patient. I have discussed the Heater Cooler Infection situation with the patient and family. They understand and wish to proceed.we planned to do full neuro monitoring and maintain higher perfusion pressures during the surgery.      Procedure: CABG ×4.  Pedicle LIMA graft to mid LAD.  Vein grafts to right coronary artery, ramus intermedius, first diagonal branch the LAD.  Mitral valve repair with a 24 mm Medtronic 3-D rigid ring.  Temporary cardiopulmonary bypass.  Antegrade and retrograde cold blood cardioplegia with warm reperfusion.  Full neurologic monitoring.  Transesophageal echo by anesthesia.    Surgeon: Ramos Doherty [facility] and Carol Hernandez PAC    Anesthesia: GET    Findings : There was moderate to severe mitral incompetence with multiple jets.  This valve needed to be repaired.  The right coronary artery was 2-1/2 mm.  The 2 other artery branches were 2 mm.  The LAD was 2 mm.  The vein was harvested from the left leg with the endoscope and was 4-1/2 mm and excellent.  The NURA was a nice 2 mm vessel with  excellent blood flow.  On the CT angiogram there was no stenosis in the left subclavian so we use the mammary.  We maintain higher perfusion pressure while on bypass with good brain readings by neuro monitoring and I was pleased with this.    Operative Procedure: a primary median sternotomy was made while the left greater saphenous vein was harvested with the endoscope.  The left NURA was harvested from the chest wall as a pedicle graft.  Cardio pulmonary bypass was then established for 100 minutes drifting 28°C for brain protection with appropriate flow rates and good perfusion pressures.  Aorta was crossclamped for 76 minutes and we gave 800 cc of antegrade cold blood cardioplegia and then a liter of retrograde cold blood cardioplegia.  Doses were repeated every 10-15 minutes to good effect.  The left atrium was open on the right side and the valves examined and we placed our 2-0 Ethibond sutures and a 24 mm ring was sewn into place.  It was fixed in place with the cor knot device and with pressure testing there was no leak.  The left atrium was closed with running 3-0 Prolene.  3 veins were then anastomosed the ascending aorta with 6-0 Prolene and marked with washers.  There were then sewn the right coronary artery ramus intermedius and the diagonal branch with 7-0 Prolene.  The mammary was sewn to the LAD with 7-0 Prolene.  A warm dose of retrograde cardioplegia was given and there was strong suction on the aortic needle vent the cross-clamp was released.  The patient was rewarmed and complete de-airing performed.  Cardio pulmonary bypass was then easily weaned and discontinued.  Decannulation was affected the usual devices were placed and hemostasis was obtained.  We applied vancomycin enriched platelet rich plasma.  The sternum was closed with #7 and double #6 wires.  The sponge needle and instrument counts noted be correct.  Neuro monitoring was satisfactory.  Transesophageal echo was good with no mitral  incompetence.  LV function was improved.  The sponge needle and instrument counts noted be correct and she went the open-heart recovery room in stable condition.    Complications: None    Tubes: 3    Epicardial Wires: 3    Blood Loss: Minimal    Aortic X time: 76min    CPB time: 100min     Specimen: none    Condition: Stable      Patient Care Team:  Narciso Camargo MD as PCP - General (Family Medicine)        Ramos Muñoz MD  10/12/2017  5:07 PM

## 2017-10-12 NOTE — ANESTHESIA PROCEDURE NOTES
Arterial Line    Patient location during procedure: holding area   Line placed for hemodynamic monitoring and ABGs/Labs/ISTAT.  Performed By   Anesthesiologist: GREGORIA JAMES  Preanesthetic Checklist  Completed: patient identified, site marked, surgical consent, pre-op evaluation, timeout performed, IV checked, risks and benefits discussed and monitors and equipment checked  Arterial Line Prep   Prep: ChloraPrep  Patient monitoring: blood pressure monitoring, continuous pulse oximetry and EKG  Arterial Line Procedure   Laterality:left  Location:  radial artery  Catheter size: 20 G   Guidance: palpation technique  Number of attempts: 1  Successful placement: yes          Post Assessment   Dressing Type: occlusive dressing applied, secured with tape and wrist guard applied.   Complications no  Circ/Move/Sens Assessment: normal.   Patient Tolerance: patient tolerated the procedure well with no apparent complications

## 2017-10-12 NOTE — ANESTHESIA PROCEDURE NOTES
Central Line    Patient location during procedure: holding area  Indications: central pressure monitoring and vascular access  Staff  Anesthesiologist: GREGORIA JAMES  Preanesthetic Checklist  Completed: patient identified, site marked, surgical consent, pre-op evaluation, timeout performed, IV checked, risks and benefits discussed and monitors and equipment checked  Central Line Prep  Sterile Tech:cap, gloves, mask, sterile barriers and gown  Prep: chloraprep  Patient monitoring: blood pressure monitoring, continuous pulse oximetry and EKG  Central Line Procedure  Laterality:right  Location:internal jugular  Catheter Type:Cordis and Elnora-Gauri  Catheter Size:9 Fr  Guidance:palpation technique  Assessment  Post procedure:biopatch applied, line sutured and occlusive dressing applied  Assessement:blood return through all ports  Complications:yes  Patient Tolerance:patient tolerated the procedure well with no apparent complications  Additional Notes  Initial latex free swan balloon ruptured req another swan-gauri placement

## 2017-10-12 NOTE — ANESTHESIA PREPROCEDURE EVALUATION
Anesthesia Evaluation     Patient summary reviewed   NPO Solid Status: > 8 hours       Airway   Mallampati: II  Neck ROM: full  no difficulty expected  Dental      Pulmonary    Cardiovascular     Rhythm: regular    (+) hypertension, CAD, angina,       Neuro/Psych  (+) headaches,      ROS Comment: Bilateral total internal carotid occlusions (non-operable)  GI/Hepatic/Renal/Endo    (+) obesity,      Musculoskeletal     Abdominal    Substance History      OB/GYN          Other   (+) arthritis                             Anesthesia Plan    ASA 4     general   (Art line /SGC FILEMON/post-op vent/neuro-monitoring intra-op/maintain MAP normal range)  intravenous induction   Anesthetic plan and risks discussed with patient.  Use of blood products discussed with patient .

## 2017-10-12 NOTE — ANESTHESIA PROCEDURE NOTES
Airway  Urgency: elective    Airway not difficult    General Information and Staff    Patient location during procedure: OR  Anesthesiologist: MICHAEL SAMANIEGO    Indications and Patient Condition  Indications for airway management: airway protection    Preoxygenated: yes  MILS maintained throughout  Mask difficulty assessment: 1 - vent by mask    Final Airway Details  Final airway type: endotracheal airway      Successful airway: ETT  Cuffed: yes   Successful intubation technique: direct laryngoscopy  Facilitating devices/methods: anterior pressure/BURP  Endotracheal tube insertion site: oral  Blade: Vladimir  Blade size: #3  ETT size: 7.5 mm  Cormack-Lehane Classification: grade IIb - view of arytenoids or posterior of glottis only  Placement verified by: chest auscultation and capnometry   Measured from: lips  ETT to lips (cm): 21  Number of attempts at approach: 1

## 2017-10-13 ENCOUNTER — APPOINTMENT (OUTPATIENT)
Dept: CT IMAGING | Facility: HOSPITAL | Age: 67
End: 2017-10-13
Attending: PSYCHIATRY & NEUROLOGY

## 2017-10-13 ENCOUNTER — APPOINTMENT (OUTPATIENT)
Dept: GENERAL RADIOLOGY | Facility: HOSPITAL | Age: 67
End: 2017-10-13

## 2017-10-13 LAB
ALBUMIN SERPL-MCNC: 4.4 G/DL (ref 3.5–5.2)
ANION GAP SERPL CALCULATED.3IONS-SCNC: 16.5 MMOL/L
ARTERIAL PATENCY WRIST A: ABNORMAL
ARTERIAL PATENCY WRIST A: ABNORMAL
ATMOSPHERIC PRESS: 753.4 MMHG
ATMOSPHERIC PRESS: 753.6 MMHG
BASE EXCESS BLDA CALC-SCNC: -2.5 MMOL/L (ref 0–2)
BASE EXCESS BLDA CALC-SCNC: -2.6 MMOL/L (ref 0–2)
BASOPHILS # BLD AUTO: 0.01 10*3/MM3 (ref 0–0.2)
BASOPHILS NFR BLD AUTO: 0.1 % (ref 0–1.5)
BDY SITE: ABNORMAL
BDY SITE: ABNORMAL
BUN BLD-MCNC: 18 MG/DL (ref 8–23)
BUN/CREAT SERPL: 16.7 (ref 7–25)
CALCIUM SPEC-SCNC: 9.2 MG/DL (ref 8.6–10.5)
CHLORIDE SERPL-SCNC: 110 MMOL/L (ref 98–107)
CHOLEST SERPL-MCNC: 97 MG/DL (ref 0–200)
CO2 SERPL-SCNC: 20.5 MMOL/L (ref 22–29)
CREAT BLD-MCNC: 1.08 MG/DL (ref 0.57–1)
DEPRECATED RDW RBC AUTO: 41.3 FL (ref 37–54)
EOSINOPHIL # BLD AUTO: 0.01 10*3/MM3 (ref 0–0.7)
EOSINOPHIL NFR BLD AUTO: 0.1 % (ref 0.3–6.2)
ERYTHROCYTE [DISTWIDTH] IN BLOOD BY AUTOMATED COUNT: 12.7 % (ref 11.7–13)
GFR SERPL CREATININE-BSD FRML MDRD: 51 ML/MIN/1.73
GLUCOSE BLD-MCNC: 157 MG/DL (ref 65–99)
GLUCOSE BLDC GLUCOMTR-MCNC: 113 MG/DL (ref 70–130)
GLUCOSE BLDC GLUCOMTR-MCNC: 132 MG/DL (ref 70–130)
GLUCOSE BLDC GLUCOMTR-MCNC: 133 MG/DL (ref 70–130)
GLUCOSE BLDC GLUCOMTR-MCNC: 145 MG/DL (ref 70–130)
GLUCOSE BLDC GLUCOMTR-MCNC: 150 MG/DL (ref 70–130)
GLUCOSE BLDC GLUCOMTR-MCNC: 151 MG/DL (ref 70–130)
GLUCOSE BLDC GLUCOMTR-MCNC: 153 MG/DL (ref 70–130)
GLUCOSE BLDC GLUCOMTR-MCNC: 153 MG/DL (ref 70–130)
GLUCOSE BLDC GLUCOMTR-MCNC: 181 MG/DL (ref 70–130)
GLUCOSE BLDC GLUCOMTR-MCNC: 184 MG/DL (ref 70–130)
HBA1C MFR BLD: 5.2 % (ref 4.8–5.6)
HBA1C MFR BLD: 5.5 % (ref 4.8–5.6)
HCO3 BLDA-SCNC: 22.4 MMOL/L (ref 22–28)
HCO3 BLDA-SCNC: 23.5 MMOL/L (ref 22–28)
HCT VFR BLD AUTO: 29.2 % (ref 35.6–45.5)
HDLC SERPL-MCNC: 22 MG/DL (ref 40–60)
HGB BLD-MCNC: 9.7 G/DL (ref 11.9–15.5)
HOROWITZ INDEX BLD+IHG-RTO: 40 %
HOROWITZ INDEX BLD+IHG-RTO: 40 %
IMM GRANULOCYTES # BLD: 0.1 10*3/MM3 (ref 0–0.03)
IMM GRANULOCYTES NFR BLD: 0.5 % (ref 0–0.5)
LDLC SERPL CALC-MCNC: 56 MG/DL (ref 0–100)
LDLC/HDLC SERPL: 2.54 {RATIO}
LYMPHOCYTES # BLD AUTO: 1.68 10*3/MM3 (ref 0.9–4.8)
LYMPHOCYTES NFR BLD AUTO: 8.9 % (ref 19.6–45.3)
MCH RBC QN AUTO: 29.8 PG (ref 26.9–32)
MCHC RBC AUTO-ENTMCNC: 33.2 G/DL (ref 32.4–36.3)
MCV RBC AUTO: 89.8 FL (ref 80.5–98.2)
MODALITY: ABNORMAL
MODALITY: ABNORMAL
MONOCYTES # BLD AUTO: 1.6 10*3/MM3 (ref 0.2–1.2)
MONOCYTES NFR BLD AUTO: 8.5 % (ref 5–12)
NEUTROPHILS # BLD AUTO: 15.45 10*3/MM3 (ref 1.9–8.1)
NEUTROPHILS NFR BLD AUTO: 81.9 % (ref 42.7–76)
NRBC BLD MANUAL-RTO: 0 /100 WBC (ref 0–0)
O2 A-A PPRESDIFF RESPIRATORY: 0.5 MMHG
O2 A-A PPRESDIFF RESPIRATORY: 0.5 MMHG
PCO2 BLDA: 38.9 MM HG (ref 35–45)
PCO2 BLDA: 44.6 MM HG (ref 35–45)
PEEP RESPIRATORY: 7.5 CM[H2O]
PEEP RESPIRATORY: 7.5 CM[H2O]
PH BLDA: 7.33 PH UNITS (ref 7.35–7.45)
PH BLDA: 7.37 PH UNITS (ref 7.35–7.45)
PHOSPHATE SERPL-MCNC: 2.8 MG/DL (ref 2.5–4.5)
PLATELET # BLD AUTO: 204 10*3/MM3 (ref 140–500)
PMV BLD AUTO: 9.4 FL (ref 6–12)
PO2 BLDA: 110.7 MM HG (ref 80–100)
PO2 BLDA: 117.9 MM HG (ref 80–100)
POTASSIUM BLD-SCNC: 4.3 MMOL/L (ref 3.5–5.2)
PSV: 8 CMH2O
RBC # BLD AUTO: 3.25 10*6/MM3 (ref 3.9–5.2)
SAO2 % BLDCOA: 97.9 % (ref 92–99)
SAO2 % BLDCOA: 98.5 % (ref 92–99)
SET MECH RESP RATE: 12
SET MECH RESP RATE: 31
SODIUM BLD-SCNC: 147 MMOL/L (ref 136–145)
TOTAL RATE: 12 BREATHS/MINUTE
TOTAL RATE: 31 BREATHS/MINUTE
TRIGL SERPL-MCNC: 96 MG/DL (ref 0–150)
TSH SERPL DL<=0.05 MIU/L-ACNC: 0.09 MIU/ML (ref 0.27–4.2)
TSH SERPL DL<=0.05 MIU/L-ACNC: 0.12 MIU/ML (ref 0.27–4.2)
VENTILATOR MODE: ABNORMAL
VENTILATOR MODE: AC
VIT B12 BLD-MCNC: 419 PG/ML (ref 211–946)
VIT B12 BLD-MCNC: 461 PG/ML (ref 211–946)
VLDLC SERPL-MCNC: 19.2 MG/DL (ref 5–40)
VT ON VENT VENT: 372 ML
VT ON VENT VENT: 550 ML
WBC NRBC COR # BLD: 18.85 10*3/MM3 (ref 4.5–10.7)

## 2017-10-13 PROCEDURE — 82962 GLUCOSE BLOOD TEST: CPT

## 2017-10-13 PROCEDURE — 93005 ELECTROCARDIOGRAM TRACING: CPT | Performed by: THORACIC SURGERY (CARDIOTHORACIC VASCULAR SURGERY)

## 2017-10-13 PROCEDURE — 82803 BLOOD GASES ANY COMBINATION: CPT

## 2017-10-13 PROCEDURE — 25010000002 MORPHINE PER 10 MG: Performed by: THORACIC SURGERY (CARDIOTHORACIC VASCULAR SURGERY)

## 2017-10-13 PROCEDURE — 25010000002 ENOXAPARIN PER 10 MG: Performed by: THORACIC SURGERY (CARDIOTHORACIC VASCULAR SURGERY)

## 2017-10-13 PROCEDURE — 94799 UNLISTED PULMONARY SVC/PX: CPT

## 2017-10-13 PROCEDURE — 25010000003 CEFAZOLIN IN DEXTROSE 2-4 GM/100ML-% SOLUTION: Performed by: THORACIC SURGERY (CARDIOTHORACIC VASCULAR SURGERY)

## 2017-10-13 PROCEDURE — 82607 VITAMIN B-12: CPT | Performed by: PSYCHIATRY & NEUROLOGY

## 2017-10-13 PROCEDURE — 99223 1ST HOSP IP/OBS HIGH 75: CPT | Performed by: PSYCHIATRY & NEUROLOGY

## 2017-10-13 PROCEDURE — 83036 HEMOGLOBIN GLYCOSYLATED A1C: CPT | Performed by: PSYCHIATRY & NEUROLOGY

## 2017-10-13 PROCEDURE — 84443 ASSAY THYROID STIM HORMONE: CPT | Performed by: PSYCHIATRY & NEUROLOGY

## 2017-10-13 PROCEDURE — 25010000003 POTASSIUM CHLORIDE 10 MEQ/100ML SOLUTION: Performed by: THORACIC SURGERY (CARDIOTHORACIC VASCULAR SURGERY)

## 2017-10-13 PROCEDURE — 93010 ELECTROCARDIOGRAM REPORT: CPT | Performed by: INTERNAL MEDICINE

## 2017-10-13 PROCEDURE — 71010 HC CHEST PA OR AP: CPT

## 2017-10-13 PROCEDURE — 25010000002 ALBUMIN HUMAN 5% PER 50 ML: Performed by: THORACIC SURGERY (CARDIOTHORACIC VASCULAR SURGERY)

## 2017-10-13 PROCEDURE — 94003 VENT MGMT INPAT SUBQ DAY: CPT

## 2017-10-13 PROCEDURE — P9041 ALBUMIN (HUMAN),5%, 50ML: HCPCS | Performed by: THORACIC SURGERY (CARDIOTHORACIC VASCULAR SURGERY)

## 2017-10-13 PROCEDURE — 80061 LIPID PANEL: CPT | Performed by: PSYCHIATRY & NEUROLOGY

## 2017-10-13 PROCEDURE — 99024 POSTOP FOLLOW-UP VISIT: CPT | Performed by: PHYSICIAN ASSISTANT

## 2017-10-13 PROCEDURE — 97110 THERAPEUTIC EXERCISES: CPT

## 2017-10-13 PROCEDURE — 25010000002 METOCLOPRAMIDE PER 10 MG: Performed by: THORACIC SURGERY (CARDIOTHORACIC VASCULAR SURGERY)

## 2017-10-13 PROCEDURE — 80069 RENAL FUNCTION PANEL: CPT | Performed by: THORACIC SURGERY (CARDIOTHORACIC VASCULAR SURGERY)

## 2017-10-13 PROCEDURE — 85025 COMPLETE CBC W/AUTO DIFF WBC: CPT | Performed by: THORACIC SURGERY (CARDIOTHORACIC VASCULAR SURGERY)

## 2017-10-13 PROCEDURE — 97161 PT EVAL LOW COMPLEX 20 MIN: CPT

## 2017-10-13 PROCEDURE — 70450 CT HEAD/BRAIN W/O DYE: CPT

## 2017-10-13 RX ORDER — TRAMADOL HYDROCHLORIDE 50 MG/1
50 TABLET ORAL EVERY 6 HOURS PRN
Status: DISCONTINUED | OUTPATIENT
Start: 2017-10-13 | End: 2017-10-13

## 2017-10-13 RX ORDER — TRAMADOL HYDROCHLORIDE 50 MG/1
50 TABLET ORAL EVERY 6 HOURS PRN
Status: DISCONTINUED | OUTPATIENT
Start: 2017-10-13 | End: 2017-10-18 | Stop reason: HOSPADM

## 2017-10-13 RX ORDER — HYDROCODONE BITARTRATE AND ACETAMINOPHEN 5; 325 MG/1; MG/1
2 TABLET ORAL EVERY 4 HOURS PRN
Status: DISCONTINUED | OUTPATIENT
Start: 2017-10-13 | End: 2017-10-16 | Stop reason: SDUPTHER

## 2017-10-13 RX ORDER — NICOTINE POLACRILEX 4 MG
15 LOZENGE BUCCAL
Status: DISCONTINUED | OUTPATIENT
Start: 2017-10-13 | End: 2017-10-18 | Stop reason: HOSPADM

## 2017-10-13 RX ORDER — ASPIRIN 325 MG
325 TABLET, DELAYED RELEASE (ENTERIC COATED) ORAL DAILY
Status: DISCONTINUED | OUTPATIENT
Start: 2017-10-14 | End: 2017-10-15

## 2017-10-13 RX ORDER — DEXTROSE MONOHYDRATE 25 G/50ML
25 INJECTION, SOLUTION INTRAVENOUS
Status: DISCONTINUED | OUTPATIENT
Start: 2017-10-13 | End: 2017-10-18 | Stop reason: HOSPADM

## 2017-10-13 RX ADMIN — POTASSIUM CHLORIDE 10 MEQ: 7.46 INJECTION, SOLUTION INTRAVENOUS at 02:10

## 2017-10-13 RX ADMIN — HYDROCODONE BITARTRATE AND ACETAMINOPHEN 1 TABLET: 5; 325 TABLET ORAL at 08:44

## 2017-10-13 RX ADMIN — ACETAMINOPHEN 650 MG: 325 TABLET ORAL at 15:45

## 2017-10-13 RX ADMIN — ALBUMIN HUMAN 1500 ML: 0.05 INJECTION, SOLUTION INTRAVENOUS at 02:58

## 2017-10-13 RX ADMIN — POTASSIUM CHLORIDE 10 MEQ: 7.46 INJECTION, SOLUTION INTRAVENOUS at 03:50

## 2017-10-13 RX ADMIN — TRAMADOL HYDROCHLORIDE 50 MG: 50 TABLET, FILM COATED ORAL at 20:18

## 2017-10-13 RX ADMIN — CLEVIPIDINE 2 MG/HR: 0.5 EMULSION INTRAVENOUS at 03:17

## 2017-10-13 RX ADMIN — POTASSIUM CHLORIDE 10 MEQ: 7.46 INJECTION, SOLUTION INTRAVENOUS at 01:07

## 2017-10-13 RX ADMIN — OXYCODONE HYDROCHLORIDE 5 MG: 5 TABLET ORAL at 15:45

## 2017-10-13 RX ADMIN — LEVOTHYROXINE SODIUM 137 MCG: 137 TABLET ORAL at 22:15

## 2017-10-13 RX ADMIN — OXYCODONE HYDROCHLORIDE 5 MG: 5 TABLET ORAL at 22:15

## 2017-10-13 RX ADMIN — CEFAZOLIN SODIUM 2 G: 2 INJECTION, SOLUTION INTRAVENOUS at 01:30

## 2017-10-13 RX ADMIN — ENOXAPARIN SODIUM 40 MG: 40 INJECTION SUBCUTANEOUS at 17:54

## 2017-10-13 RX ADMIN — SENNOSIDES AND DOCUSATE SODIUM 2 TABLET: 8.6; 5 TABLET ORAL at 20:14

## 2017-10-13 RX ADMIN — ATORVASTATIN CALCIUM 40 MG: 20 TABLET, FILM COATED ORAL at 20:14

## 2017-10-13 RX ADMIN — CEFAZOLIN SODIUM 2 G: 2 INJECTION, SOLUTION INTRAVENOUS at 17:54

## 2017-10-13 RX ADMIN — MORPHINE SULFATE 1 MG: 2 INJECTION, SOLUTION INTRAMUSCULAR; INTRAVENOUS at 15:27

## 2017-10-13 RX ADMIN — OXYCODONE HYDROCHLORIDE 5 MG: 5 TABLET ORAL at 18:06

## 2017-10-13 RX ADMIN — METOPROLOL TARTRATE 12.5 MG: 25 TABLET ORAL at 20:14

## 2017-10-13 RX ADMIN — MORPHINE SULFATE 1 MG: 2 INJECTION, SOLUTION INTRAMUSCULAR; INTRAVENOUS at 09:55

## 2017-10-13 RX ADMIN — CEFAZOLIN SODIUM 2 G: 2 INJECTION, SOLUTION INTRAVENOUS at 11:30

## 2017-10-13 RX ADMIN — METOCLOPRAMIDE 10 MG: 5 INJECTION, SOLUTION INTRAMUSCULAR; INTRAVENOUS at 12:11

## 2017-10-13 RX ADMIN — METOPROLOL TARTRATE 12.5 MG: 25 TABLET ORAL at 15:19

## 2017-10-13 RX ADMIN — ACETAMINOPHEN 650 MG: 650 SUPPOSITORY RECTAL at 01:34

## 2017-10-13 NOTE — CONSULTS
Encounter Date: 10/13/2017    CHIEF COMPLAINT: left side weakness    Patient Active Problem List   Diagnosis   • Coronary artery disease of native heart with stable angina pectoris   • CAD (coronary artery disease)       PRESENT ILLNESS:    Portions of this notes is copied from previous physician encounters, reviewed and edited appropriately.    Background:     Marion Anderson is a 67 y.o. female history of coronary artery disease, known bilateral carotid occlusion who had cardiac bypass surgery yesterday complaining of some left-sided weakness.  Patient's boyfriend present.  Patient has long-standing left-sided mild weakness even before surgery.  She had ankle injury and she is bedbound since 1 year.  Also her left arm is slightly weak at baseline.  She had preop imaging of the head and neck angiogram which revealed bilateral occluded carotid.     Yesterday she had surgery and today morning on nurse's assessment the found her to have some left-sided weakness affecting the arm and leg.  She was also confused earlier today.  On arrival by the bedside patient has some repetitive speech about her pain.  Otherwise she denies any weakness even though compared to the right patient has reduced activity in the left.  Patient definitely had some antigravity in the left upper extremity.    Review of systems   No neck stiffness  No photophobia  All systems reviewed and are negative.         Past Medical History:   Diagnosis Date   • Arthritis    • Coronary artery disease    • Disease of thyroid gland    • Hypertension    • PONV (postoperative nausea and vomiting)    • Spinal headache        Past Surgical History:   Procedure Laterality Date   • CARDIAC SURGERY     • CORONARY ARTERY BYPASS GRAFT N/A 10/12/2017    Procedure: FILEMON STERNOTOMY CORONARY ARTERY BYPASS GRAFT TIMES 4  USING LEFT INTERNAL MAMMARY ARTERY AND LEFT GREATER SAPHENOUS VEIN GRAFT PER  ENDOSCOPIC VEIN HARVESTING, MITRAL VALVE REPAIR AND PRP;  Surgeon:  Ramos Muñoz MD;  Location: [facility];  Service:    • FRACTURE SURGERY      ankle. leg   • TONSILLECTOMY         Current Facility-Administered Medications   Medication Dose Route Frequency Provider Last Rate Last Dose   • acetaminophen (TYLENOL) suppository 650 mg  650 mg Rectal Q4H PRN Ramos Muñoz MD   650 mg at 10/13/17 0134   • acetaminophen (TYLENOL) tablet 650 mg  650 mg Oral Q4H PRN Ramos Muñoz MD       • albumin human 5 % bottle 1,500 mL  1,500 mL Intravenous PRN Ramos Muñoz  mL/hr at 10/13/17 0258 1,500 mL at 10/13/17 0258   • ALPRAZolam (XANAX) tablet 0.25 mg  0.25 mg Oral Q8H PRN Ramos Muñoz MD       • aspirin EC tablet 81 mg  81 mg Oral Daily Ramos Muñoz MD       • atorvastatin (LIPITOR) tablet 40 mg  40 mg Oral Nightly Ramos Muñoz MD       • bisacodyl (DULCOLAX) EC tablet 10 mg  10 mg Oral Daily PRN Ramos Muñoz MD       • bisacodyl (DULCOLAX) suppository 10 mg  10 mg Rectal Daily PRN Ramos Muñoz MD       • ceFAZolin in dextrose (ANCEF) IVPB solution 2 g  2 g Intravenous Q8H Ramos Muñoz MD   2 g at 10/13/17 0130   • chlorhexidine (PERIDEX) 0.12 % solution 15 mL  15 mL Mouth/Throat Q12H Ramos Muñoz MD       • clevidipine (CLEVIPREX) infusion 0.5 mg/mL  2-32 mg/hr Intravenous Continuous PRN Ramos Muñoz MD   Stopped at 10/13/17 0325   • cyclobenzaprine (FLEXERIL) tablet 10 mg  10 mg Oral Q8H PRN Ramos Muñoz MD       • dexmedetomidine (PRECEDEX) 400 mcg/100 mL (4 mcg/mL) infusion  0.2-1.5 mcg/kg/hr Intravenous Titrated Ramos Muñoz MD       • DOPamine 400 mg/250 mL (1.6 mg/mL) infusion  2-20 mcg/kg/min Intravenous Continuous PRN Ramos Muñoz MD       • enoxaparin (LOVENOX) syringe 40 mg  40 mg Subcutaneous Daily Ramos Muñoz MD       • EPINEPHrine (ADRENALIN) 5 mg in sodium chloride 0.9 % 250 mL (0.02 mg/mL) infusion  0.02-0.3 mcg/kg/min Intravenous Continuous PRN Ramos Muñoz MD        • pantoprazole (PROTONIX) EC tablet 40 mg  40 mg Oral Daily Ramos Muñoz MD        Or   • famotidine (PEPCID) injection 20 mg  20 mg Intravenous Daily Ramos Muñoz MD   20 mg at 10/12/17 1845   • furosemide (LASIX) injection 40 mg  40 mg Intravenous Q6H PRN Ramos Muñoz MD       • HYDROcodone-acetaminophen (NORCO) 5-325 MG per tablet 2 tablet  2 tablet Oral Q4H PRN Ramos Muñoz MD   1 tablet at 10/13/17 0844   • insulin regular (HumuLIN R,NovoLIN R) 100 Units in sodium chloride 0.9 % 100 mL (1 Units/mL) infusion  0-50 Units/hr Intravenous Continuous PRN Ramos Muñoz MD   Stopped at 10/13/17 0945   • levothyroxine (SYNTHROID, LEVOTHROID) tablet 137 mcg  137 mcg Oral Daily Ramos Muñoz MD   137 mcg at 10/11/17 2355   • magnesium hydroxide (MILK OF MAGNESIA) suspension 2400 mg/10mL 10 mL  10 mL Oral Daily PRN Ramos Muñoz MD       • Magnesium Sulfate 2 gram Bolus, followed by 8 gram infusion (total Mg dose 10 grams)- Mg less than or equal to 1mg/dL  2 g Intravenous PRN Ramos Muñoz MD        Or   • Magnesium Sulfate 6 gram Infusion (2 gm x 3) -Mg 1.1 -1.5 mg/dL  2 g Intravenous PRN Ramos Muñoz MD        Or   • magnesium sulfate 4 gram infusion- Mg 1.6-1.9 mg/dL  4 g Intravenous PRN Ramos Muñoz MD       • magnesium sulfate in D5W 1g/100mL (PREMIX)  1 g Intravenous Q8H Ramos Muñoz MD       • metoclopramide (REGLAN) injection 10 mg  10 mg Intravenous Q6H Ramos Muñoz MD   Stopped at 10/13/17 0600   • metoprolol tartrate (LOPRESSOR) tablet 12.5 mg  12.5 mg Oral Q12H Ramos Muñoz MD   Stopped at 10/12/17 2100   • midazolam (VERSED) injection 2 mg  2 mg Intravenous Q1H PRN Ramos Muñoz MD       • milrinone 20 mg/100 mL (0.2 mg/mL) in 5 % dextrose infusion  0.25-0.75 mcg/kg/min Intravenous Continuous PRN Ramos Muñoz MD       • morphine injection 1 mg  1 mg Intravenous Q4H PRN Ramos Muñoz MD   1 mg at 10/13/17 0955    And   •  naloxone (NARCAN) injection 0.4 mg  0.4 mg Intravenous Q5 Min PRN Ramos Muñoz MD       • morphine injection 4 mg  4 mg Intravenous Q30 Min PRN Ramos Muñoz MD       • mupirocin (BACTROBAN) 2 % nasal ointment 1 application  1 application Each Nare Daily Ramos Muñoz MD       • mupirocin (BACTROBAN) 2 % nasal ointment   Each Nare BID Ramos Muñoz MD   1 application at 10/12/17 1848   • niCARdipine (CARDENE) 25 mg/250 mL (0.1 mg/mL) 0.9% NS VTB infusion  5-15 mg/hr Intravenous Continuous PRN Ramos Muñoz MD       • nitroglycerin 50 mg/250 mL (0.2 mg/mL) infusion  5-200 mcg/min Intravenous Continuous PRN Ramos Muñoz MD       • norepinephrine (LEVOPHED) 8 mg/250 mL (32 mcg/mL) in sodium chloride 0.9% infusion (premix)  0.02-0.3 mcg/kg/min Intravenous Continuous PRN Ramos Muñoz MD       • ondansetron (ZOFRAN) injection 4 mg  4 mg Intravenous Q6H PRN Ramos Muñoz MD       • oxyCODONE (ROXICODONE) immediate release tablet 10 mg  10 mg Oral Q4H PRN Ramos Muñoz MD       • phenylephrine (EUSEBIA-SYNEPHRINE) 50 mg in sodium chloride 0.9 % 250 mL (0.2 mg/mL) infusion  0.2-3 mcg/kg/min Intravenous Continuous PRN Ramos Muñoz MD 11.76 mL/hr at 10/12/17 2120 0.4 mcg/kg/min at 10/12/17 2120   • potassium chloride (MICRO-K) CR capsule 40 mEq  40 mEq Oral PRN Ramos Muñoz MD        Or   • potassium chloride (KLOR-CON) packet 40 mEq  40 mEq Oral PRN Ramos Muñoz MD       • potassium chloride 10 mEq in 100 mL IVPB  10 mEq Intravenous Q1H PRN Ramos Muñoz MD        Or   • potassium chloride 10 mEq in 100 mL IVPB  10 mEq Intravenous Q1H PRN Ramos Muñoz  mL/hr at 10/13/17 0350 10 mEq at 10/13/17 0350   • potassium chloride 20 mEq in 50 mL IVPB  20 mEq Intravenous Q1H PRN Ramos Muñoz MD       • potassium chloride 20 mEq in 50 mL IVPB  20 mEq Intravenous Q1H PRN Ramos Muñoz MD       • potassium chloride 20 mEq in 50 mL IVPB  20 mEq  Intravenous Q1H PRN Ramos Muñoz MD       • promethazine (PHENERGAN) tablet 12.5 mg  12.5 mg Oral Q6H PRN Ramos Muñoz MD        Or   • promethazine (PHENERGAN) injection 12.5 mg  12.5 mg Intravenous Q6H PRN Ramos Muñoz MD       • propofol (DIPRIVAN) infusion 10 mg/mL 100 mL  5-50 mcg/kg/min Intravenous Continuous PRN Ramos Muñoz MD   Stopped at 10/12/17 2042   • sennosides-docusate sodium (SENOKOT-S) 8.6-50 MG tablet 2 tablet  2 tablet Oral Nightly Ramos Muñoz MD       • sodium chloride 0.9 % flush 30 mL  30 mL Intravenous Once PRN Ramos Muñoz MD       • sodium chloride 0.9 % infusion  30 mL/hr Intravenous Continuous Ramos Muñoz MD 30 mL/hr at 10/12/17 1830 30 mL/hr at 10/12/17 1830   • sodium chloride 0.9 % infusion  30 mL/hr Intravenous Continuous PRN Ramos Muñoz MD       • vasopressin (PITRESSIN) 20 Units in sodium chloride 0.9 % 100 mL (0.2 Units/mL) infusion  0.02-0.1 Units/min Intravenous Continuous PRN Ramos Muñoz MD           Allergies   Allergen Reactions   • Latex Itching and Other (See Comments)     REDNESS WITH SKIN CONTACT       Social History     Social History   • Marital status: Single     Spouse name: N/A   • Number of children: N/A   • Years of education: N/A     Occupational History   • Not on file.     Social History Main Topics   • Smoking status: Never Smoker   • Smokeless tobacco: Never Used   • Alcohol use No   • Drug use: No   • Sexual activity: Not on file     Other Topics Concern   • Not on file     Social History Narrative       Family History   Problem Relation Age of Onset   • Heart disease Mother    • Hypertension Mother    • Heart disease Father    • Hypertension Father    • Cancer Brother    • Hypertension Brother        Family Status   Relation Status   • Mother    • Father    • Brother        No current facility-administered medications on file prior to encounter.      No current outpatient prescriptions on file prior to  encounter.           PHYSICAL EXAMINATION:    Vitals: Patient Vitals for the past 4 hrs:   BP Temp Temp src Pulse Resp SpO2   10/13/17 0800 131/68 (!) 100.6 °F (38.1 °C) Core - 18 -   10/13/17 0700 134/74 - - 101 - 99 %     Temp:  [97.7 °F (36.5 °C)-100.6 °F (38.1 °C)] 100.6 °F (38.1 °C)  Heart Rate:  [] 101  Resp:  [11-22] 18  BP: ()/(44-85) 131/68  Arterial Line BP: ()/(49-88) 122/49  FiO2 (%):  [40 %-100 %] 40 %    General:  pleasant in mild acute distress due to back pain which is chronic.  HEENT: No pallor or icterus. Neck supple. No Carotid bruits.  Heart: S1 and S2 normal with regular rhythm.  No murmur.       GENERAL NEUROLOGIC EXAM     Mental Status: Awake alert and not oriented to person place and time. Language memory, attention, concentration, fund of knowledge difficult to assess as patient does not participate.     Cranial nerves:  Fundi were normal bilaterally.  Visual fields were full to confrontation.  Pupils are equal regular and reactive to light. Extraocular movements are intact. Facial sensation was normal and symmetrical bilaterally. Muscles of facial expression were strong and symmetric.  rest of the cranial could not be examined as patient does not participate.       Motor: Normal tone and bulk with no involuntary movements or pronator drift.    Strength normal 3/5 in left upper and lower extremities.     Sensation: Light touch, pin prick, vibration and joint position sense were normal in the upper and lower extremities.     Reflexes: 1+ bilaterally symmetrical with down going toes.    Coordination and gait cannot be tested as patient does not participate as of altered mental status and weakness.     Labs:     Lab Results   Component Value Date    HGB 9.7 (L) 10/13/2017    HCT 29.2 (L) 10/13/2017    WBC 18.85 (H) 10/13/2017     10/13/2017     Lab Results   Component Value Date    BUN 18 10/13/2017    CALCIUM 9.2 10/13/2017     (H) 10/13/2017    K 4.3 10/13/2017      (H) 10/13/2017    CO2 20.5 (L) 10/13/2017     Lab Results   Component Value Date    ALT 20 10/12/2017    AST 19 10/12/2017    BILITOT 0.3 10/12/2017     Lab Results   Component Value Date    PHOS 2.8 10/13/2017    MG 2.6 (H) 10/12/2017    PROTIME 15.3 (H) 10/12/2017    INR 1.26 (H) 10/12/2017     No components found for: POCGLUC  No components found for: A1C  No results found for: HDL, LDL  No components found for: B12  No results found for: TSH    Lab Results (last 24 hours)     Procedure Component Value Units Date/Time    POC Activated Clotting Time [[id number]]  (Abnormal) Collected:  10/12/17 1307    Specimen:  Blood Updated:  10/12/17 1635     Activated Clotting Time  164 (H) Seconds       Serial Number: [id number]:  [id number]       POC OR Panel, ISTAT [[id number]]  (Abnormal) Collected:  10/12/17 1308    Specimen:  Blood Updated:  10/12/17 1635     Potassium 4.0 mmol/L      Total CO2 28 mmol/L       Serial Number: [id number]:  [id number]        Hemoglobin 11.6 (L) g/dL      Hematocrit 34 (L) %      pH, Arterial 7.44 pH units      HCO3, Arterial 27.1 (H) mmol/L      Base Excess 3.0000 mmol/L      O2 Saturation, Arterial 98 %      pO2, Arterial 108 (H) mmHg      pCO2, Arterial 40.1 mm Hg      Glucose 106 mg/dL     POC Activated Clotting Time [[id number]]  (Abnormal) Collected:  10/12/17 1425    Specimen:  Blood Updated:  10/12/17 1636     Activated Clotting Time  670 (H) Seconds       Serial Number: [id number]:  [id number]       POC Activated Clotting Time [[id number]]  (Abnormal) Collected:  10/12/17 1444    Specimen:  Blood Updated:  10/12/17 1636     Activated Clotting Time  786 (H) Seconds       Serial Number: [id number]:  [id number]       POC OR Panel, ISTAT [[id number]]  (Abnormal) Collected:  10/12/17 1445    Specimen:  Blood Updated:  10/12/17 1636     Potassium 4.4 mmol/L      Total CO2 30 (H) mmol/L       Serial Number: [id number]:  [id number]        Hemoglobin 8.8 (L) g/dL      Hematocrit 26 (L) %       pH, Arterial 7.40 pH units      HCO3, Arterial 29.0 (H) mmol/L      Base Excess 4.0000 mmol/L      O2 Saturation, Arterial 100 (H) %      pO2, Arterial 535 (H) mmHg      pCO2, Arterial 46.2 (H) mm Hg      Glucose 117 mg/dL     POC OR Panel, ISTAT [[id number]]  (Abnormal) Collected:  10/12/17 1450    Specimen:  Blood Updated:  10/12/17 1637     Potassium 4.3 mmol/L      Total CO2 26 mmol/L       Serial Number: [id number]:  [id number]        Hemoglobin 8.8 (L) g/dL      Hematocrit 26 (L) %      pH, Arterial 7.36 pH units      HCO3, Arterial 24.9 mmol/L      Base Excess -1.0000 mmol/L      O2 Saturation, Arterial 91 (L) %      pO2, Arterial 64 (L) mmHg      pCO2, Arterial 44.5 mm Hg      Glucose 117 mg/dL     POC Activated Clotting Time [[id number]]  (Abnormal) Collected:  10/12/17 1514    Specimen:  Blood Updated:  10/12/17 1637     Activated Clotting Time  747 (H) Seconds       Serial Number: [id number]:  [id number]       POC OR Panel, ISTAT [[id number]]  (Abnormal) Collected:  10/12/17 1515    Specimen:  Blood Updated:  10/12/17 1637     Potassium 4.1 mmol/L      Total CO2 29 mmol/L       Serial Number: [id number]:  5485        Hemoglobin 8.5 (L) g/dL      Hematocrit 25 (L) %      pH, Arterial 7.39 pH units      HCO3, Arterial 27.7 (H) mmol/L      Base Excess 3.0000 mmol/L      O2 Saturation, Arterial 100 (H) %      pO2, Arterial 510 (H) mmHg      pCO2, Arterial 45.6 (H) mm Hg      Glucose 136 (H) mg/dL     POC Activated Clotting Time [[id number]]  (Abnormal) Collected:  10/12/17 1540    Specimen:  Blood Updated:  10/12/17 1637     Activated Clotting Time  610 (H) Seconds       Serial Number: [id number]:  [id number]       POC OR Panel, ISTAT [[id number]]  (Abnormal) Collected:  10/12/17 1540    Specimen:  Blood Updated:  10/12/17 1637     Potassium 5.1 (H) mmol/L      Total CO2 28 mmol/L       Serial Number: [id number]:  [id number]        Hemoglobin 9.2 (L) g/dL      Hematocrit 27 (L) %      pH, Arterial 7.39 pH units       HCO3, Arterial 27.0 (H) mmol/L      Base Excess 2.0000 mmol/L      O2 Saturation, Arterial 100 (H) %      pO2, Arterial 459 (H) mmHg      pCO2, Arterial 44.6 mm Hg      Glucose 142 (H) mg/dL     POC Activated Clotting Time [[id number]]  (Normal) Collected:  10/12/17 1621    Specimen:  Blood Updated:  10/12/17 1637     Activated Clotting Time  136 Seconds       Serial Number: [id number]:  [id number]       POC OR Panel, ISTAT [[id number]]  (Abnormal) Collected:  10/12/17 1622    Specimen:  Blood Updated:  10/12/17 1638     Potassium 4.3 mmol/L      Total CO2 29 mmol/L       Serial Number: [id number]:  5485        Hemoglobin 8.5 (L) g/dL      Hematocrit 25 (L) %      pH, Arterial 7.51 pH units      HCO3, Arterial 27.7 (H) mmol/L      Base Excess 5.0000 mmol/L      O2 Saturation, Arterial 100 (H) %      pO2, Arterial 404 (H) mmHg      pCO2, Arterial 35.0 mm Hg      Glucose 132 (H) mg/dL     CBC & Differential [[id number]] Collected:  10/12/17 1812    Specimen:  Blood Updated:  10/12/17 1835    Narrative:       The following orders were created for panel order CBC & Differential.  Procedure                               Abnormality         Status                     ---------                               -----------         ------                     CBC Auto Differential[[id number]]        Abnormal            Final result                 Please view results for these tests on the individual orders.    CBC Auto Differential [[id number]]  (Abnormal) Collected:  10/12/17 1812    Specimen:  Blood Updated:  10/12/17 1835     WBC 19.06 (H) 10*3/mm3      RBC 3.64 (L) 10*6/mm3      Hemoglobin 11.0 (L) g/dL      Hematocrit 32.3 (L) %      MCV 88.7 fL      MCH 30.2 pg      MCHC 34.1 g/dL      RDW 12.3 %      RDW-SD 39.7 fl      MPV 9.7 fL      Platelets 194 10*3/mm3      Neutrophil % 85.0 (H) %      Lymphocyte % 6.6 (L) %      Monocyte % 7.0 %      Eosinophil % 0.8 %      Basophil % 0.2 %      Immature Grans % 0.4 %       Neutrophils, Absolute 16.21 (H) 10*3/mm3      Lymphocytes, Absolute 1.25 10*3/mm3      Monocytes, Absolute 1.33 (H) 10*3/mm3      Eosinophils, Absolute 0.15 10*3/mm3      Basophils, Absolute 0.04 10*3/mm3      Immature Grans, Absolute 0.08 (H) 10*3/mm3     aPTT [[id number]]  (Abnormal) Collected:  10/12/17 1812    Specimen:  Blood Updated:  10/12/17 1844     PTT 43.8 (H) seconds     Protime-INR [[id number]]  (Abnormal) Collected:  10/12/17 1812    Specimen:  Blood Updated:  10/12/17 1844     Protime 16.6 (H) Seconds      INR 1.39 (H)    Magnesium [[id number]]  (Abnormal) Collected:  10/12/17 1812    Specimen:  Blood Updated:  10/12/17 1901     Magnesium 2.9 (H) mg/dL     Renal Function Panel [[id number]]  (Abnormal) Collected:  10/12/17 1812    Specimen:  Blood Updated:  10/12/17 1905     Glucose 178 (H) mg/dL      BUN 15 mg/dL      Creatinine 0.93 mg/dL      Sodium 144 mmol/L      Potassium 4.0 mmol/L      Chloride 107 mmol/L      CO2 22.9 mmol/L      Calcium 9.1 mg/dL      Albumin 3.70 g/dL      Phosphorus 2.0 (L) mg/dL      Anion Gap 14.1 mmol/L      BUN/Creatinine Ratio 16.1     eGFR Non African Amer 60 (L) mL/min/1.73     Blood Gas, Arterial [[id number]]  (Abnormal) Collected:  10/12/17 1907    Specimen:  Arterial Blood Updated:  10/12/17 1909     Site Arterial Line     Ean's Test N/A     pH, Arterial 7.480 (H) pH units      pCO2, Arterial 34.6 (L) mm Hg      pO2, Arterial 459.6 (H) mm Hg      HCO3, Arterial 25.8 mmol/L      Base Excess, Arterial 2.4 (H) mmol/L      O2 Saturation Calculated 100.0 (H) %      A-a Gradiant 0.6 mmHg      Barometric Pressure for Blood Gas 754.0 mmHg      Modality Adult Vent     FIO2 100 %      Ventilator Mode AC     Set Tidal Volume 550     Set Mech Resp Rate 14     Rate 14 Breaths/minute      PEEP 7.5    Narrative:       sat 100% Meter: [id number] : [id number] Veronika Epps    Calcium, Ionized [[id number]]  (Normal) Collected:  10/12/17 1812    Specimen:  Blood Updated:   10/12/17 1920     Ionized Calcium 1.28 mmol/L      Ionized Calcium 5.1 mg/dL     POC Glucose Fingerstick [[id number]]  (Abnormal) Collected:  10/12/17 2045    Specimen:  Blood Updated:  10/12/17 2056     Glucose 199 (H) mg/dL     Narrative:       Meter: [id number] : [id number] Alanna Cuadra RN    POC Glucose Fingerstick [[id number]]  (Abnormal) Collected:  10/12/17 2211    Specimen:  Blood Updated:  10/12/17 2212     Glucose 173 (H) mg/dL     Narrative:       Meter: [id number] : [id number] Alanna Cuadra RN    Protime-INR [[id number]]  (Abnormal) Collected:  10/12/17 2212    Specimen:  Blood Updated:  10/12/17 2242     Protime 15.3 (H) Seconds      INR 1.26 (H)    CBC Auto Differential [[id number]]  (Abnormal) Collected:  10/12/17 2212    Specimen:  Blood Updated:  10/12/17 2245     WBC 19.17 (H) 10*3/mm3      RBC 3.53 (L) 10*6/mm3      Hemoglobin 10.6 (L) g/dL      Hematocrit 31.3 (L) %      MCV 88.7 fL      MCH 30.0 pg      MCHC 33.9 g/dL      RDW 12.5 %      RDW-SD 40.1 fl      MPV 9.7 fL      Platelets 213 10*3/mm3      Neutrophil % 85.2 (H) %      Lymphocyte % 5.1 (L) %      Monocyte % 9.0 %      Eosinophil % 0.1 (L) %      Basophil % 0.2 %      Immature Grans % 0.4 %      Neutrophils, Absolute 16.35 (H) 10*3/mm3      Lymphocytes, Absolute 0.98 10*3/mm3      Monocytes, Absolute 1.72 (H) 10*3/mm3      Eosinophils, Absolute 0.02 10*3/mm3      Basophils, Absolute 0.03 10*3/mm3      Immature Grans, Absolute 0.07 (H) 10*3/mm3     CBC & Differential [[id number]] Collected:  10/12/17 2212    Specimen:  Blood Updated:  10/12/17 2245    Narrative:       The following orders were created for panel order CBC & Differential.  Procedure                               Abnormality         Status                     ---------                               -----------         ------                     Scan Slide[[id number]]                   Normal              Final result               CBC Auto Differential[[id number]]         Abnormal            Final result                 Please view results for these tests on the individual orders.    Scan Slide [[id number]]  (Normal) Collected:  10/12/17 2212    Specimen:  Blood Updated:  10/12/17 2245     RBC Morphology Normal     WBC Morphology Normal     Platelet Morphology Normal    Magnesium [[id number]]  (Abnormal) Collected:  10/12/17 2212    Specimen:  Blood Updated:  10/12/17 2251     Magnesium 2.6 (H) mg/dL     Renal Function Panel [[id number]]  (Abnormal) Collected:  10/12/17 2212    Specimen:  Blood Updated:  10/12/17 2259     Glucose 181 (H) mg/dL      BUN 14 mg/dL      Creatinine 0.97 mg/dL      Sodium 145 mmol/L      Potassium 3.6 mmol/L      Chloride 108 (H) mmol/L      CO2 22.0 mmol/L      Calcium 9.0 mg/dL      Albumin 4.30 g/dL      Phosphorus 3.5 mg/dL      Anion Gap 15.0 mmol/L      BUN/Creatinine Ratio 14.4     eGFR Non African Amer 57 (L) mL/min/1.73     POC Glucose Fingerstick [[id number]]  (Abnormal) Collected:  10/12/17 2305    Specimen:  Blood Updated:  10/12/17 2307     Glucose 184 (H) mg/dL     Narrative:       Meter: [id number] : [id number] Alanna Cuadra RN    Blood Gas, Arterial [[id number]]  (Abnormal) Collected:  10/13/17 0033    Specimen:  Arterial Blood Updated:  10/13/17 0035     Site Arterial Line     Ean's Test N/A     pH, Arterial 7.369 pH units      pCO2, Arterial 38.9 mm Hg      pO2, Arterial 117.9 (H) mm Hg      HCO3, Arterial 22.4 mmol/L      Base Excess, Arterial -2.6 (L) mmol/L      O2 Saturation Calculated 98.5 %      A-a Gradiant 0.5 mmHg      Barometric Pressure for Blood Gas 753.6 mmHg      Modality Adult Vent     FIO2 40 %      Ventilator Mode AC     Set Tidal Volume 550     Set Mech Resp Rate 12     Rate 12 Breaths/minute      PEEP 7.5    Narrative:       sat 99% Meter: [id number] : [id number] Veronika Epps    POC Glucose Fingerstick [[id number]]  (Abnormal) Collected:  10/13/17 0057    Specimen:  Blood Updated:  10/13/17 0058     Glucose  181 (H) mg/dL     Narrative:       Meter: [id number] : [id number] Alanna Cuadra RN    POC Glucose Fingerstick [[id number]]  (Abnormal) Collected:  10/13/17 0216    Specimen:  Blood Updated:  10/13/17 0218     Glucose 184 (H) mg/dL     Narrative:       Meter: [id number] : [id number] Alanna Cuadra RN    POC Glucose Fingerstick [[id number]]  (Abnormal) Collected:  10/13/17 0303    Specimen:  Blood Updated:  10/13/17 0304     Glucose 153 (H) mg/dL     Narrative:       Meter: [id number] : [id number] Alanna Cuadra RN    Blood Gas, Arterial [[id number]]  (Abnormal) Collected:  10/13/17 0313    Specimen:  Arterial Blood Updated:  10/13/17 0316     Site Arterial Line     Ean's Test N/A     pH, Arterial 7.330 (L) pH units      pCO2, Arterial 44.6 mm Hg      pO2, Arterial 110.7 (H) mm Hg      HCO3, Arterial 23.5 mmol/L      Base Excess, Arterial -2.5 (L) mmol/L      O2 Saturation Calculated 97.9 %      A-a Gradiant 0.5 mmHg      Barometric Pressure for Blood Gas 753.4 mmHg      Modality Adult Vent     FIO2 40 %      Ventilator Mode CPAP/PS     Set Tidal Volume 372     Set Mech Resp Rate 31     Rate 31 Breaths/minute      PEEP 7.5     PSV 8 cmH2O     Narrative:       sat 99% Meter: [id number] : [id number] Veronika Epps    CBC Auto Differential [[id number]]  (Abnormal) Collected:  10/13/17 0331    Specimen:  Blood Updated:  10/13/17 0344     WBC 18.85 (H) 10*3/mm3      RBC 3.25 (L) 10*6/mm3      Hemoglobin 9.7 (L) g/dL      Hematocrit 29.2 (L) %      MCV 89.8 fL      MCH 29.8 pg      MCHC 33.2 g/dL      RDW 12.7 %      RDW-SD 41.3 fl      MPV 9.4 fL      Platelets 204 10*3/mm3      Neutrophil % 81.9 (H) %      Lymphocyte % 8.9 (L) %      Monocyte % 8.5 %      Eosinophil % 0.1 (L) %      Basophil % 0.1 %      Immature Grans % 0.5 %      Neutrophils, Absolute 15.45 (H) 10*3/mm3      Lymphocytes, Absolute 1.68 10*3/mm3      Monocytes, Absolute 1.60 (H) 10*3/mm3      Eosinophils, Absolute 0.01 10*3/mm3       Basophils, Absolute 0.01 10*3/mm3      Immature Grans, Absolute 0.10 (H) 10*3/mm3      nRBC 0.0 /100 WBC     CBC & Differential [[id number]] Collected:  10/13/17 0331    Specimen:  Blood Updated:  10/13/17 0344    Narrative:       The following orders were created for panel order CBC & Differential.  Procedure                               Abnormality         Status                     ---------                               -----------         ------                     Scan Slide[[id number]]                                                                  CBC Auto Differential[[id number]]        Abnormal            Final result                 Please view results for these tests on the individual orders.    Renal Function Panel [[id number]]  (Abnormal) Collected:  10/13/17 0330    Specimen:  Blood Updated:  10/13/17 0406     Glucose 157 (H) mg/dL      BUN 18 mg/dL      Creatinine 1.08 (H) mg/dL      Sodium 147 (H) mmol/L      Potassium 4.3 mmol/L      Chloride 110 (H) mmol/L      CO2 20.5 (L) mmol/L      Calcium 9.2 mg/dL      Albumin 4.40 g/dL      Phosphorus 2.8 mg/dL      Anion Gap 16.5 mmol/L      BUN/Creatinine Ratio 16.7     eGFR Non African Amer 51 (L) mL/min/1.73     POC Glucose Fingerstick [[id number]]  (Abnormal) Collected:  10/13/17 0401    Specimen:  Blood Updated:  10/13/17 0420     Glucose 153 (H) mg/dL     Narrative:       Meter: [id number] : [id number] Alanna Cuadra RN    POC Glucose Fingerstick [[id number]]  (Abnormal) Collected:  10/13/17 0527    Specimen:  Blood Updated:  10/13/17 0528     Glucose 150 (H) mg/dL     Narrative:       Meter: [id number] : [id number] Alanna Cuadra RN    POC Glucose Fingerstick [[id number]]  (Abnormal) Collected:  10/13/17 0619    Specimen:  Blood Updated:  10/13/17 0621     Glucose 145 (H) mg/dL     Narrative:       Meter: [id number] : [id number] Alanna Cuadra RN    POC Glucose Fingerstick [[id number]]  (Normal) Collected:  10/13/17 0756    Specimen:  Blood  Updated:  10/13/17 0758     Glucose 113 mg/dL     Narrative:       Meter: [id number] : [id number] Chaya Sibley RN          .  Imaging Results (last 24 hours)     Procedure Component Value Units Date/Time    CT Angiogram Head With & Without Contrast [[id number]] Collected:  10/11/17 1441     Updated:  10/12/17 1042    Narrative:       CONTRAST ENHANCED CT ANGIOGRAM OF THE HEAD AND NECK 10/11/2017     CLINICAL HISTORY: Bilateral carotid stenosis.     TECHNIQUE: Spiral CT images were obtained from the top of the aortic  arch up through the great vessels of the head and neck during arterial  phase of contrast and images were reformatted and submitted in 1 mm  thick axial CT section. 1 mm thick sagittal and coronal reconstructions  were performed as well as 3D reconstructions to complete the CT  angiogram of the head and neck. Subsequently spiral CT images were  obtained from the base of the skull to the vertex delayed following  intravenous contrast and these images were reformatted and submitted in  3 mm thick axial CT sections with brain algorithm.     There are no prior studies for comparison.     FINDINGS:     BRAIN: Images through the brain demonstrate a focal area of  encephalomalacia consistent with an old infarct measuring 1.1 x 2.2 cm  in size in the anterior inferior lateral right frontal lobe in the right  middle cerebral artery territory. There is focal triangular old infarct  extending from the anterior limb of the right internal capsule into the  genu of the right internal capsule and the anterior medial right  putamen. It measures 1.8 x 1 cm in size. Tiny old lacunar infarct in the  genu of the left internal capsule. The remainder of the brain parenchyma  is normal in attenuation. The ventricles are normal in size. There is no  focal mass effect and no midline shift. No extra-axial fluid collections  are identified. There is no evidence of acute intracranial hemorrhage.  No abnormal areas of  enhancement are seen in the head. The paranasal  sinuses and mastoid air cells and middle ear cavities are clear. The  nasopharynx, oropharynx, hypopharynx, true cords and subglottic airway  are normal in appearance. The thyroid gland enhances homogeneously and  is normal in appearance. The lung apices are clear. The parotid,  , parapharyngeal and submandibular spaces are symmetric and  are normal in appearance. Mild cervical spondylosis is present with  prominent arthritic changes at the atlantodental interval. There is also  some disc space narrowing and degenerative endplate changes at C5-6,  minimal posterior endplate spurring and there is moderate right facet  overgrowth, mild right bony foraminal narrowing at C6-7.     CT angiogram images through the neck demonstrate prominent calcified  atherosclerotic plaques throughout the visualized superior aspect of the  aortic arch. Mixed calcified and noncalcified plaque mildly narrows the  left subclavian artery origin. There is some ulceration in a  noncalcified plaque in the medial aspect of the proximal left internal  carotid artery. There is calcified plaque mildly narrowing the left  vertebral artery origin. Beyond this plaque the left vertebral artery  appears to be widely patent throughout its cervical segment without  stenosis. There is calcified plaque mildly narrowing the proximal  intracranial segment, otherwise no additional stenosis into the  vertebrobasilar junction. The left common carotid origin is normal in  appearance. No stenosis is seen in the left common carotid artery. There  is mixed calcified and noncalcified plaque that moderately narrows the  left common carotid bifurcation by 60%, extends into the origin of the  left internal carotid artery and there is occlusion of the left internal  carotid artery at its origin. A nest 6 stenosis of left internal carotid  artery is not given, due to the fact that the left internal carotid  artery  is occluded. There is no enhancement in the cervical, petrous,  cavernous or immediate supracavernous segments of the left internal  carotid artery indicating that the segments are occluded. There is  reconstitution of the juxta clinoid segment of the left internal carotid  to the internal carotid terminus. The brachycephalic artery origin is  normal in appearance. No stenosis is seen in the brachycephalic artery.  Its bifurcation into the right subclavian, common carotid artery is  normal in appearance. No stenosis is seen in the right subclavian  artery. The right vertebral artery origin is normal in appearance. No  stenosis is seen in the right vertebral artery from its origin and  throughout the cervical segment there is a mixed calcified and  noncalcified plaque that moderate to severely narrows the proximal  intracranial segment of the distal right vertebral artery. Beyond this  it is widely patent to the vertebrobasilar junction. The right common  carotid origin is normal in appearance. Calcified plaques mildly narrow  the mid right common carotid artery and there is circumferential  noncalcified plaque that moderately narrows the right common carotid  bifurcation, extends into the origin of the right internal carotid  artery and there is occlusion of the right internal carotid artery at  its origin. There is no contrast enhancement within the lumen of the  cervical, petrous, cavernous segment of the right internal carotid  artery. There is reconstitution of the right internal carotid artery at  the junction of the cavernous and supracavernous segment at the level of  the right ophthalmic artery origin.      CT angiogram images through the head demonstrate mixed calcified and  noncalcified plaque focally moderately to severely narrowing the distal  right vertebral artery as it pierces the dura and extends  intracranially. Beyond this it is widely patent to the vertebrobasilar  junction. There is calcified  plaque mildly narrowing the proximal  intracranial segment of the distal left vertebral artery, otherwise the  left vertebral artery is widely patent to the vertebrobasilar junction.  The basilar artery and the basilar tip is normal in appearance. The  posterior cerebral and superior cerebellar arteries are within normal  limits bilaterally. The upper cervical, petrous, cavernous segment of  the right internal carotid is occluded. There is reconstitution of the  right internal carotid at the right ophthalmic artery origin and then it  is diminutive in size but patent to the carotid terminus. The upper  cervical, petrous, cavernous and immediate supracavernous segment of the  left internal carotid is occluded. The supracavernous segment of the  left internal carotid artery is reconstituted in the juxta clinoid level  and there is a very tiny diameter to the internal carotid artery lumen  to the level of the left internal carotid artery terminus. The anterior  cerebral arteries are diminutive in size but the A1 segments are patent.  The anterior communicating artery origin is normal in appearance. The  middle cerebral arteries and middle cerebral artery trifurcations are  within normal limits.       Impression:          1. There is a 2.2 x 1 cm old infarct in the anterior inferior lateral  right frontal lobe in the right middle cerebral artery territory and  there is an additional 1.8 x 1.0 cm old lacunar type infarct extending  from the head of right caudate nucleus through the anterior limb of the  right internal capsule into the anteromedial right putamen.     2. There are prominent calcified plaques involving the aortic arch  extending into the origin proximal left subclavian artery mildly  narrowing the origin proximal left subclavian artery and there is an  ulcerated plaque mild to moderately narrowing the proximal left  subclavian artery. There is calcified plaque mildly narrowing the left  vertebral artery  origin. The left vertebral artery is a dominant  vertebral artery and there is an additional mixed calcified and  noncalcified plaque involving the posterior wall of the proximal  intracranial segment of the left vertebral artery that mildly narrows  the dominant left vertebral artery at this location. The contralateral  right vertebral artery is smaller in diameter than the left but is  widely patent from its origin to where it pierces the dura and extends  intracranially and at this location there is a mixed calcified and  noncalcified plaque that moderate to severely narrows the right  vertebral artery. Beyond this plaque it is widely patent to the  vertebrobasilar junction.     3. There are scattered calcified and noncalcified plaques mildly  narrowing the mid and distal common carotid arteries. There is a  circumferential mixed calcified and noncalcified plaque that moderately  narrows the left common carotid bifurcation by 60-70% and extends into  the origin of the left internal carotid artery and occludes the left  internal carotid artery. The cervical, petrous, cavernous and immediate  supracavernous segment of the left internal carotid artery is completely  occluded. There is reconstitution of the juxta clinoid segment of the  left internal carotid artery by collateral circulation from the [place]  [place]. There is a circumferential mixed calcified and noncalcified  plaque that moderately narrows the right common carotid bifurcation by  40-50%, extends into the origin and occludes the right internal carotid  artery at its origin and the cervical, petrous and cavernous segment of  the right internal carotid artery is occluded and there is  reconstitution of the right internal carotid artery at the junction of  cavernous and supracavernous segments by a right ophthalmic artery.  Therefore the blood supply to the brain is very tenuous as there is  bilateral cervical, petrous and cavernous segment internal  carotid  occlusion, moderate to severe stenosis at the junction of the cervical  and intracranial segment of the right vertebral artery and mild stenosis  in the dominant left vertebral artery. There are hypoplastic or atretic  posterior communicating arteries. There are small diameter A1 segments  to the anterior cerebral arteries with a small anterior communicating  artery.      The results of this study were communicated to PANKAJ Flynn by  telephone 10/11/2017 at 1:00 PM.     Radiation dose reduction techniques were utilized, including automated  exposure control and exposure modulation based on body size.     This report was finalized on 10/12/2017 10:39 AM by Dr. Kyler Egan MD.       CT Angiogram Carotids [[id number]] Collected:  10/11/17 1441     Updated:  10/12/17 1042    Narrative:       CONTRAST ENHANCED CT ANGIOGRAM OF THE HEAD AND NECK 10/11/2017     CLINICAL HISTORY: Bilateral carotid stenosis.     TECHNIQUE: Spiral CT images were obtained from the top of the aortic  arch up through the great vessels of the head and neck during arterial  phase of contrast and images were reformatted and submitted in 1 mm  thick axial CT section. 1 mm thick sagittal and coronal reconstructions  were performed as well as 3D reconstructions to complete the CT  angiogram of the head and neck. Subsequently spiral CT images were  obtained from the base of the skull to the vertex delayed following  intravenous contrast and these images were reformatted and submitted in  3 mm thick axial CT sections with brain algorithm.     There are no prior studies for comparison.     FINDINGS:     BRAIN: Images through the brain demonstrate a focal area of  encephalomalacia consistent with an old infarct measuring 1.1 x 2.2 cm  in size in the anterior inferior lateral right frontal lobe in the right  middle cerebral artery territory. There is focal triangular old infarct  extending from the anterior limb of the right internal  capsule into the  genu of the right internal capsule and the anterior medial right  putamen. It measures 1.8 x 1 cm in size. Tiny old lacunar infarct in the  genu of the left internal capsule. The remainder of the brain parenchyma  is normal in attenuation. The ventricles are normal in size. There is no  focal mass effect and no midline shift. No extra-axial fluid collections  are identified. There is no evidence of acute intracranial hemorrhage.  No abnormal areas of enhancement are seen in the head. The paranasal  sinuses and mastoid air cells and middle ear cavities are clear. The  nasopharynx, oropharynx, hypopharynx, true cords and subglottic airway  are normal in appearance. The thyroid gland enhances homogeneously and  is normal in appearance. The lung apices are clear. The parotid,  , parapharyngeal and submandibular spaces are symmetric and  are normal in appearance. Mild cervical spondylosis is present with  prominent arthritic changes at the atlantodental interval. There is also  some disc space narrowing and degenerative endplate changes at C5-6,  minimal posterior endplate spurring and there is moderate right facet  overgrowth, mild right bony foraminal narrowing at C6-7.     CT angiogram images through the neck demonstrate prominent calcified  atherosclerotic plaques throughout the visualized superior aspect of the  aortic arch. Mixed calcified and noncalcified plaque mildly narrows the  left subclavian artery origin. There is some ulceration in a  noncalcified plaque in the medial aspect of the proximal left internal  carotid artery. There is calcified plaque mildly narrowing the left  vertebral artery origin. Beyond this plaque the left vertebral artery  appears to be widely patent throughout its cervical segment without  stenosis. There is calcified plaque mildly narrowing the proximal  intracranial segment, otherwise no additional stenosis into the  vertebrobasilar junction. The left  common carotid origin is normal in  appearance. No stenosis is seen in the left common carotid artery. There  is mixed calcified and noncalcified plaque that moderately narrows the  left common carotid bifurcation by 60%, extends into the origin of the  left internal carotid artery and there is occlusion of the left internal  carotid artery at its origin. A nest 6 stenosis of left internal carotid  artery is not given, due to the fact that the left internal carotid  artery is occluded. There is no enhancement in the cervical, petrous,  cavernous or immediate supracavernous segments of the left internal  carotid artery indicating that the segments are occluded. There is  reconstitution of the juxta clinoid segment of the left internal carotid  to the internal carotid terminus. The brachycephalic artery origin is  normal in appearance. No stenosis is seen in the brachycephalic artery.  Its bifurcation into the right subclavian, common carotid artery is  normal in appearance. No stenosis is seen in the right subclavian  artery. The right vertebral artery origin is normal in appearance. No  stenosis is seen in the right vertebral artery from its origin and  throughout the cervical segment there is a mixed calcified and  noncalcified plaque that moderate to severely narrows the proximal  intracranial segment of the distal right vertebral artery. Beyond this  it is widely patent to the vertebrobasilar junction. The right common  carotid origin is normal in appearance. Calcified plaques mildly narrow  the mid right common carotid artery and there is circumferential  noncalcified plaque that moderately narrows the right common carotid  bifurcation, extends into the origin of the right internal carotid  artery and there is occlusion of the right internal carotid artery at  its origin. There is no contrast enhancement within the lumen of the  cervical, petrous, cavernous segment of the right internal carotid  artery. There is  reconstitution of the right internal carotid artery at  the junction of the cavernous and supracavernous segment at the level of  the right ophthalmic artery origin.      CT angiogram images through the head demonstrate mixed calcified and  noncalcified plaque focally moderately to severely narrowing the distal  right vertebral artery as it pierces the dura and extends  intracranially. Beyond this it is widely patent to the vertebrobasilar  junction. There is calcified plaque mildly narrowing the proximal  intracranial segment of the distal left vertebral artery, otherwise the  left vertebral artery is widely patent to the vertebrobasilar junction.  The basilar artery and the basilar tip is normal in appearance. The  posterior cerebral and superior cerebellar arteries are within normal  limits bilaterally. The upper cervical, petrous, cavernous segment of  the right internal carotid is occluded. There is reconstitution of the  right internal carotid at the right ophthalmic artery origin and then it  is diminutive in size but patent to the carotid terminus. The upper  cervical, petrous, cavernous and immediate supracavernous segment of the  left internal carotid is occluded. The supracavernous segment of the  left internal carotid artery is reconstituted in the juxta clinoid level  and there is a very tiny diameter to the internal carotid artery lumen  to the level of the left internal carotid artery terminus. The anterior  cerebral arteries are diminutive in size but the A1 segments are patent.  The anterior communicating artery origin is normal in appearance. The  middle cerebral arteries and middle cerebral artery trifurcations are  within normal limits.       Impression:          1. There is a 2.2 x 1 cm old infarct in the anterior inferior lateral  right frontal lobe in the right middle cerebral artery territory and  there is an additional 1.8 x 1.0 cm old lacunar type infarct extending  from the head of right  caudate nucleus through the anterior limb of the  right internal capsule into the anteromedial right putamen.     2. There are prominent calcified plaques involving the aortic arch  extending into the origin proximal left subclavian artery mildly  narrowing the origin proximal left subclavian artery and there is an  ulcerated plaque mild to moderately narrowing the proximal left  subclavian artery. There is calcified plaque mildly narrowing the left  vertebral artery origin. The left vertebral artery is a dominant  vertebral artery and there is an additional mixed calcified and  noncalcified plaque involving the posterior wall of the proximal  intracranial segment of the left vertebral artery that mildly narrows  the dominant left vertebral artery at this location. The contralateral  right vertebral artery is smaller in diameter than the left but is  widely patent from its origin to where it pierces the dura and extends  intracranially and at this location there is a mixed calcified and  noncalcified plaque that moderate to severely narrows the right  vertebral artery. Beyond this plaque it is widely patent to the  vertebrobasilar junction.     3. There are scattered calcified and noncalcified plaques mildly  narrowing the mid and distal common carotid arteries. There is a  circumferential mixed calcified and noncalcified plaque that moderately  narrows the left common carotid bifurcation by 60-70% and extends into  the origin of the left internal carotid artery and occludes the left  internal carotid artery. The cervical, petrous, cavernous and immediate  supracavernous segment of the left internal carotid artery is completely  occluded. There is reconstitution of the juxta clinoid segment of the  left internal carotid artery by collateral circulation from the [place]. There is a circumferential mixed calcified and noncalcified  plaque that moderately narrows the right common carotid bifurcation  by  40-50%, extends into the origin and occludes the right internal carotid  artery at its origin and the cervical, petrous and cavernous segment of  the right internal carotid artery is occluded and there is  reconstitution of the right internal carotid artery at the junction of  cavernous and supracavernous segments by a right ophthalmic artery.  Therefore the blood supply to the brain is very tenuous as there is  bilateral cervical, petrous and cavernous segment internal carotid  occlusion, moderate to severe stenosis at the junction of the cervical  and intracranial segment of the right vertebral artery and mild stenosis  in the dominant left vertebral artery. There are hypoplastic or atretic  posterior communicating arteries. There are small diameter A1 segments  to the anterior cerebral arteries with a small anterior communicating  artery.      The results of this study were communicated to PANKAJ Flynn by  telephone 10/11/2017 at 1:00 PM.     Radiation dose reduction techniques were utilized, including automated  exposure control and exposure modulation based on body size.     This report was finalized on 10/12/2017 10:39 AM by Dr. Kyler Egan MD.       XR Chest 1 View [[id number]] Collected:  10/12/17 1207     Updated:  10/12/17 1213    Narrative:       XR CHEST 1 VW-     HISTORY: Female who is 67 years-old,  line placement     TECHNIQUE: Frontal view of the chest     COMPARISON: 10/07/2017     FINDINGS: Right IJ Garfield-Gauri catheter extends to the pulmonary trunk.  Heart size is borderline. Pulmonary vasculature is slightly congested.  Minimal likely atelectasis at the left lung base. No pleural effusion or  pneumothorax. Otherwise stable.       Impression:       Right IJ catheter, no pneumothorax. Minimal left basilar  likely atelectasis. Borderline heart size is slightly pulmonary vascular  congestion.     This report was finalized on 10/12/2017 12:10 PM by Dr. Killian Cardoso MD.       XR  Chest 1 View [[id number]] Collected:  10/12/17 1901     Updated:  10/12/17 1908    Narrative:       XR CHEST 1 VW-     HISTORY: Female who is 67 years-old,  postoperative evaluation     TECHNIQUE: Frontal view of the chest     COMPARISON: 10/02/2017 1128 hours     FINDINGS: Endotracheal tube tip is 4.4 cm above the micky. The right IJ  Butte-Gauri catheter is changed in position, appears to extend to the  cavoatrial junction region and then loop back, tip in the region of the  proximal superior vena cava. Bilateral chest tubes are seen. Cardiac  valve marker and sternotomy wires are noted. Heart size is normal. No  focal pulmonary consolidation, pleural effusion, or pneumothorax is  seen, limited by supine positioning. No acute osseous process.       Impression:       Postsurgical changes. Changed position of the Butte-Gauri  catheter as described.     Discussed by telephone with the patient's nurse, Laureen,  at time of  interpretation, 1905, 10/12/2017.     This report was finalized on 10/12/2017 7:05 PM by Dr. Killian Cardoso MD.       XR Chest 1 View [[id number]] Collected:  10/13/17 0621     Updated:  10/13/17 0621    Narrative:       PORTABLE CHEST X-RAY     CLINICAL HISTORY: Post-Op Heart Surgery; R53.81-Other malaise;  I25.118-Atherosclerotic heart disease of native coronary artery with  other forms of angina pectoris.     COMPARISON: 10/12/2017.     FINDINGS: Portable AP view of the chest was obtained with overlying  monitor leads in place. ET tube has been removed. Remaining life support  lines are unchanged in position including the Butte-Gauri catheter which  appears to be looped in the superior vena cava region. Lungs are under  aerated with increasing basilar atelectasis, left greater than right and  left effusion. No definite significant pneumothorax. Heart size and  vascularity are felt to be normal considering technique.       Impression:       Interval extubation with increasing basilar atelectasis  and  left effusion.                      For this problem, the following was ordered:  Orders Placed This Encounter   Procedures   • Urine Culture - Urine, Urine, Clean Catch   • XR Chest PA & Lateral   • CT Angiogram Carotids   • CT Angiogram Head With & Without Contrast   • XR Chest 1 View   • XR Chest 1 View   • XR Chest 1 View   • XR Chest PA & Lateral   • CT Head Without Contrast   • Comprehensive Metabolic Panel   • Hemoglobin A1c   • Protime-INR   • aPTT   • Urinalysis With / Culture If Indicated - Urine, Clean Catch   • Blood Gas, Arterial   • CBC (No Diff)   • Basic Metabolic Panel   • CBC Auto Differential   • Urinalysis, Microscopic Only - Urine, Clean Catch   • Blood Gas, Arterial   • Blood Gas, Arterial   • Basic Metabolic Panel   • CBC Auto Differential   • Comprehensive Metabolic Panel   • Blood Gas, Arterial   • Protime-INR   • aPTT   • Calcium, Ionized   • Blood Gas, Arterial   • CBC (No Diff)   • Renal Function Panel   • Magnesium   • Potassium   • CBC (No Diff)   • Basic Metabolic Panel   • Renal Function Panel   • Magnesium   • Protime-INR   • aPTT   • Protime-INR   • Fibrinogen   • Potassium   • CBC Auto Differential   • Blood Gas, Arterial   • CBC Auto Differential   • Scan Slide   • Blood Gas, Arterial   • Magnesium   • Renal Function Panel   • CBC Auto Differential   • Blood Gas, Arterial   • Scan Slide   • NPO Diet   • Place Compression Stockings (TEDs)   • Remove & Replace Compression Stockings (TEDS) Daily   • Place Sequential Compression Device   • Maintain Sequential Compression Device   • Vital Signs & Post-Op Checks Every 15 Minutes x2, Every 30 Minutes x4   • Vital Signs Every Hour Until 24 Hours Post Op   • Vital Signs   • Check Peripheral Pulses Every 1 Hour x4   • Check Peripheral Pulses Every 4 Hours   • Check Peripheral Pulses As Needed   • Intake & Output Every 15 Minutes x2, Every 30 Minutes x4   • Intake & Output Every Hour Until 24 Hours Post Op   • Intake & Output   •  Cardiac Output Parameters On Admission, Then Every 1 Hour x4   • Cardiac Output Parameters Every 2 Hours Until 24 Hours Post Op   • Cardiac Output Parameters PRN Until 24 Hours Post-Op   • Cardiac Monitoring   • Continuous Pulse Oximetry   • Pacemaker Settings - Initiate for Heart Rate Less Than 60 And / Or Hemodynamically Unstable   • Turn Patient Every 2 Hours or Begin Bed Rotation Once Hemodynamically Stable   • Advance PROM to AROM   • Dangle at Bedside After Extubation   • Up in Chair As Tolerated After Extubation   • Up in Chair for Meals   • Ambulate Patient   • Insert Nasogastric Tube If Indicated & Not Already in Place   • Discontinue NG After Extubation   • Continue Indwelling Urinary Catheter   • Assess Need for Indwelling Urinary Catheter - Follow Removal Protocol   • Catheter Care   • Chest & Mediastinal Tubes to 20cm Continuous Suction   • Begin Rewarming with Thermal Unit As Needed For Temp Less Than 96F   • ACE Wraps to Vein [place] [facility] Site for 24 Hours, Then Apply ARTI Hose   • Heart Hugger Vest   • Keep Chest Incisions Covered Until Extubated   • Change Chest Dressing Daily While Intubated   • If Chest Dressing Removed During Chest Tube Removal, Clean Incision With Normal Saline & Redress Until 48 Hours Post Op   • Remove Midsternal Dressing on POD 3. Patient May Shower With Dressing On. If Dressing Becomes Loose or Wet Remove on POD 2   • Incision Care - Cleanse With Normal Saline & 4x4 Gauze Using Sterile Technique   • Leave Incisions Open to Air Unless Draining or Edges Are Not Approximated   • Cleanse Incision With Normal Saline and Redress With Dry 4x4 Gauze if Incision is Draining   • Notify Surgeon if Drainage From Surgical Incision Site   • Chest Tube & Pacing Wire Sites - Cleanse With Normal Saline & 4x4 Gauze Using Sterile Technique   • Change Chest Tube Dressings PRN to Keep Dry - Do NOT Reinforce   • ISOLATE PACER WIRES   • Clean Midsternal Incision & Chest Tube Sites With Hibiclens  Beginning on POD 3   • Notify Provider - Urine Output   • Notify Provider - Chest Tube Output   • Initial Ventilator Settings Per Pulmonologist / Anesthesiologist   • Advance Diet as Tolerated   • RN to Place Order For STAT Chest X-Ray When Chest Tubes Are Removed   • Full Code   • Inpatient Consult to Spiritual Care   • Inpatient Consult to Case Management    • Inpatient Consult to Case Management    • Inpatient Consult to Vascular Surgery   • Cardiac Rehab Evaluation and Enrollment   • Consult to Case Management /    • Inpatient Consult to Neurology   • PT Consult: Eval & Treat   • PT Consult: Eval & Treat   • Oxygen Therapy- Nasal Cannula; 2 LPM; Titrate for SPO2: 90%, 92%   • Wean & Extubate Per Rapid Wean and Extubation Protocol   • Incentive Spirometry   • EZ-Pap   • Patient May Use Home CPAP / BIPAP For Sleep   • Patient May Use Home CPAP / BIPAP As Needed   • Wean & Extubate Per Rapid Wean & Extubate Protocol   • OSCILLATING POSITIVE EXIRATORY PRESSURE (OPEP)   • POC Activated Clotting Time   • POC OR Panel, ISTAT   • POC Activated Clotting Time   • POC Activated Clotting Time   • POC OR Panel, ISTAT   • POC OR Panel, ISTAT   • POC Activated Clotting Time   • POC OR Panel, ISTAT   • POC Activated Clotting Time   • POC OR Panel, ISTAT   • POC Activated Clotting Time   • POC OR Panel, ISTAT   • POC Glucose Fingerstick   • POC Glucose Fingerstick   • POC Glucose Fingerstick   • POC Glucose Fingerstick   • POC Glucose Fingerstick   • POC Glucose Fingerstick   • POC Glucose Fingerstick   • POC Glucose Fingerstick   • POC Glucose Fingerstick   • POC Glucose Fingerstick   • ECG 12 Lead   • ECG 12 Lead   • ECG 12 Lead   • Type & Screen   • Prepare RBC, 2 Units   • ABO RH Specimen Verification   • Type & Screen   • Prepare RBC, 2 Units   • Initiate Observation Status   • Inpatient Admission   • Transfer Patient       Problem List Items Addressed This Visit     Coronary  artery disease of native heart with stable angina pectoris    Relevant Medications    metoprolol tartrate (LOPRESSOR) 25 MG tablet    ranolazine (RANEXA) 500 MG 12 hr tablet    nitroglycerin (NITROSTAT) 0.4 MG SL tablet    Other Relevant Orders    Case Request (Completed)    Case Request (Completed)          ASSESSMENT/PLAN: This is a 67 y.o. female who has   Past Medical History:   Diagnosis Date   • Arthritis    • Coronary artery disease    • Disease of thyroid gland    • Hypertension    • PONV (postoperative nausea and vomiting)    • Spinal headache     presents with Left-sided weakness.  CT head done today does not show any acute infarct.  But it does show a remote infarct in the right high convexity.    1.  Possible recrudescence of old CVA in the right versus new small ischemic stroke: Cannot perform MRI brain.  - Aspirin for now  - Patient needs to be on dual antiplatelet therapy (aspirin 81 mg + Plavix 75 mg) given extensive atherosclerosis leading to stenosis and occlusion both intra-and extracranial vessels on long-term once she is medically stable  - PTOT swallow  - Stroke labs: A1c 5.5 TSH 0.1, LDL 56, B12 461  - Recent FILEMON done but no report.    2.  Chronic lower back pain:  - Baclofen and Tylenol    Discussed with patient and family and also team and explained the above plan of care.      Thank you for allowing us to participate in the care of this patient.    Hemanth Parekh MD  10/13/17  10:19 AM

## 2017-10-13 NOTE — THERAPY EVALUATION
Acute Care - Physical Therapy Initial Evaluation  [facility]     Patient Name: Marion Anderson  : 1950  MRN: [id number]  Today's Date: 10/13/2017   Onset of Illness/Injury or Date of Surgery Date: (P) 10/12/17  Date of Referral to PT: 10/08/17  Referring Physician: Muñoz (P)      Admit Date: 10/6/2017     Visit Dx:    ICD-10-CM ICD-9-CM   1. Physical deconditioning R53.81 799.3   2. Coronary artery disease of native heart with stable angina pectoris, unspecified vessel or lesion type I25.118 414.01     413.9   3. Generalized weakness R53.1 780.79     Patient Active Problem List   Diagnosis   • Coronary artery disease of native heart with stable angina pectoris   • CAD (coronary artery disease)     Past Medical History:   Diagnosis Date   • Arthritis    • Coronary artery disease    • Disease of thyroid gland    • Hypertension    • PONV (postoperative nausea and vomiting)    • Spinal headache      Past Surgical History:   Procedure Laterality Date   • CARDIAC SURGERY     • CORONARY ARTERY BYPASS GRAFT N/A 10/12/2017    Procedure: FILEMON STERNOTOMY CORONARY ARTERY BYPASS GRAFT TIMES 4  USING LEFT INTERNAL MAMMARY ARTERY AND LEFT GREATER SAPHENOUS VEIN GRAFT PER  ENDOSCOPIC VEIN HARVESTING, MITRAL VALVE REPAIR AND PRP;  Surgeon: Ramos Muñoz MD;  Location: [facility];  Service:    • FRACTURE SURGERY      ankle. leg   • TONSILLECTOMY            PT ASSESSMENT (last 72 hours)      PT Evaluation       10/13/17 0844 10/11/17 0733    Rehab Evaluation    Document Type (P)  --  -MF     Subjective Information (P)  agree to therapy;complains of;pain  -MF     Patient Effort, Rehab Treatment (P)  adequate  -MF     Symptoms Noted During/After Treatment (P)  increased pain  -MF     Symptoms Noted Comment (P)  pt continuously verbalized pain throughout treatment   hadnt had any pain medication since being extubated at 0400  -MF     General Information    Patient Profile Review (P)  yes  -MF     Onset of  Illness/Injury or Date of Surgery Date (P)  10/12/17  -     Referring Physician (P)  Wanda  -     General Observations (P)  Pt upright in chair upon entry. very agitated, verbalizing that she is in pain. Pt very disoriented   -     Pertinent History Of Current Problem (P)  s/p CABG x 4 for CAD, PMHx of HTN, chest pain and SOA  -     Prior Level of Function (P)  independent:;all household mobility   difficult to fully assess  -     Equipment Currently Used at Home (P)  walker, standard;wheelchair  - raised toilet;bath bench;walker, standard;wheelchair   Pt does not uese the walker or w/c all the time.  -    Plans/Goals Discussed With (P)  patient  -     Barriers to Rehab (P)  cognitive status;medically complex;uncooperative  -     Living Environment    Lives With (P)  significant other  - significant other  -    Living Arrangements (P)  house  - house  -    Home Accessibility (P)  no concerns  - no concerns  -    Stair Railings at Home (P)  none  - none  -    Type of Financial/Environmental Concern  other (see comments)   concerned she will not have access to her social security check while she is hospitalized.  -    Transportation Available  car;family or friend will provide  -    Clinical Impression    Patient/Family Goals Statement (P)  pt did not state,  -     Criteria for Skilled Therapeutic Interventions Met (P)  yes;treatment indicated  -     Rehab Potential (P)  fair, will monitor progress closely  -     Vital Signs    Pre Systolic BP Rehab (P)  122  -     Pre Treatment Diastolic BP (P)  89  -     Post Systolic BP Rehab (P)  129  -     Post Treatment Diastolic BP (P)  99  -MF     Pretreatment Heart Rate (beats/min) (P)  108  -     Posttreatment Heart Rate (beats/min) (P)  117  -     Pre SpO2 (%) (P)  99  -MF     Post SpO2 (%) (P)  97  -     Pain Assessment    Pain Assessment (P)  0-10  -     Pain Score (P)  unable to assess  -     Pain Type (P)   Surgical pain  -MF     Vision Assessment/Intervention    Visual Impairment (P)  unable/difficult to assess  -MF     Cognitive Assessment/Intervention    Current Cognitive/Communication Assessment (P)  impaired  -MF     Orientation Status (P)  oriented to;person  -MF     Follows Commands/Answers Questions (P)  needs increased time;needs cueing;unable to answer questions  -     Personal Safety (P)  severe impairment;decreased awareness, need for safety  -MF     Personal Safety Interventions (P)  fall prevention program maintained  -MF     ROM (Range of Motion)    General ROM (P)  no range of motion deficits identified  -MF     MMT (Manual Muscle Testing)    General MMT Assessment (P)  other (see comments)   L>R extremity weakness.   -MF     Muscle Tone Assessment    Muscle Tone Assessment (P)  Left-Side Extremities  -MF     Left-Side Extremities Muscle Tone Assessment (P)  mildly decreased tone  -MF     Bed Mobility, Assessment/Treatment    Bed Mob, Sit to Supine, Dare (P)  moderate assist (50% patient effort);2 person assist required  -     Bed Mobility, Safety Issues (P)  cognitive deficits limit understanding;decreased use of arms for pushing/pulling  -     Bed Mobility, Impairments (P)  strength decreased;impaired balance;pain  -MF     Transfer Assessment/Treatment    Transfers, Chair-Bed Dare (P)  moderate assist (50% patient effort);2 person assist required;hand held assist  -MF     Transfers, Sit-Stand Dare (P)  moderate assist (50% patient effort);maximum assist (25% patient effort);2 person assist required  -MF     Transfers, Stand-Sit Dare (P)  moderate assist (50% patient effort);maximum assist (25% patient effort);2 person assist required  -MF     Transfer, Safety Issues (P)  step length decreased;sequencing ability decreased;weight-shifting ability decreased  -     Transfer, Impairments (P)  strength decreased;impaired balance;motor control impaired  -     Gait  Assessment/Treatment    Gait, [place] Level (P)  not tested;not appropriate to assess  -MF     Gait, Comment (P)  held 2' to pt cognitive status, not following commands  -     Motor Skills/Interventions    Additional Documentation (P)  Balance Skills Training (Group)  -MF     Balance Skills Training    Sitting-Level of Assistance (P)  Moderate assistance  -MF     Sitting-Balance Support (P)  Feet supported  -MF     Standing-Level of Assistance (P)  Moderate assistance;x2  -MF     Static Standing Balance Support (P)  Right upper extremity supported;Left upper extremity supported  -     Therapy Exercises    Exercise Protocols (P)  --   5 reps cardiac post op protocol, level 2  -MF     Positioning and Restraints    Pre-Treatment Position (P)  sitting in chair/recliner  -MF     Post Treatment Position (P)  bed  -MF     In Bed (P)  supine;patient within staff view;with nsg  -       User Key  (r) = Recorded By, (t) = Taken By, (c) = Cosigned By    Initials Name Provider Type    SS María Tiwari, RN Case Manager     Atilio Gutierrez, PT Student PT Student          Physical Therapy Education     Title: PT OT SLP Therapies (Active)     Topic: Physical Therapy (Active)     Point: Mobility training (Active)    Learning Progress Summary    Learner Readiness Method Response Comment Documented by Status   Patient Nonacceptance E NR   10/13/17 0857 Active    Eager E,TB VU,DU Encouraged pt to ambulate in hallways with staff or family at least tid to improve activity tolerance.  Discussed use of AD but pt feel that that would be a step backward and wpould prefer to go without 9we will revisit this after surgery).  10/08/17 1120 Done               Point: Home exercise program (Active)    Learning Progress Summary    Learner Readiness Method Response Comment Documented by Status   Patient Nonacceptance E NR   10/13/17 0857 Active               Point: Body mechanics (Active)    Learning Progress Summary    Learner  Readiness Method Response Comment Documented by Status   Patient Nonacceptance E NR   10/13/17 0857 Active               Point: Precautions (Active)    Learning Progress Summary    Learner Readiness Method Response Comment Documented by Status   Patient Nonacceptance E NR   10/13/17 0857 Active    Eager E,TB JUAN ANTONIO ANGEL Encouraged pt to ambulate in hallways with staff or family at least tid to improve activity tolerance.  Discussed use of AD but pt feel that that would be a step backward and wpould prefer to go without 9we will revisit this after surgery).  10/08/17 1120 Done                      User Key     Initials Effective Dates Name Provider Type Discipline     12/01/15 -  Faiza Brady, PT Physical Therapist PT     09/18/17 -  Atilio Gutierrez, PT Student PT Student PT                PT Recommendation and Plan  Anticipated Discharge Disposition: (P) skilled nursing facility  Planned Therapy Interventions: (P) balance training, bed mobility training, gait training, strengthening, transfer training, home exercise program  PT Frequency: (P) daily  Plan of Care Review  Plan Of Care Reviewed With: (P) patient  Outcome Summary/Follow up Plan: (P) Pt is s/p CABG x 4 for severe CAD and c/o SOA and chest pain. Pt was very difficult to communicate w/ and was unable to answer questions, but per previous physical therapy notes, pt's prior level of function was independent w/ household and community ambulation. Pt now is very disoriented  and has left sided weakness. Pt  able to follow commands given increased amount of time, however requires frequent verbal/tactile cueing to do so. Pt was verbalizing pain throughout evaluation. Pt presents w/ functional mobility and cognitive impairments. She will need skilled PT to address impairments and to improve independence w/ functional mobility. Probable placment in SNF following d/c.           IP PT Goals       10/13/17 0859 10/13/17 0857       Bed Mobility PT LTG    Bed  Mobility PT LTG, Date Established (P)  10/13/17  -      Bed Mobility PT LTG, Time to Achieve (P)  1 wk  -      Bed Mobility PT LTG, Activity Type (P)  all bed mobility  -      Bed Mobility PT LTG, [place] Level (P)  supervision required  -      Transfer Training 2 PT LTG    Transfer Training PT 2 LTG, Date Established (P)  10/13/17  -      Transfer Training PT 2 LTG, Time to Achieve (P)  1 wk  -MF      Transfer Training PT 2 LTG, Activity Type (P)  bed to chair /chair to bed;sit to stand/stand to sit  -      Transfer Training PT 2 LTG, [place] Level (P)  minimum assist (75% patient effort)  -MF      Transfer Training PT 2 LTG, Assist Device (P)  walker, rolling  -      Gait Training PT LTG    Gait Training Goal PT LTG, Date Established (P)  10/13/17  -      Gait Training Goal PT LTG, Time to Achieve (P)  1 wk  -      Gait Training Goal PT LTG, [place] Level (P)  minimum assist (75% patient effort);2 person assist required  -      Gait Training Goal PT LTG, Assist Device (P)  walker, rolling  -      Gait Training Goal PT LTG, Distance to Achieve (P)  20 ft  -      Cardiopulmonary PT LTG    Cardiopulmonary PT LTG, Date Established  (P)  10/13/17  -     Cardiopulmonary PT LTG, Time to Achieve  (P)  1 wk  -     Cardiopulmonary PT LTG, Level  (P)  Level III  -       User Key  (r) = Recorded By, (t) = Taken By, (c) = Cosigned By    Initials Name Provider Type    ROEL Gutierrez, PT Student PT Student                Outcome Measures       10/13/17 0905          How much help from another person do you currently need...    Turning from your back to your side while in flat bed without using bedrails? (P)  2  -MF      Moving from lying on back to sitting on the side of a flat bed without bedrails? (P)  2  -MF      Moving to and from a bed to a chair (including a wheelchair)? (P)  2  -MF      Standing up from a chair using your arms (e.g., wheelchair, bedside chair)? (P)  2  -MF       Climbing 3-5 steps with a railing? (P)  1  -MF      To walk in hospital room? (P)  1  -MF      AM-PAC 6 Clicks Score (P)  10  -      Functional Assessment    Outcome Measure Options (P)  AM-PAC 6 Clicks Basic Mobility (PT)  -        User Key  (r) = Recorded By, (t) = Taken By, (c) = Cosigned By    Initials Name Provider Type     Atilio Gutierrez, PT Student PT Student           Time Calculation:         PT Charges       10/13/17 0905          Time Calculation    Start Time (P)  0825  -      Stop Time (P)  0839  -      Time Calculation (min) (P)  14 min  -      PT Received On (P)  10/13/17  -      PT - Next Appointment (P)  10/14/17  -      PT Goal Re-Cert Due Date (P)  10/20/17  -        User Key  (r) = Recorded By, (t) = Taken By, (c) = Cosigned By    Initials Name Provider Type     Atilio Gutierrez, PT Student PT Student          Therapy Charges for Today     Code Description Service Date Service Provider Modifiers Qty    93179810021 HC PT EVAL LOW COMPLEXITY 2 10/13/2017 Atilio Gutierrez, PT Student GP 1    98837646512 HC PT THER PROC EA 15 MIN 10/13/2017 Atilio Gutierrez, PT Student GP 1    40564819857 HC PT THER SUPP EA 15 MIN 10/13/2017 Atilio Gutierrez PT Student GP 1          PT G-Codes  PT Professional Judgement Used?: Yes  Outcome Measure Options: (P) AM-PAC 6 Clicks Basic Mobility (PT)  Score: 23  Functional Limitation: Mobility: Walking and moving around  Mobility: Walking and Moving Around Current Status (): At least 1 percent but less than 20 percent impaired, limited or restricted  Mobility: Walking and Moving Around Goal Status (): At least 1 percent but less than 20 percent impaired, limited or restricted      Atilio Gutierrez PT Student  10/13/2017

## 2017-10-13 NOTE — PROGRESS NOTES
" LOS: 6 days   Patient Care Team:  Narciso Camargo MD as PCP - General (Family Medicine)    Chief Complaint: CAD    Subjective   Extubated ~4am. Very repetitive speech this am. Was not moving left upper extremity earlier this am, but now has  strength, just weaker than compared to right.     Drips: Insulin, Stewart 0.3    Vital Signs  Temp:  [97.7 °F (36.5 °C)-100.6 °F (38.1 °C)] 100.6 °F (38.1 °C)  Heart Rate:  [] 101  Resp:  [11-22] 18  BP: (111-152)/(65-85) 131/68  Arterial Line BP: ()/(49-88) 122/49  FiO2 (%):  [40 %-100 %] 40 %  Body mass index is 35.94 kg/(m^2).    Intake/Output Summary (Last 24 hours) at 10/13/17 1122  Last data filed at 10/13/17 1000   Gross per 24 hour   Intake          4960.67 ml   Output             5820 ml   Net          -859.33 ml     I/O this shift:  In: -   Out: 135 [Urine:135]     Chest tubes 150cc/60cc x 8 hours       Last 3 weights    10/07/17  0019 10/12/17  0948   Weight: 216 lb 5 oz (98.1 kg) 216 lb (98 kg)         Objective:  General Appearance:  In no acute distress (Repetitive speech).    Vital signs: (most recent): Blood pressure 131/68, pulse 101, temperature (!) 100.6 °F (38.1 °C), temperature source Core, resp. rate 18, height 65\" (165.1 cm), weight 216 lb (98 kg), SpO2 99 %.  Vital signs are normal.  No fever.    Output: Producing urine and producing stool.    HEENT: Normal HEENT exam.    Lungs:  Normal respiratory rate.  She is not in respiratory distress.  Breath sounds clear to auscultation.  There are decreased breath sounds (bases).    Heart: Normal rate.  Regular rhythm.  No murmur.   Abdomen: Abdomen is soft.  Bowel sounds are normal.   There is no abdominal tenderness.     Extremities: Normal range of motion.    Pulses: Distal pulses are intact.    Neurological: Patient is alert.  (Repetitive speech. Decreased strength LUE.).    Pupils:  Pupils are equal, round, and reactive to light.    Skin:  Warm and dry.            Results Review:        WBC " WBC   Date Value Ref Range Status   10/13/2017 18.85 (H) 4.50 - 10.70 10*3/mm3 Final   10/12/2017 19.17 (H) 4.50 - 10.70 10*3/mm3 Final   10/12/2017 19.06 (H) 4.50 - 10.70 10*3/mm3 Final   10/12/2017 8.27 4.50 - 10.70 10*3/mm3 Final      HGB Hemoglobin   Date Value Ref Range Status   10/13/2017 9.7 (L) 11.9 - 15.5 g/dL Final   10/12/2017 10.6 (L) 11.9 - 15.5 g/dL Final   10/12/2017 11.0 (L) 11.9 - 15.5 g/dL Final   10/12/2017 8.5 (L) 12.0 - 17.0 g/dL Final   10/12/2017 9.2 (L) 12.0 - 17.0 g/dL Final   10/12/2017 8.5 (L) 12.0 - 17.0 g/dL Final   10/12/2017 8.8 (L) 12.0 - 17.0 g/dL Final   10/12/2017 8.8 (L) 12.0 - 17.0 g/dL Final   10/12/2017 11.6 (L) 12.0 - 17.0 g/dL Final   10/12/2017 12.4 11.9 - 15.5 g/dL Final      HCT Hematocrit   Date Value Ref Range Status   10/13/2017 29.2 (L) 35.6 - 45.5 % Final   10/12/2017 31.3 (L) 35.6 - 45.5 % Final   10/12/2017 32.3 (L) 35.6 - 45.5 % Final   10/12/2017 25 (L) 38 - 51 % Final   10/12/2017 27 (L) 38 - 51 % Final   10/12/2017 25 (L) 38 - 51 % Final   10/12/2017 26 (L) 38 - 51 % Final   10/12/2017 26 (L) 38 - 51 % Final   10/12/2017 34 (L) 38 - 51 % Final   10/12/2017 37.6 35.6 - 45.5 % Final      Platelets Platelets   Date Value Ref Range Status   10/13/2017 204 140 - 500 10*3/mm3 Final   10/12/2017 213 140 - 500 10*3/mm3 Final   10/12/2017 194 140 - 500 10*3/mm3 Final   10/12/2017 236 140 - 500 10*3/mm3 Final        PT/INR:    Protime   Date Value Ref Range Status   10/12/2017 15.3 (H) 11.7 - 14.2 Seconds Final   10/12/2017 16.6 (H) 11.7 - 14.2 Seconds Final   /  INR   Date Value Ref Range Status   10/12/2017 1.26 (H) 0.90 - 1.10 Final   10/12/2017 1.39 (H) 0.90 - 1.10 Final       Sodium Sodium   Date Value Ref Range Status   10/13/2017 147 (H) 136 - 145 mmol/L Final   10/12/2017 145 136 - 145 mmol/L Final   10/12/2017 144 136 - 145 mmol/L Final   10/12/2017 142 136 - 145 mmol/L Final   10/12/2017 142 136 - 145 mmol/L Final      Potassium Potassium   Date Value Ref Range  Status   10/13/2017 4.3 3.5 - 5.2 mmol/L Final   10/12/2017 3.6 3.5 - 5.2 mmol/L Final   10/12/2017 4.0 3.5 - 5.2 mmol/L Final   10/12/2017 4.3 3.5 - 5.2 mmol/L Final   10/12/2017 4.2 3.5 - 5.2 mmol/L Final      Chloride Chloride   Date Value Ref Range Status   10/13/2017 110 (H) 98 - 107 mmol/L Final   10/12/2017 108 (H) 98 - 107 mmol/L Final   10/12/2017 107 98 - 107 mmol/L Final   10/12/2017 102 98 - 107 mmol/L Final   10/12/2017 103 98 - 107 mmol/L Final      Bicarbonate CO2   Date Value Ref Range Status   10/13/2017 20.5 (L) 22.0 - 29.0 mmol/L Final   10/12/2017 22.0 22.0 - 29.0 mmol/L Final   10/12/2017 22.9 22.0 - 29.0 mmol/L Final   10/12/2017 29.8 (H) 22.0 - 29.0 mmol/L Final   10/12/2017 26.9 22.0 - 29.0 mmol/L Final      BUN BUN   Date Value Ref Range Status   10/13/2017 18 8 - 23 mg/dL Final   10/12/2017 14 8 - 23 mg/dL Final   10/12/2017 15 8 - 23 mg/dL Final   10/12/2017 19 8 - 23 mg/dL Final   10/12/2017 19 8 - 23 mg/dL Final      Creatinine Creatinine   Date Value Ref Range Status   10/13/2017 1.08 (H) 0.57 - 1.00 mg/dL Final   10/12/2017 0.97 0.57 - 1.00 mg/dL Final   10/12/2017 0.93 0.57 - 1.00 mg/dL Final   10/12/2017 1.05 (H) 0.57 - 1.00 mg/dL Final   10/12/2017 1.06 (H) 0.57 - 1.00 mg/dL Final      Calcium Calcium   Date Value Ref Range Status   10/13/2017 9.2 8.6 - 10.5 mg/dL Final   10/12/2017 9.0 8.6 - 10.5 mg/dL Final   10/12/2017 9.1 8.6 - 10.5 mg/dL Final   10/12/2017 9.9 8.6 - 10.5 mg/dL Final   10/12/2017 9.6 8.6 - 10.5 mg/dL Final      Magnesium Magnesium   Date Value Ref Range Status   10/12/2017 2.6 (H) 1.6 - 2.4 mg/dL Final   10/12/2017 2.9 (H) 1.6 - 2.4 mg/dL Final            aspirin 81 mg Oral Daily   atorvastatin 40 mg Oral Nightly   ceFAZolin 2 g Intravenous Q8H   chlorhexidine 15 mL Mouth/Throat Q12H   enoxaparin 40 mg Subcutaneous Daily   pantoprazole 40 mg Oral Daily   Or      famotidine 20 mg Intravenous Daily   levothyroxine 137 mcg Oral Daily   magnesium sulfate 1 g  Intravenous Q8H   metoclopramide 10 mg Intravenous Q6H   metoprolol tartrate 12.5 mg Oral Q12H   mupirocin 1 application Each Nare Daily   mupirocin  Each Nare BID   sennosides-docusate sodium 2 tablet Oral Nightly       clevidipine 2-32 mg/hr Last Rate: Stopped (10/13/17 0325)   dexmedetomidine 0.2-1.5 mcg/kg/hr    DOPamine 2-20 mcg/kg/min    EPINEPHrine 0.02-0.3 mcg/kg/min    insulin regular infusion 1 unit/mL (CCU use) 0-50 Units/hr Last Rate: Stopped (10/13/17 0945)   milrinone 0.25-0.75 mcg/kg/min    niCARdipine 5-15 mg/hr    nitroglycerin 5-200 mcg/min    norepinephrine 0.02-0.3 mcg/kg/min    phenylephrine 0.2-3 mcg/kg/min Last Rate: 0.4 mcg/kg/min (10/12/17 2120)   propofol 5-50 mcg/kg/min Last Rate: Stopped (10/12/17 2042)   sodium chloride 30 mL/hr Last Rate: 30 mL/hr (10/12/17 1830)   sodium chloride 30 mL/hr    vasopressin 0.02-0.1 Units/min            Patient Active Problem List   Diagnosis Code   • Coronary artery disease of native heart with stable angina pectoris I25.118   • CAD (coronary artery disease) I25.10       Assessment & Plan    POD #1 s/p CABG x4, MVr d/t CAD- with stents 2006- on Brilinta (last dose 10/6) multi vessel disease                                  Left main disease - 50%                                  LAD proximal 100%                                  Circumflex 70%                                  RCA 99%  Angina   Abnormal stress test  HTN  HLD- on statin  Hypothyroidism  Frequent falls - recent fall at work 2 years ago patient broke right ankle   Deconditioned d/t fall - will consult PT to evaluate pre surgery    Vein mapping- adequate for bypass  Carotid duplex-       Proximal right internal carotid artery occlusion.      Right internal carotid artery stenosis totally occluded.      Proximal left internal carotid artery occlusion.  LUE weakness  Old stroke      CTA carotids and head- bilateral internal carotid arteries are occluded in the cervical, petrous, and cavernous  segments   Vertebral arteries providing majority of intracranial circulation-- Left is dominant  Vascular consulted- no options from there standpoint for revascularization increased risk for stroke especially if she gets hypotensive    Consulted Neuro for weakness in left upper extremity. CT head ordered. Per Dr. Parekh, CT shows old infarcts, but no new infarct or bleed. Will keep blood pressures a little higher and keep in CVR while still on Stewart. Dr. Parekh recommends full dose aspirin now and then low dose aspirin and Plavix when able.         CHARLOTTE Cheung  10/13/17  11:22 AM

## 2017-10-13 NOTE — CONSULTS
Met family yesterday while patient was in surgery.  Please see that note. Thank you for the referral!

## 2017-10-13 NOTE — PLAN OF CARE
Problem: Patient Care Overview (Adult)  Goal: Plan of Care Review    10/13/17 0859   Coping/Psychosocial Response Interventions   Plan Of Care Reviewed With patient   Outcome Evaluation   Outcome Summary/Follow up Plan Pt is s/p CABG x 4 for severe CAD and c/o SOA and chest pain. Pt was very difficult to communicate w/ and was unable to answer questions, but per previous physical therapy notes, pt's prior level of function was independent w/ household and community ambulation. Pt now is very disoriented and has left sided weakness. Pt able to follow commands given increased amount of time, however requires frequent verbal/tactile cueing to do so. Pt was verbalizing pain throughout evaluation. Pt presents w/ functional mobility and cognitive impairments. She will need skilled PT to address impairments and to improve independence w/ functional mobility. Probable placment in SNF following d/c.          Problem: Inpatient Physical Therapy  Goal: Bed Mobility Goal LTG- PT    10/13/17 0859   Bed Mobility PT LTG   Bed Mobility PT LTG, Date Established 10/13/17   Bed Mobility PT LTG, Time to Achieve 1 wk   Bed Mobility PT LTG, Activity Type all bed mobility   Bed Mobility PT LTG, [place] Level supervision required       Goal: Transfer Training Goal 2 LTG- PT    10/13/17 0859   Transfer Training 2 PT LTG   Transfer Training PT 2 LTG, Date Established 10/13/17   Transfer Training PT 2 LTG, Time to Achieve 1 wk   Transfer Training PT 2 LTG, Activity Type bed to chair /chair to bed;sit to stand/stand to sit   Transfer Training PT 2 LTG, [place] Level minimum assist (75% patient effort)   Transfer Training PT 2 LTG, Assist Device walker, rolling       Goal: Gait Training Goal LTG- PT    10/13/17 0859   Gait Training PT LTG   Gait Training Goal PT LTG, Date Established 10/13/17   Gait Training Goal PT LTG, Time to Achieve 1 wk   Gait Training Goal PT LTG, [place] Level minimum assist (75% patient effort);2 person  assist required   Gait Training Goal PT LTG, Assist Device walker, rolling   Gait Training Goal PT LTG, Distance to Achieve 20 ft

## 2017-10-14 ENCOUNTER — APPOINTMENT (OUTPATIENT)
Dept: GENERAL RADIOLOGY | Facility: HOSPITAL | Age: 67
End: 2017-10-14

## 2017-10-14 LAB
ANION GAP SERPL CALCULATED.3IONS-SCNC: 13.1 MMOL/L
BUN BLD-MCNC: 23 MG/DL (ref 8–23)
BUN/CREAT SERPL: 25 (ref 7–25)
CALCIUM SPEC-SCNC: 9.7 MG/DL (ref 8.6–10.5)
CHLORIDE SERPL-SCNC: 103 MMOL/L (ref 98–107)
CO2 SERPL-SCNC: 24.9 MMOL/L (ref 22–29)
CREAT BLD-MCNC: 0.92 MG/DL (ref 0.57–1)
DEPRECATED RDW RBC AUTO: 45.9 FL (ref 37–54)
ERYTHROCYTE [DISTWIDTH] IN BLOOD BY AUTOMATED COUNT: 13.3 % (ref 11.7–13)
GFR SERPL CREATININE-BSD FRML MDRD: 61 ML/MIN/1.73
GLUCOSE BLD-MCNC: 160 MG/DL (ref 65–99)
GLUCOSE BLDC GLUCOMTR-MCNC: 130 MG/DL (ref 70–130)
GLUCOSE BLDC GLUCOMTR-MCNC: 138 MG/DL (ref 70–130)
GLUCOSE BLDC GLUCOMTR-MCNC: 153 MG/DL (ref 70–130)
GLUCOSE BLDC GLUCOMTR-MCNC: 157 MG/DL (ref 70–130)
HCT VFR BLD AUTO: 26.9 % (ref 35.6–45.5)
HGB BLD-MCNC: 8.6 G/DL (ref 11.9–15.5)
MCH RBC QN AUTO: 30.5 PG (ref 26.9–32)
MCHC RBC AUTO-ENTMCNC: 32 G/DL (ref 32.4–36.3)
MCV RBC AUTO: 95.4 FL (ref 80.5–98.2)
PLATELET # BLD AUTO: 160 10*3/MM3 (ref 140–500)
PMV BLD AUTO: 10 FL (ref 6–12)
POTASSIUM BLD-SCNC: 4.4 MMOL/L (ref 3.5–5.2)
RBC # BLD AUTO: 2.82 10*6/MM3 (ref 3.9–5.2)
SODIUM BLD-SCNC: 141 MMOL/L (ref 136–145)
WBC NRBC COR # BLD: 15.23 10*3/MM3 (ref 4.5–10.7)

## 2017-10-14 PROCEDURE — 85027 COMPLETE CBC AUTOMATED: CPT | Performed by: THORACIC SURGERY (CARDIOTHORACIC VASCULAR SURGERY)

## 2017-10-14 PROCEDURE — 92610 EVALUATE SWALLOWING FUNCTION: CPT

## 2017-10-14 PROCEDURE — 25010000002 FUROSEMIDE PER 20 MG: Performed by: THORACIC SURGERY (CARDIOTHORACIC VASCULAR SURGERY)

## 2017-10-14 PROCEDURE — 25010000003 CEFAZOLIN IN DEXTROSE 2-4 GM/100ML-% SOLUTION: Performed by: THORACIC SURGERY (CARDIOTHORACIC VASCULAR SURGERY)

## 2017-10-14 PROCEDURE — 25010000002 ENOXAPARIN PER 10 MG: Performed by: THORACIC SURGERY (CARDIOTHORACIC VASCULAR SURGERY)

## 2017-10-14 PROCEDURE — 97110 THERAPEUTIC EXERCISES: CPT

## 2017-10-14 PROCEDURE — 80048 BASIC METABOLIC PNL TOTAL CA: CPT | Performed by: THORACIC SURGERY (CARDIOTHORACIC VASCULAR SURGERY)

## 2017-10-14 PROCEDURE — 71010 HC CHEST PA OR AP: CPT

## 2017-10-14 PROCEDURE — 93010 ELECTROCARDIOGRAM REPORT: CPT | Performed by: INTERNAL MEDICINE

## 2017-10-14 PROCEDURE — 25010000002 MORPHINE PER 10 MG: Performed by: THORACIC SURGERY (CARDIOTHORACIC VASCULAR SURGERY)

## 2017-10-14 PROCEDURE — 82962 GLUCOSE BLOOD TEST: CPT

## 2017-10-14 PROCEDURE — 63710000001 INSULIN ASPART PER 5 UNITS: Performed by: PHYSICIAN ASSISTANT

## 2017-10-14 PROCEDURE — 93005 ELECTROCARDIOGRAM TRACING: CPT | Performed by: THORACIC SURGERY (CARDIOTHORACIC VASCULAR SURGERY)

## 2017-10-14 RX ORDER — FUROSEMIDE 10 MG/ML
20 INJECTION INTRAMUSCULAR; INTRAVENOUS ONCE
Status: COMPLETED | OUTPATIENT
Start: 2017-10-14 | End: 2017-10-14

## 2017-10-14 RX ORDER — POTASSIUM CHLORIDE 750 MG/1
20 CAPSULE, EXTENDED RELEASE ORAL DAILY
Status: DISCONTINUED | OUTPATIENT
Start: 2017-10-15 | End: 2017-10-18 | Stop reason: HOSPADM

## 2017-10-14 RX ORDER — FUROSEMIDE 40 MG/1
40 TABLET ORAL DAILY
Status: DISCONTINUED | OUTPATIENT
Start: 2017-10-15 | End: 2017-10-18 | Stop reason: HOSPADM

## 2017-10-14 RX ORDER — CHOLECALCIFEROL (VITAMIN D3) 125 MCG
1000 CAPSULE ORAL DAILY
Status: DISCONTINUED | OUTPATIENT
Start: 2017-10-14 | End: 2017-10-18 | Stop reason: HOSPADM

## 2017-10-14 RX ORDER — PANTOPRAZOLE SODIUM 40 MG/1
40 TABLET, DELAYED RELEASE ORAL
Status: DISCONTINUED | OUTPATIENT
Start: 2017-10-15 | End: 2017-10-18 | Stop reason: HOSPADM

## 2017-10-14 RX ORDER — POTASSIUM CHLORIDE 750 MG/1
10 CAPSULE, EXTENDED RELEASE ORAL ONCE
Status: COMPLETED | OUTPATIENT
Start: 2017-10-14 | End: 2017-10-14

## 2017-10-14 RX ORDER — FUROSEMIDE 10 MG/ML
20 INJECTION INTRAMUSCULAR; INTRAVENOUS EVERY 12 HOURS
Status: DISCONTINUED | OUTPATIENT
Start: 2017-10-14 | End: 2017-10-14

## 2017-10-14 RX ADMIN — CEFAZOLIN SODIUM 2 G: 2 INJECTION, SOLUTION INTRAVENOUS at 03:12

## 2017-10-14 RX ADMIN — METOPROLOL TARTRATE 25 MG: 25 TABLET ORAL at 20:10

## 2017-10-14 RX ADMIN — ACETAMINOPHEN 650 MG: 325 TABLET ORAL at 18:37

## 2017-10-14 RX ADMIN — SENNOSIDES AND DOCUSATE SODIUM 2 TABLET: 8.6; 5 TABLET ORAL at 20:11

## 2017-10-14 RX ADMIN — POTASSIUM CHLORIDE 10 MEQ: 750 CAPSULE, EXTENDED RELEASE ORAL at 12:04

## 2017-10-14 RX ADMIN — ASPIRIN 325 MG: 325 TABLET, DELAYED RELEASE ORAL at 08:14

## 2017-10-14 RX ADMIN — METOPROLOL TARTRATE 12.5 MG: 25 TABLET ORAL at 08:14

## 2017-10-14 RX ADMIN — OXYCODONE HYDROCHLORIDE 10 MG: 5 TABLET ORAL at 08:15

## 2017-10-14 RX ADMIN — TRAMADOL HYDROCHLORIDE 50 MG: 50 TABLET, FILM COATED ORAL at 18:37

## 2017-10-14 RX ADMIN — FUROSEMIDE 20 MG: 10 INJECTION, SOLUTION INTRAMUSCULAR; INTRAVENOUS at 12:04

## 2017-10-14 RX ADMIN — ENOXAPARIN SODIUM 40 MG: 40 INJECTION SUBCUTANEOUS at 18:20

## 2017-10-14 RX ADMIN — TRAMADOL HYDROCHLORIDE 50 MG: 50 TABLET, FILM COATED ORAL at 03:59

## 2017-10-14 RX ADMIN — TRAMADOL HYDROCHLORIDE 50 MG: 50 TABLET, FILM COATED ORAL at 12:07

## 2017-10-14 RX ADMIN — LEVOTHYROXINE SODIUM 137 MCG: 137 TABLET ORAL at 20:11

## 2017-10-14 RX ADMIN — MORPHINE SULFATE 1 MG: 2 INJECTION, SOLUTION INTRAMUSCULAR; INTRAVENOUS at 01:34

## 2017-10-14 RX ADMIN — CYANOCOBALAMIN TAB 500 MCG 1000 MCG: 500 TAB at 12:04

## 2017-10-14 RX ADMIN — ACETAMINOPHEN 650 MG: 325 TABLET ORAL at 22:39

## 2017-10-14 RX ADMIN — PANTOPRAZOLE SODIUM 40 MG: 40 TABLET, DELAYED RELEASE ORAL at 08:15

## 2017-10-14 RX ADMIN — MUPIROCIN 1 APPLICATION: 20 OINTMENT TOPICAL at 08:15

## 2017-10-14 RX ADMIN — FUROSEMIDE 20 MG: 10 INJECTION, SOLUTION INTRAMUSCULAR; INTRAVENOUS at 03:57

## 2017-10-14 RX ADMIN — ATORVASTATIN CALCIUM 40 MG: 20 TABLET, FILM COATED ORAL at 20:11

## 2017-10-14 RX ADMIN — INSULIN ASPART 2 UNITS: 100 INJECTION, SOLUTION INTRAVENOUS; SUBCUTANEOUS at 06:33

## 2017-10-14 NOTE — PLAN OF CARE
Problem: Inpatient SLP  Goal: Dysphagia- Patient will safely consume diet as per recommendation with no signs/symptoms of aspiration    10/14/17 1736   Safely Consume Diet   Safely Consume Diet- SLP, Date Established 10/14/17   Safely Consume Diet- SLP, Time to Achieve by discharge

## 2017-10-14 NOTE — THERAPY EVALUATION
"Acute Care - Speech Language Pathology   Swallow Initial Evaluation [facility]     Patient Name: Marion Anderson  : 1950  MRN: [id number]  Today's Date: 10/14/2017  Onset of Illness/Injury or Date of Surgery Date: 10/12/17            SPEECH-LANGUAGE PATHOLOGY EVALUATION - SWALLOW  Subjective: The patient was seen on this date for a Clinical Swallow evaluation. The patient was lethargic, confused, perseverative. She continually stated \"i'm afraid\" and \"i don't know\". Inconsistent ability or willingness to follow commands, or participate in evaluation, despite encouragement from multiple family members, staff , and therapist.  The patient's history is significant for cardiac bypass, recent increase in left sided weakness with CT revealing areas of old infarct in R fontal lobe (evoling new infarct could not be r/o- MRI recommended). Recent refusal to take po, and tendency to hold food in mouth. The patient  could offer no explanation, or describe swallow difficulty. She did push food away from mouth, and would only minimally place lips on spoon.   Objective: Textures given included thin liquid and puree consistency.  Assessment: The patient refused ice chip. Over time, pt took 3 separate straw drinks of thin liquid, with inconsistent swallow delay, but no overt clinical s/s aspiration noted. Voice remained clear.  Pt did make facial grimmace x1.  Pt refused puree, or milkshake- would only open lips minimally and would not take enough of an amt to evaluate swallow response.  Daughter reported that pt has always experienced difficulty swallowing larger pills, and would crush or take smaller pills prior to hospitalization.  SLP Findings:  Cognitive dysphagia, r/o oropharyngeal dysphagia  Due to patients refusal to participate in evaluation and take po trials,  ST is unable to recommend safest diet. Would continue to monitor pts cooperation and willingness to take po,and monitor carefully for s/s aspiration. " Discussed recs w/family, and reviewed safe swallow strategies.  If s/s aspiration are noted, rec downgrade diet, or make NPO, and proceed with instrumental assessment. It  is recommended that pt be reassessed for swallow at bedside on Monday.    Recommendations: Diet Textures: thin liquid, regular consistency food- discussed w/family to encourage softer /puree foods initially if pt will take them, and proceed to preferred items if pt agreeable, but to make NPO if s/s aspiration noted.   Medications should be taken crushed with puree.   Recommended Strategies: Upright for PO and small bites and sips. Cue for prompt swallow.  Oral care before breakfast, after all meals and PRN. Constant supervision for meals.  Other Recommended Evaluations: Re-evaluation at bedside and VFSS if warranted.   Dysphagia therapy is recommended. Rationale: safe swallow- least restrictive diet.    Admit Date: 10/6/2017    Visit Dx:     ICD-10-CM ICD-9-CM   1. Physical deconditioning R53.81 799.3   2. Coronary artery disease of native heart with stable angina pectoris, unspecified vessel or lesion type I25.118 414.01     413.9   3. Generalized weakness R53.1 780.79     Patient Active Problem List   Diagnosis   • Coronary artery disease of native heart with stable angina pectoris   • CAD (coronary artery disease)     Past Medical History:   Diagnosis Date   • Arthritis    • Coronary artery disease    • Disease of thyroid gland    • Hypertension    • PONV (postoperative nausea and vomiting)    • Spinal headache      Past Surgical History:   Procedure Laterality Date   • CARDIAC SURGERY     • CORONARY ARTERY BYPASS GRAFT N/A 10/12/2017    Procedure: FILEMON STERNOTOMY CORONARY ARTERY BYPASS GRAFT TIMES 4  USING LEFT INTERNAL MAMMARY ARTERY AND LEFT GREATER SAPHENOUS VEIN GRAFT PER  ENDOSCOPIC VEIN HARVESTING, MITRAL VALVE REPAIR AND PRP;  Surgeon: Ramos Muñoz MD;  Location: [facility] OR;  Service:    • FRACTURE SURGERY      ankle. leg   •  "TONSILLECTOMY            SWALLOW EVALUATION (last 72 hours)      Swallow Evaluation       10/14/17 1724                Rehab Evaluation    Document Type evaluation  -SL        Subjective Information fatigue  -SL        Patient Effort, Rehab Treatment poor  -SL        Patient Effort, Rehab Treatment Comment pt refused most po trials, despite multiple efforts by family, therapist, and nursing  -        Symptoms Noted During/After Treatment none  -        General Information    Patient Profile Review yes  -SL        Subjective Patient Observations fatiqued, perseverative, limited verbalizations- cont stating\"i don't know\", \"i'm afraid\" but could not elaborate  -        Pertinent History Of Current Problem pt has been holding food in mouth, only taking few bites; s/p cardiac bypass  -        Current Diet Limitations thin liquids;regular solid  -SL        Precautions/Limitations, Vision WFL  -SL        Precautions/Limitations, Hearing WFL  -SL        Prior Level of Function- Communication functional in all spheres  -        Prior Level of Function- Swallowing diet modifications- foods   dtr reported pt had diff swallowing pills-would crush   -        Plans/Goals Discussed With patient;family  -        Barriers to Rehab medically complex  -        Clinical Impression    Patient's Goals For Discharge patient could not state;patient did not state  -        Family Goals For Discharge patient able to eat/drink without coughing/choking  -        SLP Swallowing Diagnosis --   r/o dysphagia; cognitive component  -        Rehab Potential/Prognosis, Swallowing fair, will monitor progress closely  -        Functional Level At Time Of Eval impaired  -        Criteria for Skilled Therapeutic Interventions Met skilled criteria for dysphagia intervention met  -        Therapy Frequency PRN  -SL        Expected Duration Therapy Session (min) 15-30 minutes  -        SLP Diet Recommendation thin liquids;soft " textures   downgrade to puree, or make npo if overt s/s aspiraiton   -        Recommended Diagnostics reassess via clinical swallow (non-instrumental exam)  -        Recommended Feeding/Eating Techniques small sips/bites;maintain upright posture during/after eating for 30 mins   encourage prompt swallow  -SL        SLP Rec. for Method of Medication Administration meds crushed in pudding/applesauce;meds via alternate route  -        Monitor For Signs Of Aspiration cough;elevated WBC count;gurgly voice;throat clearing;fever;upper respiratory infection;pneumonia;right lower lobe infiltrates  -SL        Anticipated Discharge Disposition other (see comments)  -SL        Demonstrates Need for Referral to Another Service neuropsychology services   unknown  -        Cognitive Assessment/Intervention    Current Cognitive/Communication Assessment impaired  -        Orientation Status oriented to;person;place  -        Follows Commands/Answers Questions needs repetition;needs increased time;needs cueing;able to follow single-step instructions;50% of the time  -        Personal Safety decreased insight to deficits  -        Oral Motor Structure and Function    Oral Motor Anatomy and Physiology patient demonstrates anatomy that is WNL  -        Dentition Assessment present and adequate  -        Secretion Management WNL/WFL  -SL        Volitional Swallow mild to moderate difficulties initiating volitional swallow  -SL        Volitional Cough weak volitional cough  -        Oral Motor Assessment Comment pt would not complete oral motor tasks/follow commands to complete assessment  -        General Feeding/Swallowing Observations    Current Feeding Method oral feeding methods  -        Swallow Recommendations    Eating Assistance needs constant supervision during self eating activity  -        Oral Care oral care with toothbrush and dentifrice before breakfast and after meals and PRN  -SL         Modified Eating Strategies upright positioning 90 degrees;caregiver supervision required during eating;alternate food and liquid swallows;adjust rate of eating to fit with patient's ability  -SL        Other Recommendations repeat clincial exam;meds crushed and mixed  -        Recommended Diet thin liquids;soft textures   if s/s asp noted- make NPO  -SL          User Key  (r) = Recorded By, (t) = Taken By, (c) = Cosigned By    Initials Name Effective Dates     Shira Farris MS CCC-SLP 04/13/15 -         EDUCATION  The patient has been educated in the following areas:   Cognitive Impairment Dysphagia (Swallowing Impairment).    SLP Recommendation and Plan  SLP Swallowing Diagnosis:  (r/o dysphagia; cognitive component)  SLP Diet Recommendation: thin liquids, soft textures (downgrade to puree, or make npo if overt s/s aspiraiton )  Recommended Feeding/Eating Techniques: small sips/bites, maintain upright posture during/after eating for 30 mins (encourage prompt swallow)  SLP Rec. for Method of Medication Administration: meds crushed in pudding/applesauce, meds via alternate route  Monitor For Signs Of Aspiration: cough, elevated WBC count, gurgly voice, throat clearing, fever, upper respiratory infection, pneumonia, right lower lobe infiltrates  Recommended Diagnostics: reassess via clinical swallow (non-instrumental exam)  Criteria for Skilled Therapeutic Interventions Met: skilled criteria for dysphagia intervention met  Anticipated Discharge Disposition: other (see comments)  Rehab Potential/Prognosis, Swallowing: fair, will monitor progress closely  Therapy Frequency: PRN        Demonstrates Need for Referral to Another Service: neuropsychology services (unknown)                SLP Goals       10/14/17 1736          Safely Consume Diet    Safely Consume Diet- SLP, Date Established 10/14/17  -      Safely Consume Diet- SLP, Time to Achieve by discharge  -        User Key  (r) = Recorded By, (t) = Taken By,  (c) = Cosigned By    Initials Name Provider Type    KATHERIN Farris MS CCC-SLP Speech and Language Pathologist             SLP Outcome Measures (last 72 hours)      SLP Outcome Measures       10/14/17 1700          SLP Outcome Measures    Outcome Measure Used? Adult NOMS  -SL      FCM Scores    Swallowing FCM Score 3  -SL        User Key  (r) = Recorded By, (t) = Taken By, (c) = Cosigned By    Initials Name Effective Dates    MS FABIAN BlandonSLP 04/13/15 -            Time Calculation:       Therapy Charges for Today     Code Description Service Date Service Provider Modifiers Qty    [id number]  ST EVAL ORAL PHARYNG SWALLOW 4 10/14/2017 Shira Farris MS CCC-SLP GN 1               Shira Farris MS CCC-SLP  10/14/2017

## 2017-10-14 NOTE — PROGRESS NOTES
" LOS: 7 days   Patient Care Team:  Narciso Camargo MD as PCP - General (Family Medicine)    Chief Complaint: post op f/u    Subjective    OOBTC, daughter at bedside, oriented, drowsy.  Worked with PT   Vital Signs  Temp:  [97.6 °F (36.4 °C)-98.4 °F (36.9 °C)] 98.4 °F (36.9 °C)  Heart Rate:  [] 116  Resp:  [20] 20  BP: (108-153)/() 108/63  Arterial Line BP: (125-164)/() 164/111  Body mass index is 37.69 kg/(m^2).    Intake/Output Summary (Last 24 hours) at 10/14/17 0958  Last data filed at 10/14/17 0700   Gross per 24 hour   Intake              796 ml   Output             1190 ml   Net             -394 ml          Chest tube drainage last 8 hours 40/30    Last 3 weights    10/07/17  0019 10/12/17  0948 10/14/17  0609   Weight: 216 lb 5 oz (98.1 kg) 216 lb (98 kg) 226 lb 8 oz (103 kg)         Objective:  Vital signs: (most recent): Blood pressure 108/63, pulse 116, temperature 98.4 °F (36.9 °C), temperature source Oral, resp. rate 20, height 65\" (165.1 cm), weight 226 lb 8 oz (103 kg), SpO2 96 %.  Vital signs are normal.  No fever.    Output: Producing urine and no stool output.    HEENT: Normal HEENT exam.    Lungs:  Normal respiratory rate and normal effort.  There are rhonchi and decreased breath sounds.    Heart: Normal rate.  Regular rhythm.  S1 normal and S2 normal.              Results Review:        WBC WBC   Date Value Ref Range Status   10/14/2017 15.23 (H) 4.50 - 10.70 10*3/mm3 Final   10/13/2017 18.85 (H) 4.50 - 10.70 10*3/mm3 Final   10/12/2017 19.17 (H) 4.50 - 10.70 10*3/mm3 Final   10/12/2017 19.06 (H) 4.50 - 10.70 10*3/mm3 Final   10/12/2017 8.27 4.50 - 10.70 10*3/mm3 Final      HGB Hemoglobin   Date Value Ref Range Status   10/14/2017 8.6 (L) 11.9 - 15.5 g/dL Final   10/13/2017 9.7 (L) 11.9 - 15.5 g/dL Final   10/12/2017 10.6 (L) 11.9 - 15.5 g/dL Final   10/12/2017 11.0 (L) 11.9 - 15.5 g/dL Final   10/12/2017 8.5 (L) 12.0 - 17.0 g/dL Final   10/12/2017 9.2 (L) 12.0 - 17.0 g/dL " Final   10/12/2017 8.5 (L) 12.0 - 17.0 g/dL Final   10/12/2017 8.8 (L) 12.0 - 17.0 g/dL Final   10/12/2017 8.8 (L) 12.0 - 17.0 g/dL Final   10/12/2017 11.6 (L) 12.0 - 17.0 g/dL Final   10/12/2017 12.4 11.9 - 15.5 g/dL Final      HCT Hematocrit   Date Value Ref Range Status   10/14/2017 26.9 (L) 35.6 - 45.5 % Final   10/13/2017 29.2 (L) 35.6 - 45.5 % Final   10/12/2017 31.3 (L) 35.6 - 45.5 % Final   10/12/2017 32.3 (L) 35.6 - 45.5 % Final   10/12/2017 25 (L) 38 - 51 % Final   10/12/2017 27 (L) 38 - 51 % Final   10/12/2017 25 (L) 38 - 51 % Final   10/12/2017 26 (L) 38 - 51 % Final   10/12/2017 26 (L) 38 - 51 % Final   10/12/2017 34 (L) 38 - 51 % Final   10/12/2017 37.6 35.6 - 45.5 % Final      Platelets Platelets   Date Value Ref Range Status   10/14/2017 160 140 - 500 10*3/mm3 Final   10/13/2017 204 140 - 500 10*3/mm3 Final   10/12/2017 213 140 - 500 10*3/mm3 Final   10/12/2017 194 140 - 500 10*3/mm3 Final   10/12/2017 236 140 - 500 10*3/mm3 Final        PT/INR:    Protime   Date Value Ref Range Status   10/12/2017 15.3 (H) 11.7 - 14.2 Seconds Final   10/12/2017 16.6 (H) 11.7 - 14.2 Seconds Final   /  INR   Date Value Ref Range Status   10/12/2017 1.26 (H) 0.90 - 1.10 Final   10/12/2017 1.39 (H) 0.90 - 1.10 Final       Sodium Sodium   Date Value Ref Range Status   10/14/2017 141 136 - 145 mmol/L Final   10/13/2017 147 (H) 136 - 145 mmol/L Final   10/12/2017 145 136 - 145 mmol/L Final   10/12/2017 144 136 - 145 mmol/L Final   10/12/2017 142 136 - 145 mmol/L Final   10/12/2017 142 136 - 145 mmol/L Final      Potassium Potassium   Date Value Ref Range Status   10/14/2017 4.4 3.5 - 5.2 mmol/L Final   10/13/2017 4.3 3.5 - 5.2 mmol/L Final   10/12/2017 3.6 3.5 - 5.2 mmol/L Final   10/12/2017 4.0 3.5 - 5.2 mmol/L Final   10/12/2017 4.3 3.5 - 5.2 mmol/L Final   10/12/2017 4.2 3.5 - 5.2 mmol/L Final      Chloride Chloride   Date Value Ref Range Status   10/14/2017 103 98 - 107 mmol/L Final   10/13/2017 110 (H) 98 - 107 mmol/L  Final   10/12/2017 108 (H) 98 - 107 mmol/L Final   10/12/2017 107 98 - 107 mmol/L Final   10/12/2017 102 98 - 107 mmol/L Final   10/12/2017 103 98 - 107 mmol/L Final      Bicarbonate CO2   Date Value Ref Range Status   10/14/2017 24.9 22.0 - 29.0 mmol/L Final   10/13/2017 20.5 (L) 22.0 - 29.0 mmol/L Final   10/12/2017 22.0 22.0 - 29.0 mmol/L Final   10/12/2017 22.9 22.0 - 29.0 mmol/L Final   10/12/2017 29.8 (H) 22.0 - 29.0 mmol/L Final   10/12/2017 26.9 22.0 - 29.0 mmol/L Final      BUN BUN   Date Value Ref Range Status   10/14/2017 23 8 - 23 mg/dL Final   10/13/2017 18 8 - 23 mg/dL Final   10/12/2017 14 8 - 23 mg/dL Final   10/12/2017 15 8 - 23 mg/dL Final   10/12/2017 19 8 - 23 mg/dL Final   10/12/2017 19 8 - 23 mg/dL Final      Creatinine Creatinine   Date Value Ref Range Status   10/14/2017 0.92 0.57 - 1.00 mg/dL Final   10/13/2017 1.08 (H) 0.57 - 1.00 mg/dL Final   10/12/2017 0.97 0.57 - 1.00 mg/dL Final   10/12/2017 0.93 0.57 - 1.00 mg/dL Final   10/12/2017 1.05 (H) 0.57 - 1.00 mg/dL Final   10/12/2017 1.06 (H) 0.57 - 1.00 mg/dL Final      Calcium Calcium   Date Value Ref Range Status   10/14/2017 9.7 8.6 - 10.5 mg/dL Final   10/13/2017 9.2 8.6 - 10.5 mg/dL Final   10/12/2017 9.0 8.6 - 10.5 mg/dL Final   10/12/2017 9.1 8.6 - 10.5 mg/dL Final   10/12/2017 9.9 8.6 - 10.5 mg/dL Final   10/12/2017 9.6 8.6 - 10.5 mg/dL Final      Magnesium Magnesium   Date Value Ref Range Status   10/12/2017 2.6 (H) 1.6 - 2.4 mg/dL Final   10/12/2017 2.9 (H) 1.6 - 2.4 mg/dL Final            aspirin 325 mg Oral Daily   atorvastatin 40 mg Oral Nightly   chlorhexidine 15 mL Mouth/Throat Q12H   enoxaparin 40 mg Subcutaneous Daily   pantoprazole 40 mg Oral Daily   Or      famotidine 20 mg Intravenous Daily   insulin aspart 0-7 Units Subcutaneous 4x Daily With Meals & Nightly   levothyroxine 137 mcg Oral Daily   metoprolol tartrate 12.5 mg Oral Q12H   mupirocin 1 application Each Nare Daily   sennosides-docusate sodium 2 tablet Oral  Nightly   vitamin B-12 1,000 mcg Oral Daily       sodium chloride 30 mL/hr Last Rate: 30 mL/hr (10/12/17 1830)   sodium chloride 30 mL/hr            Patient Active Problem List   Diagnosis Code   • Coronary artery disease of native heart with stable angina pectoris I25.118   • CAD (coronary artery disease) I25.10       Assessment & Plan     POD #1 s/p CABG x4, MVr d/t CAD- with stents 2006- on Brilinta (last dose 10/6) multi vessel disease                               HTN  HLD- on statin  Hypothyroidism  Frequent falls - recent fall at work 2 years ago patient broke right ankle   Deconditioned d/t fall - will consult PT to evaluate pre surgery    LUE weakness  Old stroke  Carotid        Consulted Neuro for weakness in left upper extremity. CT head ordered. Per Dr. Parekh, CT shows old infarcts, but no new infarct or bleed. Will keep blood pressures a little higher and keep in CVR while still on [place]. Dr. Parekh recommends full dose aspirin now and then low dose aspirin and Plavix when able.        Routine Care  Drowsy today  Ambulate  IS, pulmonary toilet         PANKAJ Crouch  10/14/17  9:58 AM

## 2017-10-14 NOTE — THERAPY TREATMENT NOTE
Acute Care - Physical Therapy Treatment Note  [facility]     Patient Name: Marion Anderson  : 1950  MRN: [id number]  Today's Date: 10/14/2017  Onset of Illness/Injury or Date of Surgery Date: 10/12/17  Date of Referral to PT: 10/08/17  Referring Physician: Wanda    Admit Date: 10/6/2017    Visit Dx:    ICD-10-CM ICD-9-CM   1. Physical deconditioning R53.81 799.3   2. Coronary artery disease of native heart with stable angina pectoris, unspecified vessel or lesion type I25.118 414.01     413.9   3. Generalized weakness R53.1 780.79     Patient Active Problem List   Diagnosis   • Coronary artery disease of native heart with stable angina pectoris   • CAD (coronary artery disease)               Adult Rehabilitation Note       10/14/17 0957          Rehab Assessment/Intervention    Discipline physical therapist  -SA      Document Type therapy note (daily note)  -SA      Subjective Information agree to therapy;complains of;pain  -SA      Patient Effort, Rehab Treatment adequate  -SA      Precautions/Limitations cardiac precautions;fall precautions  -SA      Recorded by [SA] Kelin Jung PT      Pain Assessment    Pain Assessment Wilhelm-Linares FACES  -SA      Wilhelm-Linares FACES Pain Rating 4  -SA      Pain Type Surgical pain  -SA      Recorded by [SA] Kelin Jung PT      Cognitive Assessment/Intervention    Current Cognitive/Communication Assessment impaired  -SA      Orientation Status oriented to;person;place  -SA      Follows Commands/Answers Questions needs increased time;needs repetition  -SA      Personal Safety decreased awareness, need for safety  -SA      Personal Safety Interventions fall prevention program maintained  -SA      Recorded by [SA] Kelin Jung PT      Bed Mobility, Assessment/Treatment    Bed Mobility, Comment up in chair  -SA      Recorded by [SA] Kelin Jung PT      Transfer Assessment/Treatment    Transfers, Sit-Stand [place] maximum assist (25% patient effort);2 person  assist required  -SA      Transfers, Stand-Sit [place] maximum assist (25% patient effort);2 person assist required  -SA      Transfer, Comment sit<>stand transfer completed 2x  -SA      Recorded by [SA] Kelin Jung PT      Gait Assessment/Treatment    Gait, [place] Level not appropriate to assess  -SA      Recorded by [SA] Kelin Jung PT      Motor Skills/Interventions    Additional Documentation Balance Skills Training (Group)  -SA      Recorded by [SA] Kelin Jung PT      Balance Skills Training    Sitting-Level of Assistance Minimum assistance;Moderate assistance  -SA      Sitting-Balance Support Feet supported  -SA      Standing-Level of Assistance Moderate assistance;x2  -SA      Static Standing Balance Support Right upper extremity supported;Left upper extremity supported  -SA      Recorded by [SA] Kelin Jung PT      Therapy Exercises    Exercise Protocols --   5 reps cardiac protocol  -SA      Recorded by [SA] Kelin Jung PT      Positioning and Restraints    Pre-Treatment Position sitting in chair/recliner  -SA      Post Treatment Position chair  -SA      In Chair reclined;call light within reach;encouraged to call for assist;with family/caregiver;RUE elevated;LUE elevated;notified nsg  -SA      Recorded by [SA] Kelin Jung PT        User Key  (r) = Recorded By, (t) = Taken By, (c) = Cosigned By    Initials Name Effective Dates    SA Kelin Jung PT 08/03/17 -                 IP PT Goals       10/13/17 0859 10/13/17 0857       Bed Mobility PT LTG    Bed Mobility PT LTG, Date Established 10/13/17  -EM (r) MF (t) EM (c)      Bed Mobility PT LTG, Time to Achieve 1 wk  -EM (r) MF (t) EM (c)      Bed Mobility PT LTG, Activity Type all bed mobility  -EM (r) MF (t) EM (c)      Bed Mobility PT LTG, [place] Level supervision required  -EM (r) MF (t) EM (c)      Transfer Training 2 PT LTG    Transfer Training PT 2 LTG, Date Established 10/13/17  -EM (r) MF (t) EM (c)      Transfer  Training PT 2 LTG, Time to Achieve 1 wk  -EM (r) MF (t) EM (c)      Transfer Training PT 2 LTG, Activity Type bed to chair /chair to bed;sit to stand/stand to sit  -EM (r) MF (t) EM (c)      Transfer Training PT 2 LTG, [place] Level minimum assist (75% patient effort)  -EM (r) MF (t) EM (c)      Transfer Training PT 2 LTG, Assist Device walker, rolling  -EM (r) MF (t) EM (c)      Gait Training PT LTG    Gait Training Goal PT LTG, Date Established 10/13/17  -EM (r) MF (t) EM (c)      Gait Training Goal PT LTG, Time to Achieve 1 wk  -EM (r) MF (t) EM (c)      Gait Training Goal PT LTG, [place] Level minimum assist (75% patient effort);2 person assist required  -EM (r) MF (t) EM (c)      Gait Training Goal PT LTG, Assist Device walker, rolling  -EM (r) MF (t) EM (c)      Gait Training Goal PT LTG, Distance to Achieve 20 ft  -EM (r) MF (t) EM (c)      Cardiopulmonary PT LTG    Cardiopulmonary PT LTG, Date Established  10/13/17  -EM (r) MF (t) EM (c)     Cardiopulmonary PT LTG, Time to Achieve  1 wk  -EM (r) MF (t) EM (c)     Cardiopulmonary PT LTG, Level  Level III  -EM (r) MF (t) EM (c)       User Key  (r) = Recorded By, (t) = Taken By, (c) = Cosigned By    Initials Name Provider Type    EM Carol Munguia, PT Physical Therapist    ROEL Gutierrez, PT Student PT Student          Physical Therapy Education     Title: PT OT SLP Therapies (Active)     Topic: Physical Therapy (Active)     Point: Mobility training (Active)    Learning Progress Summary    Learner Readiness Method Response Comment Documented by Status   Patient Acceptance E NR Educated pt on benefits of participating in PT. Daughter walked in room midway through session. Educated daughter on cardiac protocol exercises. SA 10/14/17 1002 Active    Acceptance SRIDEVI ANGEL DR 10/14/17 0832 Done    Nonacceptance E IVELISSE SEVILLA 10/13/17 0857 Active    Eager E,TB JUAN ANTONIO ANGEL Encouraged pt to ambulate in hallways with staff or family at least tid to improve activity  tolerance.  Discussed use of AD but pt feel that that would be a step backward and wpould prefer to go without 9we will revisit this after surgery).  10/08/17 1120 Done   Family Acceptance E NR Educated pt on benefits of participating in PT. Daughter walked in room midway through session. Educated daughter on cardiac protocol exercises.  10/14/17 1002 Active               Point: Home exercise program (Active)    Learning Progress Summary    Learner Readiness Method Response Comment Documented by Status   Patient Acceptance E NR Educated pt on benefits of participating in PT. Daughter walked in room midway through session. Educated daughter on cardiac protocol exercises.  10/14/17 1002 Active    Acceptance E STANLEY LAMAS 10/14/17 0832 Done    Nonacceptance E NR   10/13/17 0857 Active   Family Acceptance E NR Educated pt on benefits of participating in PT. Daughter walked in room midway through session. Educated daughter on cardiac protocol exercises.  10/14/17 1002 Active               Point: Body mechanics (Active)    Learning Progress Summary    Learner Readiness Method Response Comment Documented by Status   Patient Acceptance E NR Educated pt on benefits of participating in PT. Daughter walked in room midway through session. Educated daughter on cardiac protocol exercises.  10/14/17 1002 Active    Acceptance E STANLEY LAMAS 10/14/17 0832 Done    Nonacceptance E NR   10/13/17 0857 Active   Family Acceptance E NR Educated pt on benefits of participating in PT. Daughter walked in room midway through session. Educated daughter on cardiac protocol exercises.  10/14/17 1002 Active               Point: Precautions (Active)    Learning Progress Summary    Learner Readiness Method Response Comment Documented by Status   Patient Acceptance E NR Educated pt on benefits of participating in PT. Daughter walked in room midway through session. Educated daughter on cardiac protocol exercises.  10/14/17 1002 Active     Acceptance E VU   10/14/17 0832 Done    Nonacceptance E NR   10/13/17 0857 Active    Eager E,TB VU,DU Encouraged pt to ambulate in hallways with staff or family at least tid to improve activity tolerance.  Discussed use of AD but pt feel that that would be a step backward and wpould prefer to go without 9we will revisit this after surgery).  10/08/17 1120 Done   Family Acceptance E NR Educated pt on benefits of participating in PT. Daughter walked in room midway through session. Educated daughter on cardiac protocol exercises.  10/14/17 1002 Active                      User Key     Initials Effective Dates Name Provider Type Discipline     12/01/15 -  Faiza Brady, PT Physical Therapist PT     06/16/16 -  Jordana Umanzor, RN Registered Nurse Nurse     08/03/17 -  Kelin Jung, PT Physical Therapist PT     09/18/17 -  Atilio Gutierrez, PT Student PT Student PT                    PT Recommendation and Plan  Anticipated Discharge Disposition: skilled nursing facility  Planned Therapy Interventions: balance training, bed mobility training, gait training, strengthening, transfer training, home exercise program  PT Frequency: daily  Plan of Care Review  Plan Of Care Reviewed With: patient  Outcome Summary/Follow up Plan: Pt was lethargic during treatment session. Requiring multiple cues to complete exercises and more assist for sit to stand transfers. Reading through the treatment session, pt's daughter came in the room and stated that nursing had given the pt stronger pain medication, and she is probably still groggy from that. Daughter was very encouraging to the pt to finish her exercises. Pt will continue to benefit from skilled PT interventions.           Outcome Measures       10/14/17 1000 10/13/17 0905       How much help from another person do you currently need...    Turning from your back to your side while in flat bed without using bedrails? 2  -SA 2  -EM (r) MF (t) EM (c)     Moving from  lying on back to sitting on the side of a flat bed without bedrails? 2  -SA 2  -EM (r) MF (t) EM (c)     Moving to and from a bed to a chair (including a wheelchair)? 2  -SA 2  -EM (r) MF (t) EM (c)     Standing up from a chair using your arms (e.g., wheelchair, bedside chair)? 2  -SA 2  -EM (r) MF (t) EM (c)     Climbing 3-5 steps with a railing? 1  -SA 1  -EM (r) MF (t) EM (c)     To walk in hospital room? 1  -SA 1  -EM (r) MF (t) EM (c)     AM-PAC 6 Clicks Score 10  -SA 10  -EM (r) MF (t)     Functional Assessment    Outcome Measure Options AM-PAC 6 Clicks Basic Mobility (PT)  -SA AM-PAC 6 Clicks Basic Mobility (PT)  -EM (r) MF (t) EM (c)       User Key  (r) = Recorded By, (t) = Taken By, (c) = Cosigned By    Initials Name Provider Type    RC Munguia, PT Physical Therapist    SA Kelin Jung, PT Physical Therapist    ROEL Gutierrez, PT Student PT Student           Time Calculation:         PT Charges       10/14/17 1006          Time Calculation    Start Time 0930  -SA      Stop Time 0955  -SA      Time Calculation (min) 25 min  -      PT Received On 10/14/17  -SA      PT - Next Appointment 10/15/17  -SA        User Key  (r) = Recorded By, (t) = Taken By, (c) = Cosigned By    Initials Name Provider Type     Kelin Jung PT Physical Therapist          Therapy Charges for Today     Code Description Service Date Service Provider Modifiers Qty    40199171351 HC PT THER PROC EA 15 MIN 10/14/2017 Kelin Jung PT GP 2    84164928823 HC PT THER SUPP EA 15 MIN 10/14/2017 Kelin Jung PT GP 1          PT G-Codes  PT Professional Judgement Used?: Yes  Outcome Measure Options: AM-PAC 6 Clicks Basic Mobility (PT)  Score: 23  Functional Limitation: Mobility: Walking and moving around  Mobility: Walking and Moving Around Current Status (): At least 1 percent but less than 20 percent impaired, limited or restricted  Mobility: Walking and Moving Around Goal Status (): At least 1 percent but less  than 20 percent impaired, limited or restricted    Kelin Jung, PT  10/14/2017

## 2017-10-14 NOTE — PLAN OF CARE
Problem: Patient Care Overview (Adult)  Goal: Plan of Care Review    10/14/17 1003   Coping/Psychosocial Response Interventions   Plan Of Care Reviewed With patient   Outcome Evaluation   Outcome Summary/Follow up Plan Pt was lethargic during treatment session. Requiring multiple cues to complete exercises and more assist for sit to stand transfers. [place] through the treatment session, pt's daughter came in the room and stated that nursing had given the pt stronger pain medication, and she is probably still groggy from that. Daughter was very encouraging to the pt to finish her exercises. Pt will continue to benefit from skilled PT interventions.

## 2017-10-15 ENCOUNTER — APPOINTMENT (OUTPATIENT)
Dept: CT IMAGING | Facility: HOSPITAL | Age: 67
End: 2017-10-15

## 2017-10-15 ENCOUNTER — APPOINTMENT (OUTPATIENT)
Dept: GENERAL RADIOLOGY | Facility: HOSPITAL | Age: 67
End: 2017-10-15

## 2017-10-15 LAB
ABO + RH BLD: NORMAL
ABO + RH BLD: NORMAL
ANION GAP SERPL CALCULATED.3IONS-SCNC: 12 MMOL/L
ARTERIAL PATENCY WRIST A: ABNORMAL
ATMOSPHERIC PRESS: 753.2 MMHG
BASE EXCESS BLDA CALC-SCNC: 6.9 MMOL/L (ref 0–2)
BDY SITE: ABNORMAL
BH BB BLOOD EXPIRATION DATE: NORMAL
BH BB BLOOD EXPIRATION DATE: NORMAL
BH BB BLOOD TYPE BARCODE: 5100
BH BB BLOOD TYPE BARCODE: 5100
BH BB DISPENSE STATUS: NORMAL
BH BB DISPENSE STATUS: NORMAL
BH BB PRODUCT CODE: NORMAL
BH BB PRODUCT CODE: NORMAL
BH BB UNIT NUMBER: NORMAL
BH BB UNIT NUMBER: NORMAL
BUN BLD-MCNC: 27 MG/DL (ref 8–23)
BUN/CREAT SERPL: 37 (ref 7–25)
CALCIUM SPEC-SCNC: 10.2 MG/DL (ref 8.6–10.5)
CHLORIDE SERPL-SCNC: 102 MMOL/L (ref 98–107)
CO2 SERPL-SCNC: 28 MMOL/L (ref 22–29)
CREAT BLD-MCNC: 0.73 MG/DL (ref 0.57–1)
DEPRECATED RDW RBC AUTO: 45.7 FL (ref 37–54)
ERYTHROCYTE [DISTWIDTH] IN BLOOD BY AUTOMATED COUNT: 13.5 % (ref 11.7–13)
GAS FLOW AIRWAY: 1 LPM
GFR SERPL CREATININE-BSD FRML MDRD: 80 ML/MIN/1.73
GLUCOSE BLD-MCNC: 117 MG/DL (ref 65–99)
GLUCOSE BLDC GLUCOMTR-MCNC: 113 MG/DL (ref 70–130)
GLUCOSE BLDC GLUCOMTR-MCNC: 117 MG/DL (ref 70–130)
GLUCOSE BLDC GLUCOMTR-MCNC: 145 MG/DL (ref 70–130)
HCO3 BLDA-SCNC: 30.6 MMOL/L (ref 22–28)
HCT VFR BLD AUTO: 26.5 % (ref 35.6–45.5)
HGB BLD-MCNC: 8.3 G/DL (ref 11.9–15.5)
MCH RBC QN AUTO: 29.5 PG (ref 26.9–32)
MCHC RBC AUTO-ENTMCNC: 31.3 G/DL (ref 32.4–36.3)
MCV RBC AUTO: 94.3 FL (ref 80.5–98.2)
MODALITY: ABNORMAL
PCO2 BLDA: 39.1 MM HG (ref 35–45)
PH BLDA: 7.5 PH UNITS (ref 7.35–7.45)
PLATELET # BLD AUTO: 185 10*3/MM3 (ref 140–500)
PMV BLD AUTO: 9.8 FL (ref 6–12)
PO2 BLDA: 62.2 MM HG (ref 80–100)
POTASSIUM BLD-SCNC: 3.6 MMOL/L (ref 3.5–5.2)
RBC # BLD AUTO: 2.81 10*6/MM3 (ref 3.9–5.2)
SAO2 % BLDCOA: 93.4 % (ref 92–99)
SET MECH RESP RATE: 20
SODIUM BLD-SCNC: 142 MMOL/L (ref 136–145)
TOTAL RATE: 20 BREATHS/MINUTE
UNIT  ABO: NORMAL
UNIT  ABO: NORMAL
UNIT  RH: NORMAL
UNIT  RH: NORMAL
WBC NRBC COR # BLD: 14.92 10*3/MM3 (ref 4.5–10.7)

## 2017-10-15 PROCEDURE — 36600 WITHDRAWAL OF ARTERIAL BLOOD: CPT

## 2017-10-15 PROCEDURE — 82803 BLOOD GASES ANY COMBINATION: CPT

## 2017-10-15 PROCEDURE — 71020 HC CHEST PA AND LATERAL: CPT

## 2017-10-15 PROCEDURE — 80048 BASIC METABOLIC PNL TOTAL CA: CPT | Performed by: THORACIC SURGERY (CARDIOTHORACIC VASCULAR SURGERY)

## 2017-10-15 PROCEDURE — 25010000002 ONDANSETRON PER 1 MG: Performed by: THORACIC SURGERY (CARDIOTHORACIC VASCULAR SURGERY)

## 2017-10-15 PROCEDURE — 85027 COMPLETE CBC AUTOMATED: CPT | Performed by: THORACIC SURGERY (CARDIOTHORACIC VASCULAR SURGERY)

## 2017-10-15 PROCEDURE — 25010000002 NALOXONE PER 1 MG: Performed by: THORACIC SURGERY (CARDIOTHORACIC VASCULAR SURGERY)

## 2017-10-15 PROCEDURE — 82962 GLUCOSE BLOOD TEST: CPT

## 2017-10-15 PROCEDURE — 94799 UNLISTED PULMONARY SVC/PX: CPT

## 2017-10-15 PROCEDURE — 97110 THERAPEUTIC EXERCISES: CPT

## 2017-10-15 PROCEDURE — 25010000002 ENOXAPARIN PER 10 MG: Performed by: THORACIC SURGERY (CARDIOTHORACIC VASCULAR SURGERY)

## 2017-10-15 RX ORDER — ASPIRIN 81 MG/1
81 TABLET ORAL DAILY
Status: DISCONTINUED | OUTPATIENT
Start: 2017-10-16 | End: 2017-10-17

## 2017-10-15 RX ORDER — NALOXONE HYDROCHLORIDE 1 MG/ML
0.4 INJECTION INTRAMUSCULAR; INTRAVENOUS; SUBCUTANEOUS
Status: DISPENSED | OUTPATIENT
Start: 2017-10-15 | End: 2017-10-15

## 2017-10-15 RX ADMIN — TRAMADOL HYDROCHLORIDE 50 MG: 50 TABLET, FILM COATED ORAL at 00:49

## 2017-10-15 RX ADMIN — ALPRAZOLAM 0.25 MG: 0.25 TABLET ORAL at 04:59

## 2017-10-15 RX ADMIN — CYANOCOBALAMIN TAB 500 MCG 1000 MCG: 500 TAB at 09:55

## 2017-10-15 RX ADMIN — METOPROLOL TARTRATE 37.5 MG: 25 TABLET ORAL at 09:56

## 2017-10-15 RX ADMIN — ONDANSETRON 4 MG: 2 INJECTION INTRAMUSCULAR; INTRAVENOUS at 05:05

## 2017-10-15 RX ADMIN — PANTOPRAZOLE SODIUM 40 MG: 40 TABLET, DELAYED RELEASE ORAL at 05:02

## 2017-10-15 RX ADMIN — TRAMADOL HYDROCHLORIDE 50 MG: 50 TABLET, FILM COATED ORAL at 22:59

## 2017-10-15 RX ADMIN — TICAGRELOR 60 MG: 60 TABLET ORAL at 17:29

## 2017-10-15 RX ADMIN — ENOXAPARIN SODIUM 40 MG: 40 INJECTION SUBCUTANEOUS at 17:29

## 2017-10-15 RX ADMIN — NALOXONE HYDROCHLORIDE 0.4 MG: 0.4 INJECTION, SOLUTION INTRAMUSCULAR; INTRAVENOUS; SUBCUTANEOUS at 18:35

## 2017-10-15 RX ADMIN — FUROSEMIDE 40 MG: 40 TABLET ORAL at 09:56

## 2017-10-15 RX ADMIN — POTASSIUM CHLORIDE 20 MEQ: 750 CAPSULE, EXTENDED RELEASE ORAL at 09:56

## 2017-10-15 RX ADMIN — ASPIRIN 325 MG: 325 TABLET, DELAYED RELEASE ORAL at 09:56

## 2017-10-15 RX ADMIN — METOPROLOL TARTRATE 37.5 MG: 25 TABLET ORAL at 22:59

## 2017-10-15 RX ADMIN — MUPIROCIN 1 APPLICATION: 20 OINTMENT TOPICAL at 09:56

## 2017-10-15 RX ADMIN — NALOXONE HYDROCHLORIDE 0.4 MG: 0.4 INJECTION, SOLUTION INTRAMUSCULAR; INTRAVENOUS; SUBCUTANEOUS at 18:36

## 2017-10-15 NOTE — THERAPY TREATMENT NOTE
Acute Care - Physical Therapy Treatment Note  [facility]     Patient Name: Marion Anderson  : 1950  MRN: [id number]  Today's Date: 10/15/2017  Onset of Illness/Injury or Date of Surgery Date: 10/12/17  Date of Referral to PT: 10/08/17  Referring Physician: Wanda    Admit Date: 10/6/2017    Visit Dx:    ICD-10-CM ICD-9-CM   1. Physical deconditioning R53.81 799.3   2. Coronary artery disease of native heart with stable angina pectoris, unspecified vessel or lesion type I25.118 414.01     413.9   3. Generalized weakness R53.1 780.79     Patient Active Problem List   Diagnosis   • Coronary artery disease of native heart with stable angina pectoris   • CAD (coronary artery disease)               Adult Rehabilitation Note       10/15/17 1012 10/14/17 0957       Rehab Assessment/Intervention    Discipline physical therapist  -SA physical therapist  -SA     Document Type therapy note (daily note)  -SA therapy note (daily note)  -SA     Subjective Information agree to therapy;complains of;pain  -SA agree to therapy;complains of;pain  -SA     Patient Effort, Rehab Treatment  adequate  -SA     Precautions/Limitations  cardiac precautions;fall precautions  -SA     Recorded by [SA] Kelin Jung, PT [SA] Kelin Jung, PT     Pain Assessment    Pain Assessment Wilhelm-Linares FACES  -SA Wilhelm-Baker FACES  -SA     Wilhelm-Linares FACES Pain Rating 4  -SA 4  -SA     Pain Type  Surgical pain  -SA     Pain Intervention(s) Repositioned;Ambulation/increased activity;Rest  -SA      Response to Interventions tolerated  -SA      Recorded by [SA] Kelin Jung, PT [SA] Kelin Jung, PT     Cognitive Assessment/Intervention    Current Cognitive/Communication Assessment functional  -SA impaired  -SA     Orientation Status oriented to;person;place;time  -SA oriented to;person;place  -SA     Follows Commands/Answers Questions needs repetition;needs increased time  -SA needs increased time;needs repetition  -SA     Personal Safety decreased  awareness, need for safety  -SA decreased awareness, need for safety  -SA     Personal Safety Interventions fall prevention program maintained;nonskid shoes/slippers when out of bed  -SA fall prevention program maintained  -SA     Recorded by [SA] Kelin Jung PT [SA] Kelin Jugn PT     Bed Mobility, Assessment/Treatment    Bed Mobility, Roll Right, [place] dependent (less than 25% patient effort);2 person assist required  -SA      Bed Mob, Supine to Sit, [place] dependent (less than 25% patient effort);2 person assist required  -SA      Bed Mobility, Comment  up in chair  -SA     Recorded by [SA] Kelin Jung PT [SA] Kelin Jung PT     Transfer Assessment/Treatment    Transfers, Sit-Stand [place] moderate assist (50% patient effort);2 person assist required  -SA maximum assist (25% patient effort);2 person assist required  -SA     Transfers, Stand-Sit [place] moderate assist (50% patient effort);2 person assist required  -SA maximum assist (25% patient effort);2 person assist required  -SA     Transfer, Comment sit<>stand transfer completed 3x  -SA sit<>stand transfer completed 2x  -SA     Recorded by [SA] Kelin Jung PT [SA] Kelin Jung PT     Gait Assessment/Treatment    Gait, [place] Level moderate assist (50% patient effort);maximum assist (25% patient effort);2 person assist required  -SA not appropriate to assess  -SA     Gait, Distance (Feet) 2  -SA      Gait, Gait Deviations bilateral:;shane decreased;decreased heel strike;narrow base;weight-shifting ability decreased   shuffling  -SA      Recorded by [SA] Kelin Jung PT [SA] Kelin Jung PT     Motor Skills/Interventions    Additional Documentation Balance Skills Training (Group)  -SA Balance Skills Training (Group)  -SA     Recorded by [SA] Kelin Jung PT [SA] Kelin Jung PT     Balance Skills Training    Sitting-Level of Assistance Minimum assistance;Moderate assistance  -SA Minimum assistance;Moderate  assistance  -SA     Sitting-Balance Support Feet supported  - Feet supported  -     Sitting-Balance Activities --   orienting to midline  -SA      Sitting # of Minutes 2  -SA      Standing-Level of Assistance  Moderate assistance;x2  -SA     Static Standing Balance Support  Right upper extremity supported;Left upper extremity supported  -SA     Recorded by [SA] Kelin Jung PT [SA] Kelin Jung PT     Therapy Exercises    Bilateral Lower Extremities AAROM:;5 reps;ankle pumps/circles;LAQ  -SA      Exercise Protocols --   cardiac protocol, 5 reps  -SA --   5 reps cardiac protocol  -SA     Recorded by [SA] Kelin Jung PT [SA] Kelin Jung PT     Positioning and Restraints    Pre-Treatment Position sitting in chair/recliner  -SA sitting in chair/recliner  -SA     Post Treatment Position chair  -SA chair  -SA     In Chair notified nsg;reclined;call light within reach;encouraged to call for assist;with family/caregiver   HALEIGH Silveira notified  -SA reclined;call light within reach;encouraged to call for assist;with family/caregiver;RUE elevated;LUE elevated;notified nsg  -SA     Recorded by [SA] Kelin Jung PT [SA] Kelin Jung PT       User Key  (r) = Recorded By, (t) = Taken By, (c) = Cosigned By    Initials Name Effective Dates     Kelin Jung PT 08/03/17 -                 IP PT Goals       10/13/17 0859 10/13/17 0857       Bed Mobility PT LTG    Bed Mobility PT LTG, Date Established 10/13/17  -EM (r) MF (t) EM (c)      Bed Mobility PT LTG, Time to Achieve 1 wk  -EM (r) MF (t) EM (c)      Bed Mobility PT LTG, Activity Type all bed mobility  -EM (r) MF (t) EM (c)      Bed Mobility PT LTG, [place] Level supervision required  -EM (r) MF (t) EM (c)      Transfer Training 2 PT LTG    Transfer Training PT 2 LTG, Date Established 10/13/17  -EM (r) MF (t) EM (c)      Transfer Training PT 2 LTG, Time to Achieve 1 wk  -EM (r) MF (t) EM (c)      Transfer Training PT 2 LTG, Activity Type bed to chair /chair to  bed;sit to stand/stand to sit  -EM (r) MF (t) EM (c)      Transfer Training PT 2 LTG, [place] Level minimum assist (75% patient effort)  -EM (r) MF (t) EM (c)      Transfer Training PT 2 LTG, Assist Device walker, rolling  -EM (r) MF (t) EM (c)      Gait Training PT LTG    Gait Training Goal PT LTG, Date Established 10/13/17  -EM (r) MF (t) EM (c)      Gait Training Goal PT LTG, Time to Achieve 1 wk  -EM (r) MF (t) EM (c)      Gait Training Goal PT LTG, [place] Level minimum assist (75% patient effort);2 person assist required  -EM (r) MF (t) EM (c)      Gait Training Goal PT LTG, Assist Device walker, rolling  -EM (r) MF (t) EM (c)      Gait Training Goal PT LTG, Distance to Achieve 20 ft  -EM (r) MF (t) EM (c)      Cardiopulmonary PT LTG    Cardiopulmonary PT LTG, Date Established  10/13/17  -EM (r) MF (t) EM (c)     Cardiopulmonary PT LTG, Time to Achieve  1 wk  -EM (r) MF (t) EM (c)     Cardiopulmonary PT LTG, Level  Level III  -EM (r) MF (t) EM (c)       User Key  (r) = Recorded By, (t) = Taken By, (c) = Cosigned By    Initials Name Provider Type    EM Carol Munguia, PT Physical Therapist    MF Atilio Gutierrez, PT Student PT Student          Physical Therapy Education     Title: PT OT SLP Therapies (Active)     Topic: Physical Therapy (Active)     Point: Mobility training (Active)    Learning Progress Summary    Learner Readiness Method Response Comment Documented by Status   Patient Acceptance E NR   10/15/17 1027 Active    Acceptance E NR Educated pt on benefits of participating in PT. Daughter walked in room midway through session. Educated daughter on cardiac protocol exercises.  10/14/17 1002 Active    Acceptance E STANLEY LAMAS 10/14/17 0832 Done    Nonacceptance E NR   10/13/17 0857 Active    Eager E,TB VU,DU Encouraged pt to ambulate in hallways with staff or family at least tid to improve activity tolerance.  Discussed use of AD but pt feel that that would be a step backward and wpould  prefer to go without 9we will revisit this after surgery).  10/08/17 1120 Done   Family Acceptance E NR Educated pt on benefits of participating in PT. Daughter walked in room midway through session. Educated daughter on cardiac protocol exercises.  10/14/17 1002 Active               Point: Home exercise program (Active)    Learning Progress Summary    Learner Readiness Method Response Comment Documented by Status   Patient Acceptance E NR   10/15/17 1027 Active    Acceptance E NR Educated pt on benefits of participating in PT. Daughter walked in room midway through session. Educated daughter on cardiac protocol exercises.  10/14/17 1002 Active    Acceptance E STANLEY LAMAS 10/14/17 0832 Done    Nonacceptance E NR   10/13/17 0857 Active   Family Acceptance E NR Educated pt on benefits of participating in PT. Daughter walked in room midway through session. Educated daughter on cardiac protocol exercises.  10/14/17 1002 Active               Point: Body mechanics (Active)    Learning Progress Summary    Learner Readiness Method Response Comment Documented by Status   Patient Acceptance E NR   10/15/17 1027 Active    Acceptance E NR Educated pt on benefits of participating in PT. Daughter walked in room midway through session. Educated daughter on cardiac protocol exercises.  10/14/17 1002 Active    Acceptance E STANLEY LAMAS 10/14/17 0832 Done    Nonacceptance E NR   10/13/17 0857 Active   Family Acceptance E NR Educated pt on benefits of participating in PT. Daughter walked in room midway through session. Educated daughter on cardiac protocol exercises.  10/14/17 1002 Active               Point: Precautions (Active)    Learning Progress Summary    Learner Readiness Method Response Comment Documented by Status   Patient Acceptance E NR   10/15/17 1027 Active    Acceptance E NR Educated pt on benefits of participating in PT. Daughter walked in room midway through session. Educated daughter on cardiac protocol  exercises.  10/14/17 1002 Active    Acceptance E VU   10/14/17 0832 Done    Nonacceptance E NR   10/13/17 0857 Active    Eager E,TB VU,DU Encouraged pt to ambulate in hallways with staff or family at least tid to improve activity tolerance.  Discussed use of AD but pt feel that that would be a step backward and wpould prefer to go without 9we will revisit this after surgery).  10/08/17 1120 Done   Family Acceptance E NR Educated pt on benefits of participating in PT. Daughter walked in room midway through session. Educated daughter on cardiac protocol exercises.  10/14/17 1002 Active                      User Key     Initials Effective Dates Name Provider Type Discipline     12/01/15 -  Faiza Brady, PT Physical Therapist PT     06/16/16 -  Jordana Umanzor, RN Registered Nurse Nurse     08/03/17 -  Kelin Jung, PT Physical Therapist PT     09/18/17 -  Atilio Gutierrez, PT Student PT Student PT                    PT Recommendation and Plan  Anticipated Discharge Disposition: skilled nursing facility  Planned Therapy Interventions: balance training, bed mobility training, gait training, strengthening, transfer training, home exercise program  PT Frequency: daily  Plan of Care Review  Plan Of Care Reviewed With: patient  Progress: progress toward functional goals is gradual  Outcome Summary/Follow up Plan: Pt drowsy today. Able to complete sit<>stand transfers from bed with decreased assist compared to yesterday. Dependent for bed mobility. Requierd assist of two to take a few steps towards the chair. Pt will continue to benefit from skilled PT interventions.           Outcome Measures       10/15/17 1000 10/14/17 1000 10/13/17 0905    How much help from another person do you currently need...    Turning from your back to your side while in flat bed without using bedrails? 1  -SA 2  -SA 2  -EM (r) MF (t) EM (c)    Moving from lying on back to sitting on the side of a flat bed without bedrails? 1   -SA 2  -SA 2  -EM (r) MF (t) EM (c)    Moving to and from a bed to a chair (including a wheelchair)? 2  -SA 2  -SA 2  -EM (r) MF (t) EM (c)    Standing up from a chair using your arms (e.g., wheelchair, bedside chair)? 2  -SA 2  -SA 2  -EM (r) MF (t) EM (c)    Climbing 3-5 steps with a railing? 1  -SA 1  -SA 1  -EM (r) MF (t) EM (c)    To walk in hospital room? 1  -SA 1  -SA 1  -EM (r) MF (t) EM (c)    AM-PAC 6 Clicks Score 8  -SA 10  -SA 10  -EM (r) MF (t)    Functional Assessment    Outcome Measure Options AM-PAC 6 Clicks Basic Mobility (PT)  -SA AM-PAC 6 Clicks Basic Mobility (PT)  -SA AM-PAC 6 Clicks Basic Mobility (PT)  -EM (r) MF (t) EM (c)      User Key  (r) = Recorded By, (t) = Taken By, (c) = Cosigned By    Initials Name Provider Type    EM Carol Munguia, PT Physical Therapist    SA Kelin Jung PT Physical Therapist    ROEL Gutierrez, PT Student PT Student           Time Calculation:         PT Charges       10/15/17 1032 10/15/17 0929       Time Calculation    Start Time 0959  -      Stop Time 1012  -      Time Calculation (min) 13 min  -      PT Received On 10/15/17  -      PT - Next Appointment 10/16/17  - 10/16/17  -SA       User Key  (r) = Recorded By, (t) = Taken By, (c) = Cosigned By    Initials Name Provider Type     Kelin Jung PT Physical Therapist          Therapy Charges for Today     Code Description Service Date Service Provider Modifiers Qty    09957946663 HC PT THER PROC EA 15 MIN 10/14/2017 Kelin Jung PT GP 2    30411360005 HC PT THER SUPP EA 15 MIN 10/14/2017 Kelin Jung PT GP 1    36679896983 HC PT THER PROC EA 15 MIN 10/15/2017 Kelin Jung PT GP 1    66182601202 HC PT THER SUPP EA 15 MIN 10/15/2017 Kelin Jung PT GP 1          PT G-Codes  PT Professional Judgement Used?: Yes  Outcome Measure Options: AM-PAC 6 Clicks Basic Mobility (PT)  Score: 23  Functional Limitation: Mobility: Walking and moving around  Mobility: Walking and Moving Around Current  Status (): At least 1 percent but less than 20 percent impaired, limited or restricted  Mobility: Walking and Moving Around Goal Status (): At least 1 percent but less than 20 percent impaired, limited or restricted    Kelin Jung, PT  10/15/2017

## 2017-10-15 NOTE — CODE DOCUMENTATION
Patient was speaking appropriately the past couple of days and up with assist. We will call Dr Parekh for his input.

## 2017-10-15 NOTE — CODE DOCUMENTATION
"Narcan administered. Patient just repeats \"Oh God.\" still not answering questions appropriately.  "

## 2017-10-15 NOTE — PLAN OF CARE
Problem: Patient Care Overview (Adult)  Goal: Plan of Care Review    10/15/17 1028   Coping/Psychosocial Response Interventions   Plan Of Care Reviewed With patient   Patient Care Overview   Progress progress towards functional goals is fair   Outcome Evaluation   Outcome Summary/Follow up Plan Pt drowsy today. Able to complete sit<>stand transfers from bed with decreased assist compared to yesterday. Dependent for bed mobility. Requierd assist of two to take a few steps towards the chair. Pt will continue to benefit from skilled PT interventions.

## 2017-10-15 NOTE — PLAN OF CARE
Problem: Patient Care Overview (Adult)  Goal: Plan of Care Review  Outcome: Ongoing (interventions implemented as appropriate)    10/15/17 0408   Coping/Psychosocial Response Interventions   Plan Of Care Reviewed With patient   Patient Care Overview   Progress progress towards functional goals is fair   Outcome Evaluation   Outcome Summary/Follow up Plan Patient remains rowsy at times. Very reluctant to taker her medication by mouth. Pain well controlled at this time. Vitals are stable. Will continue to monitor.        Goal: Adult Individualization and Mutuality  Outcome: Ongoing (interventions implemented as appropriate)  Goal: Discharge Needs Assessment  Outcome: Ongoing (interventions implemented as appropriate)    Problem: Acute Coronary Syndrome (ACS) (Adult)  Goal: Signs and Symptoms of Listed Potential Problems Will be Absent or Manageable (Acute Coronary Syndrome)  Outcome: Ongoing (interventions implemented as appropriate)    Problem: Pain, Acute (Adult)  Goal: Identify Related Risk Factors and Signs and Symptoms  Outcome: Ongoing (interventions implemented as appropriate)  Goal: Acceptable Pain Control/Comfort Level  Outcome: Ongoing (interventions implemented as appropriate)    Problem: Perioperative Period (Adult)  Goal: Signs and Symptoms of Listed Potential Problems Will be Absent or Manageable (Perioperative Period)  Outcome: Ongoing (interventions implemented as appropriate)    Problem: Cardiac Surgery (Adult)  Goal: Signs and Symptoms of Listed Potential Problems Will be Absent or Manageable (Cardiac Surgery)  Outcome: Ongoing (interventions implemented as appropriate)

## 2017-10-15 NOTE — SIGNIFICANT NOTE
10/15/17 0929   Rehab Treatment   Discipline physical therapist   Treatment Not Performed patient unavailable for treatment  (Transport in room taking pt to x-ray. PT will check back today if time allows, if not PT will check back tomorrow. )   Recommendation   PT - Next Appointment 10/16/17

## 2017-10-15 NOTE — CODE DOCUMENTATION
Spoke with Dr Parekh. He is aware of no narcotics, still wanted naloxone ordered 0.4 mg times two. Wanted an abg since patient's O2 was disconnected.

## 2017-10-15 NOTE — CODE DOCUMENTATION
We will leave patient on the unit and she will go for a head ct in the am. Dr Parekh is aware of conditions.

## 2017-10-15 NOTE — PLAN OF CARE
Patient examined by the bedside.    Still has some back pain otherwise stable follow simple commands.    Left-sided weakness as before but slightly better especially in the upper extremity.    Assessment and plan:       This is a 67 y.o. female who has    Medical History         Past Medical History:   Diagnosis Date   • Arthritis     • Coronary artery disease     • Disease of thyroid gland     • Hypertension     • PONV (postoperative nausea and vomiting)     • Spinal headache         presents with Left-sided weakness.  CT head done today does not show any acute infarct.  But it does show a remote infarct in the right high convexity.     1.  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  - Will benefit with rehabilitation placement.     2.  Chronic lower back pain:  - Tramadol and Tylenol    3.  Mild B12 deficiency: Replaced.     We will sign off please call us back with questions

## 2017-10-15 NOTE — PROGRESS NOTES
" LOS: 8 days   Patient Care Team:  Narciso Camargo MD as PCP - General (Family Medicine)    Chief Complaint: post op f/u    Subjective  Family at bedside, repeated words, left upper extremity weak.  Oriented to person, place, time and situation.    Vital Signs  Temp:  [98.2 °F (36.8 °C)-98.7 °F (37.1 °C)] 98.2 °F (36.8 °C)  Heart Rate:  [102-117] 102  Resp:  [16-20] 16  BP: (108-128)/(50-73) 114/50  Body mass index is 37.69 kg/(m^2).    Intake/Output Summary (Last 24 hours) at 10/15/17 0743  Last data filed at 10/14/17 2100   Gross per 24 hour   Intake              220 ml   Output              700 ml   Net             -480 ml            Last 3 weights    10/07/17  0019 10/12/17  0948 10/14/17  0609   Weight: 216 lb 5 oz (98.1 kg) 216 lb (98 kg) 226 lb 8 oz (103 kg)         Objective:  General Appearance:  Comfortable and in no acute distress.    Vital signs: (most recent): Blood pressure 123/72, pulse 107, temperature 98.1 °F (36.7 °C), temperature source Oral, resp. rate 16, height 65\" (165.1 cm), weight 226 lb 8 oz (103 kg), SpO2 97 %.  Vital signs are normal.  No fever.    Output: Producing urine and no stool output.    HEENT: Normal HEENT exam.    Lungs:  Normal respiratory rate and normal effort.  Breath sounds clear to auscultation.    Heart: Tachycardia.  Regular rhythm.  S1 normal and S2 normal.    Chest: (Sternotomy CDI, stable)  Abdomen: Abdomen is soft.  Hypoactive bowel sounds.   There is no abdominal tenderness.     Extremities: Normal range of motion.    Pulses: Distal pulses are intact.    Neurological: Patient is alert and oriented to person, place and time.    Pupils:  Pupils are equal, round, and reactive to light.    Skin:  Warm and dry.              Results Review:        WBC WBC   Date Value Ref Range Status   10/15/2017 14.92 (H) 4.50 - 10.70 10*3/mm3 Final   10/14/2017 15.23 (H) 4.50 - 10.70 10*3/mm3 Final   10/13/2017 18.85 (H) 4.50 - 10.70 10*3/mm3 Final   10/12/2017 19.17 (H) 4.50 - " 10.70 10*3/mm3 Final   10/12/2017 19.06 (H) 4.50 - 10.70 10*3/mm3 Final   10/12/2017 8.27 4.50 - 10.70 10*3/mm3 Final      HGB Hemoglobin   Date Value Ref Range Status   10/15/2017 8.3 (L) 11.9 - 15.5 g/dL Final   10/14/2017 8.6 (L) 11.9 - 15.5 g/dL Final   10/13/2017 9.7 (L) 11.9 - 15.5 g/dL Final   10/12/2017 10.6 (L) 11.9 - 15.5 g/dL Final   10/12/2017 11.0 (L) 11.9 - 15.5 g/dL Final   10/12/2017 8.5 (L) 12.0 - 17.0 g/dL Final   10/12/2017 9.2 (L) 12.0 - 17.0 g/dL Final   10/12/2017 8.5 (L) 12.0 - 17.0 g/dL Final   10/12/2017 8.8 (L) 12.0 - 17.0 g/dL Final   10/12/2017 8.8 (L) 12.0 - 17.0 g/dL Final   10/12/2017 11.6 (L) 12.0 - 17.0 g/dL Final   10/12/2017 12.4 11.9 - 15.5 g/dL Final      HCT Hematocrit   Date Value Ref Range Status   10/15/2017 26.5 (L) 35.6 - 45.5 % Final   10/14/2017 26.9 (L) 35.6 - 45.5 % Final   10/13/2017 29.2 (L) 35.6 - 45.5 % Final   10/12/2017 31.3 (L) 35.6 - 45.5 % Final   10/12/2017 32.3 (L) 35.6 - 45.5 % Final   10/12/2017 25 (L) 38 - 51 % Final   10/12/2017 27 (L) 38 - 51 % Final   10/12/2017 25 (L) 38 - 51 % Final   10/12/2017 26 (L) 38 - 51 % Final   10/12/2017 26 (L) 38 - 51 % Final   10/12/2017 34 (L) 38 - 51 % Final   10/12/2017 37.6 35.6 - 45.5 % Final      Platelets Platelets   Date Value Ref Range Status   10/15/2017 185 140 - 500 10*3/mm3 Final   10/14/2017 160 140 - 500 10*3/mm3 Final   10/13/2017 204 140 - 500 10*3/mm3 Final   10/12/2017 213 140 - 500 10*3/mm3 Final   10/12/2017 194 140 - 500 10*3/mm3 Final   10/12/2017 236 140 - 500 10*3/mm3 Final        PT/INR:    Protime   Date Value Ref Range Status   10/12/2017 15.3 (H) 11.7 - 14.2 Seconds Final   10/12/2017 16.6 (H) 11.7 - 14.2 Seconds Final   /  INR   Date Value Ref Range Status   10/12/2017 1.26 (H) 0.90 - 1.10 Final   10/12/2017 1.39 (H) 0.90 - 1.10 Final       Sodium Sodium   Date Value Ref Range Status   10/15/2017 142 136 - 145 mmol/L Final   10/14/2017 141 136 - 145 mmol/L Final   10/13/2017 147 (H) 136 - 145  mmol/L Final   10/12/2017 145 136 - 145 mmol/L Final   10/12/2017 144 136 - 145 mmol/L Final   10/12/2017 142 136 - 145 mmol/L Final   10/12/2017 142 136 - 145 mmol/L Final      Potassium Potassium   Date Value Ref Range Status   10/15/2017 3.6 3.5 - 5.2 mmol/L Final   10/14/2017 4.4 3.5 - 5.2 mmol/L Final   10/13/2017 4.3 3.5 - 5.2 mmol/L Final   10/12/2017 3.6 3.5 - 5.2 mmol/L Final   10/12/2017 4.0 3.5 - 5.2 mmol/L Final   10/12/2017 4.3 3.5 - 5.2 mmol/L Final   10/12/2017 4.2 3.5 - 5.2 mmol/L Final      Chloride Chloride   Date Value Ref Range Status   10/15/2017 102 98 - 107 mmol/L Final   10/14/2017 103 98 - 107 mmol/L Final   10/13/2017 110 (H) 98 - 107 mmol/L Final   10/12/2017 108 (H) 98 - 107 mmol/L Final   10/12/2017 107 98 - 107 mmol/L Final   10/12/2017 102 98 - 107 mmol/L Final   10/12/2017 103 98 - 107 mmol/L Final      Bicarbonate CO2   Date Value Ref Range Status   10/15/2017 28.0 22.0 - 29.0 mmol/L Final   10/14/2017 24.9 22.0 - 29.0 mmol/L Final   10/13/2017 20.5 (L) 22.0 - 29.0 mmol/L Final   10/12/2017 22.0 22.0 - 29.0 mmol/L Final   10/12/2017 22.9 22.0 - 29.0 mmol/L Final   10/12/2017 29.8 (H) 22.0 - 29.0 mmol/L Final   10/12/2017 26.9 22.0 - 29.0 mmol/L Final      BUN BUN   Date Value Ref Range Status   10/15/2017 27 (H) 8 - 23 mg/dL Final   10/14/2017 23 8 - 23 mg/dL Final   10/13/2017 18 8 - 23 mg/dL Final   10/12/2017 14 8 - 23 mg/dL Final   10/12/2017 15 8 - 23 mg/dL Final   10/12/2017 19 8 - 23 mg/dL Final   10/12/2017 19 8 - 23 mg/dL Final      Creatinine Creatinine   Date Value Ref Range Status   10/15/2017 0.73 0.57 - 1.00 mg/dL Final   10/14/2017 0.92 0.57 - 1.00 mg/dL Final   10/13/2017 1.08 (H) 0.57 - 1.00 mg/dL Final   10/12/2017 0.97 0.57 - 1.00 mg/dL Final   10/12/2017 0.93 0.57 - 1.00 mg/dL Final   10/12/2017 1.05 (H) 0.57 - 1.00 mg/dL Final   10/12/2017 1.06 (H) 0.57 - 1.00 mg/dL Final      Calcium Calcium   Date Value Ref Range Status   10/15/2017 10.2 8.6 - 10.5 mg/dL Final    10/14/2017 9.7 8.6 - 10.5 mg/dL Final   10/13/2017 9.2 8.6 - 10.5 mg/dL Final   10/12/2017 9.0 8.6 - 10.5 mg/dL Final   10/12/2017 9.1 8.6 - 10.5 mg/dL Final   10/12/2017 9.9 8.6 - 10.5 mg/dL Final   10/12/2017 9.6 8.6 - 10.5 mg/dL Final      Magnesium Magnesium   Date Value Ref Range Status   10/12/2017 2.6 (H) 1.6 - 2.4 mg/dL Final   10/12/2017 2.9 (H) 1.6 - 2.4 mg/dL Final            aspirin 325 mg Oral Daily   atorvastatin 40 mg Oral Nightly   chlorhexidine 15 mL Mouth/Throat Q12H   enoxaparin 40 mg Subcutaneous Daily   furosemide 40 mg Oral Daily   insulin aspart 0-7 Units Subcutaneous 4x Daily With Meals & Nightly   levothyroxine 137 mcg Oral Daily   metoprolol tartrate 25 mg Oral Q12H   mupirocin 1 application Each Nare Daily   pantoprazole 40 mg Oral Q AM   potassium chloride 20 mEq Oral Daily   sennosides-docusate sodium 2 tablet Oral Nightly   vitamin B-12 1,000 mcg Oral Daily       sodium chloride 30 mL/hr Last Rate: 30 mL/hr (10/12/17 1830)   sodium chloride 30 mL/hr            Patient Active Problem List   Diagnosis Code   • Coronary artery disease of native heart with stable angina pectoris I25.118   • CAD (coronary artery disease) I25.10       Assessment & Plan   POD #2 s/p CABG x4, MVr d/t CAD- with stents 2006- on Brilinta (last dose 10/6) multi vessel disease                               HTN  HLD- on statin  Hypothyroidism  Frequent falls - recent fall at work 2 years ago patient broke right ankle   Deconditioned d/t fall - will consult PT to evaluate pre surgery    LUE weakness  Old stroke  Severe carotid stenosis bilaterally           10/13 Consulted Neuro for weakness in left upper extremity. CT head ordered. Per Dr. Parekh, CT shows old infarcts, but no new infarct or bleed.  Dr. Parekh recommends full dose aspirin now and then low dose aspirin and Plavix when able.          Routine Care  D/c wires, chest tubes, lira yesterday  Drowsy still just received medication for anxiety and family  stated it made her drowsy  Ambulate  IS, pulmonary toilet              Una Reddy, APRN  10/15/17  7:43 AM

## 2017-10-16 ENCOUNTER — APPOINTMENT (OUTPATIENT)
Dept: CT IMAGING | Facility: HOSPITAL | Age: 67
End: 2017-10-16
Attending: PSYCHIATRY & NEUROLOGY

## 2017-10-16 LAB
ABO GROUP BLD: NORMAL
ANION GAP SERPL CALCULATED.3IONS-SCNC: 12.1 MMOL/L
BILIRUB UR QL STRIP: NEGATIVE
BLD GP AB SCN SERPL QL: NEGATIVE
BUN BLD-MCNC: 27 MG/DL (ref 8–23)
BUN/CREAT SERPL: 35.5 (ref 7–25)
CALCIUM SPEC-SCNC: 9.2 MG/DL (ref 8.6–10.5)
CHLORIDE SERPL-SCNC: 100 MMOL/L (ref 98–107)
CLARITY UR: ABNORMAL
CO2 SERPL-SCNC: 29.9 MMOL/L (ref 22–29)
COLOR UR: YELLOW
CREAT BLD-MCNC: 0.76 MG/DL (ref 0.57–1)
DEPRECATED RDW RBC AUTO: 45.1 FL (ref 37–54)
ERYTHROCYTE [DISTWIDTH] IN BLOOD BY AUTOMATED COUNT: 13.4 % (ref 11.7–13)
GFR SERPL CREATININE-BSD FRML MDRD: 76 ML/MIN/1.73
GLUCOSE BLD-MCNC: 117 MG/DL (ref 65–99)
GLUCOSE BLDC GLUCOMTR-MCNC: 117 MG/DL (ref 70–130)
GLUCOSE BLDC GLUCOMTR-MCNC: 119 MG/DL (ref 70–130)
GLUCOSE BLDC GLUCOMTR-MCNC: 119 MG/DL (ref 70–130)
GLUCOSE BLDC GLUCOMTR-MCNC: 96 MG/DL (ref 70–130)
GLUCOSE UR STRIP-MCNC: NEGATIVE MG/DL
HCT VFR BLD AUTO: 23 % (ref 35.6–45.5)
HGB BLD-MCNC: 7.4 G/DL (ref 11.9–15.5)
HGB UR QL STRIP.AUTO: NEGATIVE
KETONES UR QL STRIP: NEGATIVE
LEUKOCYTE ESTERASE UR QL STRIP.AUTO: NEGATIVE
MCH RBC QN AUTO: 30.3 PG (ref 26.9–32)
MCHC RBC AUTO-ENTMCNC: 32.2 G/DL (ref 32.4–36.3)
MCV RBC AUTO: 94.3 FL (ref 80.5–98.2)
NITRITE UR QL STRIP: NEGATIVE
PH UR STRIP.AUTO: 5.5 [PH] (ref 5–8)
PLATELET # BLD AUTO: 202 10*3/MM3 (ref 140–500)
PMV BLD AUTO: 9.5 FL (ref 6–12)
POTASSIUM BLD-SCNC: 3.7 MMOL/L (ref 3.5–5.2)
PROT UR QL STRIP: NEGATIVE
RBC # BLD AUTO: 2.44 10*6/MM3 (ref 3.9–5.2)
RH BLD: POSITIVE
SODIUM BLD-SCNC: 142 MMOL/L (ref 136–145)
SP GR UR STRIP: 1.02 (ref 1–1.03)
UROBILINOGEN UR QL STRIP: ABNORMAL
WBC NRBC COR # BLD: 11.51 10*3/MM3 (ref 4.5–10.7)

## 2017-10-16 PROCEDURE — 99221 1ST HOSP IP/OBS SF/LOW 40: CPT | Performed by: NURSE PRACTITIONER

## 2017-10-16 PROCEDURE — 86850 RBC ANTIBODY SCREEN: CPT | Performed by: NURSE PRACTITIONER

## 2017-10-16 PROCEDURE — 70450 CT HEAD/BRAIN W/O DYE: CPT

## 2017-10-16 PROCEDURE — 25010000002 LEVETRIRACETAM PER 10 MG: Performed by: NURSE PRACTITIONER

## 2017-10-16 PROCEDURE — 86900 BLOOD TYPING SEROLOGIC ABO: CPT | Performed by: NURSE PRACTITIONER

## 2017-10-16 PROCEDURE — 36430 TRANSFUSION BLD/BLD COMPNT: CPT

## 2017-10-16 PROCEDURE — 81003 URINALYSIS AUTO W/O SCOPE: CPT | Performed by: NURSE PRACTITIONER

## 2017-10-16 PROCEDURE — 99233 SBSQ HOSP IP/OBS HIGH 50: CPT | Performed by: NURSE PRACTITIONER

## 2017-10-16 PROCEDURE — 85027 COMPLETE CBC AUTOMATED: CPT | Performed by: THORACIC SURGERY (CARDIOTHORACIC VASCULAR SURGERY)

## 2017-10-16 PROCEDURE — 92610 EVALUATE SWALLOWING FUNCTION: CPT

## 2017-10-16 PROCEDURE — 86923 COMPATIBILITY TEST ELECTRIC: CPT

## 2017-10-16 PROCEDURE — P9016 RBC LEUKOCYTES REDUCED: HCPCS

## 2017-10-16 PROCEDURE — 82962 GLUCOSE BLOOD TEST: CPT

## 2017-10-16 PROCEDURE — 80048 BASIC METABOLIC PNL TOTAL CA: CPT | Performed by: THORACIC SURGERY (CARDIOTHORACIC VASCULAR SURGERY)

## 2017-10-16 PROCEDURE — 86900 BLOOD TYPING SEROLOGIC ABO: CPT

## 2017-10-16 PROCEDURE — 99024 POSTOP FOLLOW-UP VISIT: CPT | Performed by: NURSE PRACTITIONER

## 2017-10-16 PROCEDURE — 86901 BLOOD TYPING SEROLOGIC RH(D): CPT | Performed by: NURSE PRACTITIONER

## 2017-10-16 RX ADMIN — FUROSEMIDE 40 MG: 40 TABLET ORAL at 10:19

## 2017-10-16 RX ADMIN — LEVOTHYROXINE SODIUM 137 MCG: 137 TABLET ORAL at 20:42

## 2017-10-16 RX ADMIN — ATORVASTATIN CALCIUM 40 MG: 20 TABLET, FILM COATED ORAL at 20:42

## 2017-10-16 RX ADMIN — LEVETIRACETAM 250 MG: 500 INJECTION, SOLUTION, CONCENTRATE INTRAVENOUS at 20:43

## 2017-10-16 RX ADMIN — METOPROLOL TARTRATE 37.5 MG: 25 TABLET ORAL at 20:42

## 2017-10-16 RX ADMIN — LEVETIRACETAM 250 MG: 500 INJECTION, SOLUTION, CONCENTRATE INTRAVENOUS at 11:17

## 2017-10-16 RX ADMIN — METOPROLOL TARTRATE 37.5 MG: 25 TABLET ORAL at 10:17

## 2017-10-16 RX ADMIN — CYANOCOBALAMIN TAB 500 MCG 1000 MCG: 500 TAB at 10:16

## 2017-10-16 NOTE — SIGNIFICANT NOTE
10/16/17 1201   Rehab Treatment   Discipline physical therapist   Treatment Not Performed unable to treat, medical status change  (rapid response called last night, pt with change in status, SDH on CT, will hold off today and follow up tomorrow)   Recommendation   PT - Next Appointment 10/17/17

## 2017-10-16 NOTE — PROGRESS NOTES
"DOS: 10/16/2017  NAME: Marion Anderson   : 1950  PCP: Narciso Camargo MD  No chief complaint on file.    CC: left-sided weakness and confusion.     Subjective: A rapid was called on patient last night per RN due to worsening confusion, not following commands. She has improved. She just finished with therapy and is eating lunch, daughter at bedside.     Objective:  Vital signs: /70  Pulse 97  Temp 98.3 °F (36.8 °C) (Oral)   Resp 16  Ht 65\" (165.1 cm)  Wt 223 lb 8 oz (101 kg)  LMP Comment: postmenopausal  SpO2 96%  BMI 37.19 kg/m2      Physical Exam:  GENERAL: NAD  HEENT: Normocephalic, atraumatic   COR: RRR, fresh sternotomy wound  Resp: Even and unlabored  Extremities: No signs of distal embolization. TEDs and SCDs in place.    Neurological:   MS: Drowsy but easily arousable. Some decreased attention and concentration. Naming, repetition and reading intact. . Language normal. No neglect. Higher integrative function normal  CN: II-XII normal  Motor: Normal strength and tone on the right. Strength on left 3-4/5.   Sensory: Intact    Results Review:     I reviewed the patient's new clinical results.    Current Medications:    aspirin 81 mg Oral Daily   atorvastatin 40 mg Oral Nightly   chlorhexidine 15 mL Mouth/Throat Q12H   enoxaparin 40 mg Subcutaneous Daily   furosemide 40 mg Oral Daily   insulin aspart 0-7 Units Subcutaneous 4x Daily With Meals & Nightly   levETIRAcetam 250 mg Intravenous Q12H   levothyroxine 137 mcg Oral Daily   metoprolol tartrate 37.5 mg Oral Q12H   mupirocin 1 application Each Nare Daily   pantoprazole 40 mg Oral Q AM   potassium chloride 20 mEq Oral Daily   sennosides-docusate sodium 2 tablet Oral Nightly   ticagrelor 60 mg Oral BID   vitamin B-12 1,000 mcg Oral Daily       sodium chloride 30 mL/hr Last Rate: 30 mL/hr (10/12/17 1830)   sodium chloride 30 mL/hr        Medications Reviewed: Changed made    Laboratory results:  Lab Results   Component Value Date    " GLUCOSE 117 (H) 10/16/2017    CALCIUM 9.2 10/16/2017     10/16/2017    K 3.7 10/16/2017    CO2 29.9 (H) 10/16/2017     10/16/2017    BUN 27 (H) 10/16/2017    CREATININE 0.76 10/16/2017    EGFRIFNONA 76 10/16/2017    BCR 35.5 (H) 10/16/2017    ANIONGAP 12.1 10/16/2017     Lab Results   Component Value Date    WBC 11.51 (H) 10/16/2017    HGB 7.4 (L) 10/16/2017    HCT 23.0 (L) 10/16/2017    MCV 94.3 10/16/2017     10/16/2017        Results from last 7 days  Lab Units 10/13/17  0330   CHOLESTEROL mg/dL 97     Lab Results   Component Value Date    INR 1.26 (H) 10/12/2017    INR 1.39 (H) 10/12/2017    INR 0.99 10/07/2017    PROTIME 15.3 (H) 10/12/2017    PROTIME 16.6 (H) 10/12/2017    PROTIME 12.7 10/07/2017     No components found for: B12  Lab Results   Component Value Date    TSH 0.091 (L) 10/13/2017     Lab Results   Component Value Date    LDLCALC 56 10/13/2017     Lab Results   Component Value Date    HGBA1C 5.20 10/13/2017       Review and interpretation of imaging:  CT 10/16/17: There is a small acute subdural hematoma appreciated along the right  aspect of the falx cerebri predominantly medial to the right parietal  lobe, although there is a smaller component medial to the posterior  right frontal lobe. For the most part, the subdural hematoma is similar  in size when compared to the prior study of 10/13/2017. However, it may  be slightly larger in greatest AP diameter when compared to the prior  exam.    Impression: Ms. Anderson is a 68 yo RHW female with CAD, HTN, HLD and known bilateral carotid occlusion who was transferred from [place] on 10/6. She underwent CABG on 10/12 followed by left-sided weakness. I discussed her imaging with Dr. Parekh which CT head on 10/13 was negative for any acute ischemic stroke but does show remote infarct and follow-up CT demonstrates SDH. She cannot get a MRI brain. She is currently on ASA and Brillinta which are being held pending neurosurgery  "input.regarding continuation of antiplatelet therapy. She eventually needs to be on dual antiplatelet therapy. Will also start Keppra 250 BID. Continue neurochecks. PT/OT/ST.     Plan:  Antithrombotics being held pending [facility] input  Lipitor 80 mg  Hydrate  Neurochecks  Non-pharmacological DVT prophylaxis  EKG Tele  PT/OT/ST  Stroke Education  Blood pressure control to <130/80  Goal LDL <70-recommend high dose statins-   Serum glucose < 140    I have discussed the above with Dr. Bryant, RN, patient,and family.  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 By the time Dr. Parekh and I arrived at bedside, patient with improvement. Likely encephalopathic. Will repeat head CT in the morning. Patient's Hg 7.4 today, per cardiothoracic NP patient to be transfused 2 units. Will also check urinalysis.   "

## 2017-10-16 NOTE — PROGRESS NOTES
"Adult Nutrition  Assessment/PES    Patient Name:  Marion Anderson  YOB: 1950  MRN: [id number]  Admit Date:  10/6/2017    Assessment Date:  10/16/2017    Comments:    LOS day 9 - chart reviewed. Post-op issues noted and poor intake as a result. Will continue to follow.           Reason for Assessment       10/16/17 0928    Reason for Assessment    Reason For Assessment/Visit length of stay    Cardiac CAD;CABG              Nutrition/Diet History       10/16/17 0929    Nutrition/Diet History    Other Neuro consulted (rapid/code called last night). CT of head pending.             Anthropometrics       10/16/17 0929    Anthropometrics    RD Documented Current Weight  101 kg (223 lb)    RD Documented Weight on Admission 98 kg (216 lb)    Anthropometrics (Special Considerations)    Height Used for Calculations 1.651 m (5' 5\")    RD Calculated BMI (kg/m2) 37    Body Mass Index (BMI)    BMI Grade 35 - 39.9 - obesity - grade II            Labs/Tests/Procedures/Meds       10/16/17 0931    Labs/Tests/Procedures/Meds    Diagnostic Test/Procedure Review reviewed    Labs/Tests Review Reviewed;Glucose;BUN    Procedure Review SLP    Swallow eval status Other (comment)   refused to participate fully in eval on 10/14    Type of SLP Evaluation Bedside    Medication Review Reviewed, pertinent;Diuretic;Insulin;Antacid    Significant Vitals reviewed            Physical Findings       10/16/17 0932    Physical Findings/Assessment    Additional Documentation Physical Appearance (Group)    Physical Appearance    Overall Physical Appearance other (see comments)   ^ lethargy and weakness noted on 10/15    Skin surgical wound              Nutrition Prescription Ordered       10/16/17 0942    Nutrition Prescription PO    Common Modifiers Consistent Carbohydrate            Evaluation of Received Nutrient/Fluid Intake       10/16/17 0943    PO Evaluation    Number of Days PO Intake Evaluated 3 days    % PO Intake 0-25%    "         Problem/Interventions:        Problem 1       10/16/17 0943    Nutrition Diagnoses Problem 1    Problem 1 Inadequate Intake/Infusion    Inadequate Intake Type Oral    Etiology (related to) Factors Affecting Nutrition    Reported/Observed By RN;MD    Mental State/Condition Too Drowsy to Eat;Weakness    Other post-op lethargy                    Intervention Goal       10/16/17 0944    Intervention Goal    General Maintain nutrition    PO Tolerate PO;Increase intake;PO intake (%)    PO Intake % 75 %            Nutrition Intervention       10/16/17 0944    Nutrition Intervention    RD/Tech Action Follow Tx progress;Care plan reviewd;Encourage intake              Education/Evaluation       10/16/17 0945    Education    Education Will Instruct as appropriate    Monitor/Evaluation    Monitor Per protocol        Electronically signed by:  Una Mathew RD  10/16/17 9:46 AM

## 2017-10-16 NOTE — PROGRESS NOTES
" LOS: 9 days   Patient Care Team:  Narciso Camargo MD as PCP - General (Family Medicine)    Chief Complaint: post op fu    Subjective:  Symptoms:  No shortness of breath.    Diet:  Adequate intake.  No nausea or vomiting.    Activity level: Returning to normal.    Pain:  Pain is partially controlled.      \"sore\"  \"I'm thirsty\"    Vital Signs  Temp:  [97.7 °F (36.5 °C)-98.4 °F (36.9 °C)] 98.3 °F (36.8 °C)  Heart Rate:  [] 96  Resp:  [16-18] 16  BP: (107-129)/(52-72) 129/69  Body mass index is 37.19 kg/(m^2).    Intake/Output Summary (Last 24 hours) at 10/16/17 0813  Last data filed at 10/16/17 0640   Gross per 24 hour   Intake              360 ml   Output              550 ml   Net             -190 ml            Last 3 weights    10/12/17  0948 10/14/17  0609 10/16/17  0640   Weight: 216 lb (98 kg) 226 lb 8 oz (103 kg) 223 lb 8 oz (101 kg)         Objective:  General Appearance:  Comfortable.    Vital signs: (most recent): Blood pressure 139/70, pulse 97, temperature 98.2 °F (36.8 °C), temperature source Oral, resp. rate 16, height 65\" (165.1 cm), weight 223 lb 8 oz (101 kg), SpO2 96 %.    Output: Producing urine and producing stool.    Lungs:  Normal respiratory rate and normal effort.  Breath sounds clear to auscultation.    Heart: Normal rate.  Regular rhythm.  S1 normal and S2 normal.  No murmur, gallop or friction rub.   Abdomen: Abdomen is soft and non-distended.    Extremities: There is no dependent edema.    Neurological: Patient is alert.  (Weakness and drift with Left upper and lower extremity.  Answers mentation questions appropriately, has repetitive words/phrases ).    Skin:  Warm and dry.              Results Review:        WBC WBC   Date Value Ref Range Status   10/16/2017 11.51 (H) 4.50 - 10.70 10*3/mm3 Final   10/15/2017 14.92 (H) 4.50 - 10.70 10*3/mm3 Final   10/14/2017 15.23 (H) 4.50 - 10.70 10*3/mm3 Final      HGB Hemoglobin   Date Value Ref Range Status   10/16/2017 7.4 (L) 11.9 - 15.5 " g/dL Final   10/15/2017 8.3 (L) 11.9 - 15.5 g/dL Final   10/14/2017 8.6 (L) 11.9 - 15.5 g/dL Final      HCT Hematocrit   Date Value Ref Range Status   10/16/2017 23.0 (L) 35.6 - 45.5 % Final   10/15/2017 26.5 (L) 35.6 - 45.5 % Final   10/14/2017 26.9 (L) 35.6 - 45.5 % Final      Platelets Platelets   Date Value Ref Range Status   10/16/2017 202 140 - 500 10*3/mm3 Final   10/15/2017 185 140 - 500 10*3/mm3 Final   10/14/2017 160 140 - 500 10*3/mm3 Final        PT/INR:  No results found for: PROTIME/No results found for: INR    Sodium Sodium   Date Value Ref Range Status   10/16/2017 142 136 - 145 mmol/L Final   10/15/2017 142 136 - 145 mmol/L Final   10/14/2017 141 136 - 145 mmol/L Final      Potassium Potassium   Date Value Ref Range Status   10/16/2017 3.7 3.5 - 5.2 mmol/L Final   10/15/2017 3.6 3.5 - 5.2 mmol/L Final   10/14/2017 4.4 3.5 - 5.2 mmol/L Final      Chloride Chloride   Date Value Ref Range Status   10/16/2017 100 98 - 107 mmol/L Final   10/15/2017 102 98 - 107 mmol/L Final   10/14/2017 103 98 - 107 mmol/L Final      Bicarbonate CO2   Date Value Ref Range Status   10/16/2017 29.9 (H) 22.0 - 29.0 mmol/L Final   10/15/2017 28.0 22.0 - 29.0 mmol/L Final   10/14/2017 24.9 22.0 - 29.0 mmol/L Final      BUN BUN   Date Value Ref Range Status   10/16/2017 27 (H) 8 - 23 mg/dL Final   10/15/2017 27 (H) 8 - 23 mg/dL Final   10/14/2017 23 8 - 23 mg/dL Final      Creatinine Creatinine   Date Value Ref Range Status   10/16/2017 0.76 0.57 - 1.00 mg/dL Final   10/15/2017 0.73 0.57 - 1.00 mg/dL Final   10/14/2017 0.92 0.57 - 1.00 mg/dL Final      Calcium Calcium   Date Value Ref Range Status   10/16/2017 9.2 8.6 - 10.5 mg/dL Final   10/15/2017 10.2 8.6 - 10.5 mg/dL Final   10/14/2017 9.7 8.6 - 10.5 mg/dL Final      Magnesium No results found for: MG         aspirin 81 mg Oral Daily   atorvastatin 40 mg Oral Nightly   chlorhexidine 15 mL Mouth/Throat Q12H   enoxaparin 40 mg Subcutaneous Daily   furosemide 40 mg Oral Daily    insulin aspart 0-7 Units Subcutaneous 4x Daily With Meals & Nightly   levothyroxine 137 mcg Oral Daily   metoprolol tartrate 37.5 mg Oral Q12H   mupirocin 1 application Each Nare Daily   pantoprazole 40 mg Oral Q AM   potassium chloride 20 mEq Oral Daily   sennosides-docusate sodium 2 tablet Oral Nightly   ticagrelor 60 mg Oral BID   vitamin B-12 1,000 mcg Oral Daily       sodium chloride 30 mL/hr Last Rate: 30 mL/hr (10/12/17 1830)   sodium chloride 30 mL/hr            Patient Active Problem List   Diagnosis Code   • Coronary artery disease of native heart with stable angina pectoris I25.118   • CAD (coronary artery disease) I25.10       Assessment & Plan    CAD (hx of PCI 2006), SEVERE MV INCOMPETENCE S/P CABG X4 WITH LIMA, MV REPAIR/RING----POD #4, on asa/bb/statin, brilenta resumed  BILATERAL CAROTID DX, HX CVA----left upper extremity weakness post op, neuro following, recommed asa/plavix (on brilenta)  POST OP ANEMIA----expected blood loss, Hb 7.4 today  HTN----controlled on current tx  HL---on statin  HYPOTHYROID----on synthroid  DECONDITIONING, FREQUENT FALLS PREOP    ST eval 10/14 without dysphagia, assist x2 for few steps with PT.  Rapid response called last night r/t decreased responsiveness, PaO2 62 on 1L, repeat CT of head pending for today.  Give 1 unit PRBC today.        PANKAJ Perez  10/16/17  8:13 AM

## 2017-10-16 NOTE — CONSULTS
Referring Provider: Dr. Muñoz  Reason for Consultation: Consulted for?  CARLOS    Patient Care Team:  Narciso Camargo MD as PCP - General (Family Medicine)    Chief complaint:   Does not have one    History of present illness:    This is a 67-year-old female patient was admitted on 10/7/17 for chest pain.  She underwent CABG ×4, 5 days ago.  Her active problems at this point include confusion and left-sided weakness with suspected stroke.  We are consulted for evaluation of sleep apnea.  I could not obtain an accurate history from the patient.  She kept repeating the same statement that she had seen me earlier today although I did not.  Some of the information were obtained from her daughter who was present at bedside.    Patient apparently lives with her fiancé.  She reported snoring which disturbs her bed partner.  She also reported some fatigue and sleepiness during the day sometimes.  She denies apnea, or waking up snoring or snorting.  She was never evaluated for sleep apnea in the past.    She usually goes to bed around 11 PM and falls asleep within 30 minutes.  She wakes up at 8 in the morning and feels refreshed.  She denies awakening in the middle of the night.  She denies symptoms of restless leg syndrome.    Review of Systems  Constitutional: No fever or chills.   ENMT: No sinus congestion  Cardiovascular: Mild chest pain during cough  Respiratory: No dyspnea, cough or wheezing.  Gastrointestinal: No constipation, diarrhea or abdominal pain   Neurology: No headache, weakness, numbness or dizziness.   Musculoskeletal: No joints pain, stiffness or swelling.   Psychiatry: No depression.  Lymphatic: No swollen glands.  Integumentary: No rash.    History  Past Medical History:   Diagnosis Date   • Arthritis    • Coronary artery disease    • Disease of thyroid gland    • Hypertension    • PONV (postoperative nausea and vomiting)    • Spinal headache    ,   Past Surgical History:   Procedure  Laterality Date   • CARDIAC SURGERY     • CORONARY ARTERY BYPASS GRAFT N/A 10/12/2017    Procedure: FILEMON STERNOTOMY CORONARY ARTERY BYPASS GRAFT TIMES 4  USING LEFT INTERNAL MAMMARY ARTERY AND LEFT GREATER SAPHENOUS VEIN GRAFT PER  ENDOSCOPIC VEIN HARVESTING, MITRAL VALVE REPAIR AND PRP;  Surgeon: Ramos Muñoz MD;  Location: [facility];  Service:    • FRACTURE SURGERY      ankle. leg   • TONSILLECTOMY     ,   Family History   Problem Relation Age of Onset   • Heart disease Mother    • Hypertension Mother    • Heart disease Father    • Hypertension Father    • Cancer Brother    • Hypertension Brother    ,   Social History   Substance Use Topics   • Smoking status: Never Smoker   • Smokeless tobacco: Never Used   • Alcohol use No   ,   Prescriptions Prior to Admission   Medication Sig Dispense Refill Last Dose   • aspirin 81 MG chewable tablet Chew 81 mg Daily.      • levothyroxine (SYNTHROID, LEVOTHROID) 137 MCG tablet Take 137 mcg by mouth Daily.      • losartan (COZAAR) 25 MG tablet Take 25 mg by mouth Daily.      • metoprolol tartrate (LOPRESSOR) 25 MG tablet Take 25 mg by mouth 2 (Two) Times a Day.      • Multiple Vitamins-Minerals (MULTIVITAMIN ADULT PO) Take 1 tablet by mouth Daily.      • nitroglycerin (NITROSTAT) 0.4 MG SL tablet Place 0.4 mg under the tongue Every 5 (Five) Minutes As Needed for Chest Pain. Take no more than 3 doses in 15 minutes.      • ranolazine (RANEXA) 500 MG 12 hr tablet Take 500 mg by mouth 2 (Two) Times a Day.      • rosuvastatin (CRESTOR) 20 MG tablet Take 40 mg by mouth Every Night.      • traMADol (ULTRAM) 50 MG tablet Take 50 mg by mouth 2 (Two) Times a Day.      • vitamin D (ERGOCALCIFEROL) 26442 units capsule capsule Take 50,000 Units by mouth Daily.      , Scheduled Meds:    aspirin 81 mg Oral Daily   atorvastatin 40 mg Oral Nightly   enoxaparin 40 mg Subcutaneous Daily   furosemide 40 mg Oral Daily   insulin aspart 0-7 Units Subcutaneous 4x Daily With Meals & Nightly    levETIRAcetam 250 mg Intravenous Q12H   levothyroxine 137 mcg Oral Daily   metoprolol tartrate 37.5 mg Oral Q12H   mupirocin 1 application Each Nare Daily   pantoprazole 40 mg Oral Q AM   potassium chloride 20 mEq Oral Daily   sennosides-docusate sodium 2 tablet Oral Nightly   ticagrelor 60 mg Oral BID   vitamin B-12 1,000 mcg Oral Daily   , Continuous Infusions:    sodium chloride 30 mL/hr Last Rate: 30 mL/hr (10/12/17 1830)   sodium chloride 30 mL/hr    , PRN Meds:  •  acetaminophen  •  ALPRAZolam  •  baclofen oral suspension 2.5 mg/mL  •  bisacodyl  •  bisacodyl  •  dextrose  •  dextrose  •  glucagon (human recombinant)  •  magnesium hydroxide  •  magnesium sulfate **OR** magnesium sulfate **OR** magnesium sulfate  •  Morphine **AND** naloxone  •  ondansetron  •  oxyCODONE  •  potassium chloride **OR** potassium chloride  •  potassium chloride **OR** potassium chloride  •  potassium chloride  •  potassium chloride  •  potassium chloride  •  promethazine **OR** promethazine  •  sodium chloride  •  sodium chloride  •  traMADol **OR** [DISCONTINUED] HYDROcodone-acetaminophen and Allergies:  Latex    Objective     Vital Signs   Temp:  [97.8 °F (36.6 °C)-98.3 °F (36.8 °C)] 97.8 °F (36.6 °C)  Heart Rate:  [] 94  Resp:  [16-18] 16  BP: (107-139)/(52-72) 124/64    Physical Exam:  Constitutional: Not in acute distress.  Eyes: Injected conjunctiva, EOMI.  ENMT: Shultz 3.  Small oral cavity.  Slightly enlarged tongue  Heart: RRR, no murmur  Lungs: Good and equal air entry bilaterally.         Abdomen: Obese. Soft. No tenderness or dullness.  Extremities: No cyanosis, clubbing or pitting edema: . Moves all extremities.  Neuro: Conscious, alert, oriented x3.  Echolalia  Psych: Appropriate mood.   Integumentary: No rash  Lymphatic: No palpable cervical or supraclavicular lymph nodes.          Assessment:  1.   2. Snoring  3. Obesity  4. Coronary artery disease  5. Hypertension    Recommendations:  She patient is at  risk for sleep apnea given her upper airway anatomy and symptoms of snoring.  She has underlying coronary artery disease and therefore she has a low threshold for treatment with positive pressure ventilation.  She will need an evaluation as outpatient with a sleep study.  Again, treatment with CPAP is indicated for AHI >5.    I explained to her the importance of sleep apnea therapy in the setting of coronary artery disease.  She voiced understanding.  Will arrange for follow-up as outpatient.    Thank you for the consultation.  No additional recommendations at this point. we'll sign off        I discussed the patients findings and my recommendations with patient and family    Corina Capellan MD  10/16/17  6:15 PM

## 2017-10-16 NOTE — SIGNIFICANT NOTE
10/16/17 0823   Rehab Treatment   Discipline physical therapist   Treatment Not Performed patient unavailable for treatment  (Pt had rapid HR last night, off  floor for CT. PT will follow up in PM)   Recommendation   PT - Next Appointment 10/16/17

## 2017-10-16 NOTE — PLAN OF CARE
Problem: Inpatient SLP  Goal: Dysphagia- Patient will safely consume diet as per recommendation with no signs/symptoms of aspiration  Outcome: Outcome(s) achieved Date Met:  10/16/17    10/16/17 1030   Safely Consume Diet   Safely Consume Diet- SLP, Date Established 10/16/17   Safely Consume Diet- SLP, Time to Achieve by discharge   Safely Consume Diet- SLP, Outcome goal met

## 2017-10-16 NOTE — PROGRESS NOTES
Continued Stay Note  [facility]     Patient Name: Marion Anderson  MRN: [id number]  Today's Date: 10/16/2017    Admit Date: 10/6/2017          Discharge Plan       10/16/17 1428    Case Management/Social Work Plan    Plan TBD.  Home with HH versus SNF.    Patient/Family In Agreement With Plan yes    Additional Comments Plan is undetermined at this time.  Pt sedated and CCP unable to speak with her today.  Voicemail left for s.o. Fadi Thomas [phone].  Per son in law Ludwin Fernández if Pt discharges home then they would like [facility].  CCP went ahead and called HH referral to [facility].  CCP will f/u on Tuesday 10/17/17.               Discharge Codes     None            María Tiwari RN

## 2017-10-16 NOTE — PLAN OF CARE
Problem: Patient Care Overview (Adult)  Goal: Plan of Care Review  Outcome: Ongoing (interventions implemented as appropriate)    10/16/17 0638   Coping/Psychosocial Response Interventions   Plan Of Care Reviewed With patient   Patient Care Overview   Progress improving   Outcome Evaluation   Outcome Summary/Follow up Plan Rapid response called just beefore evening shift change due to patients lack of response to stimuli. Patient given narcan and assessed for possible stroke. During assessment patient became more awake, would answer some questions and returned to similar previous mental state of repeating sentences/repeating questions. VSS, ultram given for pain. Swallowing ability assessed and passed for liquids/no straw. Patient is more awake this moring, answering and following commands. Still willl repeat sentences. Neurology to assess today. CT sccan without contrast scheduled for this morning. Will continue to monitor closely.          Problem: Pain, Acute (Adult)  Goal: Identify Related Risk Factors and Signs and Symptoms  Outcome: Ongoing (interventions implemented as appropriate)    10/16/17 0638   Pain, Acute   Related Risk Factors (Acute Pain) disease process   Signs and Symptoms (Acute Pain) fatigue/weakness;moaning;impaired thought process/concentration;questions meaning of pain         Problem: Perioperative Period (Adult)  Goal: Signs and Symptoms of Listed Potential Problems Will be Absent or Manageable (Perioperative Period)  Outcome: Ongoing (interventions implemented as appropriate)    10/16/17 0638   Perioperative Period   Problems Assessed (Perioperative Period) all   Problems Present (Perioperative Period) pain;situational response         Problem: Cardiac Surgery (Adult)  Goal: Signs and Symptoms of Listed Potential Problems Will be Absent or Manageable (Cardiac Surgery)  Outcome: Ongoing (interventions implemented as appropriate)    10/16/17 0638   Cardiac Surgery   Problems Assessed (Cardiac  Surgery) all   Problems Present (Cardiac Surgery) acute neurologic deterioration;pain;fluid imbalance;situational response

## 2017-10-16 NOTE — THERAPY DISCHARGE NOTE
Acute Care - Speech Language Pathology   Swallow Eval/Discharge [facility]     Patient Name: Marion Anderson  : 1950  MRN: [id number]  Today's Date: 10/16/2017  Onset of Illness/Injury or Date of Surgery Date: 10/12/17            Admit Date: 10/6/2017    SPEECH-LANGUAGE PATHOLOGY EVALUATION - SWALLOW  Subjective: The patient was seen on this date for a Clinical Swallow evaluation.  Patient was alert and cooperative but did resist PO intake, especially with normal-sized bites. Pt required encouragement from family and staff to continue with evaluation.    The patient's history is significant for a subdural hematoma, cardiac bypass and recent refusal to PO intake.   Objective: Textures given included thin liquid, puree consistency, mechanical soft consistency and regular consistency.  Assessment: Difficulties were noted with none of the above consistencies, characterized by no overt s/s of penetration/aspiration. Pt was resistant to larger bites of food but did tolerate adequate amounts of puree consistency and liquid consistency. Pt accepted and tolerated smaller bite size and amounts of trials of mixed consistency and soft, chopped food. Good laryngeal elevation and anterior hyoid movement noted. No wet vocal quality to voice or cough after trial.  SLP Findings:  Patient presents with functional swallow.  Recommendations: Diet Textures: thin liquid and soft, chopped food.  Medications should be taken crushed with puree.   Recommended Strategies: Upright for PO and small bites and sips. Oral care before breakfast, after all meals and PRN.  Dysphagia therapy is not recommended. Rationale: swallow is WFL.  Reconsult if difficulties noted with increased intake.  Visit Dx:    ICD-10-CM ICD-9-CM   1. Physical deconditioning R53.81 799.3   2. Coronary artery disease of native heart with stable angina pectoris, unspecified vessel or lesion type I25.118 414.01     413.9   3. Generalized weakness R53.1 780.79  "    Patient Active Problem List   Diagnosis   • Coronary artery disease of native heart with stable angina pectoris   • CAD (coronary artery disease)     Past Medical History:   Diagnosis Date   • Arthritis    • Coronary artery disease    • Disease of thyroid gland    • Hypertension    • PONV (postoperative nausea and vomiting)    • Spinal headache      Past Surgical History:   Procedure Laterality Date   • CARDIAC SURGERY     • CORONARY ARTERY BYPASS GRAFT N/A 10/12/2017    Procedure: FILEMON STERNOTOMY CORONARY ARTERY BYPASS GRAFT TIMES 4  USING LEFT INTERNAL MAMMARY ARTERY AND LEFT GREATER SAPHENOUS VEIN GRAFT PER  ENDOSCOPIC VEIN HARVESTING, MITRAL VALVE REPAIR AND PRP;  Surgeon: Ramos Muñoz MD;  Location: [facility];  Service:    • FRACTURE SURGERY      ankle. leg   • TONSILLECTOMY            SWALLOW EVALUATION (last 72 hours)      Swallow Evaluation       10/16/17 1030 10/14/17 1724             Rehab Evaluation    Document Type (P)  evaluation  -HR evaluation  -SL       Subjective Information (P)  agree to therapy;complains of   resistant to PO intake (especially larger bites of solids)  -HR fatigue  -SL       Patient Effort, Rehab Treatment  poor  -SL       Patient Effort, Rehab Treatment Comment  pt refused most po trials, despite multiple efforts by family, therapist, and nursing  -SL       Symptoms Noted During/After Treatment (P)  none  -HR none  -SL       General Information    Patient Profile Review (P)  yes  -HR yes  -SL       Subjective Patient Observations (P)  --   pt states trials of PO are \"too much\"&insisting tiny bites  -HR fatiqued, perseverative, limited verbalizations- cont stating\"i don't know\", \"i'm afraid\" but could not elaborate  -SL       Pertinent History Of Current Problem (P)  --   Subdural hematoma; cardiac bypass; recent resistance of PO  -HR pt has been holding food in mouth, only taking few bites; s/p cardiac bypass  -SL       Current Diet Limitations (P)  no diet " restrictions;other (see comments)   consistent carbohydrate  -HR thin liquids;regular solid  -SL       Precautions/Limitations, Vision  WFL  -SL       Precautions/Limitations, Hearing  WFL  -SL       Prior Level of Function- Communication  functional in all spheres  -SL       Prior Level of Function- Swallowing  diet modifications- foods   dtr reported pt had diff swallowing pills-would crush   -       Plans/Goals Discussed With (P)  patient and family  -HR patient;family  -SL       Barriers to Rehab (P)  medically complex  -HR medically complex  -SL       Clinical Impression    Patient's Goals For Discharge (P)  patient did not state  -HR patient could not state;patient did not state  -SL       Family Goals For Discharge  patient able to eat/drink without coughing/choking  -SL       SLP Swallowing Diagnosis (P)  other (see comments)   Swallow WFL  -HR --   r/o dysphagia; cognitive component  -SL       Rehab Potential/Prognosis, Swallowing (P)  good, to achieve stated therapy goals  -HR fair, will monitor progress closely  -SL       Functional Level At Time Of Eval  impaired  -SL       Criteria for Skilled Therapeutic Interventions Met (P)  no problems identified which require skilled intervention  -HR skilled criteria for dysphagia intervention met  -SL       Therapy Frequency (P)  evaluation only  -HR PRN  -SL       Predicted Duration Therapy Interv (days) (P)  until discharge  -HR        Expected Duration Therapy Session (min)  15-30 minutes  -SL       SLP Diet Recommendation (P)  soft textures;chopped;thin liquids;other (see comments)   consistent carbohydrate  -HR thin liquids;soft textures   downgrade to puree, or make npo if overt s/s aspiraiton   -SL       Recommended Diagnostics  reassess via clinical swallow (non-instrumental exam)  -SL       Recommended Feeding/Eating Techniques  small sips/bites;maintain upright posture during/after eating for 30 mins   encourage prompt swallow  -SL       SLP Rec. for  Method of Medication Administration (P)  meds crushed in pudding/applesauce  -HR meds crushed in pudding/applesauce;meds via alternate route  -SL       Monitor For Signs Of Aspiration (P)  cough;gurgly voice;throat clearing;pneumonia;right lower lobe infiltrates  -HR cough;elevated WBC count;gurgly voice;throat clearing;fever;upper respiratory infection;pneumonia;right lower lobe infiltrates  -SL       Anticipated Discharge Disposition (P)  other (see comments)   unknown  -HR other (see comments)  -SL       Demonstrates Need for Referral to Another Service  neuropsychology services   unknown  -SL       Pain Assessment    Pain Assessment (P)  No/denies pain  -HR        Cognitive Assessment/Intervention    Current Cognitive/Communication Assessment (P)  functional  -HR impaired  -SL       Orientation Status  oriented to;person;place  -SL       Follows Commands/Answers Questions  needs repetition;needs increased time;needs cueing;able to follow single-step instructions;50% of the time  -SL       Personal Safety  decreased insight to deficits  -SL       Oral Motor Structure and Function    Oral Motor Anatomy and Physiology (P)  patient demonstrates anatomy and physiology that is WNL  -HR patient demonstrates anatomy that is WNL  -SL       Dentition Assessment  present and adequate  -SL       Secretion Management  WNL/WFL  -SL       Volitional Swallow  mild to moderate difficulties initiating volitional swallow  -SL       Volitional Cough  weak volitional cough  -SL       Oral Motor Assessment Comment  pt would not complete oral motor tasks/follow commands to complete assessment  -SL       General Feeding/Swallowing Observations    Current Feeding Method  oral feeding methods  -SL       Swallow Recommendations    Eating Assistance  needs constant supervision during self eating activity  -SL       Oral Care  oral care with toothbrush and dentifrice before breakfast and after meals and PRN  -SL       Modified Eating  Strategies  upright positioning 90 degrees;caregiver supervision required during eating;alternate food and liquid swallows;adjust rate of eating to fit with patient's ability  -SL       Other Recommendations  repeat clincial exam;meds crushed and mixed  -SL       Recommended Diet  thin liquids;soft textures   if s/s asp noted- make NPO  -SL         User Key  (r) = Recorded By, (t) = Taken By, (c) = Cosigned By    Initials Name Effective Dates    KATHERIN Farris MS CCC-SLP 04/13/15 -     HR Linda Huang Speech Therapy Student 09/06/17 -         EDUCATION  The patient has been educated in the following areas:   Dysphagia (Swallowing Impairment).    SLP Recommendation and Plan  SLP Swallowing Diagnosis: (P) other (see comments) (Swallow WFL)  SLP Diet Recommendation: (P) soft textures, chopped, thin liquids, other (see comments) (consistent carbohydrate)     SLP Rec. for Method of Medication Administration: (P) meds crushed in pudding/applesauce  Monitor For Signs Of Aspiration: (P) cough, gurgly voice, throat clearing, pneumonia, right lower lobe infiltrates     Criteria for Skilled Therapeutic Interventions Met: (P) no problems identified which require skilled intervention  Anticipated Discharge Disposition: (P) other (see comments) (unknown)  Rehab Potential/Prognosis, Swallowing: (P) good, to achieve stated therapy goals  Therapy Frequency: (P) evaluation only                        IP SLP Goals       10/16/17 1030 10/14/17 1736       Safely Consume Diet    Safely Consume Diet- SLP, Date Established (P)  10/16/17  -HR 10/14/17  -SL     Safely Consume Diet- SLP, Time to Achieve (P)  by discharge  -HR by discharge  -SL     Safely Consume Diet- SLP, Outcome (P)  goal met  -HR        User Key  (r) = Recorded By, (t) = Taken By, (c) = Cosigned By    Initials Name Provider Type    KATHERIN Farris MS CCC-SLP Speech and Language Pathologist    HR Linda Huang Speech Therapy Student Speech Therapy Student             SLP Outcome  Measures (last 72 hours)      SLP Outcome Measures       10/16/17 1030 10/14/17 1700       SLP Outcome Measures    Outcome Measure Used? (P)  Adult NOMS  -HR Adult NOMS  -SL     FCM Scores    FCM Chosen (P)  Swallowing  -HR      Swallowing FCM Score (P)  5  -HR 3  -SL       User Key  (r) = Recorded By, (t) = Taken By, (c) = Cosigned By    Initials Name Effective Dates     Shira Farris MS CCC-SLP 04/13/15 -     HR Linda Huang Speech Therapy Student 09/06/17 -            Time Calculation:         Time Calculation- SLP       10/16/17 1030          Time Calculation- SLP    SLP Start Time (P)  1030  -HR      SLP Stop Time (P)  1200  -HR      SLP Time Calculation (min) (P)  90 min  -HR        User Key  (r) = Recorded By, (t) = Taken By, (c) = Cosigned By    Initials Name Provider Type    HR Constance Sheridan Therapy Student Speech Therapy Student          Therapy Charges for Today     Code Description Service Date Service Provider Modifiers Qty    [id number] HC ST EVAL ORAL PHARYNG SWALLOW 6 10/16/2017 Linda Huang Speech Therapy Student GN 1               SLP Discharge Summary  Anticipated Discharge Disposition: (P) other (see comments) (unknown)    Constance Sheridan Therapy Student  10/16/2017

## 2017-10-16 NOTE — CONSULTS
Inpatient Consult to Neurosurgery  Consult performed by: CHELLE PATE  Consult ordered by: CHELLE KARIMI  Reason for consult: SDH          Patient Care Team:  Narciso Camargo MD as PCP - General (Family Medicine)    Chief complaint:    SDH    Subjective     HPI Comments: This is a 66 yo female with a history of coronary artery disease, bilateral carotid stenosis.  She was on chronic anticoagulation with Brilinta. She has a history of previous ankle surgery about 2 years ago. She has been immobile ever since. According to the notes she was evaluated by vascular surgery who determined that she was not a candidate for vascular reconstruction.  She had been on Brilinta preoperatively.  She underwent cardiac bypass surgery 3 days ago.  On the following day she was noted to have some confusion and left-sided weakness.  The patient is not able to have an MRI and therefore a CT scan of the brain was performed which revealed evidence of a remote right frontal infarct.  She had a follow-up CT scan of the brain today for episodes of worsening confusion.  The CT scan performed today revealed evidence of a small acute subdural hematoma noted along the right aspect of the falx medial to the right parietal lobe. It measures up to approximately 3-4 mm in diameter. This subdural hematoma had a similar maximal diameter on the prior exam. There  was also evidence of a chronic right MCA distribution infarct with right frontal lobe encephalomalacia.  Also chronic infarcts noted in the right lentiform nucleus and left globus pallidus.  We were asked to give a neurosurgical opinion regarding the subdural hematoma and make recommendations regarding anticoagulation.    Cerebrovascular Accident   This is a new problem. The current episode started in the past 7 days. Associated symptoms include chest pain, fatigue and weakness (generalized from recent CABG). Pertinent negatives include no headaches or visual change.       Review  of Systems   Constitutional: Positive for activity change and fatigue.   Cardiovascular: Positive for chest pain.   Skin: Positive for wound (sternotomy wound).   Neurological: Positive for weakness (generalized from recent CABG). Negative for seizures and headaches.   Psychiatric/Behavioral: Positive for confusion and decreased concentration.   All other systems reviewed and are negative.       Past Medical History:   Diagnosis Date   • Arthritis    • Coronary artery disease    • Disease of thyroid gland    • Hypertension    • PONV (postoperative nausea and vomiting)    • Spinal headache    ,   Past Surgical History:   Procedure Laterality Date   • CARDIAC SURGERY     • CORONARY ARTERY BYPASS GRAFT N/A 10/12/2017    Procedure: FILEMON STERNOTOMY CORONARY ARTERY BYPASS GRAFT TIMES 4  USING LEFT INTERNAL MAMMARY ARTERY AND LEFT GREATER SAPHENOUS VEIN GRAFT PER  ENDOSCOPIC VEIN HARVESTING, MITRAL VALVE REPAIR AND PRP;  Surgeon: Ramos Muñoz MD;  Location: [facility];  Service:    • FRACTURE SURGERY      ankle. leg   • TONSILLECTOMY     ,   Family History   Problem Relation Age of Onset   • Heart disease Mother    • Hypertension Mother    • Heart disease Father    • Hypertension Father    • Cancer Brother    • Hypertension Brother    ,   Social History   Substance Use Topics   • Smoking status: Never Smoker   • Smokeless tobacco: Never Used   • Alcohol use No   ,   Prescriptions Prior to Admission   Medication Sig Dispense Refill Last Dose   • aspirin 81 MG chewable tablet Chew 81 mg Daily.      • levothyroxine (SYNTHROID, LEVOTHROID) 137 MCG tablet Take 137 mcg by mouth Daily.      • losartan (COZAAR) 25 MG tablet Take 25 mg by mouth Daily.      • metoprolol tartrate (LOPRESSOR) 25 MG tablet Take 25 mg by mouth 2 (Two) Times a Day.      • Multiple Vitamins-Minerals (MULTIVITAMIN ADULT PO) Take 1 tablet by mouth Daily.      • nitroglycerin (NITROSTAT) 0.4 MG SL tablet Place 0.4 mg under the tongue Every 5 (Five)  Minutes As Needed for Chest Pain. Take no more than 3 doses in 15 minutes.      • ranolazine (RANEXA) 500 MG 12 hr tablet Take 500 mg by mouth 2 (Two) Times a Day.      • rosuvastatin (CRESTOR) 20 MG tablet Take 40 mg by mouth Every Night.      • traMADol (ULTRAM) 50 MG tablet Take 50 mg by mouth 2 (Two) Times a Day.      • vitamin D (ERGOCALCIFEROL) 94437 units capsule capsule Take 50,000 Units by mouth Daily.      , Scheduled Meds:    aspirin 81 mg Oral Daily   atorvastatin 40 mg Oral Nightly   enoxaparin 40 mg Subcutaneous Daily   furosemide 40 mg Oral Daily   insulin aspart 0-7 Units Subcutaneous 4x Daily With Meals & Nightly   levETIRAcetam 250 mg Intravenous Q12H   levothyroxine 137 mcg Oral Daily   metoprolol tartrate 37.5 mg Oral Q12H   mupirocin 1 application Each Nare Daily   pantoprazole 40 mg Oral Q AM   potassium chloride 20 mEq Oral Daily   sennosides-docusate sodium 2 tablet Oral Nightly   ticagrelor 60 mg Oral BID   vitamin B-12 1,000 mcg Oral Daily   , Continuous Infusions:    sodium chloride 30 mL/hr Last Rate: 30 mL/hr (10/12/17 1830)   sodium chloride 30 mL/hr    , PRN Meds:  •  acetaminophen  •  ALPRAZolam  •  baclofen oral suspension 2.5 mg/mL  •  bisacodyl  •  bisacodyl  •  dextrose  •  dextrose  •  glucagon (human recombinant)  •  magnesium hydroxide  •  magnesium sulfate **OR** magnesium sulfate **OR** magnesium sulfate  •  Morphine **AND** naloxone  •  ondansetron  •  oxyCODONE  •  potassium chloride **OR** potassium chloride  •  potassium chloride **OR** potassium chloride  •  potassium chloride  •  potassium chloride  •  potassium chloride  •  promethazine **OR** promethazine  •  sodium chloride  •  sodium chloride  •  traMADol **OR** [DISCONTINUED] HYDROcodone-acetaminophen and Allergies:  Latex    Objective      Vital Signs  Temp:  [97.8 °F (36.6 °C)-98.3 °F (36.8 °C)] 97.8 °F (36.6 °C)  Heart Rate:  [] 94  Resp:  [16-18] 16  BP: (107-139)/(52-72) 124/64    Physical Exam    Constitutional: She is oriented to person, place, and time. She appears well-developed and well-nourished. She is cooperative. No distress.   HENT:   Head: Normocephalic and atraumatic.   Eyes: Conjunctivae and EOM are normal. Pupils are equal, round, and reactive to light.   Neck: Normal range of motion. Neck supple.   Cardiovascular: Normal rate.    Pulmonary/Chest: Effort normal. No respiratory distress. She exhibits tenderness (recent CABG).   Abdominal: Soft. She exhibits no distension. There is no tenderness.   Musculoskeletal: She exhibits no edema or tenderness.   Neurological: She is alert and oriented to person, place, and time. She has normal strength. She displays normal reflexes. No cranial nerve deficit or sensory deficit. She exhibits normal muscle tone. Coordination normal. GCS eye subscore is 4. GCS verbal subscore is 5. GCS motor subscore is 6.   Reflex Scores:       Tricep reflexes are 2+ on the right side and 2+ on the left side.       Bicep reflexes are 2+ on the right side and 2+ on the left side.       Brachioradialis reflexes are 2+ on the right side and 2+ on the left side.       Patellar reflexes are 2+ on the right side and 2+ on the left side.       Achilles reflexes are 2+ on the right side and 2+ on the left side.  Encephalopathic.  Converses but confused.  Follows commands in all 4 extremities with much encouragement.  Seems reluctant to move the left side but will do so with constant prodding.  No pronator drift.  Finger to nose intact bilaterally.  Negative Tai's; negative clonus.   Skin: Skin is warm and dry. No rash noted. She is not diaphoretic.   Psychiatric: She has a normal mood and affect. Thought content normal.   Vitals reviewed.      Results Review:    I reviewed the patient's new clinical results.  I reviewed the patient's new imaging results and agree with the interpretation.     Please see above in the history of present illness for findings of a CT scan of the  brain performed October 13 and October 16, 2017.        Assessment/Plan     Active Problems:    Coronary artery disease of native heart with stable angina pectoris    CAD (coronary artery disease)      Assessment & Plan     POD 3 CABG x 4 and mitral valve repair  Post op encephalopathy  CT head with SDH; no history of fall or trauma  Hx of carotid stenosis  Post op confusion; possible stroke - neurology following  Hx of chronic anticoagulation with Brilinta at home  Hx of longstanding L side weakness and previous L ankle surgery    I have discussed the above with Dr. Nolen and he has viewed the CT brain images. According to Dr. Nolen, if AC is needed then of course she would be cleared with high risk for worsening of or development of new intracranial hemorrhage. If that were to occur, we will be happy to re-evaluate. We will continue to follow with repeat head CT in am.      I discussed the patients findings and my recommendations with patient, family and nursing staff    Carly Fleming, PANKAJ  10/16/17  5:59 PM

## 2017-10-16 NOTE — NURSING NOTE
9194 Preliminary results of head CT shared with PANKAJ Perez. To hold meds and keep NPO under further notice. Radiologist to call Dr. Parekh    6947 Okay for patient to take meds and eat per Dr. Parekh. Cardiac surgery aware of holding of brilinta and ASA until evaluation from neurosurgery.

## 2017-10-17 ENCOUNTER — APPOINTMENT (OUTPATIENT)
Dept: CT IMAGING | Facility: HOSPITAL | Age: 67
End: 2017-10-17

## 2017-10-17 LAB
ABO + RH BLD: NORMAL
ABO + RH BLD: NORMAL
ANION GAP SERPL CALCULATED.3IONS-SCNC: 13.6 MMOL/L
ANISOCYTOSIS BLD QL: ABNORMAL
BASOPHILS # BLD MANUAL: 0.11 10*3/MM3 (ref 0–0.2)
BASOPHILS NFR BLD AUTO: 1 % (ref 0–1.5)
BH BB BLOOD EXPIRATION DATE: NORMAL
BH BB BLOOD EXPIRATION DATE: NORMAL
BH BB BLOOD TYPE BARCODE: 5100
BH BB BLOOD TYPE BARCODE: 5100
BH BB DISPENSE STATUS: NORMAL
BH BB DISPENSE STATUS: NORMAL
BH BB PRODUCT CODE: NORMAL
BH BB PRODUCT CODE: NORMAL
BH BB UNIT NUMBER: NORMAL
BH BB UNIT NUMBER: NORMAL
BUN BLD-MCNC: 23 MG/DL (ref 8–23)
BUN/CREAT SERPL: 31.1 (ref 7–25)
CALCIUM SPEC-SCNC: 9.5 MG/DL (ref 8.6–10.5)
CHLORIDE SERPL-SCNC: 96 MMOL/L (ref 98–107)
CO2 SERPL-SCNC: 30.4 MMOL/L (ref 22–29)
CREAT BLD-MCNC: 0.74 MG/DL (ref 0.57–1)
DEPRECATED RDW RBC AUTO: 47.4 FL (ref 37–54)
EOSINOPHIL # BLD MANUAL: 0.65 10*3/MM3 (ref 0–0.7)
EOSINOPHIL NFR BLD MANUAL: 6 % (ref 0.3–6.2)
ERYTHROCYTE [DISTWIDTH] IN BLOOD BY AUTOMATED COUNT: 15.1 % (ref 11.7–13)
GFR SERPL CREATININE-BSD FRML MDRD: 79 ML/MIN/1.73
GLUCOSE BLD-MCNC: 110 MG/DL (ref 65–99)
HCT VFR BLD AUTO: 30.4 % (ref 35.6–45.5)
HGB BLD-MCNC: 10.4 G/DL (ref 11.9–15.5)
LYMPHOCYTES # BLD MANUAL: 2.17 10*3/MM3 (ref 0.9–4.8)
LYMPHOCYTES NFR BLD MANUAL: 20 % (ref 19.6–45.3)
LYMPHOCYTES NFR BLD MANUAL: 9 % (ref 5–12)
MCH RBC QN AUTO: 29.4 PG (ref 26.9–32)
MCHC RBC AUTO-ENTMCNC: 34.2 G/DL (ref 32.4–36.3)
MCV RBC AUTO: 85.9 FL (ref 80.5–98.2)
MONOCYTES # BLD AUTO: 0.97 10*3/MM3 (ref 0.2–1.2)
NEUTROPHILS # BLD AUTO: 6.82 10*3/MM3 (ref 1.9–8.1)
NEUTROPHILS NFR BLD MANUAL: 63 % (ref 42.7–76)
PLASMA CELL PREC NFR BLD MANUAL: 1 % (ref 0–0)
PLAT MORPH BLD: NORMAL
PLATELET # BLD AUTO: 255 10*3/MM3 (ref 140–500)
PMV BLD AUTO: 9.4 FL (ref 6–12)
POTASSIUM BLD-SCNC: 3.4 MMOL/L (ref 3.5–5.2)
RBC # BLD AUTO: 3.54 10*6/MM3 (ref 3.9–5.2)
SODIUM BLD-SCNC: 140 MMOL/L (ref 136–145)
UNIT  ABO: NORMAL
UNIT  ABO: NORMAL
UNIT  RH: NORMAL
UNIT  RH: NORMAL
WBC MORPH BLD: NORMAL
WBC NRBC COR # BLD: 10.83 10*3/MM3 (ref 4.5–10.7)

## 2017-10-17 PROCEDURE — 85007 BL SMEAR W/DIFF WBC COUNT: CPT | Performed by: NURSE PRACTITIONER

## 2017-10-17 PROCEDURE — 99233 SBSQ HOSP IP/OBS HIGH 50: CPT | Performed by: NURSE PRACTITIONER

## 2017-10-17 PROCEDURE — 97110 THERAPEUTIC EXERCISES: CPT

## 2017-10-17 PROCEDURE — 25010000002 ENOXAPARIN PER 10 MG: Performed by: THORACIC SURGERY (CARDIOTHORACIC VASCULAR SURGERY)

## 2017-10-17 PROCEDURE — 97166 OT EVAL MOD COMPLEX 45 MIN: CPT

## 2017-10-17 PROCEDURE — 70450 CT HEAD/BRAIN W/O DYE: CPT

## 2017-10-17 PROCEDURE — 99024 POSTOP FOLLOW-UP VISIT: CPT | Performed by: NURSE PRACTITIONER

## 2017-10-17 PROCEDURE — 25010000002 LEVETRIRACETAM PER 10 MG: Performed by: NURSE PRACTITIONER

## 2017-10-17 PROCEDURE — 85025 COMPLETE CBC W/AUTO DIFF WBC: CPT | Performed by: NURSE PRACTITIONER

## 2017-10-17 PROCEDURE — 80048 BASIC METABOLIC PNL TOTAL CA: CPT | Performed by: THORACIC SURGERY (CARDIOTHORACIC VASCULAR SURGERY)

## 2017-10-17 PROCEDURE — 92523 SPEECH SOUND LANG COMPREHEN: CPT

## 2017-10-17 RX ORDER — ASPIRIN 81 MG/1
81 TABLET, CHEWABLE ORAL DAILY
Status: DISCONTINUED | OUTPATIENT
Start: 2017-10-17 | End: 2017-10-18 | Stop reason: HOSPADM

## 2017-10-17 RX ADMIN — POTASSIUM CHLORIDE 40 MEQ: 1.5 POWDER, FOR SOLUTION ORAL at 09:41

## 2017-10-17 RX ADMIN — ENOXAPARIN SODIUM 40 MG: 40 INJECTION SUBCUTANEOUS at 17:49

## 2017-10-17 RX ADMIN — CYANOCOBALAMIN TAB 500 MCG 1000 MCG: 500 TAB at 09:43

## 2017-10-17 RX ADMIN — METOPROLOL TARTRATE 37.5 MG: 25 TABLET ORAL at 09:43

## 2017-10-17 RX ADMIN — ASPIRIN 81 MG: 81 TABLET, CHEWABLE ORAL at 17:49

## 2017-10-17 RX ADMIN — METOPROLOL TARTRATE 37.5 MG: 25 TABLET ORAL at 20:11

## 2017-10-17 RX ADMIN — LEVETIRACETAM 250 MG: 500 INJECTION, SOLUTION, CONCENTRATE INTRAVENOUS at 14:17

## 2017-10-17 RX ADMIN — POTASSIUM CHLORIDE 40 MEQ: 1.5 POWDER, FOR SOLUTION ORAL at 14:11

## 2017-10-17 RX ADMIN — ATORVASTATIN CALCIUM 40 MG: 20 TABLET, FILM COATED ORAL at 20:11

## 2017-10-17 RX ADMIN — LEVOTHYROXINE SODIUM 137 MCG: 137 TABLET ORAL at 20:11

## 2017-10-17 RX ADMIN — MUPIROCIN 1 APPLICATION: 20 OINTMENT TOPICAL at 09:44

## 2017-10-17 RX ADMIN — FUROSEMIDE 40 MG: 40 TABLET ORAL at 09:44

## 2017-10-17 NOTE — PROGRESS NOTES
" LOS: 10 days   Patient Care Team:  Narciso Camargo MD as PCP - General (Family Medicine)    Chief Complaint: post op fu    Subjective:  Symptoms:  No shortness of breath or chest pain.    Diet:  Adequate intake.  No nausea or vomiting.    Activity level: Returning to normal.    Pain:  She reports no pain.          Vital Signs  Temp:  [97.8 °F (36.6 °C)-99.3 °F (37.4 °C)] 98.4 °F (36.9 °C)  Heart Rate:  [] 85  Resp:  [14-16] 14  BP: (114-139)/(55-70) 114/55  Body mass index is 37.19 kg/(m^2).    Intake/Output Summary (Last 24 hours) at 10/17/17 0811  Last data filed at 10/17/17 0537   Gross per 24 hour   Intake             1060 ml   Output             1100 ml   Net              -40 ml            Last 3 weights    10/12/17  0948 10/14/17  0609 10/16/17  0640   Weight: 216 lb (98 kg) 226 lb 8 oz (103 kg) 223 lb 8 oz (101 kg)         Objective:  General Appearance:  Comfortable.    Vital signs: (most recent): Blood pressure 112/54, pulse 96, temperature 98.6 °F (37 °C), temperature source Oral, resp. rate 16, height 65\" (165.1 cm), weight 223 lb 8 oz (101 kg), SpO2 92 %.    Output: Producing urine and producing stool.    Lungs:  Normal respiratory rate and normal effort.  Breath sounds clear to auscultation.    Heart: Normal rate.  Regular rhythm.  S1 normal and S2 normal.  No murmur, gallop or friction rub.   Abdomen: Abdomen is soft and non-distended.    Neurological: Patient is alert and oriented to person, place and time.  (Answers mentation questions appropriately, appropriate conversation skill.  Still with left sided weakness, with drift noted to left upper extremity).    Skin:  Warm and dry.  (Sternal and leg incisions well approximated without erythema or drainage)            Results Review:        WBC WBC   Date Value Ref Range Status   10/16/2017 11.51 (H) 4.50 - 10.70 10*3/mm3 Final   10/15/2017 14.92 (H) 4.50 - 10.70 10*3/mm3 Final      HGB Hemoglobin   Date Value Ref Range Status   10/16/2017 7.4 " (L) 11.9 - 15.5 g/dL Final   10/15/2017 8.3 (L) 11.9 - 15.5 g/dL Final      HCT Hematocrit   Date Value Ref Range Status   10/16/2017 23.0 (L) 35.6 - 45.5 % Final   10/15/2017 26.5 (L) 35.6 - 45.5 % Final      Platelets Platelets   Date Value Ref Range Status   10/16/2017 202 140 - 500 10*3/mm3 Final   10/15/2017 185 140 - 500 10*3/mm3 Final        PT/INR:  No results found for: PROTIME/No results found for: INR    Sodium Sodium   Date Value Ref Range Status   10/17/2017 140 136 - 145 mmol/L Final   10/16/2017 142 136 - 145 mmol/L Final   10/15/2017 142 136 - 145 mmol/L Final      Potassium Potassium   Date Value Ref Range Status   10/17/2017 3.4 (L) 3.5 - 5.2 mmol/L Final   10/16/2017 3.7 3.5 - 5.2 mmol/L Final   10/15/2017 3.6 3.5 - 5.2 mmol/L Final      Chloride Chloride   Date Value Ref Range Status   10/17/2017 96 (L) 98 - 107 mmol/L Final   10/16/2017 100 98 - 107 mmol/L Final   10/15/2017 102 98 - 107 mmol/L Final      Bicarbonate CO2   Date Value Ref Range Status   10/17/2017 30.4 (H) 22.0 - 29.0 mmol/L Final   10/16/2017 29.9 (H) 22.0 - 29.0 mmol/L Final   10/15/2017 28.0 22.0 - 29.0 mmol/L Final      BUN BUN   Date Value Ref Range Status   10/17/2017 23 8 - 23 mg/dL Final   10/16/2017 27 (H) 8 - 23 mg/dL Final   10/15/2017 27 (H) 8 - 23 mg/dL Final      Creatinine Creatinine   Date Value Ref Range Status   10/17/2017 0.74 0.57 - 1.00 mg/dL Final   10/16/2017 0.76 0.57 - 1.00 mg/dL Final   10/15/2017 0.73 0.57 - 1.00 mg/dL Final      Calcium Calcium   Date Value Ref Range Status   10/17/2017 9.5 8.6 - 10.5 mg/dL Final   10/16/2017 9.2 8.6 - 10.5 mg/dL Final   10/15/2017 10.2 8.6 - 10.5 mg/dL Final      Magnesium No results found for: MG         aspirin 81 mg Oral Daily   atorvastatin 40 mg Oral Nightly   enoxaparin 40 mg Subcutaneous Daily   furosemide 40 mg Oral Daily   levETIRAcetam 250 mg Intravenous Q12H   levothyroxine 137 mcg Oral Daily   metoprolol tartrate 37.5 mg Oral Q12H   mupirocin 1 application  Each Nare Daily   pantoprazole 40 mg Oral Q AM   potassium chloride 20 mEq Oral Daily   sennosides-docusate sodium 2 tablet Oral Nightly   ticagrelor 60 mg Oral BID   vitamin B-12 1,000 mcg Oral Daily       sodium chloride 30 mL/hr Last Rate: 30 mL/hr (10/12/17 1830)   sodium chloride 30 mL/hr            Patient Active Problem List   Diagnosis Code   • Coronary artery disease of native heart with stable angina pectoris I25.118   • CAD (coronary artery disease) I25.10       Assessment & Plan    CAD (hx of PCI 2006), SEVERE MV INCOMPETENCE S/P CABG X4 WITH LIMA, MV REPAIR/RING----POD #5, on bb/statin, asa/brilenta on hold  SMALL SDH-----  POST OP TME----u/a negative 10/16  BILATERAL CAROTID DX, HX CVA----left upper extremity weakness post op, neuro following, recommed asa/plavix (brilenta on hold)  POST OP ANEMIA----expected blood loss, 2 units PRBC given yesterday  HTN----controlled on current tx  HL---on statin  HYPOTHYROID----on synthroid  DECONDITIONING, FREQUENT FALLS PREOP  ?CARLOS----pulmonary eval yesterday and signed off, recommend outpatient study    Eval for acute rehab.  OT/ST eval for rehab.  Passed swallow eval.  Repeat CT of head today without change, cleared to resume a/c with understanding that new or worsening ICH may occur. Will discuss with Dr. Muñoz/Dr. Parekh timing for resuming a/c.       PANKAJ Perez  10/17/17  8:11 AM

## 2017-10-17 NOTE — PROGRESS NOTES
Continued Stay Note  [facility]     Patient Name: Marion Anderson  MRN: [id number]  Today's Date: 10/17/2017    Admit Date: 10/6/2017          Discharge Plan       10/17/17 1733    Case Management/Social Work Plan    Plan Referral to [facility] pending.    Patient/Family In Agreement With Plan yes    Additional Comments S/W Pt, her two daughters and with PANKAJ Palafox and Md plan is for Pt to try and go to [facility] upon discharge.  Consult to [facility] is pending.  CCP following.               Discharge Codes     None            María Tiwari RN

## 2017-10-17 NOTE — PROGRESS NOTES
Adult Nutrition  Assessment/PES    Patient Name:  Marion Anderson  YOB: 1950  MRN: [id number]  Admit Date:  10/6/2017    Assessment Date:  10/17/2017    Comments:    Improved from yesterday, intake good/adequate.   Following.          Reason for Assessment       10/17/17 0950    Reason for Assessment    Reason For Assessment/Visit follow up protocol                Labs/Tests/Procedures/Meds       10/17/17 0950    Labs/Tests/Procedures/Meds    Labs/Tests Review K+;Cl-;Glucose    Procedure Review CT;SLP   CT - small SDH    Swallow eval status Done    Type of SLP Evaluation Bedside   10/16    Medication Review Reviewed, pertinent;Diuretic;Anticonvulsant;Antacid    Significant Vitals reviewed            Physical Findings       10/17/17 0952    Physical Appearance    Overall Physical Appearance other (see comments)   Patient is alert and oriented to person, place and time; left sided weakness    Skin surgical wound              Nutrition Prescription Ordered       10/17/17 0952    Nutrition Prescription PO    Common Modifiers Cardiac            Evaluation of Received Nutrient/Fluid Intake       10/17/17 0952    PO Evaluation    Number of Meals 1    % PO Intake 75% + IVF @ 30 cc/hr            Problem/Interventions:        Problem 1       10/17/17 0953    Nutrition Diagnoses Problem 1    Other Intake improved, as well as mental status                Intervention Goal       10/17/17 0954    Intervention Goal    General Maintain nutrition    PO Continue positive trend;PO intake (%)    PO Intake % 75 %            Nutrition Intervention       10/17/17 0954    Nutrition Intervention    RD/Tech Action Follow Tx progress;Care plan reviewd;Encourage intake;Interview for preference              Education/Evaluation       10/17/17 0954    Monitor/Evaluation    Monitor Per protocol        Electronically signed by:  Una Mathew RD  10/17/17 9:54 AM

## 2017-10-17 NOTE — PLAN OF CARE
Problem: Patient Care Overview (Adult)  Goal: Plan of Care Review  Outcome: Ongoing (interventions implemented as appropriate)    10/17/17 1038   Coping/Psychosocial Response Interventions   Plan Of Care Reviewed With patient   Patient Care Overview   Progress progress towards functional goals is fair   Outcome Evaluation   Outcome Summary/Follow up Plan pt presents with decreased motor planning and L side weakness, she was able to stand and walk approx 12 ft with assist of 2 people, pt was lethargic and fatigued quickly, will progress as tolerated

## 2017-10-17 NOTE — PLAN OF CARE
Problem: Inpatient SLP  Goal: Cognitive-linguistic-Patient will improve Cognitive-linguistic skills to allow optimal participation in care  Outcome: Ongoing (interventions implemented as appropriate)    10/17/17 7588   Cognitive Linguistic- Optimal Participation in Care   Cognitive Linguistic Optimal Participation in Care- SLP, Date Established 10/17/17   Cognitive Linguistic Optimal Participation in Care- SLP, Time to Achieve (Pt would benefit from extensive eval at next level of care)   Cognitive Linguistic Optimal Participation in Care- SLP, Outcome goal ongoing  (Tx rec'ed at current and next level of care)

## 2017-10-17 NOTE — PLAN OF CARE
Problem: Patient Care Overview (Adult)  Goal: Plan of Care Review  Outcome: Ongoing (interventions implemented as appropriate)    10/17/17 1542   Coping/Psychosocial Response Interventions   Plan Of Care Reviewed With patient   Patient Care Overview   Progress improving   Outcome Evaluation   Outcome Summary/Follow up Plan Patient has urinated very frequently and had solid bowel movements today. Patient doing better at using walker and gait belt with two assist. Patient remains on room air and in sinus rhythm. Awaiting confirmation on home with home health vs rehab.        Goal: Adult Individualization and Mutuality  Outcome: Ongoing (interventions implemented as appropriate)  Goal: Discharge Needs Assessment  Outcome: Ongoing (interventions implemented as appropriate)    Problem: Acute Coronary Syndrome (ACS) (Adult)  Goal: Signs and Symptoms of Listed Potential Problems Will be Absent or Manageable (Acute Coronary Syndrome)  Outcome: Ongoing (interventions implemented as appropriate)    Problem: Pain, Acute (Adult)  Goal: Identify Related Risk Factors and Signs and Symptoms  Outcome: Ongoing (interventions implemented as appropriate)  Goal: Acceptable Pain Control/Comfort Level  Outcome: Ongoing (interventions implemented as appropriate)    10/17/17 1542   Pain, Acute (Adult)   Acceptable Pain Control/Comfort Level making progress toward outcome         Problem: Perioperative Period (Adult)  Goal: Signs and Symptoms of Listed Potential Problems Will be Absent or Manageable (Perioperative Period)  Outcome: Ongoing (interventions implemented as appropriate)    10/17/17 1542   Perioperative Period   Problems Assessed (Perioperative Period) all   Problems Present (Perioperative Period) none         Problem: Cardiac Surgery (Adult)  Goal: Signs and Symptoms of Listed Potential Problems Will be Absent or Manageable (Cardiac Surgery)  Outcome: Ongoing (interventions implemented as appropriate)    10/17/17 1542   Cardiac  Surgery   Problems Assessed (Cardiac Surgery) all   Problems Present (Cardiac Surgery) electrolyte imbalance;fluid imbalance

## 2017-10-17 NOTE — PROGRESS NOTES
"DOS: 10/17/2017  NAME: Marion Anderson   : 1950  PCP: Narciso Camargo MD  No chief complaint on file.    CC: left-sided weakness and confusion.     Subjective: Patient had a second episode of difficulty with speech yesterday afternoon, likely encephalopathic. SHe has since received 2 units PRBCs for low hemoglobin. Patient with no new neurologic changes since yesterday per RN. Patient's daughters at bedside state she was up in chair, [place] [facility] [place] problems with speech. She is currently in bed, drowsy but arousable.     Objective:  Vital signs: /66 (BP Location: Right arm, Patient Position: Lying)  Pulse 82  Temp 98.2 °F (36.8 °C) (Oral)   Resp 16  Ht 65\" (165.1 cm)  Wt 223 lb 8 oz (101 kg)  LMP Comment: postmenopausal  SpO2 95%  BMI 37.19 kg/m2      Physical Exam:  GENERAL: NAD  HEENT: Normocephalic, atraumatic   COR: RRR,  sternotomy wound  Resp: Even and unlabored  Extremities: No signs of distal embolization. TEDs and SCDs in place.    Neurological:   MS: Drowsy but easily arousable. Some decreased attention and concentration. Naming intact. Language normal. No neglect. Higher integrative function normal  CN: II-XII normal  Motor: Normal strength and tone on the right. Strength on left 3-4/5.   Sensory: Intact  Patient examined, changes noted.   Results Review:     I reviewed the patient's new clinical results.    Current Medications:    atorvastatin 40 mg Oral Nightly   enoxaparin 40 mg Subcutaneous Daily   furosemide 40 mg Oral Daily   levETIRAcetam 250 mg Intravenous Q12H   levothyroxine 137 mcg Oral Daily   metoprolol tartrate 37.5 mg Oral Q12H   mupirocin 1 application Each Nare Daily   pantoprazole 40 mg Oral Q AM   potassium chloride 20 mEq Oral Daily   sennosides-docusate sodium 2 tablet Oral Nightly   vitamin B-12 1,000 mcg Oral Daily       sodium chloride 30 mL/hr Last Rate: 30 mL/hr (10/12/17 1830)   sodium chloride 30 mL/hr        Medications Reviewed: " Changed made    Laboratory results:  Lab Results   Component Value Date    GLUCOSE 110 (H) 10/17/2017    CALCIUM 9.5 10/17/2017     10/17/2017    K 3.4 (L) 10/17/2017    CO2 30.4 (H) 10/17/2017    CL 96 (L) 10/17/2017    BUN 23 10/17/2017    CREATININE 0.74 10/17/2017    EGFRIFNONA 79 10/17/2017    BCR 31.1 (H) 10/17/2017    ANIONGAP 13.6 10/17/2017     Lab Results   Component Value Date    WBC 10.83 (H) 10/17/2017    HGB 10.4 (L) 10/17/2017    HCT 30.4 (L) 10/17/2017    MCV 85.9 10/17/2017     10/17/2017        Results from last 7 days  Lab Units 10/13/17  0330   CHOLESTEROL mg/dL 97     Lab Results   Component Value Date    INR 1.26 (H) 10/12/2017    INR 1.39 (H) 10/12/2017    INR 0.99 10/07/2017    PROTIME 15.3 (H) 10/12/2017    PROTIME 16.6 (H) 10/12/2017    PROTIME 12.7 10/07/2017     No components found for: B12  Lab Results   Component Value Date    TSH 0.091 (L) 10/13/2017     Lab Results   Component Value Date    LDLCALC 56 10/13/2017     Lab Results   Component Value Date    HGBA1C 5.20 10/13/2017     Review and interpretation of imaging:  CT 10/16/17: There is a small acute subdural hematoma appreciated along the right  aspect of the falx cerebri predominantly medial to the right parietal  lobe, although there is a smaller component medial to the posterior  right frontal lobe. For the most part, the subdural hematoma is similar  in size when compared to the prior study of 10/13/2017. However, it may  be slightly larger in greatest AP diameter when compared to the prior  Exam.  CT head 10/17/17: There is a small subacute subdural hematoma noted along the right aspect  of falx cerebri medial to the right parietal lobe. When compared to the  previous exam, there is no convincing interval change    Impression: Ms. Anderson is a 68 yo RHW female with CAD, HTN, HLD and known bilateral carotid occlusion who was transferred from [place] on 10/6. She underwent CABG on 10/12 followed by left-sided  weakness. Her CT head on 10/13 was negative for any acute ischemic stroke but does show remote infarct and follow-up CT demonstrated SDH. Neurosurgery has been consulted to comment on continuing antiplatelet therapy. I reviewed repeat head CT done this morning with Dr. Parekh and it is stable. Okay to resume ASA. Would not recommend DAPT at this time but will eventually need, and at that point would recommend Plavix over Brilinta. Will continue Keppra 250 mg BID for 6 months. Discussed reasons for AED with daughters. Continue neurochecks. PT/OT/ST. Will see as needed. Please call with questions or concerns.     Plan:  ASA 81 mg daily for now   Lipitor 80 mg  Keppra 250 mg BID x 6 months.  Hydrate  Neurochecks  Non-pharmacological DVT prophylaxis  EKG Tele  PT/OT/ST  Stroke Education  Blood pressure control to <130/80  Goal LDL <70-recommend high dose statins-   Serum glucose < 140    I have discussed the above with Dr. Bryant, RN, patient,and family.  Carly Irwin, APRN  10/17/17  2:45 PM      Active Problems:    Coronary artery disease of native heart with stable angina pectoris    CAD (coronary artery disease)

## 2017-10-17 NOTE — THERAPY EVALUATION
Acute Care - Occupational Therapy Initial Evaluation   [place]     Patient Name: Marion Anderson  : 1950  MRN: [id number]  Today's Date: 10/17/2017  Onset of Illness/Injury or Date of Surgery Date: 10/06/17     Referring Physician: Wanda    Admit Date: 10/6/2017       ICD-10-CM ICD-9-CM   1. Physical deconditioning R53.81 799.3   2. Coronary artery disease of native heart with stable angina pectoris, unspecified vessel or lesion type I25.118 414.01     413.9   3. Generalized weakness R53.1 780.79     Patient Active Problem List   Diagnosis   • Coronary artery disease of native heart with stable angina pectoris   • CAD (coronary artery disease)     Past Medical History:   Diagnosis Date   • Arthritis    • Coronary artery disease    • Disease of thyroid gland    • Hypertension    • PONV (postoperative nausea and vomiting)    • Spinal headache      Past Surgical History:   Procedure Laterality Date   • CARDIAC SURGERY     • CORONARY ARTERY BYPASS GRAFT N/A 10/12/2017    Procedure: FILEMON STERNOTOMY CORONARY ARTERY BYPASS GRAFT TIMES 4  USING LEFT INTERNAL MAMMARY ARTERY AND LEFT GREATER SAPHENOUS VEIN GRAFT PER  ENDOSCOPIC VEIN HARVESTING, MITRAL VALVE REPAIR AND PRP;  Surgeon: Ramos Muñoz MD;  Location: [facility];  Service:    • FRACTURE SURGERY      ankle. leg   • TONSILLECTOMY            OT ASSESSMENT FLOWSHEET (last 72 hours)      OT Evaluation       10/17/17 1200 10/17/17 1025 10/17/17 0001 10/16/17 1946 10/16/17 1608    Rehab Evaluation    Document Type evaluation  -MR therapy note (daily note)  -PC       Subjective Information agree to therapy;complains of;weakness;fatigue  -MR agree to therapy;complains of;weakness;fatigue;pain  -PC       Patient Effort, Rehab Treatment adequate  -MR adequate  -PC       Patient Effort, Rehab Treatment Comment RN and family state pt has had a busy day and very fatigued  -MR        Symptoms Noted During/After Treatment fatigue  -MR fatigue  -PC        Symptoms Noted Comment  lethargy, needs continual tactile and verbal cues  -       General Information    Patient Profile Review yes  -MR        Onset of Illness/Injury or Date of Surgery Date 10/06/17  -MR        Precautions/Limitations cardiac precautions;sternal precautions;fall precautions  -MR cardiac precautions;sternal precautions  -PC       Prior Level of Function independent:;ADL's  -MR        Equipment Currently Used at Home walker, standard;wheelchair  -MR        Plans/Goals Discussed With patient;family  -MR        Benefits Reviewed patient:;family:  -MR        Living Environment    Lives With significant other  -MR        Living Arrangements house  -MR        Clinical Impression    Impairments Found (describe specific impairments) aerobic capacity/endurance;gait, locomotion, and balance  -MR        Criteria for Skilled Therapeutic Interventions Met yes;treatment indicated  -MR        Rehab Potential good, to achieve stated therapy goals  -MR        Therapy Frequency 3-5 times/wk  -MR        Anticipated Discharge Disposition skilled nursing facility;inpatient rehabilitation facility  -MR        Vital Signs    Pre SpO2 (%)  96  -PC       Pain Assessment    Pain Assessment No/denies pain  -MR Wilhelm-Linares FACES  -PC       Wilhelm-Linares FACES Pain Rating  6  -PC       Pain Location  [place]  -PC       Pain Intervention(s)  Medication (See MAR);Repositioned  -       Cognitive Assessment/Intervention    Current Cognitive/Communication Assessment  impaired  -PC       Orientation Status oriented to;person;situation  -MR oriented to;person  -PC       Follows Commands/Answers Questions able to follow single-step instructions;needs cueing;needs increased time;needs repetition  -MR able to follow single-step instructions;needs cueing;needs increased time;needs repetition;50% of the time  -       Personal Safety  moderate impairment;decreased insight to deficits  -       Personal Safety Interventions fall  prevention program maintained  - fall prevention program maintained;gait belt  -PC       ROM (Range of Motion)    General ROM Detail RUE ROM WFL; LUE noted with decreased ROM/weakness  -MR        MMT (Manual Muscle Testing)    General MMT Assessment Detail RUE 3/5; LUE 2 to 2+/5  -MR LUE 2/5, RUE 3/5, LLE 3/5, RLE 4/5  -PC       Muscle Tone Assessment    Muscle Tone Assessment   Left-Side Extremities  -GW Left-Side Extremities  -JS Left-Side Extremities  -AH    Left-Side Extremities Muscle Tone Assessment   mildly decreased tone  -GW mildly decreased tone  -JS mildly decreased tone  -AH    Bed Mobility, Assessment/Treatment    Bed Mob, Sit to Supine, [place]  moderate assist (50% patient effort);maximum assist (25% patient effort)  -PC       Bed Mobility, Comment not tested, pt declined due to fatigue, states had been up recently with PT  -MR        Transfer Assessment/Treatment    Transfers, Sit-Stand [place]  moderate assist (50% patient effort);hand held assist;2 person assist required  -       Transfers, Stand-Sit [place]  moderate assist (50% patient effort);2 person assist required  -       ADL Assessment/Intervention    Additional Documentation Self-Feeding Assessment/Training (Group)  -        Self-Feeding Assessment/Training    Self-Feeding Assess/Train, [place] set up required  -        Therapy Exercises    Bilateral Lower Extremities  5 reps;AROM:;sitting;ankle pumps/circles;LAQ;hip flexion   needs tactile and verbal cues  -       Exercise Protocols  --   level II   -PC       General Therapy Interventions    Planned Therapy Interventions activity intolerance;ADL retraining;bed mobility training;strengthening;transfer training  -        Positioning and Restraints    Pre-Treatment Position in bed  -MR sitting in chair/recliner  -       Post Treatment Position bed  -MR bed  -PC       In Bed supine;notified nsg;call light within reach;encouraged to call for assist;with  family/caregiver  -MR supine;call light within reach;encouraged to call for assist  -PC         10/16/17 1231 10/16/17 1030 10/16/17 0802 10/15/17 1012 10/14/17 1724    Rehab Evaluation    Document Type  evaluation  -SA (r) HR (t) SA (c)  therapy note (daily note)  -PRADEEP evaluation  -SL    Subjective Information  agree to therapy;complains of   resistant to PO intake (especially larger bites of solids)  -SA (r) HR (t) SA (c)  agree to therapy;complains of;pain  -PRADEEP fatigue  -SL    Patient Effort, Rehab Treatment     poor  -SL    Patient Effort, Rehab Treatment Comment     pt refused most po trials, despite multiple efforts by family, therapist, and nursing  -SL    Symptoms Noted During/After Treatment  none  -SA (r) HR (t) SA (c)   none  -SL    Pain Assessment    Pain Assessment  No/denies pain  -SA (r) HR (t) SA (c)  Wilhelm-Baker FACES  -PRADEEP     Wilhelm-Linares FACES Pain Rating    4  -PRADEEP     Pain Intervention(s)    Repositioned;Ambulation/increased activity;Rest  -PRADEEP     Response to Interventions    tolerated  -PRADEEP     Cognitive Assessment/Intervention    Current Cognitive/Communication Assessment  functional  -SA (r) HR (t) SA (c)  functional  -PRADEEP impaired  -SL    Orientation Status    oriented to;person;place;time  -PRADEEP oriented to;person;place  -SL    Follows Commands/Answers Questions    needs repetition;needs increased time  -PRADEEP needs repetition;needs increased time;needs cueing;able to follow single-step instructions;50% of the time  -SL    Personal Safety    decreased awareness, need for safety  -PRADEEP decreased insight to deficits  -SL    Personal Safety Interventions    fall prevention program maintained;nonskid shoes/slippers when out of bed  -PRADEEP     Muscle Tone Assessment    Muscle Tone Assessment Left-Side Extremities  -AH  Left-Side Extremities  -AH      Left-Side Extremities Muscle Tone Assessment mildly decreased tone  -AH  mildly decreased tone  -AH      Bed Mobility, Assessment/Treatment    Bed  Mobility, Roll Right, [place]    dependent (less than 25% patient effort);2 person assist required  -PRADEEP     Bed Mob, Supine to Sit, [place]    dependent (less than 25% patient effort);2 person assist required  -PRADEEP     Transfer Assessment/Treatment    Transfers, Sit-Stand [place]    moderate assist (50% patient effort);2 person assist required  -PRADEEP     Transfers, Stand-Sit [place]    moderate assist (50% patient effort);2 person assist required  -PRADEEP     Transfer, Comment    sit<>stand transfer completed 3x  -[facility]     Motor Skills/Interventions    Additional Documentation    Balance Skills Training (Group)  -[facility]     Balance Skills Training    Sitting-Level of Assistance    Minimum assistance;Moderate assistance  -[facility]     Sitting-Balance Support    Feet supported  -[facility]     Sitting-Balance Activities    --   orienting to midline  -[facility]     Sitting # of Minutes    2  -PRADEEP     Therapy Exercises    Bilateral Lower Extremities    AAROM:;5 reps;ankle pumps/circles;LAQ  -[facility]     Exercise Protocols    --   cardiac protocol, 5 reps  -PRADEEP     Positioning and Restraints    Pre-Treatment Position    sitting in chair/recliner  -[facility]     Post Treatment Position    chair  -PRADEEP     In Chair    notified nsg;reclined;call light within reach;encouraged to call for assist;with family/caregiver   HALEIGH Silveira notified  -[facility]       User Key  (r) = Recorded By, (t) = Taken By, (c) = Cosigned By    Initials Name Effective Dates    SA Shira Plascencia, MS CCC-SLP 04/13/15 -     SL Shira Farris, MS CCC-SLP 04/13/15 -     PC Tiffanie Kemp, PT 12/01/15 -     COURTNEY Unger, RN 06/16/16 -     MR Monse Hull, OT 06/22/16 -     NICKY Cabrera, HALEIGH 06/16/16 -     AIDA Baig, RN 01/05/17 -     PRADEEP Kelin Jung, PT 08/03/17 -     HR Linda Huang, Speech Therapy Student 09/06/17 -            Occupational Therapy Education     Title: PT OT SLP Therapies (Active)     Topic: Occupational Therapy (Done)     Point: ADL  training (Done)    Description: Instruct learner(s) on proper safety adaptation and remediation techniques during self care or transfers.   Instruct in proper use of assistive devices.    Learning Progress Summary    Learner Readiness Method Response Comment Documented by Status   Patient Acceptance E VU Educated on role of OT, benefits of inpatient rehab. MR 10/17/17 1218 Done   Family Acceptance E VU Educated on role of OT, benefits of inpatient rehab. MR 10/17/17 1218 Done                      User Key     Initials Effective Dates Name Provider Type Discipline    MR 06/22/16 -  Monse Hull OT Occupational Therapist OT                  OT Recommendation and Plan  Anticipated Discharge Disposition: skilled nursing facility, inpatient rehabilitation facility  Planned Therapy Interventions: activity intolerance, ADL retraining, bed mobility training, strengthening, transfer training  Therapy Frequency: 3-5 times/wk  Plan of Care Review  Plan Of Care Reviewed With: patient  Outcome Summary/Follow up Plan: Pt seen for initial evaluation, recommend skilled OT services to increase safety and independence with ADLs.          OT Goals       10/17/17 1219          Transfer Training OT LTG    Transfer Training OT LTG, Date Established 10/17/17  -      Transfer Training OT LTG, Time to Achieve 1 wk  -MR      Transfer Training OT LTG, Activity Type bed to chair /chair to bed;toilet  -MR      Transfer Training OT LTG, [place] Level moderate assist (50% patient effort)  -MR      Transfer Training OT LTG, Assist Device walker, rolling  -MR      Isolated Movement OT LTG    Isolated Movement OT LTG, Date Established 10/17/17  -      Isolated Movement OT LTG, Time to Achieve 1 wk  -MR      Isolated Movement OT LTG, Body Area left shoulder;left elbow;left forearm;left wrist;left fingers  -      Isolated Movement OT LTG, Level facilitate movement;3/4  -MR      ADL OT LTG    ADL OT LTG, Date Established 10/17/17  -       ADL OT LTG, Time to Achieve 1 wk  -MR      ADL OT LTG, Activity Type ADL skills  -MR      ADL OT LTG, [place] Level mod assist;max assist  -MR        User Key  (r) = Recorded By, (t) = Taken By, (c) = Cosigned By    Initials Name Provider Type    MR Monse Hull, OT Occupational Therapist                Outcome Measures       10/17/17 1200 10/17/17 1000 10/15/17 1000    How much help from another person do you currently need...    Turning from your back to your side while in flat bed without using bedrails?  2  -PC 1  -SA    Moving from lying on back to sitting on the side of a flat bed without bedrails?  2  -PC 1  -SA    Moving to and from a bed to a chair (including a wheelchair)?  2  -PC 2  -SA    Standing up from a chair using your arms (e.g., wheelchair, bedside chair)?  2  -PC 2  -SA    Climbing 3-5 steps with a railing?  1  -PC 1  -SA    To walk in hospital room?  2  -PC 1  -SA    AM-PAC 6 Clicks Score  11  -PC 8  -SA    How much help from another is currently needed...    Putting on and taking off regular lower body clothing? 1  -MR      Bathing (including washing, rinsing, and drying) 1  -MR      Toileting (which includes using toilet bed pan or urinal) 1  -MR      Putting on and taking off regular upper body clothing 2  -MR      Taking care of personal grooming (such as brushing teeth) 2  -MR      Eating meals 3  -MR      Score 10  -MR      Functional Assessment    Outcome Measure Options AM-PAC 6 Clicks Daily Activity (OT)  -MR  AM-PAC 6 Clicks Basic Mobility (PT)  -SA      User Key  (r) = Recorded By, (t) = Taken By, (c) = Cosigned By    Initials Name Provider Type    MIRIAN Kemp, PT Physical Therapist    MR Monse Hull, OT Occupational Therapist    SA Kelin Jung PT Physical Therapist          Time Calculation:   OT Start Time: 1041  OT Stop Time: 1056  OT Time Calculation (min): 15 min    Therapy Charges for Today     Code Description Service Date Service Provider Modifiers  Qty    [id number]  OT EVAL MOD COMPLEXITY 2 10/17/2017 Monse Hull, OT GO 1               Monse Hull, OT  10/17/2017

## 2017-10-17 NOTE — PLAN OF CARE
Problem: Patient Care Overview (Adult)  Goal: Plan of Care Review    10/17/17 1219   Coping/Psychosocial Response Interventions   Plan Of Care Reviewed With patient   Outcome Evaluation   Outcome Summary/Follow up Plan Pt seen for initial evaluation, recommend skilled OT services to increase safety and independence with ADLs.         Problem: Inpatient Occupational Therapy  Goal: Transfer Training Goal 1 LTG- OT    10/17/17 1219   Transfer Training OT LTG   Transfer Training OT LTG, Date Established 10/17/17   Transfer Training OT LTG, Time to Achieve 1 wk   Transfer Training OT LTG, Activity Type bed to chair /chair to bed;toilet   Transfer Training OT LTG, [place] Level moderate assist (50% patient effort)   Transfer Training OT LTG, Assist Device walker, rolling       Goal: Isolated Movement Goal LTG- OT    10/17/17 1219   Isolated Movement OT LTG   Isolated Movement OT LTG, Date Established 10/17/17   Isolated Movement OT LTG, Time to Achieve 1 wk   Isolated Movement OT LTG, Body Area left shoulder;left elbow;left forearm;left wrist;left fingers   Isolated Movement OT LTG, Level facilitate movement;3/4       Goal: ADL Goal LTG- OT    10/17/17 1219   ADL OT LTG   ADL OT LTG, Date Established 10/17/17   ADL OT LTG, Time to Achieve 1 wk   ADL OT LTG, Activity Type ADL skills   ADL OT LTG, [place] Level mod assist;max assist

## 2017-10-17 NOTE — THERAPY EVALUATION
Acute Care - Speech Language Pathology Initial Evaluation  [facility]     Patient Name: Marion Anderson  : 1950  MRN: [id number]  Today's Date: 10/17/2017  Onset of Illness/Injury or Date of Surgery Date: 10/06/17            Admit Date: 10/6/2017     The patient was seen on this date for assessment of cognitive-communication abilities. The patient was cooperative and friendly. The patient was accompanied by two daughters who assisted in contributing background information regarding the patient's premorbid baseline.  The patient's history is significant for CAD, cardiac bypass. A recent CT scan (head) noted subdural hematoma along right aspect of fax cerebri and chronic infarct in the distribution of the right MCA.    Today's assessment noted the following cognitive-communication deficits:  (1) flat affect in structured and unstructured tasks  (2) decrease responsiveness to humor-presented verbally and in written format  (3) delayed processing of information  (4) decrease inflection and prosody in spoken communication  (5) mildly impaired reading comprehension at the paragraph level    REC: the patient would benefit from more extensive assessment at the next level of care and education and treatment to addressed identified deficits at the current level of care.    Visit Dx:    ICD-10-CM ICD-9-CM   1. Physical deconditioning R53.81 799.3   2. Coronary artery disease of native heart with stable angina pectoris, unspecified vessel or lesion type I25.118 414.01     413.9   3. Generalized weakness R53.1 780.79     Patient Active Problem List   Diagnosis   • Coronary artery disease of native heart with stable angina pectoris   • CAD (coronary artery disease)     Past Medical History:   Diagnosis Date   • Arthritis    • Coronary artery disease    • Disease of thyroid gland    • Hypertension    • PONV (postoperative nausea and vomiting)    • Spinal headache      Past Surgical History:   Procedure Laterality  Date   • CARDIAC SURGERY     • CORONARY ARTERY BYPASS GRAFT N/A 10/12/2017    Procedure: FILEMON STERNOTOMY CORONARY ARTERY BYPASS GRAFT TIMES 4  USING LEFT INTERNAL MAMMARY ARTERY AND LEFT GREATER SAPHENOUS VEIN GRAFT PER  ENDOSCOPIC VEIN HARVESTING, MITRAL VALVE REPAIR AND PRP;  Surgeon: Ramos Muñoz MD;  Location: [facility];  Service:    • FRACTURE SURGERY      ankle. leg   • TONSILLECTOMY            SLP EVALUATION (last 72 hours)      SLP Evaluation       10/17/17 1600                Rehab Evaluation    Document Type evaluation  -RH        Subjective Information no complaints;agree to therapy  -RH        General Information    Patient Profile Review yes  -RH        Subjective Patient Observations pt was friendly and cooperative  -RH        Pertinent History Of Current Problem CT scan noted subdural hematoma along right aspect of fax cerebri and chronic infarct in the distribution of the R MCA  -RH        Current Diet Limitations thin liquids;regular solid  -RH        Barriers to Rehab none identified  -RH        Pain Assessment    Pain Assessment No/denies pain  -RH        Cognitive Assessment/Intervention    Current Cognitive/Communication Assessment impaired  -RH        Orientation Status oriented x 4  -RH        Follows Commands/Answers Questions 100% of the time;needs increased time  -RH          User Key  (r) = Recorded By, (t) = Taken By, (c) = Cosigned By    Initials Name Effective Dates    RH Benjamin Hernandez, MS [facility]-SLP 07/07/16 -            EDUCATION  The patient has been educated in the following areas:   Cognitive Impairment Communication Impairment.    SLP Recommendation and Plan                                           SLP Goals       10/17/17 1625 10/16/17 1030 10/14/17 1736    Safely Consume Diet    Safely Consume Diet- SLP, Date Established  10/16/17  -SA (r) HR (t) SA (c) 10/14/17  -SL    Safely Consume Diet- SLP, Time to Achieve  by discharge  -SA (r) HR (t) SA (c) by discharge  -SL     Safely Consume Diet- SLP, Outcome  goal met  -SA (r) HR (t) SA (c)     Cognitive Linguistic- Optimal Participation in Care    Cognitive Linguistic Optimal Participation in Care- SLP, Date Established 10/17/17  -      Cognitive Linguistic Optimal Participation in Care- SLP, Time to Achieve --   Pt would benefit from extensive eval at next level of care  -      Cognitive Linguistic Optimal Participation in Care- SLP, Outcome goal ongoing   Tx rec'ed at current and next level of care  -        User Key  (r) = Recorded By, (t) = Taken By, (c) = Cosigned By    Initials Name Provider Type    SA Shira Plascencia, MS CCC-SLP Speech and Language Pathologist     Shira Farris, MS CCC-SLP Speech and Language Pathologist     Benjamin Hernandez, MS CCC-SLP Speech and Language Pathologist    HR Linda Huang, Speech Therapy Student Speech Therapy Student             SLP Outcome Measures (last 72 hours)      SLP Outcome Measures       10/16/17 1030 10/14/17 1700       SLP Outcome Measures    Outcome Measure Used? Adult NOMS  -SA (r) HR (t) SA (c) Adult NOMS  -SL     FCM Scores    FCM Chosen Swallowing  -SA (r) HR (t) SA (c)      Swallowing FCM Score 5  -SA (r) HR (t) SA (c) 3  -SL       User Key  (r) = Recorded By, (t) = Taken By, (c) = Cosigned By    Initials Name Effective Dates    SA Shira Plascencia, MS CCC-SLP 04/13/15 -      Shira Farris, MS CCC-SLP 04/13/15 -     HR Linda Huang, Speech Therapy Student 09/06/17 -           Time Calculation:         Time Calculation- SLP       10/17/17 1626          Time Calculation- SLP    SLP Received On 10/17/17  -        User Key  (r) = Recorded By, (t) = Taken By, (c) = Cosigned By    Initials Name Provider Type     Benjamin Hernandez, MS CCC-SLP Speech and Language Pathologist          Therapy Charges for Today     Code Description Service Date Service Provider Modifiers Qty    39304679757 HC ST EVAL SPEECH AND PROD W LANG  3 10/17/2017 Benjamin Hernandez, MS CCC-SLP GN 1              ADULT NOMS (last 72 hours)      Adult NOMS       10/16/17 1030 10/14/17 1700             FCM Scores    FCM Chosen Swallowing  -SA (r) HR (t) SA (c)        Swallowing FCM Score 5  -SA (r) HR (t) SA (c) 3  -SL         User Key  (r) = Recorded By, (t) = Taken By, (c) = Cosigned By    Initials Name Effective Dates    SA Shira Plascencia, MS CCC-SLP 04/13/15 -     SL Shira Farris, MS CCC-SLP 04/13/15 -     HR Linda Huang, Speech Therapy Student 09/06/17 -                Benjamin Hernandez, MS CCC-SLP  10/17/2017

## 2017-10-17 NOTE — THERAPY TREATMENT NOTE
Acute Care - Physical Therapy Treatment Note  [facility]     Patient Name: Marion Anderson  : 1950  MRN: [id number]  Today's Date: 10/17/2017  Onset of Illness/Injury or Date of Surgery Date: 10/12/17  Date of Referral to PT: 10/08/17  Referring Physician: Wanda    Admit Date: 10/6/2017    Visit Dx:    ICD-10-CM ICD-9-CM   1. Physical deconditioning R53.81 799.3   2. Coronary artery disease of native heart with stable angina pectoris, unspecified vessel or lesion type I25.118 414.01     413.9   3. Generalized weakness R53.1 780.79     Patient Active Problem List   Diagnosis   • Coronary artery disease of native heart with stable angina pectoris   • CAD (coronary artery disease)               Adult Rehabilitation Note       10/17/17 1025 10/15/17 1012       Rehab Assessment/Intervention    Discipline physical therapist  -PC physical therapist  -SA     Document Type therapy note (daily note)  -PC therapy note (daily note)  -SA     Subjective Information agree to therapy;complains of;weakness;fatigue;pain  -PC agree to therapy;complains of;pain  -SA     Patient Effort, Rehab Treatment adequate  -PC      Symptoms Noted During/After Treatment fatigue  -PC      Symptoms Noted Comment lethargy, needs continual tactile and verbal cues  -PC      Precautions/Limitations cardiac precautions;sternal precautions  -PC      Recorded by [PC] Tiffanie Kemp, PT [SA] Kelin Jung PT     Vital Signs    Pre SpO2 (%) 96  -PC      Recorded by [PC] Tiffanie Kemp, PT      Pain Assessment    Pain Assessment Wilhelm-Linares FACES  -PC Wilhelm-Linares FACES  -SA     [place] FACES Pain Rating 6  -PC 4  -SA     Pain Location Sternum  -PC      Pain Intervention(s) Medication (See MAR);Repositioned  -PC Repositioned;Ambulation/increased activity;Rest  -SA     Response to Interventions  tolerated  -SA     Recorded by [PC] Tiffanie Kemp, PT [SA] Kelin Jung PT     Cognitive Assessment/Intervention    Current Cognitive/Communication  Assessment impaired  -PC functional  -SA     Orientation Status oriented to;person  -PC oriented to;person;place;time  -SA     Follows Commands/Answers Questions able to follow single-step instructions;needs cueing;needs increased time;needs repetition;50% of the time  -PC needs repetition;needs increased time  -SA     Personal Safety moderate impairment;decreased insight to deficits  -PC decreased awareness, need for safety  -SA     Personal Safety Interventions fall prevention program maintained;gait belt  -PC fall prevention program maintained;nonskid shoes/slippers when out of bed  -SA     Recorded by [PC] Tiffanie Kemp PT [SA] Kelin Jung PT     MMT (Manual Muscle Testing)    General MMT Assessment Detail LUE 2/5, RUE 3/5, LLE 3/5, RLE 4/5  -PC      Recorded by [PC] Tiffanie Kemp PT      Bed Mobility, Assessment/Treatment    Bed Mobility, Roll Right, [place]  dependent (less than 25% patient effort);2 person assist required  -SA     Bed Mob, Supine to Sit, [place]  dependent (less than 25% patient effort);2 person assist required  -SA     Bed Mob, Sit to Supine, [place] moderate assist (50% patient effort);maximum assist (25% patient effort)  -PC      Recorded by [PC] Tiffanie Kemp, PT [SA] Kelin Jung PT     Transfer Assessment/Treatment    Transfers, Sit-Stand [place] moderate assist (50% patient effort);hand held assist;2 person assist required  -PC moderate assist (50% patient effort);2 person assist required  -SA     Transfers, Stand-Sit [place] moderate assist (50% patient effort);2 person assist required  -PC moderate assist (50% patient effort);2 person assist required  -SA     Transfer, Comment  sit<>stand transfer completed 3x  -SA     Recorded by [PC] Tiffanie Kemp PT [SA] Kelin Jung PT     Gait Assessment/Treatment    Gait, [place] Level moderate assist (50% patient effort);2 person assist required;hand held assist  -PC moderate assist (50% patient  effort);maximum assist (25% patient effort);2 person assist required  -SA     Gait, Distance (Feet) 12  -PC 2  -SA     Gait, Gait Deviations ataxia;forward flexed posture;step length decreased  - bilateral:;shane decreased;decreased heel strike;narrow base;weight-shifting ability decreased   shuffling  -SA     Gait, Safety Issues step length decreased  -      Gait, Impairments coordination impaired;motor control impaired;impaired balance  -PC      Recorded by [PC] Tiffanie Kemp, PT [SA] Kelin Jung PT     Motor Skills/Interventions    Additional Documentation  Balance Skills Training (Group)  -SA     Recorded by  [SA] Kelin Jung PT     Balance Skills Training    Sitting-Level of Assistance  Minimum assistance;Moderate assistance  -SA     Sitting-Balance Support  Feet supported  -SA     Sitting-Balance Activities  --   orienting to midline  -SA     Sitting # of Minutes  2  -SA     Recorded by  [SA] Kelin Jung PT     Therapy Exercises    Bilateral Lower Extremities 5 reps;AROM:;sitting;ankle pumps/circles;LAQ;hip flexion   needs tactile and verbal cues  - AAROM:;5 reps;ankle pumps/circles;LAQ  -SA     Exercise Protocols --   level II   -PC --   cardiac protocol, 5 reps  -SA     Recorded by [PC] Tiffanie Kemp PT [SA] Kelin Jung PT     Positioning and Restraints    Pre-Treatment Position sitting in chair/recliner  -PC sitting in chair/recliner  -SA     Post Treatment Position bed  -PC chair  -SA     In Bed supine;call light within reach;encouraged to call for assist  -PC      In Chair  notified nsg;reclined;call light within reach;encouraged to call for assist;with family/caregiver   HALEIGH Silveira notified  -SA     Recorded by [PC] Tiffanie Kemp PT [SA] Kelin Jung PT       User Key  (r) = Recorded By, (t) = Taken By, (c) = Cosigned By    Initials Name Effective Dates     Tiffanie Kemp PT 12/01/15 -     SA Kelin Jung PT 08/03/17 -                 IP PT Goals       10/13/17 0859 10/13/17 0857        Bed Mobility PT LTG    Bed Mobility PT LTG, Date Established 10/13/17  -EM (r) MF (t) EM (c)      Bed Mobility PT LTG, Time to Achieve 1 wk  -EM (r) MF (t) EM (c)      Bed Mobility PT LTG, Activity Type all bed mobility  -EM (r) MF (t) EM (c)      Bed Mobility PT LTG, [place] Level supervision required  -EM (r) MF (t) EM (c)      Transfer Training 2 PT LTG    Transfer Training PT 2 LTG, Date Established 10/13/17  -EM (r) MF (t) EM (c)      Transfer Training PT 2 LTG, Time to Achieve 1 wk  -EM (r) MF (t) EM (c)      Transfer Training PT 2 LTG, Activity Type bed to chair /chair to bed;sit to stand/stand to sit  -EM (r) MF (t) EM (c)      Transfer Training PT 2 LTG, [place] Level minimum assist (75% patient effort)  -EM (r) MF (t) EM (c)      Transfer Training PT 2 LTG, Assist Device walker, rolling  -EM (r) MF (t) EM (c)      Gait Training PT LTG    Gait Training Goal PT LTG, Date Established 10/13/17  -EM (r) MF (t) EM (c)      Gait Training Goal PT LTG, Time to Achieve 1 wk  -EM (r) MF (t) EM (c)      Gait Training Goal PT LTG, [place] Level minimum assist (75% patient effort);2 person assist required  -EM (r) MF (t) EM (c)      Gait Training Goal PT LTG, Assist Device walker, rolling  -EM (r) MF (t) EM (c)      Gait Training Goal PT LTG, Distance to Achieve 20 ft  -EM (r) MF (t) EM (c)      Cardiopulmonary PT LTG    Cardiopulmonary PT LTG, Date Established  10/13/17  -EM (r) MF (t) EM (c)     Cardiopulmonary PT LTG, Time to Achieve  1 wk  -EM (r) MF (t) EM (c)     Cardiopulmonary PT LTG, Level  Level III  -EM (r) MF (t) EM (c)       User Key  (r) = Recorded By, (t) = Taken By, (c) = Cosigned By    Initials Name Provider Type    EM Carol Munguia, PT Physical Therapist    ROEL Gutierrez, PT Student PT Student          Physical Therapy Education     Title: PT OT SLP Therapies (Active)     Topic: Physical Therapy (Active)     Point: Mobility training (Active)    Learning Progress Summary     Learner Readiness Method Response Comment Documented by Status   Patient Acceptance E,D NR  PC 10/17/17 1037 Active    Acceptance E NR  SA 10/15/17 1027 Active    Acceptance E NR Educated pt on benefits of participating in PT. Daughter walked in room midway through session. Educated daughter on cardiac protocol exercises.  10/14/17 1002 Active    Acceptance E STANLEY LAMAS 10/14/17 0832 Done    Nonacceptance E NR   10/13/17 0857 Active    Eager E,TB VU,DU Encouraged pt to ambulate in hallways with staff or family at least tid to improve activity tolerance.  Discussed use of AD but pt feel that that would be a step backward and wpould prefer to go without 9we will revisit this after surgery).  10/08/17 1120 Done   Family Acceptance E NR Educated pt on benefits of participating in PT. Daughter walked in room midway through session. Educated daughter on cardiac protocol exercises.  10/14/17 1002 Active               Point: Home exercise program (Active)    Learning Progress Summary    Learner Readiness Method Response Comment Documented by Status   Patient Acceptance E,D NR   10/17/17 1037 Active    Acceptance E NR  SA 10/15/17 1027 Active    Acceptance E NR Educated pt on benefits of participating in PT. Daughter walked in room midway through session. Educated daughter on cardiac protocol exercises.  10/14/17 1002 Active    Acceptance E STANLEY LAMAS 10/14/17 0832 Done    Nonacceptance E NR   10/13/17 0857 Active   Family Acceptance E NR Educated pt on benefits of participating in PT. Daughter walked in room midway through session. Educated daughter on cardiac protocol exercises.  10/14/17 1002 Active               Point: Body mechanics (Active)    Learning Progress Summary    Learner Readiness Method Response Comment Documented by Status   Patient Acceptance E,D NR   10/17/17 1037 Active    Acceptance E NR   10/15/17 1027 Active    Acceptance E NR Educated pt on benefits of participating in PT. Daughter  walked in room midway through session. Educated daughter on cardiac protocol exercises.  10/14/17 1002 Active    Acceptance E VU   10/14/17 0832 Done    Nonacceptance E NR   10/13/17 0857 Active   Family Acceptance E NR Educated pt on benefits of participating in PT. Daughter walked in room midway through session. Educated daughter on cardiac protocol exercises.  10/14/17 1002 Active               Point: Precautions (Active)    Learning Progress Summary    Learner Readiness Method Response Comment Documented by Status   Patient Acceptance E,D NR   10/17/17 1037 Active    Acceptance E NR   10/15/17 1027 Active    Acceptance E NR Educated pt on benefits of participating in PT. Daughter walked in room midway through session. Educated daughter on cardiac protocol exercises.  10/14/17 1002 Active    Acceptance E VU   10/14/17 0832 Done    Nonacceptance E NR   10/13/17 0857 Active    Eager E,TB VU,DU Encouraged pt to ambulate in hallways with staff or family at least tid to improve activity tolerance.  Discussed use of AD but pt feel that that would be a step backward and wpould prefer to go without 9we will revisit this after surgery).  10/08/17 1120 Done   Family Acceptance E NR Educated pt on benefits of participating in PT. Daughter walked in room midway through session. Educated daughter on cardiac protocol exercises.  10/14/17 1002 Active                      User Key     Initials Effective Dates Name Provider Type Discipline     12/01/15 -  Tiffanie Kemp, PT Physical Therapist PT     12/01/15 -  Faiza Brady, PT Physical Therapist PT     06/16/16 -  Jordana Umanzor, RN Registered Nurse Nurse     08/03/17 -  Kelin Jung, PT Physical Therapist PT     09/18/17 -  Atilio Gutierrez, PT Student PT Student PT                    PT Recommendation and Plan  Anticipated Discharge Disposition: inpatient rehabilitation facility, skilled nursing facility  Planned Therapy Interventions:  balance training, bed mobility training, gait training, strengthening, transfer training, home exercise program  PT Frequency: daily  Plan of Care Review  Plan Of Care Reviewed With: patient  Progress: progress towards functional goals is fair  Outcome Summary/Follow up Plan: pt presents with decreased motor planning and L side weakness, she was able to stand and walk approx 12 ft with assist of 2 people, pt was lethargic and fatigued quickly, will progress as tolerated          Outcome Measures       10/17/17 1000 10/15/17 1000       How much help from another person do you currently need...    Turning from your back to your side while in flat bed without using bedrails? 2  -PC 1  -SA     Moving from lying on back to sitting on the side of a flat bed without bedrails? 2  -PC 1  -SA     Moving to and from a bed to a chair (including a wheelchair)? 2  -PC 2  -SA     Standing up from a chair using your arms (e.g., wheelchair, bedside chair)? 2  -PC 2  -SA     Climbing 3-5 steps with a railing? 1  -PC 1  -SA     To walk in hospital room? 2  -PC 1  -SA     AM-PAC 6 Clicks Score 11  -PC 8  -SA     Functional Assessment    Outcome Measure Options  AM-PAC 6 Clicks Basic Mobility (PT)  -SA       User Key  (r) = Recorded By, (t) = Taken By, (c) = Cosigned By    Initials Name Provider Type    PC Tiffanie Kemp, BRO Physical Therapist    SA Kelin Jung PT Physical Therapist           Time Calculation:         PT Charges       10/17/17 1043          Time Calculation    Start Time 0959  -PC      Stop Time 1022  -PC      Time Calculation (min) 23 min  -PC      PT Received On 10/17/17  -PC      PT - Next Appointment 10/18/17  -PC        User Key  (r) = Recorded By, (t) = Taken By, (c) = Cosigned By    Initials Name Provider Type     Tiffanie Kemp PT Physical Therapist          Therapy Charges for Today     Code Description Service Date Service Provider Modifiers Qty    05191161565  PT THER PROC EA 15 MIN 10/17/2017 Tiffanie REGAN  Viridiana, PT GP 2    [id number]  PT THER SUPP EA 15 MIN 10/17/2017 Tiffanie Kemp, PT GP 1          PT G-Codes  PT Professional Judgement Used?: Yes  Outcome Measure Options: AM-PAC 6 Clicks Basic Mobility (PT)  Score: 23  Functional Limitation: Mobility: Walking and moving around  Mobility: Walking and Moving Around Current Status (): At least 1 percent but less than 20 percent impaired, limited or restricted  Mobility: Walking and Moving Around Goal Status (): At least 1 percent but less than 20 percent impaired, limited or restricted    Tiffanie Kemp, PT  10/17/2017

## 2017-10-18 ENCOUNTER — HOSPITAL ENCOUNTER (INPATIENT)
Facility: HOSPITAL | Age: 67
LOS: 13 days | Discharge: HOME HEALTH | End: 2017-10-31
Attending: PHYSICAL MEDICINE & REHABILITATION | Admitting: PHYSICAL MEDICINE & REHABILITATION

## 2017-10-18 VITALS
HEIGHT: 65 IN | HEART RATE: 86 BPM | DIASTOLIC BLOOD PRESSURE: 65 MMHG | SYSTOLIC BLOOD PRESSURE: 116 MMHG | OXYGEN SATURATION: 93 % | WEIGHT: 216 LBS | BODY MASS INDEX: 35.99 KG/M2 | TEMPERATURE: 98.2 F | RESPIRATION RATE: 16 BRPM

## 2017-10-18 DIAGNOSIS — Z95.1 S/P CABG X 4: ICD-10-CM

## 2017-10-18 DIAGNOSIS — S06.5XAA SUBDURAL HEMATOMA (HCC): ICD-10-CM

## 2017-10-18 LAB
ANION GAP SERPL CALCULATED.3IONS-SCNC: 13.8 MMOL/L
BASOPHILS # BLD AUTO: 0.02 10*3/MM3 (ref 0–0.2)
BASOPHILS NFR BLD AUTO: 0.2 % (ref 0–1.5)
BUN BLD-MCNC: 19 MG/DL (ref 8–23)
BUN/CREAT SERPL: 26.4 (ref 7–25)
CALCIUM SPEC-SCNC: 9.4 MG/DL (ref 8.6–10.5)
CHLORIDE SERPL-SCNC: 95 MMOL/L (ref 98–107)
CO2 SERPL-SCNC: 28.2 MMOL/L (ref 22–29)
CREAT BLD-MCNC: 0.72 MG/DL (ref 0.57–1)
DEPRECATED RDW RBC AUTO: 49.2 FL (ref 37–54)
EOSINOPHIL # BLD AUTO: 0.5 10*3/MM3 (ref 0–0.7)
EOSINOPHIL NFR BLD AUTO: 4.3 % (ref 0.3–6.2)
ERYTHROCYTE [DISTWIDTH] IN BLOOD BY AUTOMATED COUNT: 15.1 % (ref 11.7–13)
GFR SERPL CREATININE-BSD FRML MDRD: 81 ML/MIN/1.73
GLUCOSE BLD-MCNC: 123 MG/DL (ref 65–99)
HCT VFR BLD AUTO: 31.9 % (ref 35.6–45.5)
HGB BLD-MCNC: 10.4 G/DL (ref 11.9–15.5)
IMM GRANULOCYTES # BLD: 0.08 10*3/MM3 (ref 0–0.03)
IMM GRANULOCYTES NFR BLD: 0.7 % (ref 0–0.5)
LYMPHOCYTES # BLD AUTO: 1.74 10*3/MM3 (ref 0.9–4.8)
LYMPHOCYTES NFR BLD AUTO: 14.9 % (ref 19.6–45.3)
MCH RBC QN AUTO: 29.6 PG (ref 26.9–32)
MCHC RBC AUTO-ENTMCNC: 32.6 G/DL (ref 32.4–36.3)
MCV RBC AUTO: 90.9 FL (ref 80.5–98.2)
MONOCYTES # BLD AUTO: 1.48 10*3/MM3 (ref 0.2–1.2)
MONOCYTES NFR BLD AUTO: 12.7 % (ref 5–12)
NEUTROPHILS # BLD AUTO: 7.82 10*3/MM3 (ref 1.9–8.1)
NEUTROPHILS NFR BLD AUTO: 67.2 % (ref 42.7–76)
PLATELET # BLD AUTO: 280 10*3/MM3 (ref 140–500)
PMV BLD AUTO: 9.2 FL (ref 6–12)
POTASSIUM BLD-SCNC: 3.8 MMOL/L (ref 3.5–5.2)
RBC # BLD AUTO: 3.51 10*6/MM3 (ref 3.9–5.2)
SODIUM BLD-SCNC: 137 MMOL/L (ref 136–145)
WBC NRBC COR # BLD: 11.64 10*3/MM3 (ref 4.5–10.7)

## 2017-10-18 PROCEDURE — 85025 COMPLETE CBC W/AUTO DIFF WBC: CPT | Performed by: THORACIC SURGERY (CARDIOTHORACIC VASCULAR SURGERY)

## 2017-10-18 PROCEDURE — 99231 SBSQ HOSP IP/OBS SF/LOW 25: CPT | Performed by: PHYSICIAN ASSISTANT

## 2017-10-18 PROCEDURE — 80048 BASIC METABOLIC PNL TOTAL CA: CPT | Performed by: THORACIC SURGERY (CARDIOTHORACIC VASCULAR SURGERY)

## 2017-10-18 PROCEDURE — 25010000002 LEVETRIRACETAM PER 10 MG: Performed by: NURSE PRACTITIONER

## 2017-10-18 PROCEDURE — 99024 POSTOP FOLLOW-UP VISIT: CPT | Performed by: NURSE PRACTITIONER

## 2017-10-18 PROCEDURE — 97110 THERAPEUTIC EXERCISES: CPT | Performed by: OCCUPATIONAL THERAPIST

## 2017-10-18 PROCEDURE — 97110 THERAPEUTIC EXERCISES: CPT

## 2017-10-18 RX ORDER — DEXTROSE MONOHYDRATE 25 G/50ML
25 INJECTION, SOLUTION INTRAVENOUS
Status: DISCONTINUED | OUTPATIENT
Start: 2017-10-18 | End: 2017-10-31

## 2017-10-18 RX ORDER — BISACODYL 5 MG/1
10 TABLET, DELAYED RELEASE ORAL DAILY PRN
Status: CANCELLED | OUTPATIENT
Start: 2017-10-18

## 2017-10-18 RX ORDER — BISACODYL 10 MG
10 SUPPOSITORY, RECTAL RECTAL DAILY PRN
Status: DISCONTINUED | OUTPATIENT
Start: 2017-10-18 | End: 2017-10-31

## 2017-10-18 RX ORDER — CHOLECALCIFEROL (VITAMIN D3) 125 MCG
1000 CAPSULE ORAL DAILY
Status: CANCELLED | OUTPATIENT
Start: 2017-10-19

## 2017-10-18 RX ORDER — BISACODYL 5 MG/1
10 TABLET, DELAYED RELEASE ORAL DAILY PRN
Status: DISCONTINUED | OUTPATIENT
Start: 2017-10-18 | End: 2017-10-31

## 2017-10-18 RX ORDER — FUROSEMIDE 40 MG/1
40 TABLET ORAL DAILY
Status: DISCONTINUED | OUTPATIENT
Start: 2017-10-19 | End: 2017-10-31 | Stop reason: HOSPADM

## 2017-10-18 RX ORDER — ACETAMINOPHEN 325 MG/1
650 TABLET ORAL EVERY 4 HOURS PRN
Status: CANCELLED | OUTPATIENT
Start: 2017-10-18

## 2017-10-18 RX ORDER — LEVOTHYROXINE SODIUM 137 UG/1
137 TABLET ORAL DAILY
Status: CANCELLED | OUTPATIENT
Start: 2017-10-18

## 2017-10-18 RX ORDER — POTASSIUM CHLORIDE 750 MG/1
20 CAPSULE, EXTENDED RELEASE ORAL DAILY
Status: CANCELLED | OUTPATIENT
Start: 2017-10-19

## 2017-10-18 RX ORDER — PANTOPRAZOLE SODIUM 40 MG/1
40 TABLET, DELAYED RELEASE ORAL
Status: DISCONTINUED | OUTPATIENT
Start: 2017-10-19 | End: 2017-10-31 | Stop reason: HOSPADM

## 2017-10-18 RX ORDER — LEVETIRACETAM 250 MG/1
250 TABLET ORAL 2 TIMES DAILY
Status: CANCELLED | OUTPATIENT
Start: 2017-10-18

## 2017-10-18 RX ORDER — SENNA AND DOCUSATE SODIUM 50; 8.6 MG/1; MG/1
2 TABLET, FILM COATED ORAL NIGHTLY
Status: CANCELLED | OUTPATIENT
Start: 2017-10-18

## 2017-10-18 RX ORDER — ACETAMINOPHEN 325 MG/1
650 TABLET ORAL EVERY 4 HOURS PRN
Status: DISCONTINUED | OUTPATIENT
Start: 2017-10-18 | End: 2017-10-31

## 2017-10-18 RX ORDER — NALOXONE HCL 0.4 MG/ML
0.4 VIAL (ML) INJECTION
Status: DISCONTINUED | OUTPATIENT
Start: 2017-10-18 | End: 2017-10-31

## 2017-10-18 RX ORDER — FUROSEMIDE 40 MG/1
40 TABLET ORAL DAILY
Status: CANCELLED | OUTPATIENT
Start: 2017-10-19

## 2017-10-18 RX ORDER — LEVOTHYROXINE SODIUM 137 UG/1
137 TABLET ORAL
Status: DISCONTINUED | OUTPATIENT
Start: 2017-10-19 | End: 2017-10-31 | Stop reason: HOSPADM

## 2017-10-18 RX ORDER — ONDANSETRON 2 MG/ML
4 INJECTION INTRAMUSCULAR; INTRAVENOUS EVERY 6 HOURS PRN
Status: CANCELLED | OUTPATIENT
Start: 2017-10-18

## 2017-10-18 RX ORDER — PANTOPRAZOLE SODIUM 40 MG/1
40 TABLET, DELAYED RELEASE ORAL
Status: CANCELLED | OUTPATIENT
Start: 2017-10-19

## 2017-10-18 RX ORDER — ASPIRIN 81 MG/1
81 TABLET, CHEWABLE ORAL DAILY
Status: DISCONTINUED | OUTPATIENT
Start: 2017-10-19 | End: 2017-10-31 | Stop reason: HOSPADM

## 2017-10-18 RX ORDER — POTASSIUM CHLORIDE 750 MG/1
20 CAPSULE, EXTENDED RELEASE ORAL DAILY
Status: DISCONTINUED | OUTPATIENT
Start: 2017-10-19 | End: 2017-10-31

## 2017-10-18 RX ORDER — ATORVASTATIN CALCIUM 20 MG/1
40 TABLET, FILM COATED ORAL NIGHTLY
Status: CANCELLED | OUTPATIENT
Start: 2017-10-18

## 2017-10-18 RX ORDER — DEXTROSE MONOHYDRATE 25 G/50ML
25 INJECTION, SOLUTION INTRAVENOUS
Status: CANCELLED | OUTPATIENT
Start: 2017-10-18

## 2017-10-18 RX ORDER — NICOTINE POLACRILEX 4 MG
15 LOZENGE BUCCAL
Status: CANCELLED | OUTPATIENT
Start: 2017-10-18

## 2017-10-18 RX ORDER — NICOTINE POLACRILEX 4 MG
15 LOZENGE BUCCAL
Status: DISCONTINUED | OUTPATIENT
Start: 2017-10-18 | End: 2017-10-31

## 2017-10-18 RX ORDER — TRAMADOL HYDROCHLORIDE 50 MG/1
50 TABLET ORAL EVERY 6 HOURS PRN
Status: DISCONTINUED | OUTPATIENT
Start: 2017-10-18 | End: 2017-10-31 | Stop reason: HOSPADM

## 2017-10-18 RX ORDER — ONDANSETRON 2 MG/ML
4 INJECTION INTRAMUSCULAR; INTRAVENOUS EVERY 6 HOURS PRN
Status: DISCONTINUED | OUTPATIENT
Start: 2017-10-18 | End: 2017-10-18

## 2017-10-18 RX ORDER — ASPIRIN 81 MG/1
81 TABLET, CHEWABLE ORAL DAILY
Status: CANCELLED | OUTPATIENT
Start: 2017-10-19

## 2017-10-18 RX ORDER — BISACODYL 10 MG
10 SUPPOSITORY, RECTAL RECTAL DAILY PRN
Status: CANCELLED | OUTPATIENT
Start: 2017-10-18

## 2017-10-18 RX ORDER — SENNA AND DOCUSATE SODIUM 50; 8.6 MG/1; MG/1
2 TABLET, FILM COATED ORAL NIGHTLY
Status: DISCONTINUED | OUTPATIENT
Start: 2017-10-18 | End: 2017-10-31 | Stop reason: HOSPADM

## 2017-10-18 RX ORDER — ONDANSETRON 4 MG/1
4 TABLET, ORALLY DISINTEGRATING ORAL EVERY 6 HOURS PRN
Status: DISCONTINUED | OUTPATIENT
Start: 2017-10-18 | End: 2017-10-31

## 2017-10-18 RX ORDER — ATORVASTATIN CALCIUM 20 MG/1
40 TABLET, FILM COATED ORAL NIGHTLY
Status: DISCONTINUED | OUTPATIENT
Start: 2017-10-18 | End: 2017-10-19

## 2017-10-18 RX ORDER — TRAMADOL HYDROCHLORIDE 50 MG/1
50 TABLET ORAL EVERY 6 HOURS PRN
Status: CANCELLED | OUTPATIENT
Start: 2017-10-18 | End: 2017-10-23

## 2017-10-18 RX ORDER — NALOXONE HCL 0.4 MG/ML
0.4 VIAL (ML) INJECTION
Status: CANCELLED | OUTPATIENT
Start: 2017-10-18

## 2017-10-18 RX ORDER — CHOLECALCIFEROL (VITAMIN D3) 125 MCG
1000 CAPSULE ORAL DAILY
Status: DISCONTINUED | OUTPATIENT
Start: 2017-10-19 | End: 2017-10-31 | Stop reason: HOSPADM

## 2017-10-18 RX ORDER — LEVETIRACETAM 250 MG/1
250 TABLET ORAL 2 TIMES DAILY
Status: DISCONTINUED | OUTPATIENT
Start: 2017-10-18 | End: 2017-10-31 | Stop reason: HOSPADM

## 2017-10-18 RX ADMIN — PANTOPRAZOLE SODIUM 40 MG: 40 TABLET, DELAYED RELEASE ORAL at 06:31

## 2017-10-18 RX ADMIN — CYANOCOBALAMIN TAB 500 MCG 1000 MCG: 500 TAB at 09:05

## 2017-10-18 RX ADMIN — LEVETIRACETAM 250 MG: 500 INJECTION, SOLUTION, CONCENTRATE INTRAVENOUS at 02:21

## 2017-10-18 RX ADMIN — ASPIRIN 81 MG: 81 TABLET, CHEWABLE ORAL at 09:05

## 2017-10-18 RX ADMIN — FUROSEMIDE 40 MG: 40 TABLET ORAL at 08:09

## 2017-10-18 RX ADMIN — LEVETIRACETAM 250 MG: 250 TABLET, FILM COATED ORAL at 20:52

## 2017-10-18 RX ADMIN — METOPROLOL TARTRATE 37.5 MG: 25 TABLET ORAL at 09:05

## 2017-10-18 RX ADMIN — TRAMADOL HYDROCHLORIDE 50 MG: 50 TABLET ORAL at 21:05

## 2017-10-18 RX ADMIN — METOPROLOL TARTRATE 37.5 MG: 25 TABLET ORAL at 20:52

## 2017-10-18 RX ADMIN — LEVETIRACETAM 250 MG: 500 INJECTION, SOLUTION, CONCENTRATE INTRAVENOUS at 14:36

## 2017-10-18 RX ADMIN — MUPIROCIN 1 APPLICATION: 20 OINTMENT TOPICAL at 09:05

## 2017-10-18 NOTE — PROGRESS NOTES
" LOS: 11 days   Patient Care Team:  Narciso Camargo MD as PCP - General (Family Medicine)    Chief Complaint: post op fu    Subjective:  Symptoms:  No shortness of breath or chest pain.    Diet:  Adequate intake.  No nausea or vomiting.    Pain:  Pain is requiring pain medication.  (Sore with palpation).         Vital Signs  Temp:  [97.8 °F (36.6 °C)-98.5 °F (36.9 °C)] 98.3 °F (36.8 °C)  Heart Rate:  [81-98] 92  Resp:  [16-18] 16  BP: (130-151)/(66-88) 151/75  Body mass index is 35.94 kg/(m^2).    Intake/Output Summary (Last 24 hours) at 10/18/17 1017  Last data filed at 10/18/17 0825   Gross per 24 hour   Intake              820 ml   Output              675 ml   Net              145 ml     I/O this shift:  In: 240 [P.O.:240]  Out: -       Last 3 weights    10/14/17  0609 10/16/17  0640 10/18/17  0629   Weight: 226 lb 8 oz (103 kg) 223 lb 8 oz (101 kg) 216 lb (98 kg)         Objective:  General Appearance:  Comfortable.    Vital signs: (most recent): Blood pressure 151/75, pulse 92, temperature 98.3 °F (36.8 °C), temperature source Oral, resp. rate 16, height 65\" (165.1 cm), weight 216 lb (98 kg), SpO2 93 %.    Output: Producing urine and producing stool.    Lungs:  Normal respiratory rate and normal effort.  Breath sounds clear to auscultation.  No rales or rhonchi.    Heart: Normal rate.  Regular rhythm.  S1 normal and S2 normal.  No murmur, gallop or friction rub.   Abdomen: Abdomen is soft and non-distended.    Extremities: There is no dependent edema.    Neurological: Patient is oriented to person, place and time.  (Sleeping when arrived, awakens easily).    Skin:  Warm and dry.  (Sternal and leg incision well approximated without erythema or drainage)            Results Review:        WBC WBC   Date Value Ref Range Status   10/18/2017 11.64 (H) 4.50 - 10.70 10*3/mm3 Final   10/17/2017 10.83 (H) 4.50 - 10.70 10*3/mm3 Final   10/16/2017 11.51 (H) 4.50 - 10.70 10*3/mm3 Final      HGB Hemoglobin   Date Value " Ref Range Status   10/18/2017 10.4 (L) 11.9 - 15.5 g/dL Final   10/17/2017 10.4 (L) 11.9 - 15.5 g/dL Final   10/16/2017 7.4 (L) 11.9 - 15.5 g/dL Final      HCT Hematocrit   Date Value Ref Range Status   10/18/2017 31.9 (L) 35.6 - 45.5 % Final   10/17/2017 30.4 (L) 35.6 - 45.5 % Final   10/16/2017 23.0 (L) 35.6 - 45.5 % Final      Platelets Platelets   Date Value Ref Range Status   10/18/2017 280 140 - 500 10*3/mm3 Final   10/17/2017 255 140 - 500 10*3/mm3 Final   10/16/2017 202 140 - 500 10*3/mm3 Final        PT/INR:  No results found for: PROTIME/No results found for: INR    Sodium Sodium   Date Value Ref Range Status   10/18/2017 137 136 - 145 mmol/L Final   10/17/2017 140 136 - 145 mmol/L Final   10/16/2017 142 136 - 145 mmol/L Final      Potassium Potassium   Date Value Ref Range Status   10/18/2017 3.8 3.5 - 5.2 mmol/L Final   10/17/2017 3.4 (L) 3.5 - 5.2 mmol/L Final   10/16/2017 3.7 3.5 - 5.2 mmol/L Final      Chloride Chloride   Date Value Ref Range Status   10/18/2017 95 (L) 98 - 107 mmol/L Final   10/17/2017 96 (L) 98 - 107 mmol/L Final   10/16/2017 100 98 - 107 mmol/L Final      Bicarbonate CO2   Date Value Ref Range Status   10/18/2017 28.2 22.0 - 29.0 mmol/L Final   10/17/2017 30.4 (H) 22.0 - 29.0 mmol/L Final   10/16/2017 29.9 (H) 22.0 - 29.0 mmol/L Final      BUN BUN   Date Value Ref Range Status   10/18/2017 19 8 - 23 mg/dL Final   10/17/2017 23 8 - 23 mg/dL Final   10/16/2017 27 (H) 8 - 23 mg/dL Final      Creatinine Creatinine   Date Value Ref Range Status   10/18/2017 0.72 0.57 - 1.00 mg/dL Final   10/17/2017 0.74 0.57 - 1.00 mg/dL Final   10/16/2017 0.76 0.57 - 1.00 mg/dL Final      Calcium Calcium   Date Value Ref Range Status   10/18/2017 9.4 8.6 - 10.5 mg/dL Final   10/17/2017 9.5 8.6 - 10.5 mg/dL Final   10/16/2017 9.2 8.6 - 10.5 mg/dL Final      Magnesium No results found for: MG         aspirin 81 mg Oral Daily   atorvastatin 40 mg Oral Nightly   enoxaparin 40 mg Subcutaneous Daily    furosemide 40 mg Oral Daily   levETIRAcetam 250 mg Intravenous Q12H   levothyroxine 137 mcg Oral Daily   metoprolol tartrate 37.5 mg Oral Q12H   mupirocin 1 application Each Nare Daily   pantoprazole 40 mg Oral Q AM   potassium chloride 20 mEq Oral Daily   sennosides-docusate sodium 2 tablet Oral Nightly   vitamin B-12 1,000 mcg Oral Daily       sodium chloride 30 mL/hr Last Rate: 30 mL/hr (10/12/17 1830)   sodium chloride 30 mL/hr            Patient Active Problem List   Diagnosis Code   • Coronary artery disease of native heart with stable angina pectoris I25.118   • CAD (coronary artery disease) I25.10       Assessment & Plan    CAD (hx of PCI 2006), SEVERE MV INCOMPETENCE S/P CABG X4 WITH LIMA, MV REPAIR/RING----POD #6, on asa/bb/statin  SMALL SDH-----no change on sequential CTs, continue Keppra 250 mg po bid for 6 months  POST OP TME----u/a negative 10/16  BILATERAL CAROTID DX, HX CVA----left upper extremity weakness post op, neuro signed off, recommend asa, plavix instead of brilenta eventually  POST OP ANEMIA----expected blood loss, 2 units PRBC given 10/16, Hb stable  HTN----controlled on current tx  HL---on statin  HYPOTHYROID----on synthroid  DECONDITIONING, FREQUENT FALLS PREOP  ?CARLOS----pulmonary eval yesterday and signed off, recommend outpatient study  MILD LEUKOCYTOSIS----trending down, afebrile    Acute rehab acceptance, case management working on precert and bed availability, possible transfer today.      Vivienne Yan, PANKAJ  10/18/17  10:17 AM

## 2017-10-18 NOTE — THERAPY TREATMENT NOTE
Acute Care - Occupational Therapy Treatment Note  [facility]     Patient Name: Marion Anderson  : 1950  MRN: [id number]  Today's Date: 10/18/2017  Onset of Illness/Injury or Date of Surgery Date: 10/06/17     Referring Physician: Wanda      Admit Date: 10/6/2017    Visit Dx:     ICD-10-CM ICD-9-CM   1. Physical deconditioning R53.81 799.3   2. Coronary artery disease of native heart with stable angina pectoris, unspecified vessel or lesion type I25.118 414.01     413.9   3. Generalized weakness R53.1 780.79   4. S/P CABG x 4 Z95.1 V45.81     Patient Active Problem List   Diagnosis   • Coronary artery disease of native heart with stable angina pectoris   • CAD (coronary artery disease)             Adult Rehabilitation Note       10/18/17 1537 10/18/17 1004 10/17/17 1025    Rehab Assessment/Intervention    Discipline occupational therapist  -KP physical therapist  -EM physical therapist  -PC    Document Type therapy note (daily note)  -KP therapy note (daily note)  -EM therapy note (daily note)  -PC    Subjective Information agree to therapy;complains of   soreness in chest   -KP agree to therapy;complains of;pain  -EM agree to therapy;complains of;weakness;fatigue;pain  -PC    Patient Effort, Rehab Treatment good  -KP adequate  -EM adequate  -PC    Symptoms Noted During/After Treatment other (see comments)   soreness in chest  -KP  fatigue  -PC    Symptoms Noted Comment   lethargy, needs continual tactile and verbal cues  -PC    Precautions/Limitations fall precautions;cardiac precautions;sternal precautions  -  cardiac precautions;sternal precautions  -PC    Recorded by [KP] Vida Bonds OTR [EM] Carol Munguia, PT [PC] Tiffanie Kemp, PT    Vital Signs    Pre Systolic BP Rehab  151  -EM     Pre Treatment Diastolic BP  75  -EM     Pretreatment Heart Rate (beats/min)  84  -EM     Posttreatment Heart Rate (beats/min)  88  -EM     Pre SpO2 (%)  94  -EM 96  -PC    O2 Delivery Pre  Treatment  room air  -EM     Post SpO2 (%)  94  -EM     O2 Delivery Post Treatment  room air  -EM     Recorded by  [EM] Carol Munguia, PT [PC] Tiffanie Kemp, PT    Pain Assessment    Pain Assessment No/denies pain   just sore she states  -KP 0-10  -EM Wilhelm-Baker FACES  -PC    Wilhelm-Linares FACES Pain Rating   6  -PC    Pain Score  7  -EM     Pain Location  Sternum  -EM Sternum  -PC    Pain Intervention(s)  Medication (See MAR);Repositioned  -EM Medication (See MAR);Repositioned  -PC    Recorded by [KP] Vida Bonds, OTR [EM] Carol Munguia, PT [PC] Tiffanie Kemp, PT    Cognitive Assessment/Intervention    Current Cognitive/Communication Assessment functional  -KP  impaired  -PC    Orientation Status oriented x 4  -KP  oriented to;person  -PC    Follows Commands/Answers Questions 100% of the time;able to follow single-step instructions;needs cueing;needs increased time;needs repetition   states she is trying but is sore. and it is hard she states  -KP  able to follow single-step instructions;needs cueing;needs increased time;needs repetition;50% of the time  -    Personal Safety WNL/WFL  -KP  moderate impairment;decreased insight to deficits  -PC    Personal Safety Interventions fall prevention program maintained;muscle strengthening facilitated  -  fall prevention program maintained;gait belt  -PC    Recorded by [KP] Vida Bonds, OTR  [PC] Tiffanie Kemp, PT    MMT (Manual Muscle Testing)    General MMT Assessment Detail   LUE 2/5, RUE 3/5, LLE 3/5, RLE 4/5  -PC    Recorded by   [PC] Tiffanie Kemp PT    Bed Mobility, Assessment/Treatment    Bed Mob, Supine to Sit, [place]  not tested  -EM     Bed Mob, Sit to Supine, [place]  not tested  -EM moderate assist (50% patient effort);maximum assist (25% patient effort)  -PC    Bed Mobility, Comment up in chair  -      Recorded by [KP] Vida Bonds, OTR [EM] Carol Munguia, PT [PC] Tiffanie Kemp, PT    Transfer  Assessment/Treatment    Transfers, Bed-Chair [place]  minimum assist (75% patient effort);2 person assist required  -EM     Transfers, Chair-Bed [place]  minimum assist (75% patient effort);2 person assist required  -EM     Transfers, Sit-Stand [place]  minimum assist (75% patient effort);2 person assist required  -EM moderate assist (50% patient effort);hand held assist;2 person assist required  -PC    Transfers, Stand-Sit [place]  minimum assist (75% patient effort);verbal cues required  -EM moderate assist (50% patient effort);2 person assist required  -PC    Transfer, Comment  verbal cues to get lined up with chair prior to sitting, tsf from chair to BSC and back to chair after ambulation   -EM     Recorded by  [EM] Carol Munguia, PT [PC] Tiffanie Kemp, PT    Gait Assessment/Treatment    Gait, [place] Level  minimum assist (75% patient effort);2 person assist required;hand held assist  -EM moderate assist (50% patient effort);2 person assist required;hand held assist  -PC    Gait, Distance (Feet)  25  -EM 12  -PC    Gait, Gait Deviations  shane decreased;step length decreased  -EM ataxia;forward flexed posture;step length decreased  -PC    Gait, Safety Issues   step length decreased  -PC    Gait, Impairments   coordination impaired;motor control impaired;impaired balance  -PC    Recorded by  [EM] Carol Munguia, PT [PC] Tiffanie Kemp, PT    Upper Body Bathing Assessment/Training    UB Bathing Assess/Train, Comment already washed up. ed pt on ADLs on rehab w. OT and how process goes   -      Recorded by [KP] Vida Bonds OTR      Therapy Exercises    Bilateral Lower Extremities   5 reps;AROM:;sitting;ankle pumps/circles;LAQ;hip flexion   needs tactile and verbal cues  -PC    Bilateral Upper Extremity AROM:;sitting;elbow flexion/extension;shoulder extension/flexion;20 reps   w. prec followed and max VC to lift UE off the arm rest  -      Exercise Protocols  --    5 reps cardiac protocol with assist, level 2   -EM --   level II   -PC    Recorded by [KP] Vida Bonds, OTR [EM] Carol Munguia, PT [PC] Tiffanie Kemp, BRO    Positioning and Restraints    Pre-Treatment Position sitting in chair/recliner  -KP sitting in chair/recliner  -EM sitting in chair/recliner  -PC    Post Treatment Position chair  -KP chair  -EM bed  -PC    In Bed   supine;call light within reach;encouraged to call for assist  -PC    In Chair reclined;call light within reach;encouraged to call for assist;with family/caregiver  -KP reclined;call light within reach;with family/caregiver  -EM     Recorded by [KP] Vida Bonds, OTR [EM] Carol Munguia, PT [PC] Tiffanie Kemp, PT      User Key  (r) = Recorded By, (t) = Taken By, (c) = Cosigned By    Initials Name Effective Dates     Vida Bonds, OTR 04/13/15 -     PC Tiffanie Kemp, PT 12/01/15 -     EM Carol Munguia, PT 12/01/15 -                 OT Goals       10/17/17 1219          Transfer Training OT LTG    Transfer Training OT LTG, Date Established 10/17/17  -MR      Transfer Training OT LTG, Time to Achieve 1 wk  -MR      Transfer Training OT LTG, Activity Type bed to chair /chair to bed;toilet  -MR      Transfer Training OT LTG, Power Level moderate assist (50% patient effort)  -MR      Transfer Training OT LTG, Assist Device walker, rolling  -MR      Isolated Movement OT LTG    Isolated Movement OT LTG, Date Established 10/17/17  -MR      Isolated Movement OT LTG, Time to Achieve 1 wk  -MR      Isolated Movement OT LTG, Body Area left shoulder;left elbow;left forearm;left wrist;left fingers  -MR      Isolated Movement OT LTG, Level facilitate movement;3/4  -MR      ADL OT LTG    ADL OT LTG, Date Established 10/17/17  -MR      ADL OT LTG, Time to Achieve 1 wk  -MR      ADL OT LTG, Activity Type ADL skills  -MR      ADL OT LTG, Power Level mod assist;max assist  -MR        User Key  (r) =  Recorded By, (t) = Taken By, (c) = Cosigned By    Initials Name Provider Type    MR Monse Overton Della, OT Occupational Therapist          Occupational Therapy Education     Title: PT OT SLP Therapies (Active)     Topic: Occupational Therapy (Done)     Point: ADL training (Done)    Description: Instruct learner(s) on proper safety adaptation and remediation techniques during self care or transfers.   Instruct in proper use of assistive devices.    Learning Progress Summary    Learner Readiness Method Response Comment Documented by Status   Patient Acceptance E VU Educated on role of OT, benefits of inpatient rehab. MR 10/17/17 1218 Done   Family Acceptance E VU Educated on role of OT, benefits of inpatient rehab. MR 10/17/17 1218 Done                      User Key     Initials Effective Dates Name Provider Type Discipline    MR 06/22/16 -  Monse Barnesafia OT Occupational Therapist OT                  OT Recommendation and Plan  Anticipated Discharge Disposition: skilled nursing facility, inpatient rehabilitation facility  Planned Therapy Interventions: activity intolerance, ADL retraining, bed mobility training, strengthening, transfer training  Therapy Frequency: 3-5 times/wk           Outcome Measures       10/18/17 1541 10/18/17 1000 10/17/17 1200    How much help from another person do you currently need...    Turning from your back to your side while in flat bed without using bedrails?  3  -EM     Moving from lying on back to sitting on the side of a flat bed without bedrails?  2  -EM     Moving to and from a bed to a chair (including a wheelchair)?  3  -EM     Standing up from a chair using your arms (e.g., wheelchair, bedside chair)?  3  -EM     Climbing 3-5 steps with a railing?  2  -EM     To walk in hospital room?  3  -EM     AM-PAC 6 Clicks Score  16  -EM     How much help from another is currently needed...    Putting on and taking off regular lower body clothing? 1  -KP  1  -MR    Bathing (including  washing, rinsing, and drying) 2  -KP  1  -MR    Toileting (which includes using toilet bed pan or urinal) 1  -KP  1  -MR    Putting on and taking off regular upper body clothing 2  -KP  2  -MR    Taking care of personal grooming (such as brushing teeth) 2  -KP  2  -MR    Eating meals 3  -KP  3  -MR    Score 11  -KP  10  -MR    Functional Assessment    Outcome Measure Options AM-PAC 6 Clicks Daily Activity (OT)  -KP  AM-PAC 6 Clicks Daily Activity (OT)  -MR      10/17/17 1000          How much help from another person do you currently need...    Turning from your back to your side while in flat bed without using bedrails? 2  -PC      Moving from lying on back to sitting on the side of a flat bed without bedrails? 2  -PC      Moving to and from a bed to a chair (including a wheelchair)? 2  -PC      Standing up from a chair using your arms (e.g., wheelchair, bedside chair)? 2  -PC      Climbing 3-5 steps with a railing? 1  -PC      To walk in hospital room? 2  -PC      AM-PAC 6 Clicks Score 11  -PC        User Key  (r) = Recorded By, (t) = Taken By, (c) = Cosigned By    Initials Name Provider Type     Vida Bonds OTR Occupational Therapist    PC Tiffanie Kemp, PT Physical Therapist    RC Munguia, PT Physical Therapist    MR Monse Hull, OT Occupational Therapist           Time Calculation:         Time Calculation- OT       10/18/17 1541          Time Calculation- OT    OT Start Time 1330  -      OT Stop Time 1353  -      OT Time Calculation (min) 23 min  -      OT Received On 10/18/17  -      OT - Next Appointment 10/19/17  -        User Key  (r) = Recorded By, (t) = Taken By, (c) = Cosigned By    Initials Name Provider Type     Vida Bonds OTR Occupational Therapist           Therapy Charges for Today     Code Description Service Date Service Provider Modifiers Qty    [id number]  OT THER PROC EA 15 MIN 10/18/2017 JOE Juarez GO 2                Vida Bonds, OTR  10/18/2017

## 2017-10-18 NOTE — PROGRESS NOTES
Continued Stay Note  [facility]     Patient Name: Marion Anderson  MRN: [id number]  Today's Date: 10/18/2017    Admit Date: 10/6/2017          Discharge Plan       10/18/17 1256    Case Management/Social Work Plan    Plan BAR     Additional Comments Per Coty Pt is approved for [facility].  CCP following.              Discharge Codes     None            María Tiwari RN

## 2017-10-18 NOTE — PLAN OF CARE
Problem: Patient Care Overview (Adult)  Goal: Plan of Care Review  Outcome: Ongoing (interventions implemented as appropriate)    10/18/17 0428   Coping/Psychosocial Response Interventions   Plan Of Care Reviewed With patient   Patient Care Overview   Progress progress towards functional goals is fair   Outcome Evaluation   Outcome Summary/Follow up Plan pt is doing great walking with her walker with 1 asst. vital signs stable. taking her po meds with water and tolerating it

## 2017-10-18 NOTE — PROGRESS NOTES
CC: Denies HA. Feeling tired      AVSS  AA,Ox3  DRAKE well, no focal deficits    ..    Results from last 7 days  Lab Units 10/18/17  0335   WBC 10*3/mm3 11.64*   HEMOGLOBIN g/dL 10.4*   HEMATOCRIT % 31.9*   PLATELETS 10*3/mm3 280       6 days s/p CABG/MV repair  CAD  HTN  Small R parietal SDH on CT    She is currently on aspirin and Lovenox.  Ultimately the plan is to transition her to aspirin and Plavix.  We are fine with what ever anticoagulation neurology and cardiology decides is appropriate.  From our perspective she can go to acute rehabilitation as planned at any time.  I will go ahead and repeat the CT Monday, October 23.  We will see her at that time and certainly feel free to call sooner with any questions or concerns.

## 2017-10-18 NOTE — NURSING NOTE
Reviewed rehab program and criteria with pt and 2 daughters, yesterday. At current level, appropriate for our program. Will continue to monitor for progress. Thanks, Lissy RICARDO rehab admission nurse [phone]

## 2017-10-18 NOTE — PLAN OF CARE
Problem: Patient Care Overview (Adult)  Goal: Plan of Care Review  Outcome: Ongoing (interventions implemented as appropriate)    10/18/17 1009   Coping/Psychosocial Response Interventions   Plan Of Care Reviewed With patient   Patient Care Overview   Progress progress towards functional goals is fair   Outcome Evaluation   Outcome Summary/Follow up Plan patient with limited ROM of UEs during exercises, tactile and verbal encouragement to increase through full ROM. able to tolerate slight increase in ambulation distance today with encouragement, less assistance needed for sit to stand tsf although verbal cues for safety awareness when going to sit.

## 2017-10-18 NOTE — PLAN OF CARE
Problem: Patient Care Overview (Adult)  Goal: Plan of Care Review  Outcome: Ongoing (interventions implemented as appropriate)    10/18/17 1626   Coping/Psychosocial Response Interventions   Plan Of Care Reviewed With patient   Patient Care Overview   Progress improving   Outcome Evaluation   Outcome Summary/Follow up Plan No complaints at this time. patient will be transferred to [facility] subacute rehab.       Goal: Adult Individualization and Mutuality  Outcome: Ongoing (interventions implemented as appropriate)  Goal: Discharge Needs Assessment  Outcome: Ongoing (interventions implemented as appropriate)    Problem: Acute Coronary Syndrome (ACS) (Adult)  Goal: Signs and Symptoms of Listed Potential Problems Will be Absent or Manageable (Acute Coronary Syndrome)  Outcome: Ongoing (interventions implemented as appropriate)    10/18/17 1626   Acute Coronary Syndrome (ACS)   Problems Assessed (Acute Coronary Syndrome (ACS)) all   Problems Present (Acute Coronary Syndrome (ACS)) situational response         Problem: Pain, Acute (Adult)  Goal: Identify Related Risk Factors and Signs and Symptoms  Outcome: Ongoing (interventions implemented as appropriate)  Goal: Acceptable Pain Control/Comfort Level  Outcome: Ongoing (interventions implemented as appropriate)    10/18/17 1626   Pain, Acute (Adult)   Acceptable Pain Control/Comfort Level making progress toward outcome         Problem: Perioperative Period (Adult)  Goal: Signs and Symptoms of Listed Potential Problems Will be Absent or Manageable (Perioperative Period)  Outcome: Ongoing (interventions implemented as appropriate)    10/18/17 1626   Perioperative Period   Problems Assessed (Perioperative Period) all   Problems Present (Perioperative Period) none         Problem: Cardiac Surgery (Adult)  Goal: Signs and Symptoms of Listed Potential Problems Will be Absent or Manageable (Cardiac Surgery)  Outcome: Ongoing (interventions implemented as appropriate)    10/18/17  1626   Cardiac Surgery   Problems Assessed (Cardiac Surgery) all   Problems Present (Cardiac Surgery) situational response         Problem: Fall Risk (Adult)  Goal: Identify Related Risk Factors and Signs and Symptoms  Outcome: Ongoing (interventions implemented as appropriate)  Goal: Absence of Falls  Outcome: Ongoing (interventions implemented as appropriate)    10/18/17 1626   Fall Risk (Adult)   Absence of Falls making progress toward outcome

## 2017-10-18 NOTE — THERAPY TREATMENT NOTE
Acute Care - Physical Therapy Treatment Note  [facility]     Patient Name: Marion Anderson  : 1950  MRN: [id number]  Today's Date: 10/18/2017  Onset of Illness/Injury or Date of Surgery Date: 10/06/17  Date of Referral to PT: 10/08/17  Referring Physician: Wanda    Admit Date: 10/6/2017    Visit Dx:    ICD-10-CM ICD-9-CM   1. Physical deconditioning R53.81 799.3   2. Coronary artery disease of native heart with stable angina pectoris, unspecified vessel or lesion type I25.118 414.01     413.9   3. Generalized weakness R53.1 780.79   4. S/P CABG x 4 Z95.1 V45.81     Patient Active Problem List   Diagnosis   • Coronary artery disease of native heart with stable angina pectoris   • CAD (coronary artery disease)               Adult Rehabilitation Note       10/18/17 1004 10/17/17 1025       Rehab Assessment/Intervention    Discipline physical therapist  -EM physical therapist  -PC     Document Type therapy note (daily note)  -EM therapy note (daily note)  -PC     Subjective Information agree to therapy;complains of;pain  -EM agree to therapy;complains of;weakness;fatigue;pain  -PC     Patient Effort, Rehab Treatment adequate  -EM adequate  -PC     Symptoms Noted During/After Treatment  fatigue  -PC     Symptoms Noted Comment  lethargy, needs continual tactile and verbal cues  -PC     Precautions/Limitations  cardiac precautions;sternal precautions  -PC     Recorded by [EM] Carol Munguia, PT [PC] Tiffanie Kemp, PT     Vital Signs    Pre Systolic BP Rehab 151  -EM      Pre Treatment Diastolic BP 75  -EM      Pretreatment Heart Rate (beats/min) 84  -EM      Posttreatment Heart Rate (beats/min) 88  -EM      Pre SpO2 (%) 94  -EM 96  -PC     O2 Delivery Pre Treatment room air  -EM      Post SpO2 (%) 94  -EM      O2 Delivery Post Treatment room air  -EM      Recorded by [EM] Carol Munguia, PT [PC] Tiffanie Kemp, PT     Pain Assessment    Pain Assessment 0-10  -EM Wilhelm-Linares FACES  -PC      Wilhelm-Baker FACES Pain Rating  6  -PC     Pain Score 7  -EM      Pain Location Sternum  -EM Sternum  -PC     Pain Intervention(s) Medication (See MAR);Repositioned  -EM Medication (See MAR);Repositioned  -PC     Recorded by [EM] Carol Munguia, PT [PC] Tiffanie Kemp PT     Cognitive Assessment/Intervention    Current Cognitive/Communication Assessment  impaired  -PC     Orientation Status  oriented to;person  -PC     Follows Commands/Answers Questions  able to follow single-step instructions;needs cueing;needs increased time;needs repetition;50% of the time  -PC     Personal Safety  moderate impairment;decreased insight to deficits  -PC     Personal Safety Interventions  fall prevention program maintained;gait belt  -PC     Recorded by  [PC] Tiffanie Kemp PT     MMT (Manual Muscle Testing)    General MMT Assessment Detail  LUE 2/5, RUE 3/5, LLE 3/5, RLE 4/5  -PC     Recorded by  [PC] Tiffanie Kemp PT     Bed Mobility, Assessment/Treatment    Bed Mob, Supine to Sit, Achille not tested  -EM      Bed Mob, Sit to Supine, Achille not tested  -EM moderate assist (50% patient effort);maximum assist (25% patient effort)  -PC     Recorded by [EM] Carol Munguia, PT [PC] Tiffanie Kemp PT     Transfer Assessment/Treatment    Transfers, Bed-Chair Achille minimum assist (75% patient effort);2 person assist required  -EM      Transfers, Chair-Bed Achille minimum assist (75% patient effort);2 person assist required  -EM      Transfers, Sit-Stand Achille minimum assist (75% patient effort);2 person assist required  -EM moderate assist (50% patient effort);hand held assist;2 person assist required  -PC     Transfers, Stand-Sit Achille minimum assist (75% patient effort);verbal cues required  -EM moderate assist (50% patient effort);2 person assist required  -PC     Transfer, Comment verbal cues to get lined up with chair prior to sitting, tsf from chair to BSC and back to chair after  ambulation   -EM      Recorded by [EM] Carol Munguia, PT [PC] Tiffanie Kemp, PT     Gait Assessment/Treatment    Gait, Cowp[place] Level minimum assist (75% patient effort);2 person assist required;hand held assist  -EM moderate assist (50% patient effort);2 person assist required;hand held assist  -PC     Gait, Distance (Feet) 25  -EM 12  -PC     Gait, Gait Deviations shane decreased;step length decreased  -EM ataxia;forward flexed posture;step length decreased  -PC     Gait, Safety Issues  step length decreased  -PC     Gait, Impairments  coordination impaired;motor control impaired;impaired balance  -PC     Recorded by [EM] Carol Munguia, PT [PC] Tiffanie Kemp PT     Therapy Exercises    Bilateral Lower Extremities  5 reps;AROM:;sitting;ankle pumps/circles;LAQ;hip flexion   needs tactile and verbal cues  -PC     Exercise Protocols --   5 reps cardiac protocol with assist, level 2   -EM --   level II   -PC     Recorded by [EM] Carol Munguia, PT [PC] Tiffanie Kemp PT     Positioning and Restraints    Pre-Treatment Position sitting in chair/recliner  -EM sitting in chair/recliner  -PC     Post Treatment Position chair  -EM bed  -PC     In Bed  supine;call light within reach;encouraged to call for assist  -PC     In Chair reclined;call light within reach;with family/caregiver  -EM      Recorded by [EM] Carol Munguia, PT [PC] Tiffanie Kemp PT       User Key  (r) = Recorded By, (t) = Taken By, (c) = Cosigned By    Initials Name Effective Dates    PC Tiffanie Kemp, PT 12/01/15 -     EM Carol Munguia, PT 12/01/15 -                 IP PT Goals       10/13/17 0859 10/13/17 0857       Bed Mobility PT LTG    Bed Mobility PT LTG, Date Established 10/13/17  -EM (r) MF (t) EM (c)      Bed Mobility PT LTG, Time to Achieve 1 wk  -EM (r) MF (t) EM (c)      Bed Mobility PT LTG, Activity Type all bed mobility  -EM (r) MF (t) EM (c)      Bed Mobility PT LTG, Cowpens Level supervision  required  -EM (r) MF (t) EM (c)      Transfer Training 2 PT LTG    Transfer Training PT 2 LTG, Date Established 10/13/17  -EM (r) MF (t) EM (c)      Transfer Training PT 2 LTG, Time to Achieve 1 wk  -EM (r) MF (t) EM (c)      Transfer Training PT 2 LTG, Activity Type bed to chair /chair to bed;sit to stand/stand to sit  -EM (r) MF (t) EM (c)      Transfer Training PT 2 LTG, Armstrong Level minimum assist (75% patient effort)  -EM (r) MF (t) EM (c)      Transfer Training PT 2 LTG, Assist Device walker, rolling  -EM (r) MF (t) EM (c)      Gait Training PT LTG    Gait Training Goal PT LTG, Date Established 10/13/17  -EM (r) MF (t) EM (c)      Gait Training Goal PT LTG, Time to Achieve 1 wk  -EM (r) MF (t) EM (c)      Gait Training Goal PT LTG, Armstrong Level minimum assist (75% patient effort);2 person assist required  -EM (r) MF (t) EM (c)      Gait Training Goal PT LTG, Assist Device walker, rolling  -EM (r) MF (t) EM (c)      Gait Training Goal PT LTG, Distance to Achieve 20 ft  -EM (r) MF (t) EM (c)      Cardiopulmonary PT LTG    Cardiopulmonary PT LTG, Date Established  10/13/17  -EM (r) MF (t) EM (c)     Cardiopulmonary PT LTG, Time to Achieve  1 wk  -EM (r) MF (t) EM (c)     Cardiopulmonary PT LTG, Level  Level III  -EM (r) MF (t) EM (c)       User Key  (r) = Recorded By, (t) = Taken By, (c) = Cosigned By    Initials Name Provider Type    EM Carol Munguia, PT Physical Therapist    ROEL Gutierrez, PT Student PT Student          Physical Therapy Education     Title: PT OT SLP Therapies (Active)     Topic: Physical Therapy (Active)     Point: Mobility training (Active)    Learning Progress Summary    Learner Readiness Method Response Comment Documented by Status   Patient Acceptance E NR  EM 10/18/17 1009 Active    Acceptance E,D NR  PC 10/17/17 1037 Active    Acceptance E NR  SA 10/15/17 1027 Active    Acceptance E NR Educated pt on benefits of participating in PT. Daughter walked in room midway  through session. Educated daughter on cardiac protocol exercises.  10/14/17 1002 Active    Acceptance E STANLEY LAMAS 10/14/17 0832 Done    Nonacceptance E NR   10/13/17 0857 Active    Eager E,TB JUAN ANTONIO ANGEL Encouraged pt to ambulate in hallways with staff or family at least tid to improve activity tolerance.  Discussed use of AD but pt feel that that would be a step backward and wpould prefer to go without 9we will revisit this after surgery). KP 10/08/17 1120 Done   Family Acceptance E NR Educated pt on benefits of participating in PT. Daughter walked in room midway through session. Educated daughter on cardiac protocol exercises.  10/14/17 1002 Active               Point: Home exercise program (Active)    Learning Progress Summary    Learner Readiness Method Response Comment Documented by Status   Patient Acceptance E NR  EM 10/18/17 1009 Active    Acceptance E,D NR  PC 10/17/17 1037 Active    Acceptance E NR   10/15/17 1027 Active    Acceptance E NR Educated pt on benefits of participating in PT. Daughter walked in room midway through session. Educated daughter on cardiac protocol exercises.  10/14/17 1002 Active    Acceptance E STANLEY LAMAS 10/14/17 0832 Done    Nonacceptance E NR   10/13/17 0857 Active   Family Acceptance E NR Educated pt on benefits of participating in PT. Daughter walked in room midway through session. Educated daughter on cardiac protocol exercises.  10/14/17 1002 Active               Point: Body mechanics (Active)    Learning Progress Summary    Learner Readiness Method Response Comment Documented by Status   Patient Acceptance E,D NR  PC 10/17/17 1037 Active    Acceptance E NR   10/15/17 1027 Active    Acceptance E NR Educated pt on benefits of participating in PT. Daughter walked in room midway through session. Educated daughter on cardiac protocol exercises.  10/14/17 1002 Active    Acceptance E STANLEY LAMAS 10/14/17 0832 Done    Nonacceptance E NR   10/13/17 0857 Active   Family Acceptance  E NR Educated pt on benefits of participating in PT. Daughter walked in room midway through session. Educated daughter on cardiac protocol exercises.  10/14/17 1002 Active               Point: Precautions (Active)    Learning Progress Summary    Learner Readiness Method Response Comment Documented by Status   Patient Acceptance E,D NR   10/17/17 1037 Active    Acceptance E NR   10/15/17 1027 Active    Acceptance E NR Educated pt on benefits of participating in PT. Daughter walked in room midway through session. Educated daughter on cardiac protocol exercises.  10/14/17 1002 Active    Acceptance E VU   10/14/17 0832 Done    Nonacceptance E NR   10/13/17 0857 Active    Eager E,TB VU,DU Encouraged pt to ambulate in hallways with staff or family at least tid to improve activity tolerance.  Discussed use of AD but pt feel that that would be a step backward and wpould prefer to go without 9we will revisit this after surgery).  10/08/17 1120 Done   Family Acceptance E NR Educated pt on benefits of participating in PT. Daughter walked in room midway through session. Educated daughter on cardiac protocol exercises.  10/14/17 1002 Active                      User Key     Initials Effective Dates Name Provider Type Discipline     12/01/15 -  Tiffanie Kemp, PT Physical Therapist PT    EM 12/01/15 -  Carol Munguia, PT Physical Therapist PT     12/01/15 -  Faiza Brady, PT Physical Therapist PT    DR 06/16/16 -  Jordana Umanzor, RN Registered Nurse Nurse     08/03/17 -  Kelin Jung, PT Physical Therapist PT     09/18/17 -  Atilio Gutierrez, PT Student PT Student PT                    PT Recommendation and Plan  Anticipated Discharge Disposition: inpatient rehabilitation facility, skilled nursing facility  Planned Therapy Interventions: balance training, bed mobility training, gait training, strengthening, transfer training, home exercise program  PT Frequency: daily  Plan of Care Review  Plan Of  Care Reviewed With: patient  Progress: progress towards functional goals is fair  Outcome Summary/Follow up Plan: patient with limited ROM of UEs during exercises, tactile and verbal encouragement to increase through full ROM. able to tolerate slight increase in ambulation distance today with encouragement, less assistance needed for sit to stand tsf although verbal cues for safety awareness when going to sit.           Outcome Measures       10/18/17 1000 10/17/17 1200 10/17/17 1000    How much help from another person do you currently need...    Turning from your back to your side while in flat bed without using bedrails? 3  -EM  2  -PC    Moving from lying on back to sitting on the side of a flat bed without bedrails? 2  -EM  2  -PC    Moving to and from a bed to a chair (including a wheelchair)? 3  -EM  2  -PC    Standing up from a chair using your arms (e.g., wheelchair, bedside chair)? 3  -EM  2  -PC    Climbing 3-5 steps with a railing? 2  -EM  1  -PC    To walk in hospital room? 3  -EM  2  -PC    AM-PAC 6 Clicks Score 16  -EM  11  -PC    How much help from another is currently needed...    Putting on and taking off regular lower body clothing?  1  -MR     Bathing (including washing, rinsing, and drying)  1  -MR     Toileting (which includes using toilet bed pan or urinal)  1  -MR     Putting on and taking off regular upper body clothing  2  -MR     Taking care of personal grooming (such as brushing teeth)  2  -MR     Eating meals  3  -MR     Score  10  -MR     Functional Assessment    Outcome Measure Options  AM-PAC 6 Clicks Daily Activity (OT)  -MR       User Key  (r) = Recorded By, (t) = Taken By, (c) = Cosigned By    Initials Name Provider Type    PC Tiffanie Kemp, PT Physical Therapist    EM Carol Munguia, PT Physical Therapist    MR Monse Hull, OT Occupational Therapist           Time Calculation:         PT Charges       10/18/17 1011          Time Calculation    Start Time 0940  -EM       Stop Time 1003  -EM      Time Calculation (min) 23 min  -EM      PT Received On 10/18/17  -EM      PT - Next Appointment 10/19/17  -EM        User Key  (r) = Recorded By, (t) = Taken By, (c) = Cosigned By    Initials Name Provider Type    EM Carol Munguia PT Physical Therapist          Therapy Charges for Today     Code Description Service Date Service Provider Modifiers Qty    62385810641 HC PT THER PROC EA 15 MIN 10/18/2017 Carol Munguia, PT GP 1    96435269628 HC PT THER SUPP EA 15 MIN 10/18/2017 Carol Munguia, PT GP 2          PT G-Codes  PT Professional Judgement Used?: Yes  Outcome Measure Options: AM-PAC 6 Clicks Daily Activity (OT)  Score: 23  Functional Limitation: Mobility: Walking and moving around  Mobility: Walking and Moving Around Current Status (): At least 1 percent but less than 20 percent impaired, limited or restricted  Mobility: Walking and Moving Around Goal Status (): At least 1 percent but less than 20 percent impaired, limited or restricted    Carol Munguia PT  10/18/2017

## 2017-10-18 NOTE — PLAN OF CARE
Problem: Patient Care Overview (Adult)  Goal: Plan of Care Review    10/18/17 1542   Coping/Psychosocial Response Interventions   Plan Of Care Reviewed With patient;family   Patient Care Overview   Progress improving   Outcome Evaluation   Outcome Summary/Follow up Plan pt completed AROM w. B UEs. w. increased time needed, max VC to lift L UE off arm rest of chair. pt fiance encourages her to complete task to inc strength, pt states it is hard and she is sore. Pt required increased time to complete tasks. cont to benefit from skilled OT.

## 2017-10-18 NOTE — DISCHARGE SUMMARY
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 female spared from [facility] on 10/7/2017 status post cardiac catheter and significant coronary artery disease.  She underwent successful CABG ×4 with pedicle LIMA graft to mid LAD, saphenous vein graft to RCA, RA, D1, mitral valve repair with 24 mm ring, and endoscopic leg vein harvest on 10/12/2017 with Dr. Muñoz (see op report for full details).  Her postoperative course was complicated with neuro changes that was evaluated by neurology, her CT of the head showed old infarcts but no infarcts in the immediate postoperative period.  She had repeated lethargy on 10/16/2017, a repeat CT of the head demonstrated a possible change to a subdural hematoma at which time her aspirin and Brilenta were placed on hold, a neurosurgery consultation was obtained and a repeat CT of the head was obtained the next morning, no changes were noted.  Neurosurgery and neurology were comfortable with resuming aspirin, and would recommend changing to Plavix at a later date after ascertain that the subdural hematoma is stable.  Dr. Parekh wants to continue Keppra 250 mg by mouth twice a day for 6 months, she  will need to have a follow-up appointment with him as well.  She is due for a repeat CT of the head on 10/23/2017 and to follow-up with Dr. Bazzi thereafter.  During all of this there was question as to whether it could be a hypoxic issue at night or possibly related to obstructive sleep apnea, so a pulmonary evaluation was obtained and they recommended a follow-up as an outpatient for sleep study, we will continue oxygen at night at rehabilitation prophylactically.  Neurology felt that she had some element of postoperative toxic metabolic encephalopathy, her urinalysis was negative on 10/16/2017, and her orientation has continued to improve.  At this point she has had continued improvement in mentation, and continued improvement in physical rehabilitation, and she is now deemed ready for rehabilitation in the acute care unit with continuation of PT/OT/ST.        Procedures Performed  Procedure(s):  FILEMON STERNOTOMY CORONARY ARTERY BYPASS GRAFT TIMES 4  USING LEFT INTERNAL MAMMARY ARTERY AND LEFT GREATER SAPHENOUS VEIN GRAFT PER  ENDOSCOPIC VEIN HARVESTING, MITRAL VALVE REPAIR AND PRP       Consults:   Consults     Date and Time Order Name Status Description    10/16/2017 1606 Inpatient Consult to Pulmonology Completed     10/16/2017 1002 Inpatient Consult to Neurosurgery Completed     10/13/2017 0835 Inpatient Consult to Neurology Completed     10/11/2017 0944 Inpatient Consult to Vascular Surgery Completed           Pertinent Test Results:    Lab Results   Component Value Date    WBC 11.64 (H) 10/18/2017    HGB 10.4 (L) 10/18/2017    HCT 31.9 (L) 10/18/2017    MCV 90.9 10/18/2017     10/18/2017      Lab Results   Component Value Date    GLUCOSE 123 (H) 10/18/2017    CALCIUM 9.4 10/18/2017     10/18/2017    K 3.8 10/18/2017    CO2 28.2 10/18/2017    CL 95 (L) 10/18/2017    BUN 19 10/18/2017    CREATININE 0.72 10/18/2017    EGFRIFNONA 81 10/18/2017    BCR 26.4 (H) 10/18/2017    ANIONGAP 13.8 10/18/2017      Lab Results   Component Value Date    INR 1.26 (H) 10/12/2017    PROTIME 15.3 (H) 10/12/2017         Condition on Discharge:  Stable   Vital Signs  Temp:  [97.8 °F (36.6 °C)-98.5 °F (36.9 °C)] 98.1 °F (36.7 °C)  Heart Rate:  [81-98] 84  Resp:  [16-18] 16  BP: (134-151)/(61-88) 149/61      Discharge Disposition  Rehab Facility or Unit ([facility])    Discharge Medications:  See med rec for correct medications on transfer.    Discharge Diet:     Activity at Discharge:     Follow-up Appointments  Future Appointments  Date Time Provider Department Center   11/29/2017 11:30 AM Ramos Muñoz MD [facility] None     Additional Instructions for the Follow-ups that You Need to Schedule     Discharge Follow-up with Specialty    As directed    Specialty:  Dr. Muñoz   Follow Up Details:  November 29th at 11:15 am       Discharge Follow-up with Specialty    As directed    Specialty:  Cardiology   Follow Up:  1 Month       Discharge Follow-up with Specialty    As directed    Specialty:  Dr. Parekh in   Follow Up Details:  Repeat CT head on Monday, Oct 23rd, make appointment to follow       Discharge Follow-up with Specialty    As directed    Specialty:  Primary care MD   Follow Up:  2 Weeks       Discharge Follow-up with Specialty    As directed    Specialty:  Dr. Bazzi, neurosurgery   Follow Up Details:  After head CT obtained                 Test Results Pending at Discharge       PANKAJ Perez  10/18/17  3:34 PM

## 2017-10-19 PROBLEM — S06.5XAA SUBDURAL HEMATOMA: Status: ACTIVE | Noted: 2017-10-19

## 2017-10-19 PROCEDURE — 97110 THERAPEUTIC EXERCISES: CPT

## 2017-10-19 PROCEDURE — 97165 OT EVAL LOW COMPLEX 30 MIN: CPT

## 2017-10-19 PROCEDURE — 97535 SELF CARE MNGMENT TRAINING: CPT

## 2017-10-19 PROCEDURE — 97161 PT EVAL LOW COMPLEX 20 MIN: CPT

## 2017-10-19 PROCEDURE — 97112 NEUROMUSCULAR REEDUCATION: CPT

## 2017-10-19 PROCEDURE — 96125 COGNITIVE TEST BY HC PRO: CPT

## 2017-10-19 RX ORDER — ROSUVASTATIN CALCIUM 40 MG/1
40 TABLET, COATED ORAL NIGHTLY
Status: DISCONTINUED | OUTPATIENT
Start: 2017-10-19 | End: 2017-10-31 | Stop reason: HOSPADM

## 2017-10-19 RX ADMIN — CYANOCOBALAMIN TAB 500 MCG 1000 MCG: 500 TAB at 08:26

## 2017-10-19 RX ADMIN — ROSUVASTATIN CALCIUM 40 MG: 40 TABLET, COATED ORAL at 21:08

## 2017-10-19 RX ADMIN — SENNOSIDES AND DOCUSATE SODIUM 2 TABLET: 8.6; 5 TABLET ORAL at 08:26

## 2017-10-19 RX ADMIN — METOPROLOL TARTRATE 37.5 MG: 25 TABLET ORAL at 21:08

## 2017-10-19 RX ADMIN — METOPROLOL TARTRATE 37.5 MG: 25 TABLET ORAL at 08:22

## 2017-10-19 RX ADMIN — ASPIRIN 81 MG: 81 TABLET, CHEWABLE ORAL at 08:26

## 2017-10-19 RX ADMIN — PANTOPRAZOLE SODIUM 40 MG: 40 TABLET, DELAYED RELEASE ORAL at 07:38

## 2017-10-19 RX ADMIN — TRAMADOL HYDROCHLORIDE 50 MG: 50 TABLET ORAL at 12:15

## 2017-10-19 RX ADMIN — LEVOTHYROXINE SODIUM 137 MCG: 137 TABLET ORAL at 07:38

## 2017-10-19 RX ADMIN — LEVETIRACETAM 250 MG: 250 TABLET, FILM COATED ORAL at 16:48

## 2017-10-19 RX ADMIN — SENNOSIDES AND DOCUSATE SODIUM 1 TABLET: 8.6; 5 TABLET ORAL at 21:09

## 2017-10-19 RX ADMIN — LEVETIRACETAM 250 MG: 250 TABLET, FILM COATED ORAL at 08:26

## 2017-10-19 RX ADMIN — FUROSEMIDE 40 MG: 40 TABLET ORAL at 08:26

## 2017-10-20 LAB
ANION GAP SERPL CALCULATED.3IONS-SCNC: 14.7 MMOL/L
BUN BLD-MCNC: 19 MG/DL (ref 8–23)
BUN/CREAT SERPL: 19.8 (ref 7–25)
CALCIUM SPEC-SCNC: 9.9 MG/DL (ref 8.6–10.5)
CHLORIDE SERPL-SCNC: 95 MMOL/L (ref 98–107)
CO2 SERPL-SCNC: 27.3 MMOL/L (ref 22–29)
CREAT BLD-MCNC: 0.96 MG/DL (ref 0.57–1)
GFR SERPL CREATININE-BSD FRML MDRD: 58 ML/MIN/1.73
GLUCOSE BLD-MCNC: 133 MG/DL (ref 65–99)
POTASSIUM BLD-SCNC: 3.8 MMOL/L (ref 3.5–5.2)
SODIUM BLD-SCNC: 137 MMOL/L (ref 136–145)

## 2017-10-20 PROCEDURE — 92526 ORAL FUNCTION THERAPY: CPT

## 2017-10-20 PROCEDURE — 80048 BASIC METABOLIC PNL TOTAL CA: CPT | Performed by: PHYSICAL MEDICINE & REHABILITATION

## 2017-10-20 PROCEDURE — 97532: CPT

## 2017-10-20 PROCEDURE — 97535 SELF CARE MNGMENT TRAINING: CPT

## 2017-10-20 PROCEDURE — 97110 THERAPEUTIC EXERCISES: CPT

## 2017-10-20 RX ADMIN — ASPIRIN 81 MG: 81 TABLET, CHEWABLE ORAL at 08:36

## 2017-10-20 RX ADMIN — LEVOTHYROXINE SODIUM 137 MCG: 137 TABLET ORAL at 06:28

## 2017-10-20 RX ADMIN — FUROSEMIDE 40 MG: 40 TABLET ORAL at 08:36

## 2017-10-20 RX ADMIN — METOPROLOL TARTRATE 37.5 MG: 25 TABLET ORAL at 08:36

## 2017-10-20 RX ADMIN — PANTOPRAZOLE SODIUM 40 MG: 40 TABLET, DELAYED RELEASE ORAL at 06:28

## 2017-10-20 RX ADMIN — METOPROLOL TARTRATE 37.5 MG: 25 TABLET ORAL at 21:17

## 2017-10-20 RX ADMIN — LEVETIRACETAM 250 MG: 250 TABLET, FILM COATED ORAL at 17:36

## 2017-10-20 RX ADMIN — TRAMADOL HYDROCHLORIDE 50 MG: 50 TABLET ORAL at 12:47

## 2017-10-20 RX ADMIN — SENNOSIDES AND DOCUSATE SODIUM 1 TABLET: 8.6; 5 TABLET ORAL at 21:17

## 2017-10-20 RX ADMIN — LEVETIRACETAM 250 MG: 250 TABLET, FILM COATED ORAL at 08:36

## 2017-10-20 RX ADMIN — ROSUVASTATIN CALCIUM 40 MG: 40 TABLET, COATED ORAL at 21:17

## 2017-10-20 RX ADMIN — CYANOCOBALAMIN TAB 500 MCG 1000 MCG: 500 TAB at 08:36

## 2017-10-21 PROCEDURE — 97535 SELF CARE MNGMENT TRAINING: CPT

## 2017-10-21 PROCEDURE — 97110 THERAPEUTIC EXERCISES: CPT

## 2017-10-21 PROCEDURE — 97532: CPT | Performed by: SPEECH-LANGUAGE PATHOLOGIST

## 2017-10-21 RX ADMIN — LEVETIRACETAM 250 MG: 250 TABLET, FILM COATED ORAL at 18:23

## 2017-10-21 RX ADMIN — TRAMADOL HYDROCHLORIDE 50 MG: 50 TABLET ORAL at 13:33

## 2017-10-21 RX ADMIN — METOPROLOL TARTRATE 37.5 MG: 25 TABLET ORAL at 21:39

## 2017-10-21 RX ADMIN — LEVETIRACETAM 250 MG: 250 TABLET, FILM COATED ORAL at 09:43

## 2017-10-21 RX ADMIN — LEVOTHYROXINE SODIUM 137 MCG: 137 TABLET ORAL at 05:47

## 2017-10-21 RX ADMIN — ROSUVASTATIN CALCIUM 40 MG: 40 TABLET, COATED ORAL at 21:40

## 2017-10-21 RX ADMIN — MAGNESIUM HYDROXIDE 10 ML: 2400 SUSPENSION ORAL at 18:23

## 2017-10-21 RX ADMIN — CYANOCOBALAMIN TAB 500 MCG 1000 MCG: 500 TAB at 09:43

## 2017-10-21 RX ADMIN — PANTOPRAZOLE SODIUM 40 MG: 40 TABLET, DELAYED RELEASE ORAL at 05:48

## 2017-10-21 RX ADMIN — METOPROLOL TARTRATE 37.5 MG: 25 TABLET ORAL at 09:42

## 2017-10-21 RX ADMIN — FUROSEMIDE 40 MG: 40 TABLET ORAL at 09:43

## 2017-10-21 RX ADMIN — ASPIRIN 81 MG: 81 TABLET, CHEWABLE ORAL at 09:42

## 2017-10-21 RX ADMIN — SENNOSIDES AND DOCUSATE SODIUM 2 TABLET: 8.6; 5 TABLET ORAL at 21:39

## 2017-10-22 RX ADMIN — LEVETIRACETAM 250 MG: 250 TABLET, FILM COATED ORAL at 18:17

## 2017-10-22 RX ADMIN — METOPROLOL TARTRATE 37.5 MG: 25 TABLET ORAL at 09:20

## 2017-10-22 RX ADMIN — LEVOTHYROXINE SODIUM 137 MCG: 137 TABLET ORAL at 05:49

## 2017-10-22 RX ADMIN — TRAMADOL HYDROCHLORIDE 50 MG: 50 TABLET ORAL at 13:26

## 2017-10-22 RX ADMIN — ASPIRIN 81 MG: 81 TABLET, CHEWABLE ORAL at 09:20

## 2017-10-22 RX ADMIN — FUROSEMIDE 40 MG: 40 TABLET ORAL at 09:20

## 2017-10-22 RX ADMIN — LEVETIRACETAM 250 MG: 250 TABLET, FILM COATED ORAL at 09:20

## 2017-10-22 RX ADMIN — METOPROLOL TARTRATE 37.5 MG: 25 TABLET ORAL at 20:23

## 2017-10-22 RX ADMIN — PANTOPRAZOLE SODIUM 40 MG: 40 TABLET, DELAYED RELEASE ORAL at 05:49

## 2017-10-22 RX ADMIN — ROSUVASTATIN CALCIUM 40 MG: 40 TABLET, COATED ORAL at 21:41

## 2017-10-22 RX ADMIN — CYANOCOBALAMIN TAB 500 MCG 1000 MCG: 500 TAB at 09:20

## 2017-10-22 RX ADMIN — TRAMADOL HYDROCHLORIDE 50 MG: 50 TABLET ORAL at 20:22

## 2017-10-23 ENCOUNTER — APPOINTMENT (OUTPATIENT)
Dept: CT IMAGING | Facility: HOSPITAL | Age: 67
End: 2017-10-23

## 2017-10-23 LAB
ALBUMIN SERPL-MCNC: 3.4 G/DL (ref 3.5–5.2)
ALBUMIN/GLOB SERPL: 0.9 G/DL
ALP SERPL-CCNC: 91 U/L (ref 39–117)
ALT SERPL W P-5'-P-CCNC: 24 U/L (ref 1–33)
ANION GAP SERPL CALCULATED.3IONS-SCNC: 13.4 MMOL/L
AST SERPL-CCNC: 21 U/L (ref 1–32)
BASOPHILS # BLD AUTO: 0.04 10*3/MM3 (ref 0–0.2)
BASOPHILS NFR BLD AUTO: 0.4 % (ref 0–1.5)
BILIRUB SERPL-MCNC: 0.7 MG/DL (ref 0.1–1.2)
BUN BLD-MCNC: 17 MG/DL (ref 8–23)
BUN/CREAT SERPL: 15.9 (ref 7–25)
CALCIUM SPEC-SCNC: 9.8 MG/DL (ref 8.6–10.5)
CHLORIDE SERPL-SCNC: 97 MMOL/L (ref 98–107)
CO2 SERPL-SCNC: 29.6 MMOL/L (ref 22–29)
CREAT BLD-MCNC: 1.07 MG/DL (ref 0.57–1)
DEPRECATED RDW RBC AUTO: 46.4 FL (ref 37–54)
EOSINOPHIL # BLD AUTO: 0.68 10*3/MM3 (ref 0–0.7)
EOSINOPHIL NFR BLD AUTO: 6.2 % (ref 0.3–6.2)
ERYTHROCYTE [DISTWIDTH] IN BLOOD BY AUTOMATED COUNT: 14.1 % (ref 11.7–13)
GFR SERPL CREATININE-BSD FRML MDRD: 51 ML/MIN/1.73
GLOBULIN UR ELPH-MCNC: 3.6 GM/DL
GLUCOSE BLD-MCNC: 115 MG/DL (ref 65–99)
HCT VFR BLD AUTO: 35.9 % (ref 35.6–45.5)
HGB BLD-MCNC: 11.4 G/DL (ref 11.9–15.5)
IMM GRANULOCYTES # BLD: 0.06 10*3/MM3 (ref 0–0.03)
IMM GRANULOCYTES NFR BLD: 0.5 % (ref 0–0.5)
LYMPHOCYTES # BLD AUTO: 2.68 10*3/MM3 (ref 0.9–4.8)
LYMPHOCYTES NFR BLD AUTO: 24.3 % (ref 19.6–45.3)
MCH RBC QN AUTO: 29.2 PG (ref 26.9–32)
MCHC RBC AUTO-ENTMCNC: 31.8 G/DL (ref 32.4–36.3)
MCV RBC AUTO: 91.8 FL (ref 80.5–98.2)
MONOCYTES # BLD AUTO: 0.92 10*3/MM3 (ref 0.2–1.2)
MONOCYTES NFR BLD AUTO: 8.3 % (ref 5–12)
NEUTROPHILS # BLD AUTO: 6.67 10*3/MM3 (ref 1.9–8.1)
NEUTROPHILS NFR BLD AUTO: 60.3 % (ref 42.7–76)
PLATELET # BLD AUTO: 424 10*3/MM3 (ref 140–500)
PMV BLD AUTO: 9.2 FL (ref 6–12)
POTASSIUM BLD-SCNC: 4.5 MMOL/L (ref 3.5–5.2)
PROT SERPL-MCNC: 7 G/DL (ref 6–8.5)
RBC # BLD AUTO: 3.91 10*6/MM3 (ref 3.9–5.2)
SODIUM BLD-SCNC: 140 MMOL/L (ref 136–145)
WBC NRBC COR # BLD: 11.05 10*3/MM3 (ref 4.5–10.7)

## 2017-10-23 PROCEDURE — 97110 THERAPEUTIC EXERCISES: CPT

## 2017-10-23 PROCEDURE — 97532: CPT

## 2017-10-23 PROCEDURE — 97535 SELF CARE MNGMENT TRAINING: CPT

## 2017-10-23 PROCEDURE — 80053 COMPREHEN METABOLIC PANEL: CPT | Performed by: PHYSICAL MEDICINE & REHABILITATION

## 2017-10-23 PROCEDURE — 97112 NEUROMUSCULAR REEDUCATION: CPT

## 2017-10-23 PROCEDURE — 70450 CT HEAD/BRAIN W/O DYE: CPT

## 2017-10-23 PROCEDURE — 85025 COMPLETE CBC W/AUTO DIFF WBC: CPT | Performed by: PHYSICAL MEDICINE & REHABILITATION

## 2017-10-23 RX ADMIN — METOPROLOL TARTRATE 37.5 MG: 25 TABLET ORAL at 21:09

## 2017-10-23 RX ADMIN — ROSUVASTATIN CALCIUM 40 MG: 40 TABLET, COATED ORAL at 21:08

## 2017-10-23 RX ADMIN — ACETAMINOPHEN 650 MG: 325 TABLET ORAL at 13:05

## 2017-10-23 RX ADMIN — METOPROLOL TARTRATE 37.5 MG: 25 TABLET ORAL at 08:17

## 2017-10-23 RX ADMIN — PANTOPRAZOLE SODIUM 40 MG: 40 TABLET, DELAYED RELEASE ORAL at 05:25

## 2017-10-23 RX ADMIN — ASPIRIN 81 MG: 81 TABLET, CHEWABLE ORAL at 08:16

## 2017-10-23 RX ADMIN — LEVETIRACETAM 250 MG: 250 TABLET, FILM COATED ORAL at 08:16

## 2017-10-23 RX ADMIN — LEVETIRACETAM 250 MG: 250 TABLET, FILM COATED ORAL at 17:05

## 2017-10-23 RX ADMIN — FUROSEMIDE 40 MG: 40 TABLET ORAL at 08:16

## 2017-10-23 RX ADMIN — SENNOSIDES AND DOCUSATE SODIUM 2 TABLET: 8.6; 5 TABLET ORAL at 21:09

## 2017-10-23 RX ADMIN — TRAMADOL HYDROCHLORIDE 50 MG: 50 TABLET ORAL at 12:08

## 2017-10-23 RX ADMIN — TRAMADOL HYDROCHLORIDE 50 MG: 50 TABLET ORAL at 21:24

## 2017-10-23 RX ADMIN — CYANOCOBALAMIN TAB 500 MCG 1000 MCG: 500 TAB at 08:17

## 2017-10-23 RX ADMIN — LEVOTHYROXINE SODIUM 137 MCG: 137 TABLET ORAL at 05:25

## 2017-10-24 PROCEDURE — 97110 THERAPEUTIC EXERCISES: CPT

## 2017-10-24 PROCEDURE — 97532: CPT

## 2017-10-24 PROCEDURE — 97535 SELF CARE MNGMENT TRAINING: CPT

## 2017-10-24 PROCEDURE — 97530 THERAPEUTIC ACTIVITIES: CPT

## 2017-10-24 PROCEDURE — 99232 SBSQ HOSP IP/OBS MODERATE 35: CPT | Performed by: NURSE PRACTITIONER

## 2017-10-24 RX ORDER — ALPRAZOLAM 0.5 MG/1
0.5 TABLET ORAL ONCE
Status: COMPLETED | OUTPATIENT
Start: 2017-10-24 | End: 2017-10-24

## 2017-10-24 RX ADMIN — CYANOCOBALAMIN TAB 500 MCG 1000 MCG: 500 TAB at 09:13

## 2017-10-24 RX ADMIN — SENNOSIDES AND DOCUSATE SODIUM 2 TABLET: 8.6; 5 TABLET ORAL at 20:38

## 2017-10-24 RX ADMIN — METOPROLOL TARTRATE 37.5 MG: 25 TABLET ORAL at 09:12

## 2017-10-24 RX ADMIN — METOPROLOL TARTRATE 37.5 MG: 25 TABLET ORAL at 20:38

## 2017-10-24 RX ADMIN — ALPRAZOLAM 0.5 MG: 0.5 TABLET ORAL at 00:59

## 2017-10-24 RX ADMIN — PANTOPRAZOLE SODIUM 40 MG: 40 TABLET, DELAYED RELEASE ORAL at 06:08

## 2017-10-24 RX ADMIN — ROSUVASTATIN CALCIUM 40 MG: 40 TABLET, COATED ORAL at 20:41

## 2017-10-24 RX ADMIN — LEVETIRACETAM 250 MG: 250 TABLET, FILM COATED ORAL at 09:13

## 2017-10-24 RX ADMIN — LEVOTHYROXINE SODIUM 137 MCG: 137 TABLET ORAL at 06:08

## 2017-10-24 RX ADMIN — TRAMADOL HYDROCHLORIDE 50 MG: 50 TABLET ORAL at 20:39

## 2017-10-24 RX ADMIN — LEVETIRACETAM 250 MG: 250 TABLET, FILM COATED ORAL at 17:07

## 2017-10-24 RX ADMIN — ASPIRIN 81 MG: 81 TABLET, CHEWABLE ORAL at 09:13

## 2017-10-24 RX ADMIN — FUROSEMIDE 40 MG: 40 TABLET ORAL at 09:13

## 2017-10-24 RX ADMIN — TRAMADOL HYDROCHLORIDE 50 MG: 50 TABLET ORAL at 09:17

## 2017-10-25 PROCEDURE — 97110 THERAPEUTIC EXERCISES: CPT

## 2017-10-25 PROCEDURE — 97535 SELF CARE MNGMENT TRAINING: CPT

## 2017-10-25 PROCEDURE — 97112 NEUROMUSCULAR REEDUCATION: CPT

## 2017-10-25 PROCEDURE — 97532: CPT

## 2017-10-25 RX ORDER — UREA 10 %
3 LOTION (ML) TOPICAL NIGHTLY
Status: DISCONTINUED | OUTPATIENT
Start: 2017-10-25 | End: 2017-10-31 | Stop reason: HOSPADM

## 2017-10-25 RX ADMIN — Medication 3 MG: at 20:05

## 2017-10-25 RX ADMIN — ROSUVASTATIN CALCIUM 40 MG: 40 TABLET, COATED ORAL at 20:06

## 2017-10-25 RX ADMIN — LEVETIRACETAM 250 MG: 250 TABLET, FILM COATED ORAL at 09:56

## 2017-10-25 RX ADMIN — METOPROLOL TARTRATE 37.5 MG: 25 TABLET ORAL at 20:06

## 2017-10-25 RX ADMIN — SENNOSIDES AND DOCUSATE SODIUM 2 TABLET: 8.6; 5 TABLET ORAL at 20:05

## 2017-10-25 RX ADMIN — PANTOPRAZOLE SODIUM 40 MG: 40 TABLET, DELAYED RELEASE ORAL at 06:09

## 2017-10-25 RX ADMIN — ASPIRIN 81 MG: 81 TABLET, CHEWABLE ORAL at 09:56

## 2017-10-25 RX ADMIN — METOPROLOL TARTRATE 37.5 MG: 25 TABLET ORAL at 09:55

## 2017-10-25 RX ADMIN — LEVOTHYROXINE SODIUM 137 MCG: 137 TABLET ORAL at 06:09

## 2017-10-25 RX ADMIN — BISACODYL 10 MG: 5 TABLET, COATED ORAL at 09:54

## 2017-10-25 RX ADMIN — LEVETIRACETAM 250 MG: 250 TABLET, FILM COATED ORAL at 17:37

## 2017-10-25 RX ADMIN — CYANOCOBALAMIN TAB 500 MCG 1000 MCG: 500 TAB at 09:54

## 2017-10-25 RX ADMIN — TRAMADOL HYDROCHLORIDE 50 MG: 50 TABLET ORAL at 17:36

## 2017-10-25 RX ADMIN — FUROSEMIDE 40 MG: 40 TABLET ORAL at 09:55

## 2017-10-26 PROCEDURE — 97110 THERAPEUTIC EXERCISES: CPT

## 2017-10-26 PROCEDURE — 97532: CPT

## 2017-10-26 PROCEDURE — 97535 SELF CARE MNGMENT TRAINING: CPT

## 2017-10-26 PROCEDURE — 97530 THERAPEUTIC ACTIVITIES: CPT

## 2017-10-26 RX ADMIN — FUROSEMIDE 40 MG: 40 TABLET ORAL at 08:36

## 2017-10-26 RX ADMIN — CYANOCOBALAMIN TAB 500 MCG 1000 MCG: 500 TAB at 08:36

## 2017-10-26 RX ADMIN — LEVETIRACETAM 250 MG: 250 TABLET, FILM COATED ORAL at 08:36

## 2017-10-26 RX ADMIN — LEVOTHYROXINE SODIUM 137 MCG: 137 TABLET ORAL at 06:04

## 2017-10-26 RX ADMIN — METOPROLOL TARTRATE 37.5 MG: 25 TABLET ORAL at 08:36

## 2017-10-26 RX ADMIN — ONDANSETRON 4 MG: 4 TABLET, ORALLY DISINTEGRATING ORAL at 08:53

## 2017-10-26 RX ADMIN — METOPROLOL TARTRATE 37.5 MG: 25 TABLET ORAL at 21:41

## 2017-10-26 RX ADMIN — ASPIRIN 81 MG: 81 TABLET, CHEWABLE ORAL at 08:36

## 2017-10-26 RX ADMIN — LEVETIRACETAM 250 MG: 250 TABLET, FILM COATED ORAL at 17:15

## 2017-10-26 RX ADMIN — Medication 3 MG: at 21:41

## 2017-10-26 RX ADMIN — PANTOPRAZOLE SODIUM 40 MG: 40 TABLET, DELAYED RELEASE ORAL at 06:05

## 2017-10-26 RX ADMIN — ROSUVASTATIN CALCIUM 40 MG: 40 TABLET, COATED ORAL at 21:45

## 2017-10-26 RX ADMIN — TRAMADOL HYDROCHLORIDE 50 MG: 50 TABLET ORAL at 17:18

## 2017-10-27 PROCEDURE — 97110 THERAPEUTIC EXERCISES: CPT

## 2017-10-27 PROCEDURE — 97532: CPT

## 2017-10-27 PROCEDURE — 97535 SELF CARE MNGMENT TRAINING: CPT

## 2017-10-27 RX ADMIN — TRAMADOL HYDROCHLORIDE 50 MG: 50 TABLET ORAL at 13:37

## 2017-10-27 RX ADMIN — SENNOSIDES AND DOCUSATE SODIUM 2 TABLET: 8.6; 5 TABLET ORAL at 20:37

## 2017-10-27 RX ADMIN — ROSUVASTATIN CALCIUM 40 MG: 40 TABLET, COATED ORAL at 22:16

## 2017-10-27 RX ADMIN — METOPROLOL TARTRATE 37.5 MG: 25 TABLET ORAL at 09:20

## 2017-10-27 RX ADMIN — FUROSEMIDE 40 MG: 40 TABLET ORAL at 09:20

## 2017-10-27 RX ADMIN — LEVETIRACETAM 250 MG: 250 TABLET, FILM COATED ORAL at 09:20

## 2017-10-27 RX ADMIN — LEVETIRACETAM 250 MG: 250 TABLET, FILM COATED ORAL at 17:19

## 2017-10-27 RX ADMIN — PANTOPRAZOLE SODIUM 40 MG: 40 TABLET, DELAYED RELEASE ORAL at 06:08

## 2017-10-27 RX ADMIN — Medication 3 MG: at 20:37

## 2017-10-27 RX ADMIN — TRAMADOL HYDROCHLORIDE 50 MG: 50 TABLET ORAL at 20:37

## 2017-10-27 RX ADMIN — CYANOCOBALAMIN TAB 500 MCG 1000 MCG: 500 TAB at 09:20

## 2017-10-27 RX ADMIN — LEVOTHYROXINE SODIUM 137 MCG: 137 TABLET ORAL at 06:08

## 2017-10-27 RX ADMIN — ASPIRIN 81 MG: 81 TABLET, CHEWABLE ORAL at 09:20

## 2017-10-27 RX ADMIN — METOPROLOL TARTRATE 37.5 MG: 25 TABLET ORAL at 20:36

## 2017-10-28 PROCEDURE — 97110 THERAPEUTIC EXERCISES: CPT

## 2017-10-28 RX ADMIN — Medication 3 MG: at 20:36

## 2017-10-28 RX ADMIN — PANTOPRAZOLE SODIUM 40 MG: 40 TABLET, DELAYED RELEASE ORAL at 06:31

## 2017-10-28 RX ADMIN — LEVETIRACETAM 250 MG: 250 TABLET, FILM COATED ORAL at 18:37

## 2017-10-28 RX ADMIN — TRAMADOL HYDROCHLORIDE 50 MG: 50 TABLET ORAL at 20:34

## 2017-10-28 RX ADMIN — CYANOCOBALAMIN TAB 500 MCG 1000 MCG: 500 TAB at 08:26

## 2017-10-28 RX ADMIN — SENNOSIDES AND DOCUSATE SODIUM 2 TABLET: 8.6; 5 TABLET ORAL at 20:35

## 2017-10-28 RX ADMIN — TRAMADOL HYDROCHLORIDE 50 MG: 50 TABLET ORAL at 14:02

## 2017-10-28 RX ADMIN — ASPIRIN 81 MG: 81 TABLET, CHEWABLE ORAL at 08:26

## 2017-10-28 RX ADMIN — LEVOTHYROXINE SODIUM 137 MCG: 137 TABLET ORAL at 06:31

## 2017-10-28 RX ADMIN — METOPROLOL TARTRATE 37.5 MG: 25 TABLET ORAL at 20:35

## 2017-10-28 RX ADMIN — METOPROLOL TARTRATE 37.5 MG: 25 TABLET ORAL at 08:26

## 2017-10-28 RX ADMIN — LEVETIRACETAM 250 MG: 250 TABLET, FILM COATED ORAL at 08:26

## 2017-10-28 RX ADMIN — FUROSEMIDE 40 MG: 40 TABLET ORAL at 08:26

## 2017-10-28 RX ADMIN — ROSUVASTATIN CALCIUM 40 MG: 40 TABLET, COATED ORAL at 20:36

## 2017-10-29 RX ADMIN — METOPROLOL TARTRATE 37.5 MG: 25 TABLET ORAL at 08:37

## 2017-10-29 RX ADMIN — TRAMADOL HYDROCHLORIDE 50 MG: 50 TABLET ORAL at 22:56

## 2017-10-29 RX ADMIN — LEVETIRACETAM 250 MG: 250 TABLET, FILM COATED ORAL at 08:37

## 2017-10-29 RX ADMIN — SENNOSIDES AND DOCUSATE SODIUM 2 TABLET: 8.6; 5 TABLET ORAL at 20:44

## 2017-10-29 RX ADMIN — FUROSEMIDE 40 MG: 40 TABLET ORAL at 08:37

## 2017-10-29 RX ADMIN — LEVETIRACETAM 250 MG: 250 TABLET, FILM COATED ORAL at 17:16

## 2017-10-29 RX ADMIN — ASPIRIN 81 MG: 81 TABLET, CHEWABLE ORAL at 08:37

## 2017-10-29 RX ADMIN — METOPROLOL TARTRATE 37.5 MG: 25 TABLET ORAL at 20:43

## 2017-10-29 RX ADMIN — TRAMADOL HYDROCHLORIDE 50 MG: 50 TABLET ORAL at 09:44

## 2017-10-29 RX ADMIN — Medication 3 MG: at 20:44

## 2017-10-29 RX ADMIN — LEVOTHYROXINE SODIUM 137 MCG: 137 TABLET ORAL at 05:44

## 2017-10-29 RX ADMIN — CYANOCOBALAMIN TAB 500 MCG 1000 MCG: 500 TAB at 08:37

## 2017-10-29 RX ADMIN — TRAMADOL HYDROCHLORIDE 50 MG: 50 TABLET ORAL at 17:16

## 2017-10-29 RX ADMIN — ROSUVASTATIN CALCIUM 40 MG: 40 TABLET, COATED ORAL at 20:45

## 2017-10-29 RX ADMIN — PANTOPRAZOLE SODIUM 40 MG: 40 TABLET, DELAYED RELEASE ORAL at 05:44

## 2017-10-30 ENCOUNTER — APPOINTMENT (OUTPATIENT)
Dept: CT IMAGING | Facility: HOSPITAL | Age: 67
End: 2017-10-30

## 2017-10-30 LAB
ALBUMIN SERPL-MCNC: 3.6 G/DL (ref 3.5–5.2)
ALBUMIN/GLOB SERPL: 1 G/DL
ALP SERPL-CCNC: 106 U/L (ref 39–117)
ALT SERPL W P-5'-P-CCNC: 16 U/L (ref 1–33)
ANION GAP SERPL CALCULATED.3IONS-SCNC: 13.9 MMOL/L
AST SERPL-CCNC: 16 U/L (ref 1–32)
BASOPHILS # BLD AUTO: 0.05 10*3/MM3 (ref 0–0.2)
BASOPHILS NFR BLD AUTO: 0.6 % (ref 0–1.5)
BILIRUB SERPL-MCNC: 0.6 MG/DL (ref 0.1–1.2)
BUN BLD-MCNC: 16 MG/DL (ref 8–23)
BUN/CREAT SERPL: 13.8 (ref 7–25)
CALCIUM SPEC-SCNC: 10.2 MG/DL (ref 8.6–10.5)
CHLORIDE SERPL-SCNC: 92 MMOL/L (ref 98–107)
CO2 SERPL-SCNC: 30.1 MMOL/L (ref 22–29)
CREAT BLD-MCNC: 1.16 MG/DL (ref 0.57–1)
DEPRECATED RDW RBC AUTO: 45.2 FL (ref 37–54)
EOSINOPHIL # BLD AUTO: 0.43 10*3/MM3 (ref 0–0.7)
EOSINOPHIL NFR BLD AUTO: 5.1 % (ref 0.3–6.2)
ERYTHROCYTE [DISTWIDTH] IN BLOOD BY AUTOMATED COUNT: 13.7 % (ref 11.7–13)
GFR SERPL CREATININE-BSD FRML MDRD: 47 ML/MIN/1.73
GLOBULIN UR ELPH-MCNC: 3.6 GM/DL
GLUCOSE BLD-MCNC: 109 MG/DL (ref 65–99)
HCT VFR BLD AUTO: 37 % (ref 35.6–45.5)
HGB BLD-MCNC: 11.8 G/DL (ref 11.9–15.5)
IMM GRANULOCYTES # BLD: 0 10*3/MM3 (ref 0–0.03)
IMM GRANULOCYTES NFR BLD: 0 % (ref 0–0.5)
LYMPHOCYTES # BLD AUTO: 1.93 10*3/MM3 (ref 0.9–4.8)
LYMPHOCYTES NFR BLD AUTO: 22.7 % (ref 19.6–45.3)
MCH RBC QN AUTO: 29.1 PG (ref 26.9–32)
MCHC RBC AUTO-ENTMCNC: 31.9 G/DL (ref 32.4–36.3)
MCV RBC AUTO: 91.1 FL (ref 80.5–98.2)
MONOCYTES # BLD AUTO: 1.33 10*3/MM3 (ref 0.2–1.2)
MONOCYTES NFR BLD AUTO: 15.6 % (ref 5–12)
NEUTROPHILS # BLD AUTO: 4.76 10*3/MM3 (ref 1.9–8.1)
NEUTROPHILS NFR BLD AUTO: 56 % (ref 42.7–76)
NRBC BLD MANUAL-RTO: 0 /100 WBC (ref 0–0)
PLATELET # BLD AUTO: 401 10*3/MM3 (ref 140–500)
PMV BLD AUTO: 9.4 FL (ref 6–12)
POTASSIUM BLD-SCNC: 4 MMOL/L (ref 3.5–5.2)
PROT SERPL-MCNC: 7.2 G/DL (ref 6–8.5)
RBC # BLD AUTO: 4.06 10*6/MM3 (ref 3.9–5.2)
SODIUM BLD-SCNC: 136 MMOL/L (ref 136–145)
WBC NRBC COR # BLD: 8.5 10*3/MM3 (ref 4.5–10.7)

## 2017-10-30 PROCEDURE — 80053 COMPREHEN METABOLIC PANEL: CPT | Performed by: PHYSICAL MEDICINE & REHABILITATION

## 2017-10-30 PROCEDURE — 94799 UNLISTED PULMONARY SVC/PX: CPT

## 2017-10-30 PROCEDURE — 97532: CPT

## 2017-10-30 PROCEDURE — 97535 SELF CARE MNGMENT TRAINING: CPT

## 2017-10-30 PROCEDURE — 97110 THERAPEUTIC EXERCISES: CPT

## 2017-10-30 PROCEDURE — 70450 CT HEAD/BRAIN W/O DYE: CPT

## 2017-10-30 PROCEDURE — 85025 COMPLETE CBC W/AUTO DIFF WBC: CPT | Performed by: PHYSICAL MEDICINE & REHABILITATION

## 2017-10-30 RX ADMIN — TRAMADOL HYDROCHLORIDE 50 MG: 50 TABLET ORAL at 05:51

## 2017-10-30 RX ADMIN — ROSUVASTATIN CALCIUM 40 MG: 40 TABLET, COATED ORAL at 20:55

## 2017-10-30 RX ADMIN — SENNOSIDES AND DOCUSATE SODIUM 2 TABLET: 8.6; 5 TABLET ORAL at 20:54

## 2017-10-30 RX ADMIN — LEVOTHYROXINE SODIUM 137 MCG: 137 TABLET ORAL at 05:51

## 2017-10-30 RX ADMIN — Medication 3 MG: at 20:55

## 2017-10-30 RX ADMIN — ACETAMINOPHEN 650 MG: 325 TABLET ORAL at 20:54

## 2017-10-30 RX ADMIN — PANTOPRAZOLE SODIUM 40 MG: 40 TABLET, DELAYED RELEASE ORAL at 05:51

## 2017-10-30 RX ADMIN — FUROSEMIDE 40 MG: 40 TABLET ORAL at 09:54

## 2017-10-30 RX ADMIN — LEVETIRACETAM 250 MG: 250 TABLET, FILM COATED ORAL at 17:19

## 2017-10-30 RX ADMIN — ASPIRIN 81 MG: 81 TABLET, CHEWABLE ORAL at 09:58

## 2017-10-30 RX ADMIN — ACETAMINOPHEN 650 MG: 325 TABLET ORAL at 09:53

## 2017-10-30 RX ADMIN — ACETAMINOPHEN 650 MG: 325 TABLET ORAL at 16:45

## 2017-10-30 RX ADMIN — ONDANSETRON 4 MG: 4 TABLET, ORALLY DISINTEGRATING ORAL at 05:58

## 2017-10-30 RX ADMIN — LEVETIRACETAM 250 MG: 250 TABLET, FILM COATED ORAL at 09:54

## 2017-10-30 RX ADMIN — CYANOCOBALAMIN TAB 500 MCG 1000 MCG: 500 TAB at 09:53

## 2017-10-30 RX ADMIN — METOPROLOL TARTRATE 37.5 MG: 25 TABLET ORAL at 09:56

## 2017-10-30 RX ADMIN — METOPROLOL TARTRATE 37.5 MG: 25 TABLET ORAL at 20:54

## 2017-10-31 VITALS
HEART RATE: 91 BPM | OXYGEN SATURATION: 98 % | HEIGHT: 65 IN | WEIGHT: 216 LBS | SYSTOLIC BLOOD PRESSURE: 111 MMHG | DIASTOLIC BLOOD PRESSURE: 67 MMHG | RESPIRATION RATE: 18 BRPM | BODY MASS INDEX: 35.99 KG/M2 | TEMPERATURE: 97.3 F

## 2017-10-31 RX ORDER — PANTOPRAZOLE SODIUM 40 MG/1
40 TABLET, DELAYED RELEASE ORAL DAILY
Qty: 30 TABLET | Refills: 1 | Status: SHIPPED | OUTPATIENT
Start: 2017-10-31 | End: 2017-11-20

## 2017-10-31 RX ORDER — LANOLIN ALCOHOL/MO/W.PET/CERES
3 CREAM (GRAM) TOPICAL NIGHTLY
Start: 2017-10-31 | End: 2018-04-29

## 2017-10-31 RX ORDER — SENNA AND DOCUSATE SODIUM 50; 8.6 MG/1; MG/1
2 TABLET, FILM COATED ORAL NIGHTLY
Start: 2017-10-31 | End: 2018-04-29

## 2017-10-31 RX ORDER — LEVETIRACETAM 250 MG/1
250 TABLET ORAL 2 TIMES DAILY
Qty: 60 TABLET | Refills: 2 | Status: SHIPPED | OUTPATIENT
Start: 2017-10-31 | End: 2017-11-29 | Stop reason: ALTCHOICE

## 2017-10-31 RX ORDER — FUROSEMIDE 40 MG/1
40 TABLET ORAL DAILY
Qty: 30 TABLET | Refills: 1 | Status: SHIPPED | OUTPATIENT
Start: 2017-11-01 | End: 2017-11-29

## 2017-10-31 RX ORDER — METOPROLOL TARTRATE 37.5 MG/1
37.5 TABLET, FILM COATED ORAL EVERY 12 HOURS SCHEDULED
Qty: 60 TABLET | Refills: 1 | Status: SHIPPED | OUTPATIENT
Start: 2017-10-31 | End: 2019-01-17

## 2017-10-31 RX ADMIN — LEVETIRACETAM 250 MG: 250 TABLET, FILM COATED ORAL at 08:36

## 2017-10-31 RX ADMIN — TRAMADOL HYDROCHLORIDE 50 MG: 50 TABLET ORAL at 08:54

## 2017-10-31 RX ADMIN — ASPIRIN 81 MG: 81 TABLET, CHEWABLE ORAL at 08:36

## 2017-10-31 RX ADMIN — FUROSEMIDE 40 MG: 40 TABLET ORAL at 08:36

## 2017-10-31 RX ADMIN — LEVOTHYROXINE SODIUM 137 MCG: 137 TABLET ORAL at 06:05

## 2017-10-31 RX ADMIN — TRAMADOL HYDROCHLORIDE 50 MG: 50 TABLET ORAL at 14:55

## 2017-10-31 RX ADMIN — CYANOCOBALAMIN TAB 500 MCG 1000 MCG: 500 TAB at 08:36

## 2017-10-31 RX ADMIN — PANTOPRAZOLE SODIUM 40 MG: 40 TABLET, DELAYED RELEASE ORAL at 06:05

## 2017-10-31 RX ADMIN — METOPROLOL TARTRATE 37.5 MG: 25 TABLET ORAL at 08:36

## 2017-11-01 DIAGNOSIS — S06.5XAA SDH (SUBDURAL HEMATOMA) (HCC): Primary | ICD-10-CM

## 2017-11-08 NOTE — PROGRESS NOTES
PPS CMG Coordinator  Inpatient Rehabilitation Discharge    Mode of Locomotion: Walking.    Discharge Against Medical Advice:  No.  Discharge Information  Patient Discharged Alive:  Yes  Discharge Destination/Living Setting: Home with Home Health Services  Diagnosis for Interruption/Death: ICD    Impairment Group: Brain Dysfunction: 02.1 Non-traumatic    Comorbidities: ICD    Complications: ICD      DANK Bladder Accidents: 4  - Accidents.  Bladder Score = 5.  One (1) bladder accident.  DANK Bowel Accident: 4  - Accidents.  Bowel Score = 6. Patient has no accidents, but uses a device/medications.      QUALITY INDICATORS  Health Conditions: Fall(s) Since Admission:  No  Section M. Skin Conditions Discharge:  Unhealed Pressure Ulcer(s) at Stage 1 or  Higher:  No    . Current Number of Unhealed Pressure Ulcers  Branch    . Worsening in Pressure Ulcer Status Since Admission:  Branch    . Healed Pressure Ulcer(s):    Number of Healed Stage 1: 0  Number of Healed Stage 2: 0  Number of Healed Stage 3: 0  Number of Healed Stage 4: 0    . Influenza Vaccine - Discharge  Received in this facility for this year's influenza vaccination season:  No.  Influenza Vaccine Not Received Due To: Offered and declined.    Date Influenza Vaccine Received (if applicable)  Text Entry    Signed by: Eliecer Currie RN

## 2017-11-20 ENCOUNTER — TELEPHONE (OUTPATIENT)
Dept: NEUROLOGY | Facility: CLINIC | Age: 67
End: 2017-11-20

## 2017-11-20 ENCOUNTER — HOSPITAL ENCOUNTER (OUTPATIENT)
Dept: CT IMAGING | Facility: HOSPITAL | Age: 67
Discharge: HOME | End: 2017-11-20
Admitting: PHYSICIAN ASSISTANT

## 2017-11-20 ENCOUNTER — OFFICE VISIT (OUTPATIENT)
Dept: NEUROSURGERY | Facility: CLINIC | Age: 67
End: 2017-11-20

## 2017-11-20 VITALS — DIASTOLIC BLOOD PRESSURE: 76 MMHG | HEART RATE: 64 BPM | SYSTOLIC BLOOD PRESSURE: 118 MMHG

## 2017-11-20 DIAGNOSIS — S06.5XAA SUBDURAL HEMATOMA (HCC): Primary | ICD-10-CM

## 2017-11-20 DIAGNOSIS — S06.5XAA SDH (SUBDURAL HEMATOMA) (HCC): ICD-10-CM

## 2017-11-20 PROCEDURE — 70450 CT HEAD/BRAIN W/O DYE: CPT

## 2017-11-20 PROCEDURE — 99213 OFFICE O/P EST LOW 20 MIN: CPT | Performed by: PHYSICIAN ASSISTANT

## 2017-11-20 NOTE — PROGRESS NOTES
Subjective   Patient ID: Marion Anderson is a 67 y.o. female is here today for follow-up with a new Head CT. She has had some mild headaches.     HPI Comments: Ms. Anderson is 6 weeks out from a CABG/MV repair.  Stop her to really she had some transient weakness and had a CT which revealed a small right falx subdural hematoma.  At the time neurology did also see her for possible stroke activity.     Other   The current episode started more than 1 month ago. Associated symptoms include fatigue and weakness. Pertinent negatives include no chest pain, fever, headaches, nausea, neck pain, numbness, vertigo or visual change.       The following portions of the patient's history were reviewed and updated as appropriate: allergies, current medications, past family history, past medical history, past social history, past surgical history and problem list.    Review of Systems   Constitutional: Positive for fatigue. Negative for fever.   Respiratory: Negative for chest tightness and shortness of breath.    Cardiovascular: Negative for chest pain.   Gastrointestinal: Negative for nausea.   Genitourinary: Negative for difficulty urinating.   Musculoskeletal: Negative for neck pain.   Neurological: Positive for weakness. Negative for vertigo, numbness and headaches.   All other systems reviewed and are negative.      Objective   Physical Exam   Constitutional: She is oriented to person, place, and time. She appears well-developed and well-nourished.   HENT:   Head: Normocephalic and atraumatic.   Right Ear: External ear normal.   Left Ear: External ear normal.   Eyes: Conjunctivae and EOM are normal. Pupils are equal, round, and reactive to light. Right eye exhibits no discharge. Left eye exhibits no discharge.   Neck: Normal range of motion. Neck supple. No tracheal deviation present.   Pulmonary/Chest: Effort normal. No stridor. No respiratory distress.   Musculoskeletal: Normal range of motion. She exhibits no edema,  tenderness or deformity.   Neurological: She is alert and oriented to person, place, and time. She has normal strength and normal reflexes. She displays no atrophy, no tremor and normal reflexes. No cranial nerve deficit or sensory deficit. She exhibits normal muscle tone. She displays a negative Romberg sign. She displays no seizure activity. Coordination and gait normal.   No long tract signs   Skin: Skin is warm and dry.   Psychiatric: She has a normal mood and affect. Her behavior is normal. Judgment and thought content normal.   Nursing note and vitals reviewed.    Neurologic Exam     Mental Status   Oriented to person, place, and time.     Cranial Nerves     CN III, IV, VI   Pupils are equal, round, and reactive to light.  Extraocular motions are normal.     Motor Exam     Strength   Strength 5/5 throughout.       Assessment/Plan   Independent Review of Radiographic Studies:    CT today was reviewed.  It shows essentially complete resolution of the small falx subdural hematoma on the right.  No acute hemorrhage.  Medical Decision Making:    Ms. Anderson was followed during her hospital in rehabilitation stay for a very small right-sided subdural hematoma.  It was 4 mm at most localized around the right side of the falx.  At the time she was found to have significant atherosclerosis and neurology recommended being on aspirin and Plavix.  We had cleared her to be on any anticoagulation necessary at the time.  She is also recovering from her recent cardiac surgery with Dr. Muñoz.  She ultimately went to acute rehabilitation at [facility] and is now home.  She complains of fatigue and a generalized weakness, nothing focal.      Her CT from today shows essentially complete resolution of the subdural hematoma.  There is perhaps just a very tiny foci of residual hemorrhage.  I do not think any additional imaging is required.  However the patient states that she was sent home on only aspirin.  I recommended that she  follow-up with neurology to clarify the need for Plavix.  She was also sent home on Keppra.  From our perspective she never required antiepileptic coverage given that the subdural was incredibly small.  She is currently only taking it once per day because it is causing sedation.  I did also recommend that she discuss it with neurology.     Her daughter is going to take her [place] office after our appointment today to schedule a follow-up.  Otherwise from our perspective no additional imaging or workup is required in regards to the subdural and less she has new complaints or concerns.     Marion was seen today for follow-up.    Diagnoses and all orders for this visit:    Subdural hematoma  -     Ambulatory Referral to Neurology    Return if symptoms worsen or fail to improve.

## 2017-11-20 NOTE — TELEPHONE ENCOUNTER
Patient and patient's daughter stopped by the office today. They wanted to know if she could stop Keppra, stop Plavix and if a f/u appt was needed. I s/w PANKAJ Carroll and she said that patient could stop her Keppra. She wants them to call Jennifer Bazzi and she can make decision on Plavix. Patient has been scheduled in February 2018 to see Carly for hospital f/u appt.

## 2017-11-29 ENCOUNTER — OFFICE VISIT (OUTPATIENT)
Dept: CARDIAC SURGERY | Facility: CLINIC | Age: 67
End: 2017-11-29

## 2017-11-29 VITALS
HEART RATE: 83 BPM | BODY MASS INDEX: 34.99 KG/M2 | HEIGHT: 65 IN | DIASTOLIC BLOOD PRESSURE: 76 MMHG | TEMPERATURE: 98 F | OXYGEN SATURATION: 97 % | WEIGHT: 210 LBS | SYSTOLIC BLOOD PRESSURE: 118 MMHG | RESPIRATION RATE: 20 BRPM

## 2017-11-29 DIAGNOSIS — Z95.1 S/P CABG (CORONARY ARTERY BYPASS GRAFT): Primary | ICD-10-CM

## 2017-11-29 PROCEDURE — 99024 POSTOP FOLLOW-UP VISIT: CPT | Performed by: NURSE PRACTITIONER

## 2017-11-29 NOTE — PROGRESS NOTES
"CARDIOVASCULAR SURGERY FOLLOW-UP PROGRESS NOTE  Chief Complaint: s/p CABG        HPI:   Dear Dr. Narciso Camargo MD and colleagues:    It was nice to see Marion Anderson in follow up 6 weeks after surgery.  As you know, she is a 67 y.o. female with CAD who underwent CABG x 4 and mitral valve repair. She did well postoperatively and continues to do well. She comes in today complaining of fatigue.  Her activity level has been fair. Has been getting physical therapy at home, this will end next week.  Her daughter who was with her was concerned about her progress slowing if she did not have planned physical activity.  Cardiac rehab referral has been made.    Physical Exam:         /76 (BP Location: Right arm, Patient Position: Sitting, Cuff Size: Adult)  Pulse 83  Temp 98 °F (36.7 °C) (Oral)   Resp 20  Ht 65\" (165.1 cm)  Wt 210 lb (95.3 kg)  SpO2 97%  BMI 34.95 kg/m2  Heart:  regular rate and rhythm, S1, S2 normal, no murmur, click, rub or gallop  Lungs:  clear to auscultation bilaterally  Extremities:  no edema  Incision(s):  sternum stable, incisions healed    Assessment/Plan:     S/P CABG and mitral valve repair. Overall, she is doing well. Increasing activity endurance and eager to start cardiac rehab.      Post-op atrial fibrillation  Stroke     OK to drive if not taking narcotic pain medicine  OK to begin cardiac rehab  Follow-up as scheduled with cardiology  Follow-up as scheduled with PCP    No restrictions of activity.      Thank you for allowing me to participate in the care of your   patient.  Regards,  PANKAJ Crouch      "

## 2017-11-30 ENCOUNTER — TELEPHONE (OUTPATIENT)
Dept: NEUROLOGY | Facility: CLINIC | Age: 67
End: 2017-11-30

## 2017-11-30 NOTE — TELEPHONE ENCOUNTER
I called and spoke with Ms. Anderson.  She is presently at home with [facility] daily.  She thinks tomorrow she may be discharged.  Her incisional area to her chest is healed.  We reviewed her present medications:  Metoprolol Tartrate 25 mg bid, Crestor 40 mg at night and ASA 81 mg daily.  We reviewed signs and symptoms of stroke and to call 911 immediately.  mRS 2.  She has follow up appointments with neurosurgery, Dr. Meeks January 8, 2018 and Carly LIRA on February 7, 2018.  I am going to remail a new patient packet patient states did not get one.  She can call me with any questions.  SRIDEVI Anders RN

## 2017-12-04 ENCOUNTER — OFFICE VISIT (OUTPATIENT)
Dept: CARDIAC REHAB | Facility: HOSPITAL | Age: 67
End: 2017-12-04

## 2017-12-04 VITALS
HEART RATE: 94 BPM | OXYGEN SATURATION: 94 % | SYSTOLIC BLOOD PRESSURE: 108 MMHG | HEIGHT: 65 IN | BODY MASS INDEX: 35.99 KG/M2 | DIASTOLIC BLOOD PRESSURE: 64 MMHG | WEIGHT: 216 LBS

## 2017-12-04 DIAGNOSIS — Z95.1 S/P CABG X 4: Primary | ICD-10-CM

## 2017-12-04 DIAGNOSIS — Z98.890 S/P MVR (MITRAL VALVE REPAIR): ICD-10-CM

## 2017-12-04 PROCEDURE — 93797 PHYS/QHP OP CAR RHAB WO ECG: CPT

## 2017-12-04 NOTE — PROGRESS NOTES
Cardiac Rehab Initial Assessment      Name: Marion Anderson  :1950 Allergies:Latex   MRN: [id number] 67 y.o. Physician: Narciso Camargo MD   Primary Diagnosis:    Diagnosis Plan   1. S/P CABG x 4     2. S/P MVR (mitral valve repair)      Event Date: 10/12/17 Specialist: Dr. Anjel Grant in [place]   Secondary Diagnosis:  Risk Stratification:Moderate Risk Note Author: Kelin Jung RN     Cardiovascular History: Previous PCI     EXERCISE AT HOME  Yes; walking 4-10 minutes at a time, once per day      EF: 59%      Source: stress test 17          Ambulatory Status:Walker  Ambulatory Fall Risk Assessed on Initial Visit: yes 6 Minute Walk Pre- Cardiac Rehab:  Distance:429ft      RPE:3  Max. HR: 105       SPO2:92-96%    MET: 1.6  MPH: 0.8            RPD: 3  Resting BP: 108/64 LA, 108/68 RA    Peak BP: 112/68  Recovery BP: 102/68  Comments: rested for 30 seconds half way thru the walk, then had to stop 1 min and 10 seconds early d/t SOA and fatigue.     NUTRITION  Lipids:yes If yes, labs as follows;  Total:  97  HDL:   HDL Cholesterol   Date Value Ref Range Status   10/13/2017 22 (L) 40 - 60 mg/dL Final    Lipids continued:  LDL: 56  Triglyceride:  96   Weight Management:                 Weight: 216  Height: 65                                   BMI: Body mass index is 35.94 kg/(m^2).  Waist Circumference: 45  inches   Alcohol Use: none Diabetes:No    Last HGBA1C with date if applicable: 5.20 on 10/13/17         SOCIAL HISTORY  Social History     Social History   • Marital status: Single     Spouse name: N/A   • Number of children: N/A   • Years of education: N/A     Social History Main Topics   • Smoking status: Never Smoker   • Smokeless tobacco: Never Used   • Alcohol use No   • Drug use: No   • Sexual activity: Not on file     Other Topics Concern   • Not on file     Social History Narrative       Educational Level (choose one that applies) college graduate Learning Barriers:Ready to  Learn    Family Support:yes    Living Arrangement: lives with their spouse    Risk Factors: Stress  Yes, Heredity  Yes If Yes mother and father, Hyperlipidemia  Yes and Obesity  Yes     Tobacco Adjunct: N/A        Comorbidities: Cerebrovascular disease and Pulmonary disease     PSYCHOSOCIAL  Clinical Depression: no    Stress: yes     Assess presence or absence of depression using a valid screening tool: yes      PHYSICAL ASSESSMENT  Influenza vaccine: no  Pneumococcal vaccine: no          Angina: no, but has had SOA    Describe angina scale of 0 - 4: 0 = none    Today are you having incisional pain? Yes. If, Yes, Scale: 3        Today are you having any other pain? Yes. If, Yes, Scale: 3; back pain     Diagnosed with Hypertension:yes    Heart Sounds: normal, s1s2, no murmur     Lung Sounds: normal air entry, lungs clear to auscultation         Assessment: alert and oriented, palpable pedal pulses, slight lower extremity edema Orthopedic Problems:     Are you being hurt, hit, or frightened by anyone at home or in your life? no    Are you being neglected by a caregiver? N/A Shoulder flexibility/Range of motion: Below average     Recommended arm activity: Any    Chair sit and reach within: 9 inches   Leg flexibility: Below average    Leg Strength/Balance/Five times sit to stand: unable to do seconds.     Chose one: Fall Risk    Recommended stretching: Chair    Assessment:     Family attends IA: no Time of arrival: 1700  Time of departure: 1805     Patient Goals: To improve her balance so she doesn't fall anymore. Get back to aqua therapy 3 times per week for an hour. Start riding her stationary bike on her days off from here. Continue working on her diet. Start losing weight, 3-5 lbs during the program, eventually wants to lose 40-45 lbs.         12/4/2017  5:25 PM  Kelin Jung RN

## 2017-12-08 ENCOUNTER — TREATMENT (OUTPATIENT)
Dept: CARDIAC REHAB | Facility: HOSPITAL | Age: 67
End: 2017-12-08

## 2017-12-08 DIAGNOSIS — Z98.890 S/P MVR (MITRAL VALVE REPAIR): ICD-10-CM

## 2017-12-08 DIAGNOSIS — Z95.1 S/P CABG X 4: Primary | ICD-10-CM

## 2017-12-08 PROCEDURE — 93798 PHYS/QHP OP CAR RHAB W/ECG: CPT

## 2017-12-11 ENCOUNTER — TREATMENT (OUTPATIENT)
Dept: CARDIAC REHAB | Facility: HOSPITAL | Age: 67
End: 2017-12-11

## 2017-12-11 DIAGNOSIS — Z98.890 S/P MVR (MITRAL VALVE REPAIR): ICD-10-CM

## 2017-12-11 DIAGNOSIS — Z95.1 S/P CABG X 4: Primary | ICD-10-CM

## 2017-12-11 PROCEDURE — 93798 PHYS/QHP OP CAR RHAB W/ECG: CPT

## 2017-12-13 ENCOUNTER — TREATMENT (OUTPATIENT)
Dept: CARDIAC REHAB | Facility: HOSPITAL | Age: 67
End: 2017-12-13

## 2017-12-13 DIAGNOSIS — Z98.890 S/P MVR (MITRAL VALVE REPAIR): ICD-10-CM

## 2017-12-13 DIAGNOSIS — Z95.1 S/P CABG X 4: Primary | ICD-10-CM

## 2017-12-13 PROCEDURE — 93798 PHYS/QHP OP CAR RHAB W/ECG: CPT

## 2017-12-15 ENCOUNTER — TREATMENT (OUTPATIENT)
Dept: CARDIAC REHAB | Facility: HOSPITAL | Age: 67
End: 2017-12-15

## 2017-12-15 DIAGNOSIS — Z95.1 S/P CABG X 4: Primary | ICD-10-CM

## 2017-12-15 DIAGNOSIS — Z98.890 S/P MVR (MITRAL VALVE REPAIR): ICD-10-CM

## 2017-12-15 PROCEDURE — 93798 PHYS/QHP OP CAR RHAB W/ECG: CPT

## 2017-12-18 ENCOUNTER — TREATMENT (OUTPATIENT)
Dept: CARDIAC REHAB | Facility: HOSPITAL | Age: 67
End: 2017-12-18

## 2017-12-18 DIAGNOSIS — Z98.890 S/P MVR (MITRAL VALVE REPAIR): ICD-10-CM

## 2017-12-18 DIAGNOSIS — Z95.1 S/P CABG X 4: Primary | ICD-10-CM

## 2017-12-18 PROCEDURE — 93798 PHYS/QHP OP CAR RHAB W/ECG: CPT

## 2017-12-20 ENCOUNTER — TREATMENT (OUTPATIENT)
Dept: CARDIAC REHAB | Facility: HOSPITAL | Age: 67
End: 2017-12-20

## 2017-12-20 DIAGNOSIS — Z98.890 S/P MVR (MITRAL VALVE REPAIR): ICD-10-CM

## 2017-12-20 DIAGNOSIS — Z95.1 S/P CABG X 4: Primary | ICD-10-CM

## 2017-12-20 PROCEDURE — 93798 PHYS/QHP OP CAR RHAB W/ECG: CPT

## 2018-01-03 ENCOUNTER — TREATMENT (OUTPATIENT)
Dept: CARDIAC REHAB | Facility: HOSPITAL | Age: 68
End: 2018-01-03

## 2018-01-03 DIAGNOSIS — Z95.1 S/P CABG X 4: Primary | ICD-10-CM

## 2018-01-03 PROCEDURE — 93798 PHYS/QHP OP CAR RHAB W/ECG: CPT

## 2018-01-05 ENCOUNTER — TREATMENT (OUTPATIENT)
Dept: CARDIAC REHAB | Facility: HOSPITAL | Age: 68
End: 2018-01-05

## 2018-01-05 DIAGNOSIS — Z98.890 S/P MVR (MITRAL VALVE REPAIR): ICD-10-CM

## 2018-01-05 DIAGNOSIS — Z95.1 S/P CABG X 4: Primary | ICD-10-CM

## 2018-01-05 PROCEDURE — 93798 PHYS/QHP OP CAR RHAB W/ECG: CPT

## 2018-01-10 ENCOUNTER — TREATMENT (OUTPATIENT)
Dept: CARDIAC REHAB | Facility: HOSPITAL | Age: 68
End: 2018-01-10

## 2018-01-10 DIAGNOSIS — Z95.1 S/P CABG X 4: Primary | ICD-10-CM

## 2018-01-10 PROCEDURE — 93798 PHYS/QHP OP CAR RHAB W/ECG: CPT

## 2018-01-18 ENCOUNTER — OFFICE VISIT (OUTPATIENT)
Dept: CARDIAC SURGERY | Facility: CLINIC | Age: 68
End: 2018-01-18

## 2018-01-18 VITALS
RESPIRATION RATE: 18 BRPM | SYSTOLIC BLOOD PRESSURE: 140 MMHG | HEART RATE: 95 BPM | TEMPERATURE: 98.3 F | WEIGHT: 210 LBS | DIASTOLIC BLOOD PRESSURE: 85 MMHG | OXYGEN SATURATION: 98 % | BODY MASS INDEX: 34.95 KG/M2

## 2018-01-18 DIAGNOSIS — Z98.890 S/P MVR (MITRAL VALVE REPAIR): ICD-10-CM

## 2018-01-18 DIAGNOSIS — Z95.1 S/P CABG X 4: Primary | ICD-10-CM

## 2018-01-18 PROCEDURE — 99024 POSTOP FOLLOW-UP VISIT: CPT | Performed by: THORACIC SURGERY (CARDIOTHORACIC VASCULAR SURGERY)

## 2018-01-18 NOTE — PROGRESS NOTES
She is back for three-month follow-up.  She looks great.  The valve sounds normal with no murmurs.  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  I am truly amazed that she was able to get through surgery and do as well as she has.  I will see her back in one year.

## 2018-01-19 ENCOUNTER — TREATMENT (OUTPATIENT)
Dept: CARDIAC REHAB | Facility: HOSPITAL | Age: 68
End: 2018-01-19

## 2018-01-19 DIAGNOSIS — Z98.890 S/P MVR (MITRAL VALVE REPAIR): ICD-10-CM

## 2018-01-19 DIAGNOSIS — Z95.1 S/P CABG X 4: Primary | ICD-10-CM

## 2018-01-19 PROCEDURE — 93798 PHYS/QHP OP CAR RHAB W/ECG: CPT

## 2018-01-22 ENCOUNTER — TREATMENT (OUTPATIENT)
Dept: CARDIAC REHAB | Facility: HOSPITAL | Age: 68
End: 2018-01-22

## 2018-01-22 DIAGNOSIS — Z95.1 S/P CABG X 4: Primary | ICD-10-CM

## 2018-01-22 PROCEDURE — 93798 PHYS/QHP OP CAR RHAB W/ECG: CPT

## 2018-01-22 NOTE — PROGRESS NOTES
CARDIAC/PULMONARY REHAB NUTRITION EDUCATION/ASSESSMENT      67 y.o.         Height: 65  in    Weight: 210  lb     BMI: 35.9 IBW:  125-135 lb.   Diet Survey Score: 48  Cardiac Risk Factors: Obesity,HTN,Stress,sedentary lifestyle,family history Weight Assessment: Obese    Desired Weight: 165 lb      Food records reviewed? Yes     Occupation:  retired      Routine Exercise: mild     Who does the patient live with: significant other  Who does the cooking at home:  self         Pertinent Lab Values:   Total: No components found for: CHOLESTEROL  HDL:   HDL Cholesterol   Date Value Ref Range Status   10/13/2017 22 (L) 40 - 60 mg/dL Final     LDL:No results found for: LDL  Triglyceride: No components found for: TRIGLYCERIDE  Last HGBA1C with date if applicable:No components found for: A1C  Glucose:   Glucose   Date Value Ref Range Status   10/30/2017 109 (H) 65 - 99 mg/dL Final    Nutritional Supplements: B-12, 100 units daily    Pertinent Nutrition-Related Medications:  N/A         Stated Problem Areas / Concerns: Marion admits to a diet of convenience. She expressed a commitment to make better food choices, including less fast food and fewer highly processed foods. Marion is making more fresh food choices and cooking meals at home.     Assessment / Recommendations: We reviewed AHA guidelines, the DASH diet and meal strategies for weight loss. We discussed the benefits of a plant based diet. Supportive written information was provided  Motivation level toward diet compliance: moderate         Goals:  Follow guidelines for the DASH diet. Prepare more meals at home to include low fat, controlled sodium, high fiber choices. Use fruits and low fat nutrient dense choices for snacks, limit portions.                 6:33 PM  1/22/2018  Tomasa Heath RD

## 2018-01-24 ENCOUNTER — TREATMENT (OUTPATIENT)
Dept: CARDIAC REHAB | Facility: HOSPITAL | Age: 68
End: 2018-01-24

## 2018-01-24 DIAGNOSIS — Z95.1 S/P CABG X 4: Primary | ICD-10-CM

## 2018-01-24 PROCEDURE — 93798 PHYS/QHP OP CAR RHAB W/ECG: CPT

## 2018-02-05 ENCOUNTER — TREATMENT (OUTPATIENT)
Dept: CARDIAC REHAB | Facility: HOSPITAL | Age: 68
End: 2018-02-05

## 2018-02-05 DIAGNOSIS — Z95.1 S/P CABG X 4: Primary | ICD-10-CM

## 2018-02-05 PROCEDURE — 93798 PHYS/QHP OP CAR RHAB W/ECG: CPT

## 2018-02-07 ENCOUNTER — TREATMENT (OUTPATIENT)
Dept: CARDIAC REHAB | Facility: HOSPITAL | Age: 68
End: 2018-02-07

## 2018-02-07 DIAGNOSIS — Z95.1 S/P CABG X 4: Primary | ICD-10-CM

## 2018-02-07 PROCEDURE — 93798 PHYS/QHP OP CAR RHAB W/ECG: CPT

## 2018-02-09 ENCOUNTER — TREATMENT (OUTPATIENT)
Dept: CARDIAC REHAB | Facility: HOSPITAL | Age: 68
End: 2018-02-09

## 2018-02-09 DIAGNOSIS — Z98.890 S/P MVR (MITRAL VALVE REPAIR): ICD-10-CM

## 2018-02-09 DIAGNOSIS — Z95.1 S/P CABG X 4: Primary | ICD-10-CM

## 2018-02-09 PROCEDURE — 93798 PHYS/QHP OP CAR RHAB W/ECG: CPT

## 2018-02-14 ENCOUNTER — TREATMENT (OUTPATIENT)
Dept: CARDIAC REHAB | Facility: HOSPITAL | Age: 68
End: 2018-02-14

## 2018-02-14 DIAGNOSIS — Z95.1 S/P CABG X 4: Primary | ICD-10-CM

## 2018-02-14 PROCEDURE — 93798 PHYS/QHP OP CAR RHAB W/ECG: CPT

## 2018-02-16 ENCOUNTER — TREATMENT (OUTPATIENT)
Dept: CARDIAC REHAB | Facility: HOSPITAL | Age: 68
End: 2018-02-16

## 2018-02-16 DIAGNOSIS — Z98.890 S/P MVR (MITRAL VALVE REPAIR): ICD-10-CM

## 2018-02-16 DIAGNOSIS — Z95.1 S/P CABG X 4: Primary | ICD-10-CM

## 2018-02-16 PROCEDURE — 93798 PHYS/QHP OP CAR RHAB W/ECG: CPT

## 2018-02-19 ENCOUNTER — TREATMENT (OUTPATIENT)
Dept: CARDIAC REHAB | Facility: HOSPITAL | Age: 68
End: 2018-02-19

## 2018-02-19 DIAGNOSIS — Z98.890 S/P MVR (MITRAL VALVE REPAIR): ICD-10-CM

## 2018-02-19 DIAGNOSIS — Z95.1 S/P CABG X 4: Primary | ICD-10-CM

## 2018-02-19 PROCEDURE — 93798 PHYS/QHP OP CAR RHAB W/ECG: CPT

## 2018-02-21 ENCOUNTER — TREATMENT (OUTPATIENT)
Dept: CARDIAC REHAB | Facility: HOSPITAL | Age: 68
End: 2018-02-21

## 2018-02-21 DIAGNOSIS — Z95.1 S/P CABG X 4: Primary | ICD-10-CM

## 2018-02-21 PROCEDURE — 93798 PHYS/QHP OP CAR RHAB W/ECG: CPT

## 2018-02-23 ENCOUNTER — TREATMENT (OUTPATIENT)
Dept: CARDIAC REHAB | Facility: HOSPITAL | Age: 68
End: 2018-02-23

## 2018-02-23 DIAGNOSIS — Z95.1 S/P CABG X 4: Primary | ICD-10-CM

## 2018-02-23 DIAGNOSIS — Z98.890 S/P MVR (MITRAL VALVE REPAIR): ICD-10-CM

## 2018-02-23 PROCEDURE — 93798 PHYS/QHP OP CAR RHAB W/ECG: CPT

## 2018-02-26 ENCOUNTER — TREATMENT (OUTPATIENT)
Dept: CARDIAC REHAB | Facility: HOSPITAL | Age: 68
End: 2018-02-26

## 2018-02-26 DIAGNOSIS — Z95.1 S/P CABG X 4: Primary | ICD-10-CM

## 2018-02-26 DIAGNOSIS — Z98.890 S/P MVR (MITRAL VALVE REPAIR): ICD-10-CM

## 2018-02-26 PROCEDURE — 93798 PHYS/QHP OP CAR RHAB W/ECG: CPT

## 2018-03-16 ENCOUNTER — APPOINTMENT (OUTPATIENT)
Dept: CARDIAC REHAB | Facility: HOSPITAL | Age: 68
End: 2018-03-16

## 2018-03-19 ENCOUNTER — APPOINTMENT (OUTPATIENT)
Dept: CARDIAC REHAB | Facility: HOSPITAL | Age: 68
End: 2018-03-19

## 2018-03-21 ENCOUNTER — APPOINTMENT (OUTPATIENT)
Dept: CARDIAC REHAB | Facility: HOSPITAL | Age: 68
End: 2018-03-21

## 2018-04-29 ENCOUNTER — APPOINTMENT (OUTPATIENT)
Dept: GENERAL RADIOLOGY | Facility: HOSPITAL | Age: 68
End: 2018-04-29

## 2018-04-29 ENCOUNTER — HOSPITAL ENCOUNTER (EMERGENCY)
Facility: HOSPITAL | Age: 68
Discharge: HOME | End: 2018-04-29
Attending: EMERGENCY MEDICINE | Admitting: EMERGENCY MEDICINE

## 2018-04-29 VITALS
WEIGHT: 210 LBS | TEMPERATURE: 98.8 F | HEIGHT: 65 IN | RESPIRATION RATE: 18 BRPM | OXYGEN SATURATION: 100 % | DIASTOLIC BLOOD PRESSURE: 88 MMHG | SYSTOLIC BLOOD PRESSURE: 148 MMHG | BODY MASS INDEX: 34.99 KG/M2 | HEART RATE: 68 BPM
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
## 2018-04-29 DIAGNOSIS — S60.221A CONTUSION OF RIGHT HAND, INITIAL ENCOUNTER: ICD-10-CM

## 2018-04-29 DIAGNOSIS — S92.302A MULTIPLE CLOSED FRACTURES OF METATARSAL BONE OF LEFT FOOT, INITIAL ENCOUNTER: Primary | ICD-10-CM

## 2018-04-29 PROCEDURE — 73630 X-RAY EXAM OF FOOT: CPT

## 2018-04-29 PROCEDURE — 99283 EMERGENCY DEPT VISIT LOW MDM: CPT

## 2018-04-29 PROCEDURE — 73130 X-RAY EXAM OF HAND: CPT

## 2018-04-29 PROCEDURE — 73590 X-RAY EXAM OF LOWER LEG: CPT

## 2018-04-29 PROCEDURE — 73610 X-RAY EXAM OF ANKLE: CPT

## 2018-06-28 ENCOUNTER — TRANSCRIBE ORDERS (OUTPATIENT)
Dept: PHYSICAL THERAPY | Facility: HOSPITAL | Age: 68
End: 2018-06-28

## 2018-06-28 DIAGNOSIS — S99.919S: Primary | ICD-10-CM

## 2018-07-03 ENCOUNTER — HOSPITAL ENCOUNTER (OUTPATIENT)
Dept: PHYSICAL THERAPY | Facility: HOSPITAL | Age: 68
Setting detail: THERAPIES SERIES
Discharge: HOME | End: 2018-07-03

## 2018-07-03 DIAGNOSIS — R29.898 ANKLE WEAKNESS: ICD-10-CM

## 2018-07-03 DIAGNOSIS — M25.672 ANKLE JOINT STIFFNESS, BILATERAL: Primary | ICD-10-CM

## 2018-07-03 DIAGNOSIS — M25.671 ANKLE JOINT STIFFNESS, BILATERAL: Primary | ICD-10-CM

## 2018-07-03 DIAGNOSIS — M25.571 CHRONIC PAIN OF RIGHT ANKLE: ICD-10-CM

## 2018-07-03 DIAGNOSIS — R26.2 DIFFICULTY WALKING: ICD-10-CM

## 2018-07-03 DIAGNOSIS — G89.29 CHRONIC PAIN OF RIGHT ANKLE: ICD-10-CM

## 2018-07-03 PROCEDURE — G8978 MOBILITY CURRENT STATUS: HCPCS | Performed by: PHYSICAL THERAPIST

## 2018-07-03 PROCEDURE — 97161 PT EVAL LOW COMPLEX 20 MIN: CPT | Performed by: PHYSICAL THERAPIST

## 2018-07-03 PROCEDURE — 97110 THERAPEUTIC EXERCISES: CPT | Performed by: PHYSICAL THERAPIST

## 2018-07-03 PROCEDURE — G8979 MOBILITY GOAL STATUS: HCPCS | Performed by: PHYSICAL THERAPIST

## 2018-07-03 NOTE — THERAPY EVALUATION
Outpatient Physical Therapy Ortho Initial Evaluation  [facility]     Patient Name: Marion Anderson  : 1950  MRN: [id number]  Today's Date: 7/3/2018      Visit Date: 2018    Patient Active Problem List   Diagnosis   • Coronary artery disease of native heart with stable angina pectoris (CMS/HCC)   • CAD (coronary artery disease)   • Subdural hematoma (CMS/HCC)   • S/P CABG x 4   • S/P MVR (mitral valve repair)        Past Medical History:   Diagnosis Date   • Arthritis    • Coronary artery disease    • Disease of thyroid gland    • Hypertension    • PONV (postoperative nausea and vomiting)    • Spinal headache         Past Surgical History:   Procedure Laterality Date   • CARDIAC SURGERY     • CORONARY ARTERY BYPASS GRAFT N/A 10/12/2017    Procedure: FILEMON STERNOTOMY CORONARY ARTERY BYPASS GRAFT TIMES 4  USING LEFT INTERNAL MAMMARY ARTERY AND LEFT GREATER SAPHENOUS VEIN GRAFT PER  ENDOSCOPIC VEIN HARVESTING, MITRAL VALVE REPAIR AND PRP;  Surgeon: Ramos Muñoz MD;  Location: [facility];  Service:    • FRACTURE SURGERY      ankle. leg   • TONSILLECTOMY         Visit Dx:     ICD-10-CM ICD-9-CM   1. Ankle joint stiffness, bilateral M25.671 719.57    M25.672    2. Ankle weakness M62.81 719.67   3. Difficulty walking R26.2 719.7   4. Chronic pain of right ankle M25.571 719.47    G89.29 338.29             Patient History     Row Name 18 1200             History    Chief Complaint Pain;Difficulty Walking;Balance Problems  -      Type of Pain Ankle pain  -      Date Current Problem(s) Began 18  -      Brief Description of Current Complaint Patient suffered a R ankle injury on 2018 after falling down the stairs. X-Rays showed fractures of the distal 3-5th metatarsals, however the ankle only showed evidence of old fractures from a fall patient had 3 years ago (slipped on a throw rug). Her old injury involved a bi-malleolar fracture and she undwent 2 ORIF surgeries in  2015. Patient reports she never fully recovered from her old ankle injury, but she was able to work out and function until this recent fall. She is currently in a walking boot however was encouraged to begin WBAT in a shoe. Patient reports significant limitations in her current standing and walking tolerance as well as high constant pain levels.   -KH      Previous treatment for THIS PROBLEM Rehabilitation  -KH      Patient/Caregiver Goals Relieve pain;Improve mobility;Improve strength  -KH      Occupation/sports/leisure activities enjoys movies, pets, and grandchildren  -KH      How has patient tried to help current problem? medication, walking boot  -         Pain     Pain Location Ankle;Foot  -KH      Pain at Present 6  -KH      Pain at Best 5  -KH      Pain at Worst 7  -KH      Pain Frequency Constant/continuous  -KH      Pain Description Aching  -KH      What Performance Factors Make the Current Problem(s) WORSE? walking, standing  -KH      What Performance Factors Make the Current Problem(s) BETTER? rest  -KH      Tolerance Time- Standing 5 min  -KH      Tolerance Time- Walking 5 min  -KH      Difficulties with ADL's? yes  -KH      Difficulties with recreational activities? yes  -KH         Fall Risk Assessment    Any falls in the past year: Yes  -KH      Number of falls reported in the last 12 months 1  -KH      Does patient have a fear of falling Yes (comment)  -KH         Daily Activities    Primary Language English  -KH      Are you able to read Yes  -KH      Are you able to write Yes  -KH      Patient is concerned about/has problems with Walking;Transfers (getting out of a chair, bed);Standing;Difficulty with self care (i.e. bathing, dressing, toileting:;Climbing Stairs  -KH      Pt Participated in POC and Goals Yes  -KH         Safety    Are you being hurt, hit, or frightened by anyone at home or in your life? No  -KH      Are you being neglected by a caregiver No  -KH        User Key  (r) = Recorded  By, (t) = Taken By, (c) = Cosigned By    Initials Name Provider Type     Josseline Wade, PT Physical Therapist                PT Ortho     Row Name 07/03/18 1200       Posture/Observations    Alignment Options Genu valgus;Forward head;Rounded shoulders  -KH    Observations Edema   R ankle/foot  -KH    Posture/Observations Comments FF trunk, increased lateral trunk sway B, R >L LE ER during gait  -KH       Right Lower Ext    Rt Ankle Dorsiflexion AROM -20  -KH    Rt Ankle Dorsiflexion PROM -15  -KH    Rt Ankle Plantarflexion AROM 50  -KH    Rt Ankle Inversion AROM 18  -KH    Rt Ankle Eversion AROM 10  -KH       Left Lower Ext    Lt Ankle Dorsiflexion AROM -8  -KH    Lt Ankle Plantarflexion AROM 60  -KH    Lt Ankle Inversion AROM 24  -KH    Lt Ankle Eversion AROM 25  -KH       Lower Extremity (Manual Muscle Testing)    Lower Extremity: Manual Muscle Testing (MMT) left ankle strength deficit;right ankle strength deficit  -KH       Left Ankle/Foot (Manual Muscle Testing)    Left Ankle Manual Muscle Testing (MMT) plantarflexion;dorsiflexion;inversion;eversion  -KH    MMT: Plantarflexion, Left Ankle plantarflexion  -KH    MMT, Gross Movement: Left Ankle Plantarflexion (4/5) good  -KH    MMT: Dorsiflexion Left Ankle Muscles dorsiflexion  -KH    MMT, Gross Movement: Left Ankle Dorsiflexion (4-/5) good minus  -KH    MMT: Inversion, Left Ankle subtalar inversion  -KH    MMT, Gross Movement: Left Ankle Subtalar Inversion (4-/5) good minus  -KH    MMT: Eversion, Left Ankle subtalar eversion  -KH    MMT, Gross Movement: Left Ankle Subtalar Eversion (4-/5) good minus  -KH       Right Ankle/Foot (Manual Muscle Testing)    Right Ankle Manual Muscle Testing (MMT) plantarflexion;dorsiflexion;inversion;eversion  -KH    MMT: Plantarflexion, Right Ankle plantarflexion  -KH    MMT, Gross Movement: Right Ankle Plantarflexion (3/5) fair  -KH    MMT: Dorsiflexion, Right Ankle Muscles dorsiflexion  -    MMT, Gross Movement: Right Ankle  Dorsiflexion (3/5) fair  -KH    MMT: Inversion, Right Ankle subtalar inversion  -    MMT, Gross Movement: Right Ankle Subtalar Inversion (3/5) fair  -KH    MMT: Eversion, Right Ankle subtalar eversion  -    MMT, Gross Movement: Right Ankle Subtalar Eversion (3/5) fair  -KH       Sensation    Sensation WNL? WNL  -       Gait/Stairs Assessment/Training    Deviations/Abnormal Patterns (Gait) right sided deviations;antalgic;bilateral deviations;base of support, wide;stride length decreased  -      User Key  (r) = Recorded By, (t) = Taken By, (c) = Cosigned By    Initials Name Provider Type    CHICHO Wade, PT Physical Therapist                  PT OP Goals     Row Name 07/03/18 1300          PT Short Term Goals    STG 1 Patient will be independent and compliant with initial HEP for ankle mobility without increased symptoms  -     STG 1 Progress New  [facility]     STG 2 Patient will increase standing tolerance >/=15 min to reduce pain with daily ADLs  -     STG 2 Progress New  [facility]     STG 3 Patient will have ankle strength >/= 3+/5 in available ROM for improved stability with functional WB activity  -     STG 3 Progress New  [facility]     STG 4 Patient will have >/= 5 degree improvement in Right ankle DF ROM for increased ease with functional activities  -     STG 4 Progress New  [facility]        Long Term Goals    LTG 1 Patient will be independent with established HEP for strength/stabilization to facilitate self management of condition  -     LTG 1 Progress New  [facility]     LTG 2 Patient will tolerate walking >/= 10-15 minutes with no AD without pain > 3-4/10 for increased cardiovascular training  -     LTG 2 Progress New  [facility]     LTG 3 Patient will have ankle strength >/= 4-/5 in available ROM for improved stability with functional WB activity  -     LTG 3 Progress New  [facility]     LTG 4 Patient will report reduced functional limitations on FAAM with score >/= 35/84 (from 25/84 at eval)  -     LTG 4 Progress [facility]        User Key  (r) = Recorded By, (t) = Taken By, (c) = Cosigned By    Initials Name Provider Type    CHICHO Wade, PT Physical Therapist                PT Assessment/Plan     Row Name 07/03/18 9603          PT Assessment    Functional Limitations Impaired gait;Limitation in home management;Limitations in community activities;Performance in leisure activities;Performance in self-care ADL  -     Impairments Gait;Pain;Muscle strength;Range of motion;Posture;Joint mobility;Endurance;Edema;Balance  -     Assessment Comments Patient is a 68 y/o female s/p fall and distal 3-5th metatarsal fractures in the R foot. Patient also has a history of a bi-malleolar fracture in the right ankle and 2 ORIF surgeries from 2015. Patient currently presents with altered gait mechanics, limited R ankle AROM/joint mobility, and decreased R ankle strength. Recommend skilled PT to reduce pain and improve ease with basic daily mobility.   -     Please refer to paper survey for additional self-reported information Yes  -KH     Rehab Potential Good  -KH     Patient/caregiver participated in establishment of treatment plan and goals Yes  -KH     Patient would benefit from skilled therapy intervention Yes  -KH        PT Plan    PT Frequency 2x/week  -KH     Predicted Duration of Therapy Intervention (Therapy Eval) 6 weeks  -KH     Planned CPT's? PT EVAL LOW COMPLEXITY: 87872;PT THER PROC EA 15 MIN: 09235;PT GAIT TRAINING EA 15 MIN: 81278;PT HOT OR COLD PACK TREAT MCARE;PT ELECTRICAL STIM UNATTEND: ;PT THER ACT EA 15 MIN: 95249;PT MANUAL THERAPY EA 15 MIN: 96911;PT AQUATIC THERAPY EA 15 MIN: 41680;PT ELECTRICAL STIM ATTD EA 15 MIN: 31351;PT HOT/COLD PACK WC NONMCARE: 33076;PT IONTOPHORESIS EA 15 MIN: 03040;PT ULTRASOUND EA 15 MIN: 73930;PT SELF CARE/HOME MGMT/TRAIN EA 15: 44223;PT NEUROMUSC RE-EDUCATION EA 15 MIN: 41140;PT RE-EVAL: 55579  -     Physical Therapy Interventions (Optional Details) ROM (Range of Motion);stair  training;strengthening;stretching;taping;patient/family education;postural re-education;joint mobilization;home exercise program;gait training;dry needling;balance training  -     PT Plan Comments ankle ROM/mobility, gait training, LE/ankle strengthening  -       User Key  (r) = Recorded By, (t) = Taken By, (c) = Cosigned By    Initials Name Provider Type    CHICHO Wade, PT Physical Therapist                  Exercises     Row Name 07/03/18 1200             Subjective Comments    Subjective Comments I can barely tolerate walking right now  -         Subjective Pain    Able to rate subjective pain? yes  -KH      Pre-Treatment Pain Level 5  -KH      Post-Treatment Pain Level 5  -KH         Total Minutes    31791 - PT Therapeutic Exercise Minutes 15  -KH         Exercise 1    Exercise Name 1 issued HEP- see copy in chart for details  -        User Key  (r) = Recorded By, (t) = Taken By, (c) = Cosigned By    Initials Name Provider Type    CHICHO Wade, PT Physical Therapist              Outcome Measure Options: FAAM (70% disability)         Time Calculation:     Therapy Suggested Charges     Code   Minutes Charges    15051 (CPT®) Hc Pt Neuromusc Re Education Ea 15 Min      31926 (CPT®) Hc Pt Ther Proc Ea 15 Min 15 1    03033 (CPT®) Hc Gait Training Ea 15 Min      01293 (CPT®) Hc Pt Therapeutic Act Ea 15 Min      17463 (CPT®) Hc Pt Manual Therapy Ea 15 Min      67460 (CPT®) Hc Pt Ther Massage- Per 15 Min      09289 (CPT®) Hc Pt Iontophoresis Ea 15 Min      65371 (CPT®) Hc Pt Elec Stim Ea-Per 15 Min      70599 (CPT®) Hc Pt Ultrasound Ea 15 Min      81070 (CPT®) Hc Pt Self Care/Mgmt/Train Ea 15 Min      Total  15 1          Start Time: 1215  Stop Time: 1245  Time Calculation (min): 30 min     Therapy Charges for Today     Code Description Service Date Service Provider Modifiers Qty    06374759187 HC PT THER PROC EA 15 MIN 7/3/2018 Josseline Wade, PT GP 1    20256134686 HC PT EVAL LOW COMPLEXITY 1 7/3/2018 Josseline  Sheri, PT GP 1    48825628118 HC PT MOBILITY CURRENT 7/3/2018 Josseline Wade, PT GP, CL 1    34261198381 HC PT MOBILITY PROJECTED 7/3/2018 Josseline Wade, PT GP, CK 1          PT G-Codes  PT Professional Judgement Used?: Yes  Outcome Measure Options: FAAM (70% disability)  Score: 70% disability (25/84)  Functional Limitation: Mobility: Walking and moving around  Mobility: Walking and Moving Around Current Status (): At least 60 percent but less than 80 percent impaired, limited or restricted  Mobility: Walking and Moving Around Goal Status (): At least 40 percent but less than 60 percent impaired, limited or restricted         Josseline Wade, PT  7/3/2018

## 2018-07-10 ENCOUNTER — HOSPITAL ENCOUNTER (OUTPATIENT)
Dept: PHYSICAL THERAPY | Facility: HOSPITAL | Age: 68
Setting detail: THERAPIES SERIES
Discharge: HOME | End: 2018-07-10

## 2018-07-10 DIAGNOSIS — R29.898 ANKLE WEAKNESS: ICD-10-CM

## 2018-07-10 DIAGNOSIS — M25.671 ANKLE JOINT STIFFNESS, BILATERAL: Primary | ICD-10-CM

## 2018-07-10 DIAGNOSIS — G89.29 CHRONIC PAIN OF RIGHT ANKLE: ICD-10-CM

## 2018-07-10 DIAGNOSIS — R26.2 DIFFICULTY WALKING: ICD-10-CM

## 2018-07-10 DIAGNOSIS — M25.672 ANKLE JOINT STIFFNESS, BILATERAL: Primary | ICD-10-CM

## 2018-07-10 DIAGNOSIS — M25.571 CHRONIC PAIN OF RIGHT ANKLE: ICD-10-CM

## 2018-07-10 PROCEDURE — 97110 THERAPEUTIC EXERCISES: CPT | Performed by: PHYSICAL THERAPIST

## 2018-07-10 NOTE — THERAPY TREATMENT NOTE
Outpatient Physical Therapy Ortho Treatment Note  [facility]     Patient Name: Marion Anderson  : 1950  MRN: [id number]  Today's Date: 7/10/2018      Visit Date: 07/10/2018    Visit Dx:    ICD-10-CM ICD-9-CM   1. Ankle joint stiffness, bilateral M25.671 719.57    M25.672    2. Ankle weakness M62.81 719.67   3. Difficulty walking R26.2 719.7   4. Chronic pain of right ankle M25.571 719.47    G89.29 338.29       Patient Active Problem List   Diagnosis   • Coronary artery disease of native heart with stable angina pectoris (CMS/HCC)   • CAD (coronary artery disease)   • Subdural hematoma (CMS/HCC)   • S/P CABG x 4   • S/P MVR (mitral valve repair)        Past Medical History:   Diagnosis Date   • Arthritis    • Coronary artery disease    • Disease of thyroid gland    • Hypertension    • PONV (postoperative nausea and vomiting)    • Spinal headache         Past Surgical History:   Procedure Laterality Date   • CARDIAC SURGERY     • CORONARY ARTERY BYPASS GRAFT N/A 10/12/2017    Procedure: FILEMON STERNOTOMY CORONARY ARTERY BYPASS GRAFT TIMES 4  USING LEFT INTERNAL MAMMARY ARTERY AND LEFT GREATER SAPHENOUS VEIN GRAFT PER  ENDOSCOPIC VEIN HARVESTING, MITRAL VALVE REPAIR AND PRP;  Surgeon: Ramos Muñoz MD;  Location: [facility];  Service:    • FRACTURE SURGERY      ankle. leg   • TONSILLECTOMY                               PT Assessment/Plan     Row Name 07/10/18 1355          PT Assessment    Assessment Comments Patient progressing slowly with R ankle AROM exercises still in walking boot with muscle weakness.   -PB        PT Plan    PT Plan Comments Patient to bring shoe for weight bearing exercises.   -PB       User Key  (r) = Recorded By, (t) = Taken By, (c) = Cosigned By    Initials Name Provider Type    PB Jennifer Moreno, PT Physical Therapist                    Exercises     Row Name 07/10/18 1300             Subjective Comments    Subjective Comments The foot gets tired.   -PB          "Subjective Pain    Able to rate subjective pain? yes  -PB      Pre-Treatment Pain Level 5  -PB      Post-Treatment Pain Level 5  -PB         Total Minutes    36124 - PT Therapeutic Exercise Minutes 40  -PB         Exercise 1    Exercise Name 1 Gentle PROM to R ankle 4-way.  AROM ankle alphabet A-Z, circles 10/2; AROM INV/EV x 10; isometrics 4-way 5\"/10; seated toe curls with towel x 10, towel INV/EV x 10; seated PF x 15; seated knee extension ankle AROM x 20.  Issued progression of HEP.  -PB        User Key  (r) = Recorded By, (t) = Taken By, (c) = Cosigned By    Initials Name Provider Type    PB Jennifer Moreno, PT Physical Therapist                                            Time Calculation:   Start Time: 1348  Stop Time: 1430  Time Calculation (min): 42 min  Therapy Suggested Charges     Code   Minutes Charges    84397 (CPT®) Hc Pt Neuromusc Re Education Ea 15 Min      54909 (CPT®) Hc Pt Ther Proc Ea 15 Min 40 3    77334 (CPT®) Hc Gait Training Ea 15 Min      79123 (CPT®) Hc Pt Therapeutic Act Ea 15 Min      36199 (CPT®) Hc Pt Manual Therapy Ea 15 Min      60288 (CPT®) Hc Pt Ther Massage- Per 15 Min      67556 (CPT®) Hc Pt Iontophoresis Ea 15 Min      51001 (CPT®) Hc Pt Elec Stim Ea-Per 15 Min      09301 (CPT®) Hc Pt Ultrasound Ea 15 Min      39141 (CPT®) Hc Pt Self Care/Mgmt/Train Ea 15 Min      Total  40 3        Therapy Charges for Today     Code Description Service Date Service Provider Modifiers Qty    25467349856 HC PT THER PROC EA 15 MIN 7/10/2018 Jennifer Moreno, PT GP 3                    Jennifer Moreno, PT  7/10/2018     "

## 2018-07-12 ENCOUNTER — APPOINTMENT (OUTPATIENT)
Dept: PHYSICAL THERAPY | Facility: HOSPITAL | Age: 68
End: 2018-07-12

## 2018-07-16 ENCOUNTER — HOSPITAL ENCOUNTER (OUTPATIENT)
Dept: PHYSICAL THERAPY | Facility: HOSPITAL | Age: 68
Setting detail: THERAPIES SERIES
Discharge: HOME | End: 2018-07-16

## 2018-07-16 DIAGNOSIS — M25.571 CHRONIC PAIN OF RIGHT ANKLE: ICD-10-CM

## 2018-07-16 DIAGNOSIS — G89.29 CHRONIC PAIN OF RIGHT ANKLE: ICD-10-CM

## 2018-07-16 DIAGNOSIS — R29.898 ANKLE WEAKNESS: ICD-10-CM

## 2018-07-16 DIAGNOSIS — M25.671 ANKLE JOINT STIFFNESS, BILATERAL: Primary | ICD-10-CM

## 2018-07-16 DIAGNOSIS — R26.2 DIFFICULTY WALKING: ICD-10-CM

## 2018-07-16 DIAGNOSIS — M25.672 ANKLE JOINT STIFFNESS, BILATERAL: Primary | ICD-10-CM

## 2018-07-16 PROCEDURE — 97110 THERAPEUTIC EXERCISES: CPT | Performed by: PHYSICAL THERAPIST

## 2018-07-16 NOTE — THERAPY TREATMENT NOTE
Outpatient Physical Therapy Ortho Treatment Note  [facility]     Patient Name: Marion Anderson  : 1950  MRN: [id number]  Today's Date: 2018      Visit Date: 2018    Visit Dx:    ICD-10-CM ICD-9-CM   1. Ankle joint stiffness, bilateral M25.671 719.57    M25.672    2. Ankle weakness M62.81 719.67   3. Difficulty walking R26.2 719.7   4. Chronic pain of right ankle M25.571 719.47    G89.29 338.29       Patient Active Problem List   Diagnosis   • Coronary artery disease of native heart with stable angina pectoris (CMS/HCC)   • CAD (coronary artery disease)   • Subdural hematoma (CMS/HCC)   • S/P CABG x 4   • S/P MVR (mitral valve repair)        Past Medical History:   Diagnosis Date   • Arthritis    • Coronary artery disease    • Disease of thyroid gland    • Hypertension    • PONV (postoperative nausea and vomiting)    • Spinal headache         Past Surgical History:   Procedure Laterality Date   • CARDIAC SURGERY     • CORONARY ARTERY BYPASS GRAFT N/A 10/12/2017    Procedure: FILEMON STERNOTOMY CORONARY ARTERY BYPASS GRAFT TIMES 4  USING LEFT INTERNAL MAMMARY ARTERY AND LEFT GREATER SAPHENOUS VEIN GRAFT PER  ENDOSCOPIC VEIN HARVESTING, MITRAL VALVE REPAIR AND PRP;  Surgeon: Ramos Muñoz MD;  Location: [facility];  Service:    • FRACTURE SURGERY      ankle. leg   • TONSILLECTOMY                               PT Assessment/Plan     Row Name 18 1434          PT Assessment    Assessment Comments Patient able to progress to rehab in shoe for short periods with increased fatigue.   -PB       User Key  (r) = Recorded By, (t) = Taken By, (c) = Cosigned By    Initials Name Provider Type    PB Jennifer Moreno, PT Physical Therapist                    Exercises     Row Name 18 1400             Subjective Comments    Subjective Comments I have not been on foot a lot today.  The longer the worse it gets.   -PB         Subjective Pain    Able to rate subjective pain? yes  -PB       Pre-Treatment Pain Level 6  -PB      Post-Treatment Pain Level 6  -PB         Total Minutes    72892 - PT Therapeutic Exercise Minutes 40  -PB         Exercise 1    Exercise Name 1 Patient brought in R shoe today. Worked on standing and walking HHA up to 25'/2. Rock over phone book x 10. B toe raises at counter x 10. Gentle PROM to R ankle 4-way.  AROM ankle alphabet A-Z, circles 10/2; AROM INV/EV x 10; seated toe curls with towel x 10, towel INV/EV x 10; seated PF with shoe x 15;  Chayo leg press 70# x 10 R and L.   -PB        User Key  (r) = Recorded By, (t) = Taken By, (c) = Cosigned By    Initials Name Provider Type    PB Jennifer Moreno, PT Physical Therapist                             Therapy Education  Education Details: Patient to work periods at home in shoe               Time Calculation:   Start Time: 1350  Stop Time: 1432  Time Calculation (min): 42 min  Therapy Suggested Charges     Code   Minutes Charges    30955 (CPT®) Hc Pt Neuromusc Re Education Ea 15 Min      30091 (CPT®) Hc Pt Ther Proc Ea 15 Min 40 3    98202 (CPT®) Hc Gait Training Ea 15 Min      80052 (CPT®) Hc Pt Therapeutic Act Ea 15 Min      19773 (CPT®) Hc Pt Manual Therapy Ea 15 Min      13977 (CPT®) Hc Pt Ther Massage- Per 15 Min      74227 (CPT®) Hc Pt Iontophoresis Ea 15 Min      96303 (CPT®) Hc Pt Elec Stim Ea-Per 15 Min      07800 (CPT®) Hc Pt Ultrasound Ea 15 Min      59424 (CPT®) Hc Pt Self Care/Mgmt/Train Ea 15 Min      Total  40 3        Therapy Charges for Today     Code Description Service Date Service Provider Modifiers Qty    97161583548 HC PT THER PROC EA 15 MIN 7/16/2018 Jennifer Moreno, PT GP 3                    Jennifer Moreno, PT  7/16/2018

## 2018-07-18 ENCOUNTER — HOSPITAL ENCOUNTER (OUTPATIENT)
Dept: PHYSICAL THERAPY | Facility: HOSPITAL | Age: 68
Setting detail: THERAPIES SERIES
Discharge: HOME | End: 2018-07-18

## 2018-07-18 DIAGNOSIS — M25.672 ANKLE JOINT STIFFNESS, BILATERAL: Primary | ICD-10-CM

## 2018-07-18 DIAGNOSIS — M25.571 CHRONIC PAIN OF RIGHT ANKLE: ICD-10-CM

## 2018-07-18 DIAGNOSIS — R26.2 DIFFICULTY WALKING: ICD-10-CM

## 2018-07-18 DIAGNOSIS — G89.29 CHRONIC PAIN OF RIGHT ANKLE: ICD-10-CM

## 2018-07-18 DIAGNOSIS — M25.671 ANKLE JOINT STIFFNESS, BILATERAL: Primary | ICD-10-CM

## 2018-07-18 DIAGNOSIS — R29.898 ANKLE WEAKNESS: ICD-10-CM

## 2018-07-18 PROCEDURE — 97110 THERAPEUTIC EXERCISES: CPT | Performed by: PHYSICAL THERAPIST

## 2018-07-18 NOTE — THERAPY TREATMENT NOTE
Outpatient Physical Therapy Ortho Treatment Note  [facility]     Patient Name: Marion Anderson  : 1950  MRN: [id number]  Today's Date: 2018      Visit Date: 2018    Visit Dx:    ICD-10-CM ICD-9-CM   1. Ankle joint stiffness, bilateral M25.671 719.57    M25.672    2. Ankle weakness M62.81 719.67   3. Difficulty walking R26.2 719.7   4. Chronic pain of right ankle M25.571 719.47    G89.29 338.29       Patient Active Problem List   Diagnosis   • Coronary artery disease of native heart with stable angina pectoris (CMS/HCC)   • CAD (coronary artery disease)   • Subdural hematoma (CMS/HCC)   • S/P CABG x 4   • S/P MVR (mitral valve repair)        Past Medical History:   Diagnosis Date   • Arthritis    • Coronary artery disease    • Disease of thyroid gland    • Hypertension    • PONV (postoperative nausea and vomiting)    • Spinal headache         Past Surgical History:   Procedure Laterality Date   • CARDIAC SURGERY     • CORONARY ARTERY BYPASS GRAFT N/A 10/12/2017    Procedure: FILEMON STERNOTOMY CORONARY ARTERY BYPASS GRAFT TIMES 4  USING LEFT INTERNAL MAMMARY ARTERY AND LEFT GREATER SAPHENOUS VEIN GRAFT PER  ENDOSCOPIC VEIN HARVESTING, MITRAL VALVE REPAIR AND PRP;  Surgeon: Ramos Muñoz MD;  Location: [facility];  Service:    • FRACTURE SURGERY      ankle. leg   • TONSILLECTOMY                               PT Assessment/Plan     Row Name 18 1301          PT Assessment    Assessment Comments Patient slowly progressing household mobility in shoe with assistive device out of boot without increased foot pain.   -PB       User Key  (r) = Recorded By, (t) = Taken By, (c) = Cosigned By    Initials Name Provider Type    PB Jennifer Moreno, PT Physical Therapist                    Exercises     Row Name 18 1200             Subjective Comments    Subjective Comments Patient reports she has been wearing shoe around the house using walker more.   -PB         Subjective Pain    Able  to rate subjective pain? yes  -PB      Pre-Treatment Pain Level 5  -PB      Post-Treatment Pain Level 5  -PB         Total Minutes    80376 - PT Therapeutic Exercise Minutes 40  -PB         Exercise 1    Exercise Name 1 Changed to athletic shoe from boot R.  Seated and standing calf raises x 20,  Ankle AROM x 10 all ways.  Leg press 75# x 30; Chayo hip abd 30# x 30; Phone book step overs x 15.  Nustep L1 x 10 min.   Patient walked in clinic with shoe and cane.   -PB        User Key  (r) = Recorded By, (t) = Taken By, (c) = Cosigned By    Initials Name Provider Type    PB Jennifer Moreno, PT Physical Therapist                                            Time Calculation:   Start Time: 1205  Stop Time: 1245  Time Calculation (min): 40 min  Therapy Suggested Charges     Code   Minutes Charges    80943 (CPT®) Hc Pt Neuromusc Re Education Ea 15 Min      92127 (CPT®) Hc Pt Ther Proc Ea 15 Min 40 3    50088 (CPT®) Hc Gait Training Ea 15 Min      60567 (CPT®) Hc Pt Therapeutic Act Ea 15 Min      12899 (CPT®) Hc Pt Manual Therapy Ea 15 Min      01731 (CPT®) Hc Pt Ther Massage- Per 15 Min      34440 (CPT®) Hc Pt Iontophoresis Ea 15 Min      53592 (CPT®) Hc Pt Elec Stim Ea-Per 15 Min      67320 (CPT®) Hc Pt Ultrasound Ea 15 Min      28953 (CPT®) Hc Pt Self Care/Mgmt/Train Ea 15 Min      Total  40 3        Therapy Charges for Today     Code Description Service Date Service Provider Modifiers Qty    43887591602 HC PT THER PROC EA 15 MIN 7/18/2018 Jennifer Moreno, PT GP 3                    Jennifer Moreno, PT  7/18/2018

## 2018-07-23 ENCOUNTER — HOSPITAL ENCOUNTER (OUTPATIENT)
Dept: PHYSICAL THERAPY | Facility: HOSPITAL | Age: 68
Setting detail: THERAPIES SERIES
Discharge: HOME | End: 2018-07-23

## 2018-07-23 DIAGNOSIS — M25.672 ANKLE JOINT STIFFNESS, BILATERAL: Primary | ICD-10-CM

## 2018-07-23 DIAGNOSIS — R26.2 DIFFICULTY WALKING: ICD-10-CM

## 2018-07-23 DIAGNOSIS — M25.671 ANKLE JOINT STIFFNESS, BILATERAL: Primary | ICD-10-CM

## 2018-07-23 DIAGNOSIS — M25.571 CHRONIC PAIN OF RIGHT ANKLE: ICD-10-CM

## 2018-07-23 DIAGNOSIS — R29.898 ANKLE WEAKNESS: ICD-10-CM

## 2018-07-23 DIAGNOSIS — G89.29 CHRONIC PAIN OF RIGHT ANKLE: ICD-10-CM

## 2018-07-23 PROCEDURE — 97110 THERAPEUTIC EXERCISES: CPT | Performed by: PHYSICAL THERAPIST

## 2018-07-23 NOTE — THERAPY TREATMENT NOTE
Outpatient Physical Therapy Ortho Treatment Note  [facility]     Patient Name: Marion Anderson  : 1950  MRN: [id number]  Today's Date: 2018      Visit Date: 2018    Visit Dx:    ICD-10-CM ICD-9-CM   1. Ankle joint stiffness, bilateral M25.671 719.57    M25.672    2. Ankle weakness M62.81 719.67   3. Difficulty walking R26.2 719.7   4. Chronic pain of right ankle M25.571 719.47    G89.29 338.29       Patient Active Problem List   Diagnosis   • Coronary artery disease of native heart with stable angina pectoris (CMS/HCC)   • CAD (coronary artery disease)   • Subdural hematoma (CMS/HCC)   • S/P CABG x 4   • S/P MVR (mitral valve repair)        Past Medical History:   Diagnosis Date   • Arthritis    • Coronary artery disease    • Disease of thyroid gland    • Hypertension    • PONV (postoperative nausea and vomiting)    • Spinal headache         Past Surgical History:   Procedure Laterality Date   • CARDIAC SURGERY     • CORONARY ARTERY BYPASS GRAFT N/A 10/12/2017    Procedure: FILEMON STERNOTOMY CORONARY ARTERY BYPASS GRAFT TIMES 4  USING LEFT INTERNAL MAMMARY ARTERY AND LEFT GREATER SAPHENOUS VEIN GRAFT PER  ENDOSCOPIC VEIN HARVESTING, MITRAL VALVE REPAIR AND PRP;  Surgeon: Ramos Muñoz MD;  Location: [facility];  Service:    • FRACTURE SURGERY      ankle. leg   • TONSILLECTOMY                               PT Assessment/Plan     Row Name 18 1544          PT Assessment    Assessment Comments Patient has progressed walking to regular shoe and no use of boot x 4 days. She still uses cane for longer distances and walks with slower gait although improving.   -PB       User Key  (r) = Recorded By, (t) = Taken By, (c) = Cosigned By    Initials Name Provider Type    PB Jennifer Moreno, PT Physical Therapist                    Exercises     Row Name 18 1400             Subjective Comments    Subjective Comments Patient has not wron boot for 4 days.  Walked at [facility].   -PB          Subjective Pain    Able to rate subjective pain? yes  -PB      Pre-Treatment Pain Level 5  -PB      Post-Treatment Pain Level 5  -PB         Total Minutes    34949 - PT Therapeutic Exercise Minutes 40  -PB         Exercise 1    Exercise Name 1 Standing heel raises x 20; clock drill with 1 hand support x 8 B; side steps 10''/1 B; heel to toe walking x 10'/2 at wall; [place] leg press 80# 10/3 B; Chayo calf press 60# 10/3; step ups to 4 inch step with 1 hand support x 10 B; Nustep L2 x 10 min.   -PB      Cueing 1 --   Patient required 1 hand support for balance / support   -PB        User Key  (r) = Recorded By, (t) = Taken By, (c) = Cosigned By    Initials Name Provider Type    PB Jennifer Moreno, PT Physical Therapist                                            Time Calculation:   Start Time: 1420  Stop Time: 1500  Time Calculation (min): 40 min  Therapy Suggested Charges     Code   Minutes Charges    34511 (CPT®) Hc Pt Neuromusc Re Education Ea 15 Min      84202 (CPT®) Hc Pt Ther Proc Ea 15 Min 40 3    85160 (CPT®) Hc Gait Training Ea 15 Min      51593 (CPT®) Hc Pt Therapeutic Act Ea 15 Min      44947 (CPT®) Hc Pt Manual Therapy Ea 15 Min      56540 (CPT®) Hc Pt Ther Massage- Per 15 Min      32098 (CPT®) Hc Pt Iontophoresis Ea 15 Min      98553 (CPT®) Hc Pt Elec Stim Ea-Per 15 Min      20011 (CPT®) Hc Pt Ultrasound Ea 15 Min      62028 (CPT®) Hc Pt Self Care/Mgmt/Train Ea 15 Min      Total  40 3        Therapy Charges for Today     Code Description Service Date Service Provider Modifiers Qty    36789884071 HC PT THER PROC EA 15 MIN 7/23/2018 Jennifer Moreno, PT GP 3                    Jennifer Moreno, PT  7/23/2018

## 2018-07-25 ENCOUNTER — APPOINTMENT (OUTPATIENT)
Dept: PHYSICAL THERAPY | Facility: HOSPITAL | Age: 68
End: 2018-07-25

## 2018-07-26 ENCOUNTER — HOSPITAL ENCOUNTER (OUTPATIENT)
Dept: PHYSICAL THERAPY | Facility: HOSPITAL | Age: 68
Setting detail: THERAPIES SERIES
Discharge: HOME | End: 2018-07-26

## 2018-07-26 DIAGNOSIS — R29.898 ANKLE WEAKNESS: ICD-10-CM

## 2018-07-26 DIAGNOSIS — G89.29 CHRONIC PAIN OF RIGHT ANKLE: ICD-10-CM

## 2018-07-26 DIAGNOSIS — M25.671 ANKLE JOINT STIFFNESS, BILATERAL: Primary | ICD-10-CM

## 2018-07-26 DIAGNOSIS — R26.2 DIFFICULTY WALKING: ICD-10-CM

## 2018-07-26 DIAGNOSIS — M25.571 CHRONIC PAIN OF RIGHT ANKLE: ICD-10-CM

## 2018-07-26 DIAGNOSIS — M25.672 ANKLE JOINT STIFFNESS, BILATERAL: Primary | ICD-10-CM

## 2018-07-26 PROCEDURE — 97110 THERAPEUTIC EXERCISES: CPT | Performed by: PHYSICAL THERAPIST

## 2018-07-26 NOTE — THERAPY TREATMENT NOTE
Outpatient Physical Therapy Ortho Treatment Note  [facility]     Patient Name: Marion Anderson  : 1950  MRN: [id number]  Today's Date: 2018      Visit Date: 2018    Visit Dx:    ICD-10-CM ICD-9-CM   1. Ankle joint stiffness, bilateral M25.671 719.57    M25.672    2. Ankle weakness M62.81 719.67   3. Difficulty walking R26.2 719.7   4. Chronic pain of right ankle M25.571 719.47    G89.29 338.29       Patient Active Problem List   Diagnosis   • Coronary artery disease of native heart with stable angina pectoris (CMS/HCC)   • CAD (coronary artery disease)   • Subdural hematoma (CMS/HCC)   • S/P CABG x 4   • S/P MVR (mitral valve repair)        Past Medical History:   Diagnosis Date   • Arthritis    • Coronary artery disease    • Disease of thyroid gland    • Hypertension    • PONV (postoperative nausea and vomiting)    • Spinal headache         Past Surgical History:   Procedure Laterality Date   • CARDIAC SURGERY     • CORONARY ARTERY BYPASS GRAFT N/A 10/12/2017    Procedure: FILEMON STERNOTOMY CORONARY ARTERY BYPASS GRAFT TIMES 4  USING LEFT INTERNAL MAMMARY ARTERY AND LEFT GREATER SAPHENOUS VEIN GRAFT PER  ENDOSCOPIC VEIN HARVESTING, MITRAL VALVE REPAIR AND PRP;  Surgeon: Ramos Muñoz MD;  Location: [facility];  Service:    • FRACTURE SURGERY      ankle. leg   • TONSILLECTOMY                               PT Assessment/Plan     Row Name 18 1734          PT Assessment    Assessment Comments Ankle weak to step down from increased height of 4 inches without more hand support needing to work on ankle mobility   -PB       User Key  (r) = Recorded By, (t) = Taken By, (c) = Cosigned By    Initials Name Provider Type    PB Jennifer Moreno, PT Physical Therapist                    Exercises     Row Name 18 1700             Subjective Comments    Subjective Comments Patient reports she is doing better slowly   -PB         Subjective Pain    Able to rate subjective pain? yes  -PB       Pre-Treatment Pain Level 5  -PB      Post-Treatment Pain Level 5  -PB      Subjective Pain Comment Ankle is sore   -PB         Total Minutes    23979 - PT Therapeutic Exercise Minutes 40  -PB         Exercise 1    Exercise Name 1 R ankle AROM x 20; YTB ankle INV, EV, DF x 30 each, standing heel raises x 20; clock drill x 10, side steps 15/2 B; heel to toe walk 15/2 B; [place] calf press 65# x 30, leg press 80# x 30; step ups / downs from 4 inch x 10 (hesitant to step down from 4 inch requiring 2 hand support); Nustep L3 x 10 min   -PB        User Key  (r) = Recorded By, (t) = Taken By, (c) = Cosigned By    Initials Name Provider Type    PB Jennifer Moreno, PT Physical Therapist                                            Time Calculation:   Start Time: 1452  Stop Time: 1535  Time Calculation (min): 43 min  Therapy Suggested Charges     Code   Minutes Charges    08777 (CPT®) Hc Pt Neuromusc Re Education Ea 15 Min      61182 (CPT®) Hc Pt Ther Proc Ea 15 Min 40 3    92711 (CPT®) Hc Gait Training Ea 15 Min      50666 (CPT®) Hc Pt Therapeutic Act Ea 15 Min      79284 (CPT®) Hc Pt Manual Therapy Ea 15 Min      14750 (CPT®) Hc Pt Ther Massage- Per 15 Min      29365 (CPT®) Hc Pt Iontophoresis Ea 15 Min      16051 (CPT®) Hc Pt Elec Stim Ea-Per 15 Min      65466 (CPT®) Hc Pt Ultrasound Ea 15 Min      05688 (CPT®) Hc Pt Self Care/Mgmt/Train Ea 15 Min      Total  40 3        Therapy Charges for Today     Code Description Service Date Service Provider Modifiers Qty    54959563594 HC PT THER PROC EA 15 MIN 7/26/2018 Jennifer Moreno, PT GP 3                    Jennifer Moreno, PT  7/26/2018

## 2018-08-07 ENCOUNTER — HOSPITAL ENCOUNTER (OUTPATIENT)
Dept: PHYSICAL THERAPY | Facility: HOSPITAL | Age: 68
Setting detail: THERAPIES SERIES
Discharge: HOME | End: 2018-08-07

## 2018-08-07 DIAGNOSIS — R29.898 ANKLE WEAKNESS: ICD-10-CM

## 2018-08-07 DIAGNOSIS — M25.671 ANKLE JOINT STIFFNESS, BILATERAL: Primary | ICD-10-CM

## 2018-08-07 DIAGNOSIS — R26.2 DIFFICULTY WALKING: ICD-10-CM

## 2018-08-07 DIAGNOSIS — M25.571 CHRONIC PAIN OF RIGHT ANKLE: ICD-10-CM

## 2018-08-07 DIAGNOSIS — G89.29 CHRONIC PAIN OF RIGHT ANKLE: ICD-10-CM

## 2018-08-07 DIAGNOSIS — M25.672 ANKLE JOINT STIFFNESS, BILATERAL: Primary | ICD-10-CM

## 2018-08-07 PROCEDURE — 97110 THERAPEUTIC EXERCISES: CPT | Performed by: PHYSICAL THERAPIST

## 2018-08-09 ENCOUNTER — HOSPITAL ENCOUNTER (OUTPATIENT)
Dept: PHYSICAL THERAPY | Facility: HOSPITAL | Age: 68
Setting detail: THERAPIES SERIES
Discharge: HOME | End: 2018-08-09

## 2018-08-09 DIAGNOSIS — M25.671 ANKLE JOINT STIFFNESS, BILATERAL: Primary | ICD-10-CM

## 2018-08-09 DIAGNOSIS — M25.672 ANKLE JOINT STIFFNESS, BILATERAL: Primary | ICD-10-CM

## 2018-08-09 DIAGNOSIS — R29.898 ANKLE WEAKNESS: ICD-10-CM

## 2018-08-09 DIAGNOSIS — R26.2 DIFFICULTY WALKING: ICD-10-CM

## 2018-08-09 DIAGNOSIS — G89.29 CHRONIC PAIN OF RIGHT ANKLE: ICD-10-CM

## 2018-08-09 DIAGNOSIS — M25.571 CHRONIC PAIN OF RIGHT ANKLE: ICD-10-CM

## 2018-08-09 PROCEDURE — 97110 THERAPEUTIC EXERCISES: CPT | Performed by: PHYSICAL THERAPIST

## 2018-08-09 NOTE — THERAPY TREATMENT NOTE
Outpatient Physical Therapy Ortho Treatment Note  [facility]     Patient Name: Marion Anderson  : 1950  MRN: [id number]  Today's Date: 2018      Visit Date: 2018    Visit Dx:    ICD-10-CM ICD-9-CM   1. Ankle joint stiffness, bilateral M25.671 719.57    M25.672    2. Ankle weakness M62.81 719.67   3. Difficulty walking R26.2 719.7   4. Chronic pain of right ankle M25.571 719.47    G89.29 338.29       Patient Active Problem List   Diagnosis   • Coronary artery disease of native heart with stable angina pectoris (CMS/HCC)   • CAD (coronary artery disease)   • Subdural hematoma (CMS/HCC)   • S/P CABG x 4   • S/P MVR (mitral valve repair)        Past Medical History:   Diagnosis Date   • Arthritis    • Coronary artery disease    • Disease of thyroid gland    • Hypertension    • PONV (postoperative nausea and vomiting)    • Spinal headache         Past Surgical History:   Procedure Laterality Date   • CARDIAC SURGERY     • CORONARY ARTERY BYPASS GRAFT N/A 10/12/2017    Procedure: FILEMON STERNOTOMY CORONARY ARTERY BYPASS GRAFT TIMES 4  USING LEFT INTERNAL MAMMARY ARTERY AND LEFT GREATER SAPHENOUS VEIN GRAFT PER  ENDOSCOPIC VEIN HARVESTING, MITRAL VALVE REPAIR AND PRP;  Surgeon: Ramos Muñoz MD;  Location: [facility];  Service:    • FRACTURE SURGERY      ankle. leg   • TONSILLECTOMY               PT Ortho     Row Name 18 1100       Subjective Pain    Pre-Treatment Pain Level 7  -RA    Subjective Pain Comment 6-7/10  -RA       Right Lower Ext    Rt Ankle Dorsiflexion AROM neutral in sitting  -RA    Rt Ankle Dorsiflexion PROM 3   pain anterior/lateral  -RA    Rt Ankle Plantarflexion AROM 45   ant pain   -RA    Rt Ankle Inversion AROM 16   pain laterally  -RA    Rt Ankle Eversion AROM 15   pain laterally   -RA       Right Ankle/Foot (Manual Muscle Testing)    MMT, Gross Movement: Right Ankle Plantarflexion (3/5) fair   3+/5  -RA    MMT: Dorsiflexion, Right Ankle Muscles dorsiflexion   -RA    MMT, Gross Movement: Right Ankle Dorsiflexion (3/5) fair   pain   -RA    MMT: Inversion, Right Ankle subtalar inversion  -RA    MMT, Gross Movement: Right Ankle Subtalar Inversion (3/5) fair   pain  -RA    MMT: Eversion, Right Ankle subtalar eversion  -RA    MMT, Gross Movement: Right Ankle Subtalar Eversion (3+/5) fair plus   pain  -RA      User Key  (r) = Recorded By, (t) = Taken By, (c) = Cosigned By    Initials Name Provider Type    Elissa Tello, PT Physical Therapist                            PT Assessment/Plan     Row Name 08/09/18 1707          PT Assessment    Assessment Comments Patient presented today using cane for ambulation but was using in R UE (for R ankle/knee).  Educated on use of cane in L hand which she reports she knows is the correct way to use cane, however, states using in her R hand helps her more.  She continues to c/o moderate/high R ankle pain, R knee pain, and LBP.  She did better with ex today when done in multiple sets to achieve total reps vs trying to do all reps in one set.  She continues to c/o moderate/high R ankle pain, R knee pain, and LBP.    -RA        PT Plan    PT Plan Comments Continue skilled therapy working on ankle mobility/ROM, core/LE/ankle strengtheing, gait, and balance.  -RA       User Key  (r) = Recorded By, (t) = Taken By, (c) = Cosigned By    Initials Name Provider Type    Elissa Tello, PT Physical Therapist                    Exercises     Row Name 08/09/18 1100             Subjective Comments    Subjective Comments Ankle hurting pretty good and my knee still hurts.  Did find my cane so I've been using it last couple of days and feel like it helps some   -RA         Subjective Pain    Able to rate subjective pain? yes  -RA      Pre-Treatment Pain Level 7  -RA      Subjective Pain Comment 6-7/10  -RA         Total Minutes    60420 - PT Therapeutic Exercise Minutes 44  -RA         Exercise 1    Exercise Name 1 R ankle AROM x 20; YTB ankle INV,  "EV, DF x 3x 10 each, standing heel raises x 20; clock drill x 10, side steps 15/2 B; heel to toe walk 15/2 B; Chayo calf press 70# x 10, 65# 10x 2, leg press 80# x 10, 75# 10x2; step ups 4\" x 10,  steps downs from 2\" x 10; Nustep L3 x 10 min   -RA        User Key  (r) = Recorded By, (t) = Taken By, (c) = Cosigned By    Initials Name Provider Type    Elissa Tello, PT Physical Therapist                             Therapy Education  Education Details: continued use of cane, possible knee brace, and ab bracing with transitional movements.    Given: HEP, Symptoms/condition management, Pain management, Mobility training, Edema management  Program: Reinforced  How Provided: Verbal, Demonstration  Provided to: Patient  Level of Understanding: Teach back education performed, Verbalized              Time Calculation:   Start Time: 1118  Stop Time: 1205  Time Calculation (min): 47 min  Therapy Suggested Charges     Code   Minutes Charges    62317 (CPT®) Hc Pt Neuromusc Re Education Ea 15 Min      13592 (CPT®) Hc Pt Ther Proc Ea 15 Min 44 3    29355 (CPT®) Hc Gait Training Ea 15 Min      66982 (CPT®) Hc Pt Therapeutic Act Ea 15 Min      86246 (CPT®) Hc Pt Manual Therapy Ea 15 Min      07456 (CPT®) Hc Pt Ther Massage- Per 15 Min      03588 (CPT®) Hc Pt Iontophoresis Ea 15 Min      89429 (CPT®) Hc Pt Elec Stim Ea-Per 15 Min      73551 (CPT®) Hc Pt Ultrasound Ea 15 Min      07617 (CPT®) Hc Pt Self Care/Mgmt/Train Ea 15 Min      Total  44 3        Therapy Charges for Today     Code Description Service Date Service Provider Modifiers Qty    04389696356 HC PT THER PROC EA 15 MIN 8/9/2018 Elissa Ortega, PT GP 3                    Elissa Ortega PT  8/9/2018     "

## 2018-08-24 ENCOUNTER — HOSPITAL ENCOUNTER (OUTPATIENT)
Dept: PHYSICAL THERAPY | Facility: HOSPITAL | Age: 68
Setting detail: THERAPIES SERIES
Discharge: HOME | End: 2018-08-24

## 2018-08-24 DIAGNOSIS — R29.898 ANKLE WEAKNESS: ICD-10-CM

## 2018-08-24 DIAGNOSIS — M25.571 CHRONIC PAIN OF RIGHT ANKLE: ICD-10-CM

## 2018-08-24 DIAGNOSIS — R26.2 DIFFICULTY WALKING: ICD-10-CM

## 2018-08-24 DIAGNOSIS — M25.671 ANKLE JOINT STIFFNESS, BILATERAL: Primary | ICD-10-CM

## 2018-08-24 DIAGNOSIS — G89.29 CHRONIC PAIN OF RIGHT ANKLE: ICD-10-CM

## 2018-08-24 DIAGNOSIS — M25.672 ANKLE JOINT STIFFNESS, BILATERAL: Primary | ICD-10-CM

## 2018-08-24 PROCEDURE — 97110 THERAPEUTIC EXERCISES: CPT | Performed by: PHYSICAL THERAPIST

## 2018-08-24 NOTE — THERAPY TREATMENT NOTE
Outpatient Physical Therapy Ortho Treatment Note  [facility]     Patient Name: Marion Anderson  : 1950  MRN: [id number]  Today's Date: 2018      Visit Date: 2018    Visit Dx:    ICD-10-CM ICD-9-CM   1. Ankle joint stiffness, bilateral M25.671 719.57    M25.672    2. Ankle weakness M62.81 719.67   3. Difficulty walking R26.2 719.7   4. Chronic pain of right ankle M25.571 719.47    G89.29 338.29       Patient Active Problem List   Diagnosis   • Coronary artery disease of native heart with stable angina pectoris (CMS/HCC)   • CAD (coronary artery disease)   • Subdural hematoma (CMS/HCC)   • S/P CABG x 4   • S/P MVR (mitral valve repair)        Past Medical History:   Diagnosis Date   • Arthritis    • Coronary artery disease    • Disease of thyroid gland    • Hypertension    • PONV (postoperative nausea and vomiting)    • Spinal headache         Past Surgical History:   Procedure Laterality Date   • CARDIAC SURGERY     • CORONARY ARTERY BYPASS GRAFT N/A 10/12/2017    Procedure: FILEMON STERNOTOMY CORONARY ARTERY BYPASS GRAFT TIMES 4  USING LEFT INTERNAL MAMMARY ARTERY AND LEFT GREATER SAPHENOUS VEIN GRAFT PER  ENDOSCOPIC VEIN HARVESTING, MITRAL VALVE REPAIR AND PRP;  Surgeon: Ramos Muñoz MD;  Location: [facility];  Service:    • FRACTURE SURGERY      ankle. leg   • TONSILLECTOMY                               PT Assessment/Plan     Row Name 18 1633          PT Assessment    Assessment Comments Patient is wearing R knee brace to help with giving out that she has had for about 3 weeks.  She is more fatigued and sore with exercises that she has not been consistent with lapse in therapy. She did return to gym on her own this morning to resume conditioning.   -PB       User Key  (r) = Recorded By, (t) = Taken By, (c) = Cosigned By    Initials Name Provider Type    Jennifer Ray, PT Physical Therapist                    Exercises     Row Name 18 1600             Subjective  "Comments    Subjective Comments Patient reports ankle is sore and foot has been sore some too.  She purchased new shoes and inserts that feel really good.  She also purchased brace for R knee that has been giving out about once a day.   -PB         Subjective Pain    Able to rate subjective pain? yes  -PB      Pre-Treatment Pain Level 7  -PB      Post-Treatment Pain Level 7  -PB         Total Minutes    51688 - PT Therapeutic Exercise Minutes 43  -PB         Exercise 1    Exercise Name 1 R ankle AROM x 20; YTB ankle INV, EV, DF x 20 each, standing heel raises x 20; clock drill x 10 B, side steps 15/2 B; heel to toe walk 15/2 B; [place]ayo calf press 65# x 15/2; leg press 80# x 15/2;  step ups 4\" x 10,  steps downs from 2\" x 10  -PB      Cueing 1 --   Attention to pain from R knee with step downs 4 inch   -PB        User Key  (r) = Recorded By, (t) = Taken By, (c) = Cosigned By    Initials Name Provider Type    Jennifer Ray, PT Physical Therapist                                            Time Calculation:   Start Time: 1345  Stop Time: 1429  Time Calculation (min): 44 min  Therapy Suggested Charges     Code   Minutes Charges    04632 (CPT®) Hc Pt Neuromusc Re Education Ea 15 Min      55618 (CPT®) Hc Pt Ther Proc Ea 15 Min 43 3    99363 (CPT®) Hc Gait Training Ea 15 Min      29785 (CPT®) Hc Pt Therapeutic Act Ea 15 Min      00877 (CPT®) Hc Pt Manual Therapy Ea 15 Min      57641 (CPT®) Hc Pt Ther Massage- Per 15 Min      74567 (CPT®) Hc Pt Iontophoresis Ea 15 Min      10308 (CPT®) Hc Pt Elec Stim Ea-Per 15 Min      76672 (CPT®) Hc Pt Ultrasound Ea 15 Min      18686 (CPT®) Hc Pt Self Care/Mgmt/Train Ea 15 Min      50949 (CPT®) Hc Pt Prosthetic (S) Train Initial Encounter, Each 15 Min      65590 (CPT®) Hc Orthotic(S) Mgmt/Train Initial Encounter, Each 15min      74230 (CPT®) Hc Pt Aquatic Therapy Ea 15 Min      44492 (CPT®) Hc Pt Orthotic(S)/Prosthetic(S) Encounter, Each 15 Min      Total  43 3        Therapy Charges " for Today     Code Description Service Date Service Provider Modifiers Qty    41371149323 HC PT THER PROC EA 15 MIN 8/24/2018 Jennifer Moreno, PT GP 3                    Jennifer Moreno, PT  8/24/2018

## 2018-08-27 ENCOUNTER — HOSPITAL ENCOUNTER (OUTPATIENT)
Dept: PHYSICAL THERAPY | Facility: HOSPITAL | Age: 68
Setting detail: THERAPIES SERIES
Discharge: HOME | End: 2018-08-27

## 2018-08-27 DIAGNOSIS — M25.672 ANKLE JOINT STIFFNESS, BILATERAL: Primary | ICD-10-CM

## 2018-08-27 DIAGNOSIS — R29.898 ANKLE WEAKNESS: ICD-10-CM

## 2018-08-27 DIAGNOSIS — M25.671 ANKLE JOINT STIFFNESS, BILATERAL: Primary | ICD-10-CM

## 2018-08-27 DIAGNOSIS — M25.571 CHRONIC PAIN OF RIGHT ANKLE: ICD-10-CM

## 2018-08-27 DIAGNOSIS — R26.2 DIFFICULTY WALKING: ICD-10-CM

## 2018-08-27 DIAGNOSIS — G89.29 CHRONIC PAIN OF RIGHT ANKLE: ICD-10-CM

## 2018-08-27 PROCEDURE — G8979 MOBILITY GOAL STATUS: HCPCS | Performed by: PHYSICAL THERAPIST

## 2018-08-27 PROCEDURE — G8978 MOBILITY CURRENT STATUS: HCPCS | Performed by: PHYSICAL THERAPIST

## 2018-08-27 PROCEDURE — 97113 AQUATIC THERAPY/EXERCISES: CPT | Performed by: PHYSICAL THERAPIST

## 2018-08-27 NOTE — THERAPY TREATMENT NOTE
Outpatient Physical Therapy Ortho Treatment Note  [facility]     Patient Name: Marion Anderson  : 1950  MRN: [id number]  Today's Date: 2018      Visit Date: 2018    Visit Dx:    ICD-10-CM ICD-9-CM   1. Ankle joint stiffness, bilateral M25.671 719.57    M25.672    2. Ankle weakness M62.81 719.67   3. Difficulty walking R26.2 719.7   4. Chronic pain of right ankle M25.571 719.47    G89.29 338.29       Patient Active Problem List   Diagnosis   • Coronary artery disease of native heart with stable angina pectoris (CMS/HCC)   • CAD (coronary artery disease)   • Subdural hematoma (CMS/HCC)   • S/P CABG x 4   • S/P MVR (mitral valve repair)        Past Medical History:   Diagnosis Date   • Arthritis    • Coronary artery disease    • Disease of thyroid gland    • Hypertension    • PONV (postoperative nausea and vomiting)    • Spinal headache         Past Surgical History:   Procedure Laterality Date   • CARDIAC SURGERY     • CORONARY ARTERY BYPASS GRAFT N/A 10/12/2017    Procedure: FILEMON STERNOTOMY CORONARY ARTERY BYPASS GRAFT TIMES 4  USING LEFT INTERNAL MAMMARY ARTERY AND LEFT GREATER SAPHENOUS VEIN GRAFT PER  ENDOSCOPIC VEIN HARVESTING, MITRAL VALVE REPAIR AND PRP;  Surgeon: Ramos Muñoz MD;  Location: [facility];  Service:    • FRACTURE SURGERY      ankle. leg   • TONSILLECTOMY                               PT Assessment/Plan     Row Name 18 1432          PT Assessment    Assessment Comments Decreased pain in pool compared to exercises on land.  Patient a little unsteady with balance exercises in pool but not unsafe.   -PB       User Key  (r) = Recorded By, (t) = Taken By, (c) = Cosigned By    Initials Name Provider Type    Jennifer Ray, PT Physical Therapist                    Exercises     Row Name 18 1400             Subjective Comments    Subjective Comments Okay  -PB         Subjective Pain    Able to rate subjective pain? yes  -PB      Pre-Treatment Pain Level  "7  -PB      Post-Treatment Pain Level 5  -PB      Subjective Pain Comment Reduced pain while in pool   -PB         Aquatics    Aquatics performed? Yes  -PB      34037 - Aquatic Therapy Minutes 38  -PB         Aquatics LE    Water Walk forward;side;backward   Cooler pool x 2 F/B, x 1 side B   -PB      Stretch 1 Calf standing 20\"/3   -PB      Stretch 2 Ankle AROM and circles x 15   -PB      Stretch 3 Heel to toe walking 10'/4   -PB      Stretch Other 2 Single leg stance 10\"/3 with hand support   -PB      Clams Clock x 10  -PB      March in Place 15  -PB      Mini Squat 10  -PB      Toe/Heel Raises 15  -PB      Uni-Clock 10 with hand support   -PB      Step Ups 10 forward to bottom step   -PB         Exercise 1    Exercise Name 1 Issued aquatic HEP  -PB        User Key  (r) = Recorded By, (t) = Taken By, (c) = Cosigned By    Initials Name Provider Type    Jennifer Ray, PT Physical Therapist                                            Time Calculation:   Start Time: 1350  Stop Time: 1428  Time Calculation (min): 38 min  Therapy Suggested Charges     Code   Minutes Charges    94659 (CPT®) Hc Pt Neuromusc Re Education Ea 15 Min      53002 (CPT®) Hc Pt Ther Proc Ea 15 Min      03471 (CPT®) Hc Gait Training Ea 15 Min      31348 (CPT®) Hc Pt Therapeutic Act Ea 15 Min      13567 (CPT®) Hc Pt Manual Therapy Ea 15 Min      85823 (CPT®) Hc Pt Ther Massage- Per 15 Min      74035 (CPT®) Hc Pt Iontophoresis Ea 15 Min      62627 (CPT®) Hc Pt Elec Stim Ea-Per 15 Min      70217 (CPT®) Hc Pt Ultrasound Ea 15 Min      07027 (CPT®) Hc Pt Self Care/Mgmt/Train Ea 15 Min      13397 (CPT®) Hc Pt Prosthetic (S) Train Initial Encounter, Each 15 Min      06538 (CPT®) Hc Orthotic(S) Mgmt/Train Initial Encounter, Each 15min      52114 (CPT®) Hc Pt Aquatic Therapy Ea 15 Min 38 3    01452 (CPT®) Hc Pt Orthotic(S)/Prosthetic(S) Encounter, Each 15 Min      Total  38 3        Therapy Charges for Today     Code Description Service Date Service " Provider Modifiers Qty    32366766292 HC PT MOBILITY CURRENT 8/27/2018 Jennifer Moreno, PT GP, CL 1    61616581129 HC PT MOBILITY PROJECTED 8/27/2018 Jennifer Moreno, PT GP, CK 1    37855115330 HC PT AQUATIC THERAPY EA 15 MIN 8/27/2018 Jennifer Moreno, PT GP 3          PT G-Codes  Score: FAAM is a 64% disability decreased by 5 points  Functional Limitation: Mobility: Walking and moving around  Mobility: Walking and Moving Around Current Status (): At least 60 percent but less than 80 percent impaired, limited or restricted  Mobility: Walking and Moving Around Goal Status (): At least 40 percent but less than 60 percent impaired, limited or restricted         Jennifer Moreno PT  8/27/2018

## 2018-08-29 ENCOUNTER — HOSPITAL ENCOUNTER (OUTPATIENT)
Dept: PHYSICAL THERAPY | Facility: HOSPITAL | Age: 68
Setting detail: THERAPIES SERIES
Discharge: HOME | End: 2018-08-29

## 2018-08-29 DIAGNOSIS — R26.2 DIFFICULTY WALKING: ICD-10-CM

## 2018-08-29 DIAGNOSIS — M25.671 ANKLE JOINT STIFFNESS, BILATERAL: Primary | ICD-10-CM

## 2018-08-29 DIAGNOSIS — G89.29 CHRONIC PAIN OF RIGHT ANKLE: ICD-10-CM

## 2018-08-29 DIAGNOSIS — R29.898 ANKLE WEAKNESS: ICD-10-CM

## 2018-08-29 DIAGNOSIS — M25.571 CHRONIC PAIN OF RIGHT ANKLE: ICD-10-CM

## 2018-08-29 DIAGNOSIS — M25.672 ANKLE JOINT STIFFNESS, BILATERAL: Primary | ICD-10-CM

## 2018-08-29 PROCEDURE — 97113 AQUATIC THERAPY/EXERCISES: CPT | Performed by: PHYSICAL THERAPIST

## 2018-08-29 NOTE — THERAPY TREATMENT NOTE
Outpatient Physical Therapy Ortho Treatment Note  [facility]     Patient Name: Marion Anderson  : 1950  MRN: [id number]  Today's Date: 2018      Visit Date: 2018    Visit Dx:    ICD-10-CM ICD-9-CM   1. Ankle joint stiffness, bilateral M25.671 719.57    M25.672    2. Ankle weakness M62.81 719.67   3. Difficulty walking R26.2 719.7   4. Chronic pain of right ankle M25.571 719.47    G89.29 338.29       Patient Active Problem List   Diagnosis   • Coronary artery disease of native heart with stable angina pectoris (CMS/HCC)   • CAD (coronary artery disease)   • Subdural hematoma (CMS/HCC)   • S/P CABG x 4   • S/P MVR (mitral valve repair)        Past Medical History:   Diagnosis Date   • Arthritis    • Coronary artery disease    • Disease of thyroid gland    • Hypertension    • PONV (postoperative nausea and vomiting)    • Spinal headache         Past Surgical History:   Procedure Laterality Date   • CARDIAC SURGERY     • CORONARY ARTERY BYPASS GRAFT N/A 10/12/2017    Procedure: FILEMON STERNOTOMY CORONARY ARTERY BYPASS GRAFT TIMES 4  USING LEFT INTERNAL MAMMARY ARTERY AND LEFT GREATER SAPHENOUS VEIN GRAFT PER  ENDOSCOPIC VEIN HARVESTING, MITRAL VALVE REPAIR AND PRP;  Surgeon: Ramos Muñoz MD;  Location: [facility];  Service:    • FRACTURE SURGERY      ankle. leg   • TONSILLECTOMY                               PT Assessment/Plan     Row Name 18 1554          PT Assessment    Assessment Comments Patient negotiates stairs non reciprocally in and out of pool slowly with muscle heaviness for 1 hour post aquatic exercises showing her weakness and deconditioning  -PB       User Key  (r) = Recorded By, (t) = Taken By, (c) = Cosigned By    Initials Name Provider Type    Jennifer Ray, PT Physical Therapist                    Exercises     Row Name 18 1500             Subjective Comments    Subjective Comments Patient reports she was tired after pool exercises   -PB          "Subjective Pain    Able to rate subjective pain? yes  -PB      Pre-Treatment Pain Level 7  -PB      Post-Treatment Pain Level 5  -PB         Aquatics    Aquatics performed? Yes  -PB      70865 - Aquatic Therapy Minutes 30  -PB         Aquatics LE    Water Walk forward;side;backward   Cooler pool x 2 F/B, x 1 side B   -PB      Stretch 2 Ankle AROM and circles x 15   -PB      Stretch 3 Heel to toe walking 10'/4   -PB      Stretch Other 2 Single leg stance 10\"/3 with hand support   -PB      Clams Clock x 10  -PB      March in Place 15  -PB      Mini Squat 10  -PB      Toe/Heel Raises 15  -PB      Uni-Clock 10 with hand support   -PB      Step Ups 10 forward to bottom step   -PB        User Key  (r) = Recorded By, (t) = Taken By, (c) = Cosigned By    Initials Name Provider Type    Jennifer Ray, PT Physical Therapist                                            Time Calculation:   Start Time: 1400  Stop Time: 1430  Time Calculation (min): 30 min  Therapy Suggested Charges     Code   Minutes Charges    24963 (CPT®) Hc Pt Neuromusc Re Education Ea 15 Min      40132 (CPT®) Hc Pt Ther Proc Ea 15 Min      07724 (CPT®) Hc Gait Training Ea 15 Min      17957 (CPT®) Hc Pt Therapeutic Act Ea 15 Min      40607 (CPT®) Hc Pt Manual Therapy Ea 15 Min      27499 (CPT®) Hc Pt Ther Massage- Per 15 Min      72679 (CPT®) Hc Pt Iontophoresis Ea 15 Min      77008 (CPT®) Hc Pt Elec Stim Ea-Per 15 Min      36789 (CPT®) Hc Pt Ultrasound Ea 15 Min      81447 (CPT®) Hc Pt Self Care/Mgmt/Train Ea 15 Min      41957 (CPT®) Hc Pt Prosthetic (S) Train Initial Encounter, Each 15 Min      08051 (CPT®) Hc Orthotic(S) Mgmt/Train Initial Encounter, Each 15min      65419 (CPT®) Hc Pt Aquatic Therapy Ea 15 Min 30 2    52664 (CPT®) Hc Pt Orthotic(S)/Prosthetic(S) Encounter, Each 15 Min      Total  30 2        Therapy Charges for Today     Code Description Service Date Service Provider Modifiers Qty    65773961321 HC PT AQUATIC THERAPY EA 15 MIN 8/29/2018 " Josh, Jennifer REEVES, PT GP 2                    Jennifer Moreno, PT  8/29/2018

## 2018-09-07 ENCOUNTER — HOSPITAL ENCOUNTER (OUTPATIENT)
Dept: PHYSICAL THERAPY | Facility: HOSPITAL | Age: 68
Setting detail: THERAPIES SERIES
Discharge: HOME | End: 2018-09-07

## 2018-09-07 DIAGNOSIS — M25.671 ANKLE JOINT STIFFNESS, BILATERAL: Primary | ICD-10-CM

## 2018-09-07 DIAGNOSIS — G89.29 CHRONIC PAIN OF RIGHT ANKLE: ICD-10-CM

## 2018-09-07 DIAGNOSIS — R29.898 ANKLE WEAKNESS: ICD-10-CM

## 2018-09-07 DIAGNOSIS — R26.2 DIFFICULTY WALKING: ICD-10-CM

## 2018-09-07 DIAGNOSIS — M25.672 ANKLE JOINT STIFFNESS, BILATERAL: Primary | ICD-10-CM

## 2018-09-07 DIAGNOSIS — M25.571 CHRONIC PAIN OF RIGHT ANKLE: ICD-10-CM

## 2018-09-07 PROCEDURE — 97110 THERAPEUTIC EXERCISES: CPT | Performed by: PHYSICAL THERAPIST

## 2018-09-07 NOTE — THERAPY PROGRESS REPORT/RE-CERT
Outpatient Physical Therapy Ortho Re-Assessment  [facility]     Patient Name: Marion Anderson  : 1950  MRN: [id number]  Today's Date: 2018      Visit Date: 2018    Patient Active Problem List   Diagnosis   • Coronary artery disease of native heart with stable angina pectoris (CMS/HCC)   • CAD (coronary artery disease)   • Subdural hematoma (CMS/HCC)   • S/P CABG x 4   • S/P MVR (mitral valve repair)        Past Medical History:   Diagnosis Date   • Arthritis    • Coronary artery disease    • Disease of thyroid gland    • Hypertension    • PONV (postoperative nausea and vomiting)    • Spinal headache         Past Surgical History:   Procedure Laterality Date   • CARDIAC SURGERY     • CORONARY ARTERY BYPASS GRAFT N/A 10/12/2017    Procedure: FILEMON STERNOTOMY CORONARY ARTERY BYPASS GRAFT TIMES 4  USING LEFT INTERNAL MAMMARY ARTERY AND LEFT GREATER SAPHENOUS VEIN GRAFT PER  ENDOSCOPIC VEIN HARVESTING, MITRAL VALVE REPAIR AND PRP;  Surgeon: Ramos Muñoz MD;  Location: [facility];  Service:    • FRACTURE SURGERY      ankle. leg   • TONSILLECTOMY         Visit Dx:     ICD-10-CM ICD-9-CM   1. Ankle joint stiffness, bilateral M25.671 719.57    M25.672    2. Ankle weakness M62.81 719.67   3. Chronic pain of right ankle M25.571 719.47    G89.29 338.29   4. Difficulty walking R26.2 719.7                                       PT OP Goals     Row Name 18 1600          PT Short Term Goals    STG 1 Patient will be independent and compliant with initial HEP for ankle mobility without increased symptoms  -PB     STG 1 Progress Met  -PB     STG 2 Patient will increase standing tolerance >/=15 min to reduce pain with daily ADLs  -PB     STG 2 Progress Met  -PB     STG 2 Progress Comments 15-20 min household activities before needing to sit   -PB     STG 3 Patient will have ankle strength >/= 3+/5 in available ROM for improved stability with functional WB activity  -PB     STG 3 Progress Met   -PB     STG 4 Patient will have >/= 5 degree improvement in Right ankle DF ROM for increased ease with functional activities  -PB     STG 4 Progress Met  -PB        Long Term Goals    LTG 1 Patient will be independent with established HEP for strength/stabilization to facilitate self management of condition  -PB     LTG 1 Progress Ongoing  -PB     LTG 2 Patient will tolerate walking >/= 10-15 minutes with no AD without pain > 3-4/10 for increased cardiovascular training  -PB     LTG 2 Progress Ongoing  -PB     LTG 2 Progress Comments Same needing to stop to sit periodically but not using cane or walking boot   -PB     LTG 3 Patient will have ankle strength >/= 4-/5 in available ROM for improved stability with functional WB activity  -PB     LTG 3 Progress Ongoing  -PB     LTG 3 Progress Comments DF 4-, INV 3+. EV 3+   -PB     LTG 4 Patient will report reduced functional limitations on FAAM with score >/= 35/84 (from 25/84 at eval)  -PB     LTG 4 Progress Progressing  -PB       User Key  (r) = Recorded By, (t) = Taken By, (c) = Cosigned By    Initials Name Provider Type    PB Jennifer Moreno, PT Physical Therapist                PT Assessment/Plan     Row Name 09/07/18 2257          PT Assessment    Functional Limitations Impaired gait;Limitation in home management;Limitations in community activities;Performance in leisure activities;Performance in self-care ADL;Performance in work activities  -PB     Impairments Gait;Pain;Muscle strength;Range of motion;Posture;Joint mobility;Endurance;Edema;Balance  -PB     Assessment Comments Marion has been seen for 12 physical therapy sessions for R foot and ankle rehab.  She has performed land and aquatic therapy for ankle strength, ROM, balance and functional mobility.  She is independent with aquatic exercise program that she has returned to doing on her own.  Primary difficulties are going up and down stairs and curbs, not able to walk or stand for long periods of time,  "as well as moderate chronic pain.  She no longer uses cane or walking boot but walks with slow flexed posture.  She lacks good strength of the R ankle.  She has progressed slowly but that appears to be her need to go at her own pace so plan is to progress fully to HEP at the end of the month.   -PB     Please refer to paper survey for additional self-reported information Yes  -PB     Rehab Potential Good  -PB     Patient/caregiver participated in establishment of treatment plan and goals Yes  -PB     Patient would benefit from skilled therapy intervention Yes  -PB        PT Plan    PT Frequency 2x/week  -PB     Predicted Duration of Therapy Intervention (Therapy Eval) 30 days   -PB     Planned CPT's? PT AQUATIC THERAPY EA 15 MIN: 74626;PT NEUROMUSC RE-EDUCATION EA 15 MIN: 01267;PT THER PROC EA 15 MIN: 86757  -PB     PT Plan Comments Patient and therapist out of town next 2 weeks. Patient will perform aquatic exercises while on vacation.   -PB       User Key  (r) = Recorded By, (t) = Taken By, (c) = Cosigned By    Initials Name Provider Type    Jennifer Ray, PT Physical Therapist                  Exercises     Row Name 09/07/18 1600             Subjective Comments    Subjective Comments I have not been sleeping well.    -PB         Subjective Pain    Able to rate subjective pain? yes  -PB      Pre-Treatment Pain Level 6  -PB      Post-Treatment Pain Level 6  -PB         Total Minutes    83923 - PT Therapeutic Exercise Minutes 40  -PB         Exercise 1    Exercise Name 1 R ankle AROM x 20; YTB ankle INV, EV, DF x 20 each, standing heel raises x 20; clock drill x 10 B, side steps 15/2 B; heel to toe walk 15/2 B; [place] hip abd 30# x 15/2; leg press 80# x 15/2;  step ups 4\" x 10,  steps downs from 2\" x 20  -PB      Cueing 1 Verbal   Cues for exercise form   -PB        User Key  (r) = Recorded By, (t) = Taken By, (c) = Cosigned By    Initials Name Provider Type    Jennifer Ray, PT Physical Therapist         "                          Time Calculation:     Therapy Suggested Charges     Code   Minutes Charges    44855 (CPT®) Hc Pt Neuromusc Re Education Ea 15 Min      97530 (CPT®) Hc Pt Ther Proc Ea 15 Min 40 3    46514 (CPT®) Hc Gait Training Ea 15 Min      98110 (CPT®) Hc Pt Therapeutic Act Ea 15 Min      79088 (CPT®) Hc Pt Manual Therapy Ea 15 Min      10513 (CPT®) Hc Pt Ther Massage- Per 15 Min      49523 (CPT®) Hc Pt Iontophoresis Ea 15 Min      10070 (CPT®) Hc Pt Elec Stim Ea-Per 15 Min      90930 (CPT®) Hc Pt Ultrasound Ea 15 Min      25687 (CPT®) Hc Pt Self Care/Mgmt/Train Ea 15 Min      02600 (CPT®) Hc Pt Prosthetic (S) Train Initial Encounter, Each 15 Min      27967 (CPT®) Hc Orthotic(S) Mgmt/Train Initial Encounter, Each 15min      38862 (CPT®) Hc Pt Aquatic Therapy Ea 15 Min      65150 (CPT®) Hc Pt Orthotic(S)/Prosthetic(S) Encounter, Each 15 Min      Total  40 3          Start Time: 1601  Stop Time: 1641  Time Calculation (min): 40 min     Therapy Charges for Today     Code Description Service Date Service Provider Modifiers Qty    18964883406 HC PT THER PROC EA 15 MIN 9/7/2018 Jennifer Moreno, PT GP 3                    Jennifer Moreno, PT  9/7/2018

## 2018-09-25 ENCOUNTER — HOSPITAL ENCOUNTER (OUTPATIENT)
Dept: PHYSICAL THERAPY | Facility: HOSPITAL | Age: 68
Setting detail: THERAPIES SERIES
Discharge: HOME | End: 2018-09-25

## 2018-09-25 DIAGNOSIS — R26.2 DIFFICULTY WALKING: ICD-10-CM

## 2018-09-25 DIAGNOSIS — G89.29 CHRONIC PAIN OF RIGHT ANKLE: ICD-10-CM

## 2018-09-25 DIAGNOSIS — M25.671 ANKLE JOINT STIFFNESS, BILATERAL: Primary | ICD-10-CM

## 2018-09-25 DIAGNOSIS — M25.672 ANKLE JOINT STIFFNESS, BILATERAL: Primary | ICD-10-CM

## 2018-09-25 DIAGNOSIS — M25.571 CHRONIC PAIN OF RIGHT ANKLE: ICD-10-CM

## 2018-09-25 DIAGNOSIS — R29.898 ANKLE WEAKNESS: ICD-10-CM

## 2018-09-25 PROCEDURE — 97110 THERAPEUTIC EXERCISES: CPT | Performed by: PHYSICAL THERAPIST

## 2018-09-25 PROCEDURE — G8979 MOBILITY GOAL STATUS: HCPCS | Performed by: PHYSICAL THERAPIST

## 2018-09-25 PROCEDURE — G8978 MOBILITY CURRENT STATUS: HCPCS | Performed by: PHYSICAL THERAPIST

## 2018-09-25 NOTE — THERAPY TREATMENT NOTE
Outpatient Physical Therapy Ortho Treatment Note  [facility]     Patient Name: Marion Anderson  : 1950  MRN: [id number]  Today's Date: 2018      Visit Date: 2018    Visit Dx:    ICD-10-CM ICD-9-CM   1. Ankle joint stiffness, bilateral M25.671 719.57    M25.672    2. Ankle weakness M62.81 719.67   3. Chronic pain of right ankle M25.571 719.47    G89.29 338.29   4. Difficulty walking R26.2 719.7       Patient Active Problem List   Diagnosis   • Coronary artery disease of native heart with stable angina pectoris (CMS/HCC)   • CAD (coronary artery disease)   • Subdural hematoma (CMS/HCC)   • S/P CABG x 4   • S/P MVR (mitral valve repair)        Past Medical History:   Diagnosis Date   • Arthritis    • Coronary artery disease    • Disease of thyroid gland    • Hypertension    • PONV (postoperative nausea and vomiting)    • Spinal headache         Past Surgical History:   Procedure Laterality Date   • CARDIAC SURGERY     • CORONARY ARTERY BYPASS GRAFT N/A 10/12/2017    Procedure: FILEMON STERNOTOMY CORONARY ARTERY BYPASS GRAFT TIMES 4  USING LEFT INTERNAL MAMMARY ARTERY AND LEFT GREATER SAPHENOUS VEIN GRAFT PER  ENDOSCOPIC VEIN HARVESTING, MITRAL VALVE REPAIR AND PRP;  Surgeon: Ramos Muñoz MD;  Location: [facility];  Service:    • FRACTURE SURGERY      ankle. leg   • TONSILLECTOMY                               PT Assessment/Plan     Row Name 18 3809          PT Assessment    Assessment Comments Marion is walking at a slightly faster pace and able to go through exercises also with better speed so that she is not overally slow.  She was very hesitant to step off 4 inch step without hand support but with gaining some confidence was able to do so.   -PB       User Key  (r) = Recorded By, (t) = Taken By, (c) = Cosigned By    Initials Name Provider Type    Jennifer Ray, PT Physical Therapist                    Exercises     Row Name 18 1300             Subjective Comments  "   Subjective Comments Rainy weather affects my legs as well.    -PB         Subjective Pain    Able to rate subjective pain? yes  -PB      Pre-Treatment Pain Level 5  -PB      Post-Treatment Pain Level 5  -PB         Total Minutes    18110 - PT Therapeutic Exercise Minutes 35  -PB      93155 -  PT Neuromuscular Reeducation Minutes 5  -PB         Exercise 1    Exercise Name 1 R ankle AROM x 20; RTB ankle INV, EV, DF x 20 each, heel to toe walk 15/2 B; [place] hip abd 30-32# x 15/2, 10; Hip add 50# 15/2; leg press 80# x 15/2;  step ups/overs 4\" x 10; sit to stand with B hands x 15.  -PB         Exercise 2    Exercise Name 2 NM RE-ed: Standing balance on blue foam blocks; toe/heel raises on foam x 15  -PB        User Key  (r) = Recorded By, (t) = Taken By, (c) = Cosigned By    Initials Name Provider Type    Jennifer Ray, PT Physical Therapist                                            Time Calculation:   Start Time: 1302  Stop Time: 1345  Time Calculation (min): 43 min  Therapy Suggested Charges     Code   Minutes Charges    17749 (CPT®) Hc Pt Neuromusc Re Education Ea 15 Min 5 1    64544 (CPT®) Hc Pt Ther Proc Ea 15 Min 35 2    73773 (CPT®) Hc Gait Training Ea 15 Min      08101 (CPT®) Hc Pt Therapeutic Act Ea 15 Min      64673 (CPT®) Hc Pt Manual Therapy Ea 15 Min      56267 (CPT®) Hc Pt Ther Massage- Per 15 Min      41131 (CPT®) Hc Pt Iontophoresis Ea 15 Min      77657 (CPT®) Hc Pt Elec Stim Ea-Per 15 Min      35277 (CPT®) Hc Pt Ultrasound Ea 15 Min      01498 (CPT®) Hc Pt Self Care/Mgmt/Train Ea 15 Min      96594 (CPT®) Hc Pt Prosthetic (S) Train Initial Encounter, Each 15 Min      09085 (CPT®) Hc Orthotic(S) Mgmt/Train Initial Encounter, Each 15min      96329 (CPT®) Hc Pt Aquatic Therapy Ea 15 Min      05891 (CPT®) Hc Pt Orthotic(S)/Prosthetic(S) Encounter, Each 15 Min      Total  40 3        Therapy Charges for Today     Code Description Service Date Service Provider Modifiers Qty    15569583038 HC PT THER " PROC EA 15 MIN 9/25/2018 Jennifer Moreno, PT GP 3                    Jennifer Moreno, PT  9/25/2018

## 2018-09-27 ENCOUNTER — HOSPITAL ENCOUNTER (OUTPATIENT)
Dept: PHYSICAL THERAPY | Facility: HOSPITAL | Age: 68
Setting detail: THERAPIES SERIES
Discharge: HOME | End: 2018-09-27

## 2018-09-27 DIAGNOSIS — M25.671 ANKLE JOINT STIFFNESS, BILATERAL: Primary | ICD-10-CM

## 2018-09-27 DIAGNOSIS — R29.898 ANKLE WEAKNESS: ICD-10-CM

## 2018-09-27 DIAGNOSIS — M25.672 ANKLE JOINT STIFFNESS, BILATERAL: Primary | ICD-10-CM

## 2018-09-27 DIAGNOSIS — M25.571 CHRONIC PAIN OF RIGHT ANKLE: ICD-10-CM

## 2018-09-27 DIAGNOSIS — R26.2 DIFFICULTY WALKING: ICD-10-CM

## 2018-09-27 DIAGNOSIS — G89.29 CHRONIC PAIN OF RIGHT ANKLE: ICD-10-CM

## 2018-09-27 PROCEDURE — 97110 THERAPEUTIC EXERCISES: CPT | Performed by: PHYSICAL THERAPIST

## 2018-09-27 NOTE — THERAPY TREATMENT NOTE
Outpatient Physical Therapy Ortho Treatment Note  [facility]     Patient Name: Marion Anderson  : 1950  MRN: [id number]  Today's Date: 2018      Visit Date: 2018    Visit Dx:    ICD-10-CM ICD-9-CM   1. Ankle joint stiffness, bilateral M25.671 719.57    M25.672    2. Ankle weakness M62.81 719.67   3. Chronic pain of right ankle M25.571 719.47    G89.29 338.29   4. Difficulty walking R26.2 719.7       Patient Active Problem List   Diagnosis   • Coronary artery disease of native heart with stable angina pectoris (CMS/HCC)   • CAD (coronary artery disease)   • Subdural hematoma (CMS/HCC)   • S/P CABG x 4   • S/P MVR (mitral valve repair)        Past Medical History:   Diagnosis Date   • Arthritis    • Coronary artery disease    • Disease of thyroid gland    • Hypertension    • PONV (postoperative nausea and vomiting)    • Spinal headache         Past Surgical History:   Procedure Laterality Date   • CARDIAC SURGERY     • CORONARY ARTERY BYPASS GRAFT N/A 10/12/2017    Procedure: FILEMON STERNOTOMY CORONARY ARTERY BYPASS GRAFT TIMES 4  USING LEFT INTERNAL MAMMARY ARTERY AND LEFT GREATER SAPHENOUS VEIN GRAFT PER  ENDOSCOPIC VEIN HARVESTING, MITRAL VALVE REPAIR AND PRP;  Surgeon: Ramos Muñoz MD;  Location: [facility];  Service:    • FRACTURE SURGERY      ankle. leg   • TONSILLECTOMY                               PT Assessment/Plan     Row Name 18 1449          PT Assessment    Assessment Comments Marion continues to be able to move at a faster pace so that she is able to perform more exercise in less time than she used in the past.  She is still more slow that average pace.  She was still afraid to step down 4 inch but able to do so standing by her for security.   -PB       User Key  (r) = Recorded By, (t) = Taken By, (c) = Cosigned By    Initials Name Provider Type    Jennifer Ray, PT Physical Therapist                    Exercises     Row Name 18 3175              "Subjective Comments    Subjective Comments Patient reports she is not feeling well today but still came.  She reports being more sore today.  -PB         Subjective Pain    Able to rate subjective pain? yes  -PB      Pre-Treatment Pain Level 6  -PB      Post-Treatment Pain Level 6  -PB         Total Minutes    39789 - PT Therapeutic Exercise Minutes 25  -PB      36786 -  PT Neuromuscular Reeducation Minutes 5  -PB         Exercise 1    Exercise Name 1 R ankle AROM x 20; RTB ankle INV, EV, DF x 20 each, heel to toe walk 15/2 B; [place] hip abd 30-32# x 15/2, 10; Hip add 50# 15/2; leg press 80# x 15/2;  step ups/overs 4\" x 10; clock drill x 10 each.  -PB         Exercise 2    Exercise Name 2 NM RE-ed: Standing balance on blue foam blocks eyes open/eyes closed; toe/heel raises on foam x 15  -PB        User Key  (r) = Recorded By, (t) = Taken By, (c) = Cosigned By    Initials Name Provider Type    Jennifer Ray, PT Physical Therapist                                            Time Calculation:   Start Time: 1315  Stop Time: 1345  Time Calculation (min): 30 min  Therapy Suggested Charges     Code   Minutes Charges    18092 (CPT®) Hc Pt Neuromusc Re Education Ea 15 Min 5     41572 (CPT®) Hc Pt Ther Proc Ea 15 Min 25 2    01595 (CPT®) Hc Gait Training Ea 15 Min      84390 (CPT®) Hc Pt Therapeutic Act Ea 15 Min      66557 (CPT®) Hc Pt Manual Therapy Ea 15 Min      94422 (CPT®) Hc Pt Ther Massage- Per 15 Min      71933 (CPT®) Hc Pt Iontophoresis Ea 15 Min      35897 (CPT®) Hc Pt Elec Stim Ea-Per 15 Min      88672 (CPT®) Hc Pt Ultrasound Ea 15 Min      43645 (CPT®) Hc Pt Self Care/Mgmt/Train Ea 15 Min      48248 (CPT®) Hc Pt Prosthetic (S) Train Initial Encounter, Each 15 Min      95905 (CPT®) Hc Orthotic(S) Mgmt/Train Initial Encounter, Each 15min      39907 (CPT®) Hc Pt Aquatic Therapy Ea 15 Min      96172 (CPT®) Hc Pt Orthotic(S)/Prosthetic(S) Encounter, Each 15 Min      Total  30 2        Therapy Charges for Today     " Code Description Service Date Service Provider Modifiers Qty    52006016775 HC PT THER PROC EA 15 MIN 9/27/2018 Jennifer Moreno, PT GP 2                    Jennifer Moreno, PT  9/27/2018

## 2018-10-02 ENCOUNTER — APPOINTMENT (OUTPATIENT)
Dept: PHYSICAL THERAPY | Facility: HOSPITAL | Age: 68
End: 2018-10-02

## 2018-10-04 ENCOUNTER — HOSPITAL ENCOUNTER (OUTPATIENT)
Dept: PHYSICAL THERAPY | Facility: HOSPITAL | Age: 68
Setting detail: THERAPIES SERIES
Discharge: HOME | End: 2018-10-04

## 2018-10-04 DIAGNOSIS — R29.898 ANKLE WEAKNESS: ICD-10-CM

## 2018-10-04 DIAGNOSIS — R26.2 DIFFICULTY WALKING: ICD-10-CM

## 2018-10-04 DIAGNOSIS — M25.671 ANKLE JOINT STIFFNESS, BILATERAL: Primary | ICD-10-CM

## 2018-10-04 DIAGNOSIS — M25.571 CHRONIC PAIN OF RIGHT ANKLE: ICD-10-CM

## 2018-10-04 DIAGNOSIS — G89.29 CHRONIC PAIN OF RIGHT ANKLE: ICD-10-CM

## 2018-10-04 DIAGNOSIS — M25.672 ANKLE JOINT STIFFNESS, BILATERAL: Primary | ICD-10-CM

## 2018-10-04 PROCEDURE — 97110 THERAPEUTIC EXERCISES: CPT | Performed by: PHYSICAL THERAPIST

## 2018-10-04 NOTE — THERAPY TREATMENT NOTE
Outpatient Physical Therapy Ortho Treatment Note  [facility]     Patient Name: Marion Anderson  : 1950  MRN: [id number]  Today's Date: 10/4/2018      Visit Date: 10/04/2018    Visit Dx:    ICD-10-CM ICD-9-CM   1. Ankle joint stiffness, bilateral M25.671 719.57    M25.672    2. Ankle weakness M62.81 719.67   3. Chronic pain of right ankle M25.571 719.47    G89.29 338.29   4. Difficulty walking R26.2 719.7       Patient Active Problem List   Diagnosis   • Coronary artery disease of native heart with stable angina pectoris (CMS/HCC)   • CAD (coronary artery disease)   • Subdural hematoma (CMS/HCC)   • S/P CABG x 4   • S/P MVR (mitral valve repair)        Past Medical History:   Diagnosis Date   • Arthritis    • Coronary artery disease    • Disease of thyroid gland    • Hypertension    • PONV (postoperative nausea and vomiting)    • Spinal headache         Past Surgical History:   Procedure Laterality Date   • CARDIAC SURGERY     • CORONARY ARTERY BYPASS GRAFT N/A 10/12/2017    Procedure: FILEMON STERNOTOMY CORONARY ARTERY BYPASS GRAFT TIMES 4  USING LEFT INTERNAL MAMMARY ARTERY AND LEFT GREATER SAPHENOUS VEIN GRAFT PER  ENDOSCOPIC VEIN HARVESTING, MITRAL VALVE REPAIR AND PRP;  Surgeon: Ramos Muñoz MD;  Location: [facility];  Service:    • FRACTURE SURGERY      ankle. leg   • TONSILLECTOMY                               PT Assessment/Plan     Row Name 10/04/18 1653          PT Assessment    Assessment Comments Marion has a history of R knee surgery with onset of increased pain today is more limiting to step downs than R foot/ankle so added some general leg strength to HEP  -PB       User Key  (r) = Recorded By, (t) = Taken By, (c) = Cosigned By    Initials Name Provider Type    Jennifer Ray, PT Physical Therapist                    Exercises     Row Name 10/04/18 1300             Subjective Comments    Subjective Comments I'm sorry I was sick this week   -PB         Subjective Pain     "Able to rate subjective pain? yes  -PB      Pre-Treatment Pain Level 6  -PB      Post-Treatment Pain Level 6  -PB      Subjective Pain Comment Ankle bothers me on right side   -PB         Total Minutes    14781 - PT Therapeutic Exercise Minutes 43  -PB         Exercise 1    Exercise Name 1 R ankle AROM x 20; RTB ankle INV, EV, DF x 20 each, [place] hip abd 33# x 15/2, Hip add 50# 20/2; leg press 80# x 15/2;  step ups 4\" x 10, over x 5 limited by pain R knee; clock drill x 10 each.sit to stand x 10 with 1 hand from chair, seated calf stretch with strap 20\"/3, LS HS stretching 20\"/3; supine SLR x 10 B.  Heel raises x 20.  Issued updated HEP.  -PB      Cueing 1 Verbal;Tactile;Demo  -PB        User Key  (r) = Recorded By, (t) = Taken By, (c) = Cosigned By    Initials Name Provider Type    Jennifer Ray, PT Physical Therapist                                            Time Calculation:   Start Time: 1301  Stop Time: 1344  Time Calculation (min): 43 min  Therapy Suggested Charges     Code   Minutes Charges    28212 (CPT®) Hc Pt Neuromusc Re Education Ea 15 Min      39117 (CPT®) Hc Pt Ther Proc Ea 15 Min 43 3    29690 (CPT®) Hc Gait Training Ea 15 Min      86155 (CPT®) Hc Pt Therapeutic Act Ea 15 Min      34385 (CPT®) Hc Pt Manual Therapy Ea 15 Min      49674 (CPT®) Hc Pt Ther Massage- Per 15 Min      53664 (CPT®) Hc Pt Iontophoresis Ea 15 Min      04875 (CPT®) Hc Pt Elec Stim Ea-Per 15 Min      30604 (CPT®) Hc Pt Ultrasound Ea 15 Min      38650 (CPT®) Hc Pt Self Care/Mgmt/Train Ea 15 Min      49153 (CPT®) Hc Pt Prosthetic (S) Train Initial Encounter, Each 15 Min      75066 (CPT®) Hc Orthotic(S) Mgmt/Train Initial Encounter, Each 15min      61440 (CPT®) Hc Pt Aquatic Therapy Ea 15 Min      46459 (CPT®) Hc Pt Orthotic(S)/Prosthetic(S) Encounter, Each 15 Min       (CPT®) Hc Pt Electrical Stim Unattended      Total  43 3        Therapy Charges for Today     Code Description Service Date Service Provider Modifiers Qty "    32815138814  PT THER PROC EA 15 MIN 10/4/2018 Jennifer Moreno, PT GP 3                    Jennifer Moreno, PT  10/4/2018

## 2018-10-12 ENCOUNTER — HOSPITAL ENCOUNTER (OUTPATIENT)
Dept: PHYSICAL THERAPY | Facility: HOSPITAL | Age: 68
Setting detail: THERAPIES SERIES
Discharge: HOME | End: 2018-10-12

## 2018-10-12 DIAGNOSIS — R26.2 DIFFICULTY WALKING: ICD-10-CM

## 2018-10-12 DIAGNOSIS — G89.29 CHRONIC PAIN OF RIGHT ANKLE: ICD-10-CM

## 2018-10-12 DIAGNOSIS — M25.571 CHRONIC PAIN OF RIGHT ANKLE: ICD-10-CM

## 2018-10-12 DIAGNOSIS — R29.898 ANKLE WEAKNESS: ICD-10-CM

## 2018-10-12 DIAGNOSIS — M25.672 ANKLE JOINT STIFFNESS, BILATERAL: Primary | ICD-10-CM

## 2018-10-12 DIAGNOSIS — M25.671 ANKLE JOINT STIFFNESS, BILATERAL: Primary | ICD-10-CM

## 2018-10-12 PROCEDURE — 97110 THERAPEUTIC EXERCISES: CPT | Performed by: PHYSICAL THERAPIST

## 2018-10-12 NOTE — THERAPY PROGRESS REPORT/RE-CERT
Outpatient Physical Therapy Ortho Re-Assessment  [facility]     Patient Name: Marion Anderson  : 1950  MRN: [id number]  Today's Date: 10/12/2018      Visit Date: 10/12/2018    Patient Active Problem List   Diagnosis   • Coronary artery disease of native heart with stable angina pectoris (CMS/HCC)   • CAD (coronary artery disease)   • Subdural hematoma (CMS/HCC)   • S/P CABG x 4   • S/P MVR (mitral valve repair)        Past Medical History:   Diagnosis Date   • Arthritis    • Coronary artery disease    • Disease of thyroid gland    • Hypertension    • PONV (postoperative nausea and vomiting)    • Spinal headache         Past Surgical History:   Procedure Laterality Date   • CARDIAC SURGERY     • CORONARY ARTERY BYPASS GRAFT N/A 10/12/2017    Procedure: FILEMON STERNOTOMY CORONARY ARTERY BYPASS GRAFT TIMES 4  USING LEFT INTERNAL MAMMARY ARTERY AND LEFT GREATER SAPHENOUS VEIN GRAFT PER  ENDOSCOPIC VEIN HARVESTING, MITRAL VALVE REPAIR AND PRP;  Surgeon: Ramos Muñoz MD;  Location: [facility];  Service:    • FRACTURE SURGERY      ankle. leg   • TONSILLECTOMY         Visit Dx:     ICD-10-CM ICD-9-CM   1. Ankle joint stiffness, bilateral M25.671 719.57    M25.672    2. Ankle weakness M62.81 719.67   3. Chronic pain of right ankle M25.571 719.47    G89.29 338.29   4. Difficulty walking R26.2 719.7                                       PT OP Goals     Row Name 10/12/18 1400          PT Short Term Goals    STG 1 Patient will be independent and compliant with initial HEP for ankle mobility without increased symptoms  -PB     STG 1 Progress Met  -PB     STG 2 Patient will increase standing tolerance >/=15 min to reduce pain with daily ADLs  -PB     STG 2 Progress Met  -PB     STG 3 Patient will have ankle strength >/= 3+/5 in available ROM for improved stability with functional WB activity  -PB     STG 3 Progress Met  -PB     STG 4 Patient will have >/= 5 degree improvement in Right ankle DF ROM for  increased ease with functional activities  -PB     STG 4 Progress Met  -PB        Long Term Goals    LTG 1 Patient will be independent with established HEP for strength/stabilization to facilitate self management of condition  -PB     LTG 1 Progress Ongoing  -PB     LTG 2 Patient will tolerate walking >/= 10-15 minutes with no AD without pain > 3-4/10 for increased cardiovascular training  -PB     LTG 2 Progress Progressing  -PB     LTG 2 Progress Comments Patient able to walk 10 min without cane   -PB     LTG 3 Patient will have ankle strength >/= 4-/5 in available ROM for improved stability with functional WB activity  -PB     LTG 3 Progress Ongoing  -PB     LTG 3 Progress Comments DF 4, PF 4-, INV 3+, EV 3+  -PB     LTG 4 Patient will report reduced functional limitations on FAAM with score >/= 35/84 (from 25/84 at eval)  -PB     LTG 4 Progress Progressing  -PB     LTG 4 Progress Comments 30/84  -PB       User Key  (r) = Recorded By, (t) = Taken By, (c) = Cosigned By    Initials Name Provider Type    Jennifer Ray, PT Physical Therapist                PT Assessment/Plan     Row Name 10/12/18 7835          PT Assessment    Functional Limitations Impaired gait;Limitation in home management;Limitations in community activities;Performance in leisure activities;Performance in self-care ADL;Performance in work activities  -PB     Impairments Gait;Pain;Muscle strength;Range of motion;Posture;Joint mobility;Endurance;Balance  -PB     Assessment Comments Marion has been seen for 16 physical therapy sessions of physical therapy rehab for right foot and ankle.  She has performed leg and ankle strengthening, ROM and functional mobility training.  She is no longer walking with a cane and walks with a slightly faster gait that is still slower than normal speed and not able to walk a long distance without stopping.  She has functional limitations stepping down with the right leg due to right knee or right foot and ankle  "pain.  She is still weak with ankle strength that is also with some pain.  She will transition to HEP in one week planned.   -PB     Please refer to paper survey for additional self-reported information Yes  -PB     Rehab Potential Good  -PB     Patient/caregiver participated in establishment of treatment plan and goals Yes  -PB     Patient would benefit from skilled therapy intervention Yes  -PB        PT Plan    PT Frequency 2x/week  -PB     Predicted Duration of Therapy Intervention (Therapy Eval) 30 days   -PB     Planned CPT's? PT THER PROC EA 15 MIN: 35714;PT NEUROMUSC RE-EDUCATION EA 15 MIN: 22680  -PB       User Key  (r) = Recorded By, (t) = Taken By, (c) = Cosigned By    Initials Name Provider Type    Jennifer Ray PT Physical Therapist                  Exercises     Row Name 10/12/18 1500             Subjective Comments    Subjective Comments Patient reports has only done some walking has not been to the gym since still feeling sick   -PB         Subjective Pain    Able to rate subjective pain? yes  -PB      Pre-Treatment Pain Level 6  -PB      Post-Treatment Pain Level 6  -PB         Total Minutes    41133 - PT Therapeutic Exercise Minutes 38  -PB         Exercise 1    Exercise Name 1 R ankle AROM x 20; RTB ankle INV, EV, DF, PF x 20 each, [place] hip abd 34# x 15/2, Hip add 55# 15/2; leg press 80# x 15/2;  step ups 4\" x 10, over x 5 limited by pain R ankle; clock drill x 10 each.sit to stand x 10 with 1 hand from chair, standing calf stretch 10\"/10,  SLR x 10 B.  Heel raises x 20.  Issued updated HEP.  -PB        User Key  (r) = Recorded By, (t) = Taken By, (c) = Cosigned By    Initials Name Provider Type    Jennifer Ray, PT Physical Therapist                                  Time Calculation:     Therapy Suggested Charges     Code   Minutes Charges    32904 (CPT®) Hc Pt Neuromusc Re Education Ea 15 Min      74896 (CPT®) Hc Pt Ther Proc Ea 15 Min 38 3    02643 (CPT®) Hc Gait Training Ea 15 Min "      49417 (CPT®) Hc Pt Therapeutic Act Ea 15 Min      37804 (CPT®) Hc Pt Manual Therapy Ea 15 Min      39255 (CPT®) Hc Pt Ther Massage- Per 15 Min      98969 (CPT®) Hc Pt Iontophoresis Ea 15 Min      65283 (CPT®) Hc Pt Elec Stim Ea-Per 15 Min      21143 (CPT®) Hc Pt Ultrasound Ea 15 Min      69580 (CPT®) Hc Pt Self Care/Mgmt/Train Ea 15 Min      44301 (CPT®) Hc Pt Prosthetic (S) Train Initial Encounter, Each 15 Min      74376 (CPT®) Hc Orthotic(S) Mgmt/Train Initial Encounter, Each 15min      85489 (CPT®) Hc Pt Aquatic Therapy Ea 15 Min      91935 (CPT®) Hc Pt Orthotic(S)/Prosthetic(S) Encounter, Each 15 Min       (CPT®) Hc Pt Electrical Stim Unattended      Total  38 3          Start Time: 1437  Stop Time: 1515  Time Calculation (min): 38 min     Therapy Charges for Today     Code Description Service Date Service Provider Modifiers Qty    26203189205 HC PT THER PROC EA 15 MIN 10/12/2018 Jennifer Moreno, PT GP 3                    Jennifer Moreno, PT  10/12/2018

## 2018-10-16 ENCOUNTER — APPOINTMENT (OUTPATIENT)
Dept: PHYSICAL THERAPY | Facility: HOSPITAL | Age: 68
End: 2018-10-16

## 2018-10-19 ENCOUNTER — HOSPITAL ENCOUNTER (OUTPATIENT)
Dept: PHYSICAL THERAPY | Facility: HOSPITAL | Age: 68
Setting detail: THERAPIES SERIES
Discharge: HOME | End: 2018-10-19

## 2018-10-19 DIAGNOSIS — G89.29 CHRONIC PAIN OF RIGHT ANKLE: ICD-10-CM

## 2018-10-19 DIAGNOSIS — M25.671 ANKLE JOINT STIFFNESS, BILATERAL: Primary | ICD-10-CM

## 2018-10-19 DIAGNOSIS — M25.571 CHRONIC PAIN OF RIGHT ANKLE: ICD-10-CM

## 2018-10-19 DIAGNOSIS — R26.2 DIFFICULTY WALKING: ICD-10-CM

## 2018-10-19 DIAGNOSIS — R29.898 ANKLE WEAKNESS: ICD-10-CM

## 2018-10-19 DIAGNOSIS — M25.672 ANKLE JOINT STIFFNESS, BILATERAL: Primary | ICD-10-CM

## 2018-10-19 PROCEDURE — 97110 THERAPEUTIC EXERCISES: CPT | Performed by: PHYSICAL THERAPIST

## 2018-10-19 NOTE — THERAPY TREATMENT NOTE
Outpatient Physical Therapy Ortho Treatment Note  [facility]     Patient Name: Marion Anderson  : 1950  MRN: [id number]  Today's Date: 10/19/2018      Visit Date: 10/19/2018    Visit Dx:    ICD-10-CM ICD-9-CM   1. Ankle joint stiffness, bilateral M25.671 719.57    M25.672    2. Ankle weakness M62.81 719.67   3. Chronic pain of right ankle M25.571 719.47    G89.29 338.29   4. Difficulty walking R26.2 719.7       Patient Active Problem List   Diagnosis   • Coronary artery disease of native heart with stable angina pectoris (CMS/HCC)   • CAD (coronary artery disease)   • Subdural hematoma (CMS/HCC)   • S/P CABG x 4   • S/P MVR (mitral valve repair)        Past Medical History:   Diagnosis Date   • Arthritis    • Coronary artery disease    • Disease of thyroid gland    • Hypertension    • PONV (postoperative nausea and vomiting)    • Spinal headache         Past Surgical History:   Procedure Laterality Date   • CARDIAC SURGERY     • CORONARY ARTERY BYPASS GRAFT N/A 10/12/2017    Procedure: FILEMON STERNOTOMY CORONARY ARTERY BYPASS GRAFT TIMES 4  USING LEFT INTERNAL MAMMARY ARTERY AND LEFT GREATER SAPHENOUS VEIN GRAFT PER  ENDOSCOPIC VEIN HARVESTING, MITRAL VALVE REPAIR AND PRP;  Surgeon: Ramos Muñoz MD;  Location: [facility];  Service:    • FRACTURE SURGERY      ankle. leg   • TONSILLECTOMY                               PT Assessment/Plan     Row Name 10/19/18 1715          PT Assessment    Assessment Comments Patient has new onset of swelling in toe of R foot possibly from house shoe wear that causes pain with walking that is still antalgic.  Right ankle and knee pain still limiting to stepping down from 4 inch.  She has not returned to regular exercises since being sick with goal to start now and assess DC after next session.   -PB       User Key  (r) = Recorded By, (t) = Taken By, (c) = Cosigned By    Initials Name Provider Type    Jennifer Ray, PT Physical Therapist                   "  Exercises     Row Name 10/19/18 1400             Subjective Comments    Subjective Comments Patient reports her toe is swollen and hurts when she walks   -PB         Subjective Pain    Able to rate subjective pain? yes  -PB      Pre-Treatment Pain Level 5  -PB      Post-Treatment Pain Level 5  -PB         Total Minutes    44824 - PT Therapeutic Exercise Minutes 40  -PB         Exercise 1    Exercise Name 1 R ankle AROM x 20; RTB ankle INV, EV, DF, PF x 20 each, [place] hip abd 35# x 15/2, Hip add 55# 15/2; leg press 80# x 15/2;  step ups 4\" x 10, over x 5 limited by pain R ankle/ knee; clock drill x 10 each.sit to stand x 5 with 1 hand from chair, standing calf stretch 10\"/10,  Heel raises x 20.    -PB      Cueing 1 Verbal;Tactile  -PB        User Key  (r) = Recorded By, (t) = Taken By, (c) = Cosigned By    Initials Name Provider Type    Jennifer Ray, PT Physical Therapist                                            Time Calculation:   Start Time: 1432  Stop Time: 1515  Time Calculation (min): 43 min  Therapy Suggested Charges     Code   Minutes Charges    45667 (CPT®) Hc Pt Neuromusc Re Education Ea 15 Min      65022 (CPT®) Hc Pt Ther Proc Ea 15 Min 40 3    08984 (CPT®) Hc Gait Training Ea 15 Min      39700 (CPT®) Hc Pt Therapeutic Act Ea 15 Min      98587 (CPT®) Hc Pt Manual Therapy Ea 15 Min      55068 (CPT®) Hc Pt Ther Massage- Per 15 Min      29214 (CPT®) Hc Pt Iontophoresis Ea 15 Min      15028 (CPT®) Hc Pt Elec Stim Ea-Per 15 Min      06505 (CPT®) Hc Pt Ultrasound Ea 15 Min      49526 (CPT®) Hc Pt Self Care/Mgmt/Train Ea 15 Min      18355 (CPT®) Hc Pt Prosthetic (S) Train Initial Encounter, Each 15 Min      88509 (CPT®) Hc Orthotic(S) Mgmt/Train Initial Encounter, Each 15min      95514 (CPT®) Hc Pt Aquatic Therapy Ea 15 Min      09078 (CPT®) Hc Pt Orthotic(S)/Prosthetic(S) Encounter, Each 15 Min       (CPT®) Hc Pt Electrical Stim Unattended      Total  40 3        Therapy Charges for Today     Code " Description Service Date Service Provider Modifiers Qty    66833470833 HC PT THER PROC EA 15 MIN 10/19/2018 Jennifer Moreno, PT GP 3                    Jennifer Moreno, PT  10/19/2018

## 2018-11-01 ENCOUNTER — APPOINTMENT (OUTPATIENT)
Dept: PHYSICAL THERAPY | Facility: HOSPITAL | Age: 68
End: 2018-11-01

## 2019-01-17 ENCOUNTER — OFFICE VISIT (OUTPATIENT)
Dept: CARDIAC SURGERY | Facility: CLINIC | Age: 69
End: 2019-01-17

## 2019-01-17 VITALS
DIASTOLIC BLOOD PRESSURE: 84 MMHG | HEIGHT: 65 IN | WEIGHT: 229 LBS | HEART RATE: 85 BPM | OXYGEN SATURATION: 93 % | BODY MASS INDEX: 38.15 KG/M2 | SYSTOLIC BLOOD PRESSURE: 133 MMHG | TEMPERATURE: 97.9 F | RESPIRATION RATE: 20 BRPM

## 2019-01-17 DIAGNOSIS — Z98.890 S/P MVR (MITRAL VALVE REPAIR): ICD-10-CM

## 2019-01-17 DIAGNOSIS — Z95.1 S/P CABG X 4: Primary | ICD-10-CM

## 2019-01-17 PROCEDURE — 99024 POSTOP FOLLOW-UP VISIT: CPT | Performed by: NURSE PRACTITIONER

## 2019-01-17 RX ORDER — NITROGLYCERIN 0.3 MG/1
0.3 TABLET SUBLINGUAL
COMMUNITY

## 2019-01-17 RX ORDER — CEFDINIR 250 MG/5ML
POWDER, FOR SUSPENSION ORAL
COMMUNITY
Start: 2019-01-12 | End: 2019-01-17

## 2019-01-17 RX ORDER — BIOTIN 1 MG
1000 TABLET ORAL 3 TIMES DAILY
COMMUNITY
End: 2021-04-28 | Stop reason: ALTCHOICE

## 2019-01-17 RX ORDER — METOPROLOL SUCCINATE 25 MG/1
25 TABLET, EXTENDED RELEASE ORAL DAILY
COMMUNITY
End: 2019-01-17

## 2019-01-17 RX ORDER — CHLORAL HYDRATE 500 MG
CAPSULE ORAL
COMMUNITY

## 2019-01-17 RX ORDER — FLUTICASONE PROPIONATE 50 MCG
SPRAY, SUSPENSION (ML) NASAL
COMMUNITY
End: 2019-01-22

## 2019-01-17 RX ORDER — ICOSAPENT ETHYL 1000 MG/1
2 CAPSULE ORAL
COMMUNITY
Start: 2018-09-06 | End: 2019-01-22

## 2019-01-17 RX ORDER — PANTOPRAZOLE SODIUM 40 MG/1
TABLET, DELAYED RELEASE ORAL
COMMUNITY
End: 2019-01-17

## 2019-01-17 RX ORDER — HYDROXYZINE 50 MG/1
TABLET, FILM COATED ORAL
COMMUNITY
Start: 2018-01-15 | End: 2019-01-22

## 2019-01-17 RX ORDER — TRAMADOL HYDROCHLORIDE 50 MG/1
50 TABLET ORAL AS NEEDED
COMMUNITY

## 2019-01-17 RX ORDER — LANOLIN ALCOHOL/MO/W.PET/CERES
CREAM (GRAM) TOPICAL
COMMUNITY
Start: 2017-11-01 | End: 2020-02-18

## 2019-01-17 RX ORDER — ROSUVASTATIN CALCIUM 40 MG/1
40 TABLET, COATED ORAL NIGHTLY
COMMUNITY
Start: 2018-07-29

## 2019-01-17 RX ORDER — LEVOTHYROXINE SODIUM 0.12 MG/1
125 TABLET ORAL DAILY
COMMUNITY
Start: 2018-08-28

## 2019-01-17 NOTE — PROGRESS NOTES
"CARDIOVASCULAR SURGERY FOLLOW-UP PROGRESS NOTE  Chief Complaint: 1 year follow up        HPI:   Dear Narciso Chaves MD and colleagues:    It was nice to see Marion Anderson in follow up 1 year  after surgery.  As you know, she is a 68 y.o. female with severe CAD with stent (restenosised) and occlusion, bilateral carotid 100% occlusion, hx of old strokes who underwent CABGx4 MV repair by Dr. Muñoz. She did well postoperatively and continues to do well. She comes in today complaining of a sinus infection, bilateral ear infections, right knee pain, and not being able to sleep.  She is on antibiotics for her sinus infection and ear infections.  Her knee pain is chronic and some exercises aggravates this pain.  She goes to see her PCP in a week and will discuss if any medications need to be added to assist her in rest.  I talked to her about good sleep hygiene but since we will not need to follow her it would be best for her PCP to manage.  Her activity level has been good.       Physical Exam:         /84   Pulse 85   Temp 97.9 °F (36.6 °C)   Resp 20   Ht 165.1 cm (65\")   Wt 104 kg (229 lb)   SpO2 93%   BMI 38.11 kg/m²   Heart:  regular rate and rhythm  Lungs:  clear to auscultation bilaterally  Extremities:  no edema  Incision(s):  sternum stable    Assessment/Plan:     S/P CABG and mitral valve repair. Overall, she is doing well.    Slow post-op rehabilitation progress    Follow-up as scheduled with cardiology  Follow-up as scheduled with PCP    No restrictions of activity.      Thank you for allowing me to participate in the care of your   patient.  Regards,  PANKAJ Crouch      "

## 2019-01-22 ENCOUNTER — OFFICE VISIT (OUTPATIENT)
Dept: INTERNAL MEDICINE | Facility: CLINIC | Age: 69
End: 2019-01-22

## 2019-01-22 VITALS
TEMPERATURE: 98.2 F | OXYGEN SATURATION: 98 % | HEIGHT: 65 IN | DIASTOLIC BLOOD PRESSURE: 76 MMHG | SYSTOLIC BLOOD PRESSURE: 122 MMHG | HEART RATE: 76 BPM | WEIGHT: 230 LBS | BODY MASS INDEX: 38.32 KG/M2

## 2019-01-22 DIAGNOSIS — J01.00 SUBACUTE MAXILLARY SINUSITIS: Primary | ICD-10-CM

## 2019-01-22 DIAGNOSIS — G47.00 INSOMNIA, UNSPECIFIED TYPE: ICD-10-CM

## 2019-01-22 PROBLEM — E55.9 VITAMIN D INSUFFICIENCY: Status: ACTIVE | Noted: 2017-02-22

## 2019-01-22 PROBLEM — I10 ESSENTIAL HYPERTENSION: Status: ACTIVE | Noted: 2019-01-22

## 2019-01-22 PROBLEM — K21.9 GERD (GASTROESOPHAGEAL REFLUX DISEASE): Status: ACTIVE | Noted: 2018-02-26

## 2019-01-22 PROBLEM — E78.5 HYPERLIPIDEMIA LDL GOAL <70: Status: ACTIVE | Noted: 2019-01-22

## 2019-01-22 PROCEDURE — 99213 OFFICE O/P EST LOW 20 MIN: CPT | Performed by: NURSE PRACTITIONER

## 2019-01-22 RX ORDER — CEFDINIR 250 MG/5ML
250 POWDER, FOR SUSPENSION ORAL 2 TIMES DAILY
Qty: 100 ML | Refills: 0 | Status: SHIPPED | OUTPATIENT
Start: 2019-01-22 | End: 2020-02-18

## 2019-01-22 RX ORDER — TRAZODONE HYDROCHLORIDE 50 MG/1
50 TABLET ORAL NIGHTLY
Qty: 30 TABLET | Refills: 2 | Status: SHIPPED | OUTPATIENT
Start: 2019-01-22 | End: 2020-02-18

## 2019-01-22 NOTE — PROGRESS NOTES
Subjective   Marion Anderson is a 68 y.o. female. Patient is here today for   Chief Complaint   Patient presents with   • URI     Pt complains of having a productive coughm nasal/chest congestion, bilateral ear pain & fatigue  x2 weeks. Pt stated that she has went to immediate care recently & was told to take Mucinex, prescribed Cefdinir. Pt has completed these medications.    • Insomnia     Pt here to follow up on Insomnia. Pt was prescribed Hydroxyzine 50mg PRN insomnia. Pt has been having nightmares with this medication & would like to discuss her options.    .    History of Present Illness   New patient here to establish care.  Health history and questionnaire have been reviewed in its entirety. The patient's previous primary care provider was Dr. Su (retired).    C/o sinus congestion x 3 associated with fatigue, cough, bilateral ear pain. She was seen at an urgent care and prescribed cefdinir for sinusitis and bilateral ear infection. She has also tried mucinex.  States that she is feeling some better, but is not 100%    C/o insomnia off and on for a few years. She has tried hydroxyzine 50 mg prn. States that it helped, but then she started having nightmares, so she stopped taking it. She has also tried melatonin which helped for a short time.     The following portions of the patient's history were reviewed and updated as appropriate: allergies, current medications, past family history, past medical history, past social history, past surgical history and problem list.    Review of Systems   Constitutional: Positive for fatigue.   HENT: Positive for congestion, ear pain, postnasal drip and sinus pressure. Negative for sore throat.    Respiratory: Positive for cough. Negative for shortness of breath and wheezing.    Cardiovascular: Negative.    Psychiatric/Behavioral: Positive for sleep disturbance.       Objective   Vitals:    01/22/19 0938   BP: 122/76   Pulse: 76   Temp: 98.2 °F (36.8 °C)   SpO2: 98%      Physical Exam   Constitutional: Vital signs are normal. She appears well-developed and well-nourished.   HENT:   Right Ear: Ear canal normal. Tympanic membrane is not erythematous. A middle ear effusion is present.   Left Ear: Ear canal normal. Tympanic membrane is not erythematous. A middle ear effusion is present.   Nose: Right sinus exhibits maxillary sinus tenderness. Left sinus exhibits maxillary sinus tenderness.   Mouth/Throat: Oropharynx is clear and moist and mucous membranes are normal.   Cardiovascular: Normal rate, regular rhythm and normal heart sounds.   Pulmonary/Chest: Effort normal and breath sounds normal.   Lymphadenopathy:     She has no cervical adenopathy.   Skin: Skin is warm, dry and intact.   Psychiatric: She has a normal mood and affect. Her speech is normal and behavior is normal. Thought content normal.   Nursing note and vitals reviewed.      Assessment/Plan   Marion was seen today for uri and insomnia.    Diagnoses and all orders for this visit:    Subacute maxillary sinusitis  -     cefdinir (OMNICEF) 250 MG/5ML suspension; Take 5 mL by mouth 2 (Two) Times a Day.    Insomnia, unspecified type  -     traZODone (DESYREL) 50 MG tablet; Take 1 tablet by mouth Every Night.    sinusitis - Will prescribe another 10 days of cefdinir. Patient has to have liquid because she cannot swallow large pills.       F/u in one month to reassess insomnia and for Medicare Wellness exam.

## 2019-02-04 ENCOUNTER — TELEPHONE (OUTPATIENT)
Dept: INTERNAL MEDICINE | Facility: CLINIC | Age: 69
End: 2019-02-04

## 2019-02-04 DIAGNOSIS — J32.9 CHRONIC SINUSITIS, UNSPECIFIED LOCATION: Primary | ICD-10-CM

## 2019-02-04 NOTE — TELEPHONE ENCOUNTER
Pt says she is still having ear and sinus problems. She says this has been going on for a month. She has not been able to do her membership at [facility]. She was wanting a physician note stating she needs to postpone her membership for March and April? She is also wondering if she needs to be refrred to an ENT?  Please call patient back at [phone]

## 2019-02-05 NOTE — TELEPHONE ENCOUNTER
Pt aware that we will only write the note for 1 month per Linda. Pt also aware that we are referring her to ENT for chronic sinusitis.

## 2019-02-05 NOTE — TELEPHONE ENCOUNTER
Okay to refer her to ENT.   Regarding gym membership - I will give it to her for a month. I really don't have a good medical reason to have her take 3 months off.

## 2019-03-11 ENCOUNTER — DOCUMENTATION (OUTPATIENT)
Dept: PHYSICAL THERAPY | Facility: HOSPITAL | Age: 69
End: 2019-03-11

## 2019-03-11 DIAGNOSIS — R29.898 ANKLE WEAKNESS: ICD-10-CM

## 2019-03-11 DIAGNOSIS — R26.2 DIFFICULTY WALKING: ICD-10-CM

## 2019-03-11 DIAGNOSIS — M25.571 CHRONIC PAIN OF RIGHT ANKLE: ICD-10-CM

## 2019-03-11 DIAGNOSIS — M25.672 ANKLE JOINT STIFFNESS, BILATERAL: Primary | ICD-10-CM

## 2019-03-11 DIAGNOSIS — M25.671 ANKLE JOINT STIFFNESS, BILATERAL: Primary | ICD-10-CM

## 2019-03-11 DIAGNOSIS — G89.29 CHRONIC PAIN OF RIGHT ANKLE: ICD-10-CM

## 2019-03-11 NOTE — THERAPY DISCHARGE NOTE
Outpatient Physical Therapy Discharge Summary         Patient Name: Marion Anderson  : 1950  MRN: [id number]    Today's Date: 3/11/2019    Visit Dx:    ICD-10-CM ICD-9-CM   1. Ankle joint stiffness, bilateral M25.671 719.57    M25.672    2. Ankle weakness M62.81 719.67   3. Chronic pain of right ankle M25.571 719.47    G89.29 338.29   4. Difficulty walking R26.2 719.7       PT OP Goals     Row Name 19 1100          PT Short Term Goals    STG 1  Patient will be independent and compliant with initial HEP for ankle mobility without increased symptoms  -PB     STG 1 Progress  Met  -PB     STG 2  Patient will increase standing tolerance >/=15 min to reduce pain with daily ADLs  -PB     STG 2 Progress  Met  -PB     STG 3  Patient will have ankle strength >/= 3+/5 in available ROM for improved stability with functional WB activity  -PB     STG 3 Progress  Met  -PB     STG 4  Patient will have >/= 5 degree improvement in Right ankle DF ROM for increased ease with functional activities  -PB     STG 4 Progress  Met  -PB        Long Term Goals    LTG Date to Achieve  18  -PB     LTG 1  Patient will be independent with established HEP for strength/stabilization to facilitate self management of condition  -PB     LTG 1 Progress  Partially Met  -PB     LTG 2  Patient will tolerate walking >/= 10-15 minutes with no AD without pain > 3-4/10 for increased cardiovascular training  -PB     LTG 2 Progress  Partially Met  -PB     LTG 3  Patient will have ankle strength >/= 4-/5 in available ROM for improved stability with functional WB activity  -PB     LTG 3 Progress  Not Met  -PB     LTG 4  Patient will report reduced functional limitations on FAAM with score >/= 35/84 (from  at eval)  -PB     LTG 4 Progress  Not Met  -PB       User Key  (r) = Recorded By, (t) = Taken By, (c) = Cosigned By    Initials Name Provider Type    Jennifer Ray, PT Physical Therapist          OP PT Discharge Summary  Date  of Discharge: 03/11/19  Reason for Discharge: Unable to participate  Outcomes Achieved: Patient able to partially acheive established goals  Discharge Destination: Home with home program  Discharge Instructions/Additional Comments: Marion was seen for 17 sessions for right foot pain and weakness.  She performed exercises for strength, ROM and mobility.  Patient had new onset of swelling in toe of R foot possibly from house shoe wear that causes pain with walking that is still antalgic.  Right ankle and knee pain still limiting to stepping down from 4 inch.  She has not returned to regular exercises since being sick with goal to start.  DC was planned soon but she did not return.      Time Calculation:        Therapy Suggested Charges     Code   Minutes Charges    None                       Jennifer Moreno, PT  3/11/2019

## 2020-02-18 ENCOUNTER — TELEPHONE (OUTPATIENT)
Dept: CARDIOLOGY | Facility: CLINIC | Age: 70
End: 2020-02-18

## 2020-02-18 ENCOUNTER — OFFICE VISIT (OUTPATIENT)
Dept: CARDIOLOGY | Facility: CLINIC | Age: 70
End: 2020-02-18

## 2020-02-18 VITALS
HEIGHT: 65 IN | HEART RATE: 86 BPM | SYSTOLIC BLOOD PRESSURE: 172 MMHG | DIASTOLIC BLOOD PRESSURE: 94 MMHG | BODY MASS INDEX: 39.82 KG/M2 | WEIGHT: 239 LBS

## 2020-02-18 DIAGNOSIS — Z98.890 S/P MVR (MITRAL VALVE REPAIR): ICD-10-CM

## 2020-02-18 DIAGNOSIS — N28.9 RENAL INSUFFICIENCY: ICD-10-CM

## 2020-02-18 DIAGNOSIS — I10 ESSENTIAL HYPERTENSION: ICD-10-CM

## 2020-02-18 DIAGNOSIS — I65.23 BILATERAL CAROTID ARTERY STENOSIS: ICD-10-CM

## 2020-02-18 DIAGNOSIS — Z95.1 S/P CABG X 4: ICD-10-CM

## 2020-02-18 DIAGNOSIS — I25.810 CORONARY ARTERY DISEASE INVOLVING CORONARY BYPASS GRAFT OF NATIVE HEART WITHOUT ANGINA PECTORIS: Primary | ICD-10-CM

## 2020-02-18 PROCEDURE — 99204 OFFICE O/P NEW MOD 45 MIN: CPT | Performed by: INTERNAL MEDICINE

## 2020-02-18 PROCEDURE — 93000 ELECTROCARDIOGRAM COMPLETE: CPT | Performed by: INTERNAL MEDICINE

## 2020-02-18 RX ORDER — METOPROLOL SUCCINATE 50 MG/1
TABLET, EXTENDED RELEASE ORAL
Qty: 45 TABLET | Refills: 11 | Status: SHIPPED | OUTPATIENT
Start: 2020-02-18 | End: 2021-04-23

## 2020-02-18 RX ORDER — AMOXICILLIN 875 MG/1
875 TABLET, COATED ORAL 2 TIMES DAILY
COMMUNITY
Start: 2020-02-13 | End: 2021-05-28

## 2020-02-18 NOTE — PROGRESS NOTES
Date of Office Visit: 2020  Encounter Provider: Domonique Fernando MD  Place of Service: [facility] CARDIOLOGY  Patient Name: Marion Anderson  :1950    Chief complaint  Consult requested by Dr. Quijano for evaluation of coronary artery disease.    History of Present Illness  Patient is a 69-year-old female with history of hypertension, hyperlipidemia, coronary artery disease.  In  she was found to have LAD and right coronary artery stenosis for which she underwent stenting.  Patient was seen in  for progressive chest pain.  Cardiac catheterization showed multivessel coronary artery disease including left main disease.  She developed ventricular fibrillation in the Cath Lab and was successfully cardioverted.  On 2017 she had a LIMA graft to the LAD, vein graft to the right coronary artery, vein graft to ramus intermedius branch, vein graft to diagonal branch of the LAD.  She also had mitral valve repair with a 24 mm ring.  Postoperative course was complicated by encephalopathy CT imaging showed prior infarct.  She was treated with Keppra for a short period of time.  She had carotid Doppler imaging notes severe bilateral carotid artery disease/carotid occlusion which is felt to be difficult to revascularize and for which she was seen by Dr. Prado.    She states she is completed rehab has been fairly sedentary in the past several months of the holidays and upper respiratory infections and her significant other as well as her own struggles with this.  More recently she was started on amoxicillin a week ago and is started to feel improved she had strep throat at the time.  She denies any fever chills cough at this point.  She is moderately active around her home denies any chest pain shortness of breath or palpitations.  She occasionally has mild dependent edema.  She has not been checking her blood pressure regularly but feels it is slightly elevated possibly as it  is today.  Lipids checked several months ago showed dyslipidemia with elevated LDL and low HDL.  She was recently given a prescription for Toprol-XL which is not completed as she was on metoprolol tartrate though is taking this 25 mg twice daily.  In addition, her endocrinologist gave her a second antihyperlipidemic agent the name of which she cannot recall.  This is in addition to Crestor though she developed a rash shortly after taking this medication and she stopped it on her own.  She is to consider resuming it in late March after 1 month hiatus.    Past Medical History:   Diagnosis Date   • Arthritis    • Bilateral carotid artery stenosis 2/22/2020   • Coronary artery disease    • Disease of thyroid gland    • Hyperlipidemia    • Hypertension    • PONV (postoperative nausea and vomiting)    • Spinal headache      Past Surgical History:   Procedure Laterality Date   • CARDIAC CATHETERIZATION Left 10/06/2017    [facility], Dr. THAD Grant, coronary arteriography   • CARDIAC SURGERY     • CORONARY ARTERY BYPASS GRAFT N/A 10/12/2017    Procedure: FILEMON STERNOTOMY CORONARY ARTERY BYPASS GRAFT TIMES 4  USING LEFT INTERNAL MAMMARY ARTERY AND LEFT GREATER SAPHENOUS VEIN GRAFT PER  ENDOSCOPIC VEIN HARVESTING, MITRAL VALVE REPAIR AND PRP;  Surgeon: Ramos Muñoz MD;  Location: [facility];  Service:    • FRACTURE SURGERY      Ankle/Leg   • TONSILLECTOMY       Outpatient Medications Prior to Visit   Medication Sig Dispense Refill   • aspirin 81 MG chewable tablet Chew 81 mg Daily.     • Biotin 1000 MCG tablet Take 1,000 mcg by mouth 3 (Three) Times a Day.     • levothyroxine (SYNTHROID, LEVOTHROID) 125 MCG tablet Take 125 mcg by mouth Daily.     • Multiple Vitamin (MULTI-DAY PO) Take 1 tablet by mouth Daily.     • nitroglycerin (NITROSTAT) 0.3 MG SL tablet Place 0.3 mg under the tongue.     • Omega-3 Fatty Acids (FISH OIL) 1000 MG capsule capsule Take  by mouth Daily With Breakfast.     •  rosuvastatin (CRESTOR) 40 MG tablet rosuvastatin 40 mg tablet     • traMADol (ULTRAM) 50 MG tablet Every 12 (Twelve) Hours.     • metoprolol tartrate (LOPRESSOR) 25 MG tablet Take 25 mg by mouth Daily.     • amoxicillin (AMOXIL) 875 MG tablet Take 875 mg by mouth 2 (Two) Times a Day.     • Cholecalciferol (VITAMIN D3) 25 MCG (1000 UT) capsule Take 25 mcg by mouth Daily.     • cefdinir (OMNICEF) 250 MG/5ML suspension Take 5 mL by mouth 2 (Two) Times a Day. 100 mL 0   • traZODone (DESYREL) 50 MG tablet Take 1 tablet by mouth Every Night. 30 tablet 2   • vitamin B-12 (CYANOCOBALAMIN) 1000 MCG tablet Take  by mouth.       No facility-administered medications prior to visit.        Allergies as of 02/18/2020 - Reviewed 02/18/2020   Allergen Reaction Noted   • Latex Itching and Other (See Comments) 12/03/2015     Social History     Socioeconomic History   • Marital status: Single     Spouse name: Not on file   • Number of children: Not on file   • Years of education: Not on file   • Highest education level: Not on file   Tobacco Use   • Smoking status: Never Smoker   • Smokeless tobacco: Never Used   Substance and Sexual Activity   • Alcohol use: No   • Drug use: No   • Sexual activity: Defer     Birth control/protection: Post-menopausal     Family History   Problem Relation Age of Onset   • Heart disease Mother    • Hypertension Mother    • Heart disease Father    • Hypertension Father    • Heart attack Father    • Cancer Brother    • Hypertension Brother      Review of Systems   Constitution: Negative for fever, malaise/fatigue, weight gain and weight loss.   HENT: Negative for ear pain, hearing loss, nosebleeds and sore throat.    Eyes: Negative for double vision, pain, vision loss in left eye and vision loss in right eye.   Cardiovascular:        See history of present illness.   Respiratory: Negative for cough, shortness of breath, sleep disturbances due to breathing, snoring and wheezing.    Endocrine: Negative for  "cold intolerance, heat intolerance and polyuria.   Skin: Negative for itching, poor wound healing and rash.   Musculoskeletal: Negative for joint pain, joint swelling and myalgias.   Gastrointestinal: Negative for abdominal pain, diarrhea, hematochezia, nausea and vomiting.   Genitourinary: Negative for hematuria and hesitancy.   Neurological: Negative for numbness, paresthesias and seizures.   Psychiatric/Behavioral: Negative for depression. The patient is not nervous/anxious.         Objective:     Vitals:    02/18/20 1200 02/18/20 1216   BP: 168/90 172/94   BP Location: Right arm Left arm   Pulse: 86    Weight: 108 kg (239 lb)    Height: 165.1 cm (65\")      Body mass index is 39.77 kg/m².    Physical Exam   Constitutional: She is oriented to person, place, and time. She appears well-developed and well-nourished.   Obese   HENT:   Head: Normocephalic.   Nose: Nose normal.   Mouth/Throat: Oropharynx is clear and moist.   Eyes: Pupils are equal, round, and reactive to light. Conjunctivae and EOM are normal. Right eye exhibits no discharge. No scleral icterus.   Neck: Normal range of motion. Neck supple. No JVD present. No thyromegaly present.   Cardiovascular: Normal rate, regular rhythm, normal heart sounds and intact distal pulses. Exam reveals no gallop and no friction rub.   No murmur heard.  Pulses:       Carotid pulses are 2+ on the right side, and 2+ on the left side.       Radial pulses are 2+ on the right side, and 2+ on the left side.        Femoral pulses are 2+ on the right side, and 2+ on the left side.       Popliteal pulses are 2+ on the right side, and 2+ on the left side.        Dorsalis pedis pulses are 2+ on the right side, and 2+ on the left side.        Posterior tibial pulses are 2+ on the right side, and 2+ on the left side.   Pulmonary/Chest: Effort normal and breath sounds normal. No respiratory distress. She has no wheezes. She has no rales.   Abdominal: Soft. Bowel sounds are normal. She " exhibits no distension. There is no hepatosplenomegaly. There is no tenderness. There is no rebound.   Musculoskeletal: Normal range of motion. She exhibits no edema or tenderness.   Neurological: She is alert and oriented to person, place, and time.   Skin: Skin is warm and dry. No rash noted. No erythema.   Psychiatric: She has a normal mood and affect. Her behavior is normal. Judgment and thought content normal.   Vitals reviewed.    Lab Review:     ECG 12 Lead  Date/Time: 2/18/2020 12:20 PM  Performed by: Domonique Fernando MD  Authorized by: Domonique Fernando MD   Comparison: compared with previous ECG   Comparison to previous ECG: HR has decreased  Rhythm: sinus rhythm    Clinical impression: normal ECG          Assessment:       Diagnosis Plan   1. Coronary artery disease involving coronary bypass graft of native heart without angina pectoris  ECG 12 Lead   2. S/P MVR (mitral valve repair)  Adult Transthoracic Echo Complete W/ Cont if Necessary Per Protocol   3. Bilateral carotid artery stenosis  Ambulatory Referral to Vascular Surgery   4. Renal insufficiency  Basic Metabolic Panel   5. Essential hypertension  Blood Pressure Device   6. S/P CABG x 4       Plan:       1.  Coronary artery disease with prior stenting and subsequent coronary artery bypass grafting 2017.  No anginal symptoms but needs a lot of risk factor modification.  I reviewed this with her as below and asked her to start a low level excise regimen.  She will return for follow-up in 3 months with APRN.  2.  Status post mitral valve repair.  She is well aware of SBE prophylaxis and the valve sounds good on exam today.  We will check a follow-up baseline echocardiogram as she is 2 years overdue.  3.  Hypertension.  Surprisingly she is not following a low-salt diet.  Have asked her to do so.  She denies symptoms of sleep apnea.  I will increase metoprolol tartrate to 50 mg in a.m. 25 mg in p.m.  She was given Toprol by her endocrinologist and was planning  on switching to this at the end of the tartrate regimen.  I told her this was fine but when she switches to Toprol to go to 75 mg a day.  She will start checking her blood pressure more closely and call with additional follow-up.  She will bring in her device for comparison the day of the echo.  In addition we will check a BMP and depending on results as long as renal function is stable we will consider adding losartan.  4.  Hyperlipidemia.  Lipid panel in the fall was inadequate or suboptimal.  She states she is on a second drug which she cannot recall the name of.  She only took it a few times and attributes a rash to this.  She has not been taking this but plans to do so in the near future she does not recall the name of the drug but will call when she gets home with this.  5.  Renal insufficiency.  Noted on labs 4 months ago with a creatinine 1.1, GFR 49.  We will check a BMP after increase hydration.  6.  Recent strep throat.  Just completing antibiotic therapy with amoxicillin  7.  Hypothyroidism.  8.  Carotid artery disease, severe with bilateral occlusion.  I requested a follow-up appointment for her with Dr. Prado.  And will avoid hypotension.       Your medication list           Accurate as of February 18, 2020 11:59 PM. If you have any questions, ask your nurse or doctor.               START taking these medications      Instructions Last Dose Given Next Dose Due   metoprolol succinate XL 50 MG 24 hr tablet  Commonly known as:  TOPROL-XL  Started by:  Domonique Fernando MD      Take one and half tablet po q am          CONTINUE taking these medications      Instructions Last Dose Given Next Dose Due   amoxicillin 875 MG tablet  Commonly known as:  AMOXIL      Take 875 mg by mouth 2 (Two) Times a Day.       aspirin 81 MG chewable tablet      Chew 81 mg Daily.       Biotin 1000 MCG tablet      Take 1,000 mcg by mouth 3 (Three) Times a Day.       fish oil 1000 MG capsule capsule      Take  by mouth Daily With  Breakfast.       levothyroxine 125 MCG tablet  Commonly known as:  SYNTHROID, LEVOTHROID      Take 125 mcg by mouth Daily.       MULTI-DAY PO      Take 1 tablet by mouth Daily.       nitroglycerin 0.3 MG SL tablet  Commonly known as:  NITROSTAT      Place 0.3 mg under the tongue.       rosuvastatin 40 MG tablet  Commonly known as:  CRESTOR      rosuvastatin 40 mg tablet       traMADol 50 MG tablet  Commonly known as:  ULTRAM      Every 12 (Twelve) Hours.       Vitamin D3 25 MCG (1000 UT) capsule      Take 25 mcg by mouth Daily.          STOP taking these medications    cefdinir 250 MG/5ML suspension  Commonly known as:  OMNICEF  Stopped by:  Domonique Fernando MD        metoprolol tartrate 25 MG tablet  Commonly known as:  LOPRESSOR  Stopped by:  Domonique Fernando MD        traZODone 50 MG tablet  Commonly known as:  DESYREL  Stopped by:  Domonique Fernando MD        vitamin B-12 1000 MCG tablet  Commonly known as:  CYANOCOBALAMIN  Stopped by:  Domonique Fernando MD              Where to Get Your Medications      These medications were sent to [facility] [address] [place] [id number] [facility] - [phone]  - [phone] FX  [id number] [place], [place] [id number]    Phone:  [phone]   · metoprolol succinate XL 50 MG 24 hr tablet         Patient is no longer taking cefdinir, trazodone,  cyanocobalamin.  I corrected the med list to reflect this.  I did not stop these medications.    Dictated utilizing Dragon dictation

## 2020-02-19 ENCOUNTER — TELEPHONE (OUTPATIENT)
Dept: CARDIOLOGY | Facility: CLINIC | Age: 70
End: 2020-02-19

## 2020-02-19 NOTE — TELEPHONE ENCOUNTER
Patient seen by you in the office yesterday and she is looking for a referral for a nutrionist.    Please advise or place order.  Thanks!

## 2020-02-22 PROBLEM — N28.9 RENAL INSUFFICIENCY: Status: ACTIVE | Noted: 2020-02-22

## 2020-02-22 PROBLEM — I65.23 BILATERAL CAROTID ARTERY STENOSIS: Status: ACTIVE | Noted: 2020-02-22

## 2020-02-24 DIAGNOSIS — I25.810 CORONARY ARTERY DISEASE INVOLVING CORONARY BYPASS GRAFT OF NATIVE HEART WITHOUT ANGINA PECTORIS: Primary | ICD-10-CM

## 2020-03-04 ENCOUNTER — HOSPITAL ENCOUNTER (OUTPATIENT)
Dept: CARDIOLOGY | Facility: HOSPITAL | Age: 70
Discharge: HOME | End: 2020-03-04
Admitting: INTERNAL MEDICINE

## 2020-03-04 ENCOUNTER — APPOINTMENT (OUTPATIENT)
Dept: LAB | Facility: HOSPITAL | Age: 70
End: 2020-03-04

## 2020-03-04 ENCOUNTER — CLINICAL SUPPORT (OUTPATIENT)
Dept: CARDIOLOGY | Facility: CLINIC | Age: 70
End: 2020-03-04

## 2020-03-04 VITALS — BODY MASS INDEX: 39.67 KG/M2 | WEIGHT: 238.1 LBS | HEIGHT: 65 IN

## 2020-03-04 DIAGNOSIS — Z98.890 S/P MVR (MITRAL VALVE REPAIR): ICD-10-CM

## 2020-03-04 LAB
ANION GAP SERPL CALCULATED.3IONS-SCNC: 11.7 MMOL/L (ref 5–15)
AORTIC ROOT ANNULUS: 1.8 CM
BH CV ECHO MEAS - ACS: 1.6 CM
BH CV ECHO MEAS - AO MAX PG (FULL): 1.6 MMHG
BH CV ECHO MEAS - AO MAX PG: 6.9 MMHG
BH CV ECHO MEAS - AO MEAN PG (FULL): 1 MMHG
BH CV ECHO MEAS - AO MEAN PG: 4 MMHG
BH CV ECHO MEAS - AO V2 MAX: 131 CM/SEC
BH CV ECHO MEAS - AO V2 MEAN: 93 CM/SEC
BH CV ECHO MEAS - AO V2 VTI: 33 CM
BH CV ECHO MEAS - AVA(I,A): 2.4 CM^2
BH CV ECHO MEAS - AVA(I,D): 2.4 CM^2
BH CV ECHO MEAS - AVA(V,A): 2.2 CM^2
BH CV ECHO MEAS - AVA(V,D): 2.2 CM^2
BH CV ECHO MEAS - BSA(HAYCOCK): 2.3 M^2
BH CV ECHO MEAS - BSA: 2.1 M^2
BH CV ECHO MEAS - BZI_BMI: 39.8 KILOGRAMS/M^2
BH CV ECHO MEAS - BZI_METRIC_HEIGHT: 165.1 CM
BH CV ECHO MEAS - BZI_METRIC_WEIGHT: 108.4 KG
BH CV ECHO MEAS - EDV(MOD-SP2): 59 ML
BH CV ECHO MEAS - EDV(MOD-SP4): 65 ML
BH CV ECHO MEAS - EDV(TEICH): 102.4 ML
BH CV ECHO MEAS - EF(CUBED): 76.5 %
BH CV ECHO MEAS - EF(MOD-BP): 63 %
BH CV ECHO MEAS - EF(MOD-SP2): 61 %
BH CV ECHO MEAS - EF(MOD-SP4): 64.6 %
BH CV ECHO MEAS - EF(TEICH): 68.5 %
BH CV ECHO MEAS - ESV(MOD-SP2): 23 ML
BH CV ECHO MEAS - ESV(MOD-SP4): 23 ML
BH CV ECHO MEAS - ESV(TEICH): 32.2 ML
BH CV ECHO MEAS - FS: 38.3 %
BH CV ECHO MEAS - IVS/LVPW: 1.1
BH CV ECHO MEAS - IVSD: 1 CM
BH CV ECHO MEAS - LAT PEAK E' VEL: 8 CM/SEC
BH CV ECHO MEAS - LV DIASTOLIC VOL/BSA (35-75): 30.5 ML/M^2
BH CV ECHO MEAS - LV MASS(C)D: 153.4 GRAMS
BH CV ECHO MEAS - LV MASS(C)DI: 71.9 GRAMS/M^2
BH CV ECHO MEAS - LV MAX PG: 5.3 MMHG
BH CV ECHO MEAS - LV MEAN PG: 3 MMHG
BH CV ECHO MEAS - LV SYSTOLIC VOL/BSA (12-30): 10.8 ML/M^2
BH CV ECHO MEAS - LV V1 MAX: 115 CM/SEC
BH CV ECHO MEAS - LV V1 MEAN: 77.5 CM/SEC
BH CV ECHO MEAS - LV V1 VTI: 31.1 CM
BH CV ECHO MEAS - LVIDD: 4.7 CM
BH CV ECHO MEAS - LVIDS: 2.9 CM
BH CV ECHO MEAS - LVLD AP2: 7.4 CM
BH CV ECHO MEAS - LVLD AP4: 7.9 CM
BH CV ECHO MEAS - LVLS AP2: 6.5 CM
BH CV ECHO MEAS - LVLS AP4: 6.6 CM
BH CV ECHO MEAS - LVOT AREA (M): 2.5 CM^2
BH CV ECHO MEAS - LVOT AREA: 2.5 CM^2
BH CV ECHO MEAS - LVOT DIAM: 1.8 CM
BH CV ECHO MEAS - LVPWD: 0.9 CM
BH CV ECHO MEAS - MED PEAK E' VEL: 7 CM/SEC
BH CV ECHO MEAS - MV A DUR: 0.13 SEC
BH CV ECHO MEAS - MV A MAX VEL: 116 CM/SEC
BH CV ECHO MEAS - MV DEC SLOPE: 397 CM/SEC^2
BH CV ECHO MEAS - MV DEC TIME: 0.28 SEC
BH CV ECHO MEAS - MV E MAX VEL: 129 CM/SEC
BH CV ECHO MEAS - MV E/A: 1.1
BH CV ECHO MEAS - MV MAX PG: 6.9 MMHG
BH CV ECHO MEAS - MV MEAN PG: 3 MMHG
BH CV ECHO MEAS - MV P1/2T MAX VEL: 125.7 CM/SEC
BH CV ECHO MEAS - MV P1/2T: 92.7 MSEC
BH CV ECHO MEAS - MV V2 MAX: 131 CM/SEC
BH CV ECHO MEAS - MV V2 MEAN: 78.2 CM/SEC
BH CV ECHO MEAS - MV V2 VTI: 43.2 CM
BH CV ECHO MEAS - MVA P1/2T LCG: 1.8 CM^2
BH CV ECHO MEAS - MVA(P1/2T): 2.4 CM^2
BH CV ECHO MEAS - MVA(VTI): 1.8 CM^2
BH CV ECHO MEAS - PA ACC TIME: 0.16 SEC
BH CV ECHO MEAS - PA MAX PG (FULL): 2.7 MMHG
BH CV ECHO MEAS - PA MAX PG: 4 MMHG
BH CV ECHO MEAS - PA PR(ACCEL): 9.3 MMHG
BH CV ECHO MEAS - PA V2 MAX: 100 CM/SEC
BH CV ECHO MEAS - PULM A REVS DUR: 0.11 SEC
BH CV ECHO MEAS - PULM A REVS VEL: 21.5 CM/SEC
BH CV ECHO MEAS - PULM DIAS VEL: 48.7 CM/SEC
BH CV ECHO MEAS - PULM S/D: 0.88
BH CV ECHO MEAS - PULM SYS VEL: 43 CM/SEC
BH CV ECHO MEAS - PVA(V,A): 1.8 CM^2
BH CV ECHO MEAS - PVA(V,D): 1.8 CM^2
BH CV ECHO MEAS - QP/QS: 0.57
BH CV ECHO MEAS - RV MAX PG: 1.3 MMHG
BH CV ECHO MEAS - RV MEAN PG: 1 MMHG
BH CV ECHO MEAS - RV V1 MAX: 56.1 CM/SEC
BH CV ECHO MEAS - RV V1 MEAN: 41.1 CM/SEC
BH CV ECHO MEAS - RV V1 VTI: 14.3 CM
BH CV ECHO MEAS - RVOT AREA: 3.1 CM^2
BH CV ECHO MEAS - RVOT DIAM: 2 CM
BH CV ECHO MEAS - SI(CUBED): 37.2 ML/M^2
BH CV ECHO MEAS - SI(LVOT): 37.1 ML/M^2
BH CV ECHO MEAS - SI(MOD-SP2): 16.9 ML/M^2
BH CV ECHO MEAS - SI(MOD-SP4): 19.7 ML/M^2
BH CV ECHO MEAS - SI(TEICH): 32.9 ML/M^2
BH CV ECHO MEAS - SV(CUBED): 79.4 ML
BH CV ECHO MEAS - SV(LVOT): 79.1 ML
BH CV ECHO MEAS - SV(MOD-SP2): 36 ML
BH CV ECHO MEAS - SV(MOD-SP4): 42 ML
BH CV ECHO MEAS - SV(RVOT): 44.9 ML
BH CV ECHO MEAS - SV(TEICH): 70.1 ML
BH CV ECHO MEASUREMENTS AVERAGE E/E' RATIO: 17.2
BH CV XLRA - RV BASE: 2.9 CM
BH CV XLRA - RV LENGTH: 6.5 CM
BH CV XLRA - RV MID: 2.7 CM
BH CV XLRA - TDI S': 7 CM/SEC
BUN BLD-MCNC: 19 MG/DL (ref 8–23)
BUN/CREAT SERPL: 18.3 (ref 7–25)
CALCIUM SPEC-SCNC: 10 MG/DL (ref 8.6–10.5)
CHLORIDE SERPL-SCNC: 101 MMOL/L (ref 98–107)
CO2 SERPL-SCNC: 28.3 MMOL/L (ref 22–29)
CREAT BLD-MCNC: 1.04 MG/DL (ref 0.57–1)
GFR SERPL CREATININE-BSD FRML MDRD: 53 ML/MIN/1.73
GLUCOSE BLD-MCNC: 104 MG/DL (ref 65–99)
LEFT ATRIUM VOLUME INDEX: 20 ML/M2
LV EF 2D ECHO EST: 63 %
POTASSIUM BLD-SCNC: 4.2 MMOL/L (ref 3.5–5.2)
SODIUM BLD-SCNC: 141 MMOL/L (ref 136–145)

## 2020-03-04 PROCEDURE — 93306 TTE W/DOPPLER COMPLETE: CPT | Performed by: INTERNAL MEDICINE

## 2020-03-04 PROCEDURE — 93306 TTE W/DOPPLER COMPLETE: CPT

## 2020-03-04 PROCEDURE — 80048 BASIC METABOLIC PNL TOTAL CA: CPT | Performed by: INTERNAL MEDICINE

## 2020-03-04 PROCEDURE — 36415 COLL VENOUS BLD VENIPUNCTURE: CPT | Performed by: INTERNAL MEDICINE

## 2020-03-04 NOTE — PROGRESS NOTES
Pt is here for BP check and to compare her BP machine. BP reading MANUAL LA:134/84 RA: 134/98   BP MACHINE LA: 143/85 RA:140/85 HR:70. Pt denies any chest pain or palpitations. Her BP machine is accurate. Pt is taking Toprol 75 mg AM.    Thanks  Venecia LUX

## 2020-03-06 ENCOUNTER — TELEPHONE (OUTPATIENT)
Dept: CARDIOLOGY | Facility: CLINIC | Age: 70
End: 2020-03-06

## 2020-03-06 NOTE — TELEPHONE ENCOUNTER
vm left for patient to call back to reinforce the directions I gave her this am  Carly Bermudez RN  Triage nurse

## 2020-03-06 NOTE — TELEPHONE ENCOUNTER
"I have spoken to the patient concerning results and recommendations.    Patient is taking metoprolol succinate XL 1.5 tabs daily.  I had her double check the bottle      She said this is the RX she picked up after seeing you and she has thrown the old bottle away.    Please clarify which medication you would like this patient to take.  She didn't have her hr and bp log with her, but stated it is running \"better\"  Thanks  Carly Bermudez RN  Triage nurse    "

## 2020-03-06 NOTE — TELEPHONE ENCOUNTER
Please let the patient know that her heart is strong on the echocardiogram mitral valve is working well.  Heart muscle is somewhat stiff likely at this point from high blood pressure which needs to be further addressed and then we will recheck an echocardiogram in 6 months to a year depending on her progress.  Her blood test also shows some stress on the kidneys and she needs to see her PCP regarding this.  Also have her increase her hydration.  Her blood pressure looks like it still elevated and I am concerned she is not taking her medication as I had instructed to do at the office visit.  She was supposed to have been taking metoprolol 50 mg in the morning and 25 mg in p.m. and it was supposed to been metoprolol tartrate.  She told the medical assistant on the fourth when she came for the echocardiogram that she was taking Toprol-XL 75 mg a day.    Please let her know the results of the echocardiogram and blood work and recommendations to follow-up with PCP regarding renal function in the next several weeks.  Please clarify what and how she is taking her medications in terms of metoprolol (Toprol versus Lopressor) and how is her blood pressure been. vera

## 2020-03-06 NOTE — TELEPHONE ENCOUNTER
Stay on Toprol-XL the prescription that I sent in as written, 50 mg in a.m. and 25 mg in p.m.  Have her track her blood pressure over the next week and call with readings including blood pressure and heart rate.vera

## 2020-03-09 NOTE — TELEPHONE ENCOUNTER
Patient notified of instructions and will send in readings in a week as requested  Carly Bermudez RN  Triage nurse

## 2020-03-17 ENCOUNTER — TRANSCRIBE ORDERS (OUTPATIENT)
Dept: PHYSICAL THERAPY | Facility: CLINIC | Age: 70
End: 2020-03-17

## 2020-03-17 DIAGNOSIS — S83.101D: Primary | ICD-10-CM

## 2021-03-18 ENCOUNTER — TRANSCRIBE ORDERS (OUTPATIENT)
Dept: ADMINISTRATIVE | Facility: HOSPITAL | Age: 71
End: 2021-03-18

## 2021-03-18 DIAGNOSIS — Z78.0 POST-MENOPAUSE: Primary | ICD-10-CM

## 2021-04-23 ENCOUNTER — TELEPHONE (OUTPATIENT)
Dept: CARDIOLOGY | Facility: CLINIC | Age: 71
End: 2021-04-23

## 2021-04-23 ENCOUNTER — TELEPHONE (OUTPATIENT)
Dept: CARDIAC SURGERY | Facility: CLINIC | Age: 71
End: 2021-04-23

## 2021-04-23 ENCOUNTER — HOSPITAL ENCOUNTER (EMERGENCY)
Facility: HOSPITAL | Age: 71
Discharge: HOME | End: 2021-04-23
Attending: EMERGENCY MEDICINE | Admitting: EMERGENCY MEDICINE

## 2021-04-23 VITALS
OXYGEN SATURATION: 94 % | RESPIRATION RATE: 17 BRPM | BODY MASS INDEX: 37.49 KG/M2 | DIASTOLIC BLOOD PRESSURE: 79 MMHG | HEART RATE: 82 BPM | HEIGHT: 65 IN | SYSTOLIC BLOOD PRESSURE: 137 MMHG | TEMPERATURE: 97.2 F | WEIGHT: 225 LBS

## 2021-04-23 DIAGNOSIS — I10 ESSENTIAL HYPERTENSION: Primary | ICD-10-CM

## 2021-04-23 PROCEDURE — 99282 EMERGENCY DEPT VISIT SF MDM: CPT

## 2021-04-23 PROCEDURE — 99283 EMERGENCY DEPT VISIT LOW MDM: CPT

## 2021-04-23 RX ORDER — LEVOTHYROXINE SODIUM 0.12 MG/1
125 TABLET ORAL DAILY
COMMUNITY
End: 2021-04-28 | Stop reason: ALTCHOICE

## 2021-04-23 RX ORDER — LISINOPRIL AND HYDROCHLOROTHIAZIDE 20; 12.5 MG/1; MG/1
1 TABLET ORAL DAILY
COMMUNITY
End: 2021-04-28

## 2021-04-23 RX ORDER — EZETIMIBE 10 MG/1
10 TABLET ORAL DAILY
COMMUNITY

## 2021-04-23 RX ORDER — HYDROXYZINE HYDROCHLORIDE 25 MG/1
25 TABLET, FILM COATED ORAL NIGHTLY PRN
COMMUNITY

## 2021-04-23 NOTE — TELEPHONE ENCOUNTER
Marion calling requesting advice for blood pressure. She states she called 's office and was told  is out of town and she cant be seen. She states she went to her pcp yesterday and her metoprolol was stopped and she was started on Lisinopril-HCTZ 20-12.5. She states her bp this am is 227/119. Advised her to call 's office to speak with nurse and she said she already has and  is out of town.     I called 's office and spoke with triage nurse Carly. Per Carly, patient no show her last appt and before she could speak with the patient, patient had already hung up the phone.   Per Carly patient needs to go to the ER for evaluation.    I called and spoke with Marion advised per 's office to go to ER for evaluation and treatment of high blood pressure. This was agreeable to Marion and she verbalized understanding.

## 2021-04-23 NOTE — TELEPHONE ENCOUNTER
Agree that she needs to be seen in the ER today and then call to schedule an office follow-up.  Thanks!

## 2021-04-23 NOTE — ED NOTES
Patient was placed in face mask in first look.  Patient was wearing a face mask throughout our encounter.  I wore protective eye protection throughout the encounter.  Hand hygiene was performed before and after patient encounter.        Natasha Burden RN  04/23/21 [id number]

## 2021-04-23 NOTE — ED PROVIDER NOTES
EMERGENCY DEPARTMENT ENCOUNTER  Patient was placed in face mask in first look and the following protective measures were taken unless additional measures were taken and documented below in the ED course. Patient was wearing facemask when I entered the room and throughout our encounter. I wore full protective equipment throughout this patient encounter including a face mask, and gloves. Hand hygiene was performed before donning protective equipment and after removal when leaving the room.    Room Number:  36/36  Date of encounter:  4/23/2021  PCP: Alex Salazar MD    HPI:  Context: Marion Anderson is a 70 y.o. female who presents to the ED c/o chief complaint of elevated blood pressure.  Patient states that her blood pressure medication was switched from metoprolol to lisinopril/ hydrochlorothiazide yesterday.  Since then, patient has noted that her blood pressure has been elevated to as high as 220/119.  She denies chest pain, shortness of air or abdominal pain.  Has been using a home wrist cuff blood pressure cuff to give her her elevated readings.    MEDICAL HISTORY REVIEW  Reviewed in EPIC    PAST MEDICAL HISTORY  Active Ambulatory Problems     Diagnosis Date Noted   • Coronary artery disease of native heart with stable angina pectoris (CMS/[facility]) 10/07/2017   • CAD (coronary artery disease) 10/08/2017   • Subdural hematoma (CMS/[facility]) 10/19/2017   • S/P CABG x 4 01/18/2018   • S/P MVR (mitral valve repair) 01/18/2018   • Bimalleolar ankle fracture 12/04/2015   • Closed low lateral malleolus fracture 10/28/2014   • Essential hypertension 01/22/2019   • GERD (gastroesophageal reflux disease) 02/26/2018   • Hyperlipidemia LDL goal <70 01/22/2019   • Hypothyroidism due to Hashimoto's thyroiditis 09/05/2016   • Inversion sprain of ankle 10/28/2014   • Long term current use of therapeutic drug 08/22/2016   • Obesity, unspecified 08/16/2016   • Pain in joint involving ankle and foot 08/22/2016   • S/P PTCA  (percutaneous transluminal coronary angioplasty) 08/16/2016   • Screening for colon cancer 08/16/2016   • Vitamin D insufficiency 02/22/2017   • Insomnia 01/22/2019   • Bilateral carotid artery stenosis 02/22/2020   • Renal insufficiency 02/22/2020     Resolved Ambulatory Problems     Diagnosis Date Noted   • No Resolved Ambulatory Problems     Past Medical History:   Diagnosis Date   • Arthritis    • Coronary artery disease    • Disease of thyroid gland    • Hyperlipidemia    • Hypertension    • PONV (postoperative nausea and vomiting)    • Spinal headache        PAST SURGICAL HISTORY  Past Surgical History:   Procedure Laterality Date   • CARDIAC CATHETERIZATION Left 10/06/2017    [facility], Dr. THAD Grant, coronary arteriography   • CARDIAC SURGERY     • CORONARY ARTERY BYPASS GRAFT N/A 10/12/2017    Procedure: FILEMON STERNOTOMY CORONARY ARTERY BYPASS GRAFT TIMES 4  USING LEFT INTERNAL MAMMARY ARTERY AND LEFT GREATER SAPHENOUS VEIN GRAFT PER  ENDOSCOPIC VEIN HARVESTING, MITRAL VALVE REPAIR AND PRP;  Surgeon: Ramos Muñoz MD;  Location: [facility];  Service:    • FRACTURE SURGERY      Ankle/Leg   • TONSILLECTOMY         FAMILY HISTORY  Family History   Problem Relation Age of Onset   • Heart disease Mother    • Hypertension Mother    • Heart disease Father    • Hypertension Father    • Heart attack Father    • Cancer Brother    • Hypertension Brother        SOCIAL HISTORY  Social History     Socioeconomic History   • Marital status: Single     Spouse name: Not on file   • Number of children: Not on file   • Years of education: Not on file   • Highest education level: Not on file   Tobacco Use   • Smoking status: Never Smoker   • Smokeless tobacco: Never Used   Substance and Sexual Activity   • Alcohol use: No   • Drug use: No   • Sexual activity: Defer     Birth control/protection: Post-menopausal       ALLERGIES  Latex    The patient's allergies have been reviewed    REVIEW OF  SYSTEMS  All systems reviewed and negative except for those discussed in HPI.     PHYSICAL EXAM  I have reviewed the triage vital signs and nursing notes.  ED Triage Vitals   Temp Heart Rate Resp BP SpO2   04/23/21 1333 04/23/21 1333 04/23/21 1333 04/23/21 1404 04/23/21 1333   97.2 °F (36.2 °C) 112 17 126/85 97 %      Temp src Heart Rate Source Patient Position BP Location FiO2 (%)   04/23/21 1333 -- -- -- --   Tympanic             General: No acute distress.  HENT: NCAT, PERRL, Nares patent.  Eyes: no scleral icterus.  Neck: trachea midline, no ROM limitations.  CV: regular rhythm, regular rate.  Respiratory: normal effort, CTAB.  Abdomen: soft, nondistended, NTTP, no rebound tenderness, no guarding or rigidity  : deferred.  Musculoskeletal: no deformity.  Neuro: alert, moves all extremities, follows commands.  Skin: warm, dry.    LAB RESULTS  No results found for this or any previous visit (from the past 24 hour(s)).    I ordered the above labs and reviewed the results.    RADIOLOGY  No Radiology Exams Resulted Within Past 24 Hours    I ordered the above noted radiological studies. I reviewed the images and results. I agree with the radiologist interpretation.    PROCEDURES  Procedures    MEDICATIONS GIVEN IN ER  Medications - No data to display    PROGRESS, DATA ANALYSIS, CONSULTS, AND MEDICAL DECISION MAKING  A complete history and physical exam have been performed.  All available laboratory and imaging results have been reviewed by myself prior to disposition.    MDM  After the initial H&P, I discussed pertinent information from history and physical exam with patient/family.  Discussed differential diagnosis.  Discussed plan for ED evaluation/work-up/treatment.  All questions answered.  Patient/family is agreeable with plan.  ED Course as of Apr 23 1543   Fri Apr 23, 2021   1424 Patient's blood pressure here is 126/85.  Use her home blood pressure cuff and it read 208/98 in the same right arm.  Located  patient and  that there wrist blood pressure cuff machine is not accurate.  I do think it is safe to discharge her to home.    [DE]      ED Course User Index  [DE] Tru Coronado MD       AS OF 15:43 EDT VITALS:    BP - 137/79  HR - 82  TEMP - 97.2 °F (36.2 °C) (Tympanic)  O2 SATS - 94%    DIAGNOSIS  Final diagnoses:   Essential hypertension         DISPOSITION    DISCHARGE    Patient discharged in stable condition.    Reviewed implications of results, diagnosis, meds, responsibility to follow up, warning signs and symptoms of possible worsening, potential complications and reasons to return to ER.    Patient/Family voiced understanding of above instructions.    Discussed plan for discharge, as there is no emergent indication for admission. Patient referred to primary care provider for BP management due to today's BP. Pt/family is agreeable and understands need for follow up and repeat testing.  Pt is aware that discharge does not mean that nothing is wrong but it indicates no emergency is present that requires admission and they must continue care with follow-up as given below or physician of their choice.     FOLLOW-UP  Alex Salazar MD  [address] [id number] [phone]      As needed         Medication List      No changes were made to your prescriptions during this visit.          Tru Coronado MD  04/23/21 5144

## 2021-04-23 NOTE — TELEPHONE ENCOUNTER
"0914-  called asked for me to triage this pt for HTN and apt for \"today with Dr Fernando\"  While Margaret was giving me the information the pt hung up.    0920-inbound call from Daisy RICARDO at Dr Muñoz's office. Pt called there and said Dr Fernando was out and can't see her today.  Pt is reporting bp of 227/119.  Daisy is recommending the ER and I agree.    Pt no showed her last apt with CHICHO on 5/18/20.  We have not seen her in office since 2/18/20    Thanks  Carly Bermudez RN  Triage nurse      "

## 2021-04-23 NOTE — ED TRIAGE NOTES
States high BP X 2 days, PMD changed her meds around yesterday. States BP this AM  227/119 with HA    Mask placed on patient in triage. Triage staff wore appropriate PPE during interaction with patient.

## 2021-04-28 ENCOUNTER — TELEPHONE (OUTPATIENT)
Dept: CARDIOLOGY | Facility: CLINIC | Age: 71
End: 2021-04-28

## 2021-04-28 ENCOUNTER — OFFICE VISIT (OUTPATIENT)
Dept: CARDIOLOGY | Facility: CLINIC | Age: 71
End: 2021-04-28

## 2021-04-28 VITALS
WEIGHT: 238.8 LBS | DIASTOLIC BLOOD PRESSURE: 88 MMHG | SYSTOLIC BLOOD PRESSURE: 152 MMHG | HEART RATE: 111 BPM | BODY MASS INDEX: 39.79 KG/M2 | HEIGHT: 65 IN

## 2021-04-28 DIAGNOSIS — Z95.1 S/P CABG X 4: ICD-10-CM

## 2021-04-28 DIAGNOSIS — Z98.890 S/P MVR (MITRAL VALVE REPAIR): ICD-10-CM

## 2021-04-28 DIAGNOSIS — I10 ESSENTIAL HYPERTENSION: ICD-10-CM

## 2021-04-28 DIAGNOSIS — I25.10 CORONARY ARTERY DISEASE INVOLVING NATIVE CORONARY ARTERY OF NATIVE HEART WITHOUT ANGINA PECTORIS: Primary | ICD-10-CM

## 2021-04-28 PROCEDURE — 93000 ELECTROCARDIOGRAM COMPLETE: CPT | Performed by: NURSE PRACTITIONER

## 2021-04-28 PROCEDURE — 99214 OFFICE O/P EST MOD 30 MIN: CPT | Performed by: NURSE PRACTITIONER

## 2021-04-28 RX ORDER — METOPROLOL SUCCINATE 50 MG/1
75 TABLET, EXTENDED RELEASE ORAL DAILY
Qty: 45 TABLET | Refills: 0 | Status: SHIPPED | OUTPATIENT
Start: 2021-04-28 | End: 2021-04-28

## 2021-04-28 RX ORDER — METOPROLOL SUCCINATE 50 MG/1
75 TABLET, EXTENDED RELEASE ORAL DAILY
Qty: 45 TABLET | Refills: 0 | Status: SHIPPED | OUTPATIENT
Start: 2021-04-28 | End: 2021-05-03 | Stop reason: SDUPTHER

## 2021-04-28 NOTE — TELEPHONE ENCOUNTER
Ms. Anderson called today because last Thursday her BP medication was changed to lisinopril-hctz 20-12.5mg daily from metoprolol.  Since this, she went to the ED on Friday because she got a reading of 227/119 on her home BP cuff. It was 126/85 in the ED. She doesn't know her HR and hasn't checked her BP since her Ed visit. She feels like the med is keeping her BP down however, she has developed extreme fatigue and weakness, diarrhea and a cough.    She is requesting to see someone today and I added her to your schedule at 1230 today.    Thank you,  Kenna Molina RN  [facility]  Triage

## 2021-04-28 NOTE — TELEPHONE ENCOUNTER
Please see if she can bring her blood pressure cuff with her to the office when she comes in today so we can check it for accuracy.

## 2021-04-28 NOTE — PROGRESS NOTES
Date of Office Visit: 2021  Encounter Provider: Teresa Chapman, MARIBEL, APRN  Place of Service: [facility] CARDIOLOGY  Patient Name: Marion Anderson  :1950        Subjective:     Chief Complaint:  Hypertension, coronary disease, mitral valve repair      History of Present Illness:  Marion Anderson is a 70 y.o. female patient of Dr. Fernando.  I am seeing this patient in the office today and I reviewed her records.    Patient has a history of hypertension, hyperlipidemia, coronary artery disease, mitral valve repair    PER PREVIOUS OFFICE NOTE: In  she was found to have LAD and right coronary artery stenosis for which she underwent stenting.  Patient was seen in 2017 for progressive chest pain.  Cardiac catheterization showed multivessel coronary artery disease including left main disease.  She developed ventricular fibrillation in the Cath Lab and was successfully cardioverted.  On 2017 she had a LIMA graft to the LAD, vein graft to the right coronary artery, vein graft to ramus intermedius branch, vein graft to diagonal branch of the LAD.  She also had mitral valve repair with a 24 mm ring.  Postoperative course was complicated by encephalopathy CT imaging showed prior infarct.  She was treated with Keppra for a short period of time.  She had carotid Doppler imaging notes severe bilateral carotid artery disease/carotid occlusion which is felt to be difficult to revascularize and for which she was seen by Dr. Prado. THE FOLLOWING IS MY CONTRIBUTION TO NOTE: Follow-up echo 3/2020 showed normal LV systolic function with EF of 63%, grade 2 diastolic dysfunction, mitral valve ring with mitral valve gradient of 3 mmHg and no regurgitation or stenosis.    Patient went to the ER 2021 after reporting a blood pressure reading of 220/119 after being switched to the previous day for metoprolol to lisinopril/HCTZ.  Her blood pressure in the ER was 126/85 with her  home cuff reading 208/98 and therefore home cuff was not felt to be accurate.  CBC was normal, metabolic panel showed creatinine of 1.0 and EGFR 55, normal K+, normal magnesium.      Patient presents to office today for follow-up appointment.  Patient reports that she was getting blood pressure readings at home over 200 systolic and saw primary care who stopped her metoprolol XL and changed her to lisinopril/HCTZ.  She went to the ER the next day and had her blood pressure cuff with her and it read 208/98 and manually it was 126/85 and therefore home cuff was not felt to be accurate.  She was instructed to follow-up with cardiology as an outpatient.  She reports that since she has been on the lisinopril/HCTZ she has had a dry cough and also been having diarrhea and just generally does not feel good.  She has yet to purchase a new blood pressure cuff.  She does not think she is reacting well to the new medication.  She has some chronic shortness of breath with exertion which is without significant changes.  Prior to being on this new medication and feeling poorly she was walking at the park and doing well with this but has had too much diarrhea the last couple of days to do this.  Denies any true palpitations.  She denies any chest pain or discomfort, lower extremity edema, dizziness, syncope, falls, or abnormal bleeding.  Heart rate is elevated in the office today after abruptly stopping the beta-blocker.        Past Medical History:   Diagnosis Date   • Arthritis    • Bilateral carotid artery stenosis 2/22/2020   • Coronary artery disease    • Disease of thyroid gland    • Hyperlipidemia    • Hypertension    • PONV (postoperative nausea and vomiting)    • Spinal headache      Past Surgical History:   Procedure Laterality Date   • CARDIAC CATHETERIZATION Left 10/06/2017    [facility] Dr. THAD Grant, coronary arteriography   • CARDIAC SURGERY     • CORONARY ARTERY BYPASS GRAFT N/A  10/12/2017    Procedure: FILEMON STERNOTOMY CORONARY ARTERY BYPASS GRAFT TIMES 4  USING LEFT INTERNAL MAMMARY ARTERY AND LEFT GREATER SAPHENOUS VEIN GRAFT PER  ENDOSCOPIC VEIN HARVESTING, MITRAL VALVE REPAIR AND PRP;  Surgeon: Ramos Muñoz MD;  Location: [facility];  Service:    • FRACTURE SURGERY      Ankle/Leg   • TONSILLECTOMY       Outpatient Medications Prior to Visit   Medication Sig Dispense Refill   • amoxicillin (AMOXIL) 875 MG tablet Take 875 mg by mouth 2 (Two) Times a Day. For dental procedures     • aspirin 81 MG chewable tablet Chew 81 mg Daily.     • Cholecalciferol (VITAMIN D3) 25 MCG (1000 UT) capsule Take 25 mcg by mouth Daily.     • ezetimibe (ZETIA) 10 MG tablet Take 10 mg by mouth Daily.     • hydrOXYzine (ATARAX) 25 MG tablet Take 25 mg by mouth At Night As Needed for Itching.     • levothyroxine (SYNTHROID, LEVOTHROID) 125 MCG tablet Take 125 mcg by mouth Daily.     • MELATONIN GUMMIES PO Take 10 mg by mouth At Night As Needed.     • Multiple Vitamin (MULTI-DAY PO) Take 1 tablet by mouth Daily.     • nitroglycerin (NITROSTAT) 0.3 MG SL tablet Place 0.3 mg under the tongue.     • Omega-3 Fatty Acids (FISH OIL) 1000 MG capsule capsule Take  by mouth Daily With Breakfast.     • rosuvastatin (CRESTOR) 40 MG tablet rosuvastatin 40 mg tablet     • traMADol (ULTRAM) 50 MG tablet Take 50 mg by mouth As Needed.     • lisinopril-hydrochlorothiazide (PRINZIDE,ZESTORETIC) 20-12.5 MG per tablet Take 1 tablet by mouth Daily.     • Biotin 1000 MCG tablet Take 1,000 mcg by mouth 3 (Three) Times a Day.     • levothyroxine (Euthyrox) 125 MCG tablet Take 125 mcg by mouth Daily.       No facility-administered medications prior to visit.       Allergies as of 04/28/2021 - Reviewed 04/28/2021   Allergen Reaction Noted   • Latex Itching and Other (See Comments) 12/03/2015     Social History     Socioeconomic History   • Marital status: Single     Spouse name: Not on file   • Number of children: Not on file   •  "Years of education: Not on file   • Highest education level: Not on file   Tobacco Use   • Smoking status: Never Smoker   • Smokeless tobacco: Never Used   Substance and Sexual Activity   • Alcohol use: No   • Drug use: No   • Sexual activity: Defer     Birth control/protection: Post-menopausal     Family History   Problem Relation Age of Onset   • Heart disease Mother    • Hypertension Mother    • Heart disease Father    • Hypertension Father    • Heart attack Father    • Cancer Brother    • Hypertension Brother        Review of Systems   Constitutional: Positive for malaise/fatigue (Recently on new med).   Cardiovascular:        SEE HPI   Hematologic/Lymphatic: Negative for bleeding problem.   Musculoskeletal: Negative for falls.   Gastrointestinal: Positive for diarrhea (\"since on lisinopril/ HCTZ\" ). Negative for melena.   Genitourinary: Negative for hematuria.   Neurological: Negative for dizziness.   Psychiatric/Behavioral: Negative for altered mental status.          Objective:     Vitals:    04/28/21 1222   BP: 152/88   BP Location: Right arm   Cuff Size: Large Adult   Pulse: 111   Weight: 108 kg (238 lb 12.8 oz)   Height: 165.1 cm (65\")     Body mass index is 39.74 kg/m².      PHYSICAL EXAM:  Constitutional:       General: Not in acute distress.     Appearance: Well-developed. Not diaphoretic.   Eyes:      Pupils: Pupils are equal, round, and reactive to light.   HENT:      Head: Normocephalic and atraumatic.   Neck:      Vascular: No JVD.   Pulmonary:      Effort: Pulmonary effort is normal. No respiratory distress.      Breath sounds: Normal breath sounds. No wheezing. No rales.   Cardiovascular:      Normal rate. Regular rhythm.      Murmurs: There is no murmur.      No gallop. No click. No rub.   Edema:     Peripheral edema absent.   Abdominal:      General: Bowel sounds are normal. There is no distension.      Palpations: Abdomen is soft.   Musculoskeletal:         General: No tenderness or deformity. "      Cervical back: Neck supple. Skin:     General: Skin is warm and dry.      Findings: No erythema or rash.   Neurological:      Mental Status: Alert and oriented to person, place, and time.   Psychiatric:         Behavior: Behavior normal.         Judgment: Judgment normal.             ECG 12 Lead    Date/Time: 4/28/2021 1:06 PM  Performed by: Teresa Chapman DNP, APRN  Authorized by: Teresa Chapman DNP, PANKAJ   Comparison: compared with previous ECG from 2/18/2020  Rhythm: sinus tachycardia  Comments: Heart rate increased on EKG today compared with previous, otherwise no significant changes.              Assessment:       Diagnosis Plan   1. Coronary artery disease involving native coronary artery of native heart without angina pectoris     2. S/P CABG x 4     3. S/P MVR (mitral valve repair)     4. Essential hypertension           Plan:     1. Hypertension: She does not feel like she is tolerating the new medication.  She was feeling well on the metoprolol XL.  Her home blood pressure cuff was not accurate and blood pressure was well controlled in the ER.  We will go ahead and stop the lisinopril/HCTZ and resume the 75 mg of metoprolol XL daily.  She is going to get a new blood pressure cuff, recommended Omron or ReliOn arm cuff.  She is going to schedule blood pressure check appointment for 1 week and bring her cuff and log with her but call sooner if blood pressure becomes elevated at which point we will likely increase metoprolol XL further versus adding another medication.  2. Mild tachycardia: After stopping metoprolol XL abruptly.  She will continue to monitor heart rate and to ensure this improves after resuming medication.  Also may be getting a little dehydrated from the HCTZ.  3. Coronary artery disease: With history of stenting and subsequent coronary artery bypass grafting in 2017.  Denies anginal symptoms.  4. Status post mitral valve repair: Continue with SBE prophylaxis--she reports she  uses this diligently prior to dental visits.  No audible murmur on exam.  Recent echo within the last year showed valve repair looks good.  5. Diarrhea and dry cough: She will call PCP if symptoms worsen or if this does not improve after stopping the lisinopril/HCTZ.  ER for any severe symptoms.  6. Hyperlipidemia: Endocrinology managing.  LDL goal less than 70.  7. Carotid artery disease: Follows with Dr. Prado--she reports she is overdue for office follow-up, will call to schedule.  Hypotension has been avoided for this reason.  8. Renal insufficiency: Renal function stable on recent labs.  Plan as above.  9. Hypothyroidism: Managed by outside provider    Patient to schedule blood pressure check appointment in 1 week.  Follow-up with Dr. Fernando in 6 months or sooner if needed for any new, recurrent, or worsening symptoms or other issues or concerns.  Discussed in detail signs/symptoms that warrant sooner call or follow-up to the office or ER visit.      Records reviewed including but not limited to 2017 EKG, 2/2020 EKG, 3/2020 echo, 4/2021 ER note, 4/2021 metabolic panel, magnesium level, CBC.           Your medication list          Accurate as of April 28, 2021  1:07 PM. If you have any questions, ask your nurse or doctor.            START taking these medications      Instructions Last Dose Given Next Dose Due   metoprolol succinate XL 50 MG 24 hr tablet  Commonly known as: TOPROL-XL  Started by: Teresa Chapman, DNP, APRN      Take 1.5 tablets by mouth Daily.          CONTINUE taking these medications      Instructions Last Dose Given Next Dose Due   amoxicillin 875 MG tablet  Commonly known as: AMOXIL      Take 875 mg by mouth 2 (Two) Times a Day. For dental procedures       aspirin 81 MG chewable tablet      Chew 81 mg Daily.       ezetimibe 10 MG tablet  Commonly known as: ZETIA      Take 10 mg by mouth Daily.       fish oil 1000 MG capsule capsule      Take  by mouth Daily With Breakfast.       hydrOXYzine  25 MG tablet  Commonly known as: ATARAX      Take 25 mg by mouth At Night As Needed for Itching.       levothyroxine 125 MCG tablet  Commonly known as: SYNTHROID, LEVOTHROID      Take 125 mcg by mouth Daily.       MELATONIN GUMMIES PO      Take 10 mg by mouth At Night As Needed.       multivitamin tablet tablet  Commonly known as: THERAGRAN      Take 1 tablet by mouth Daily.       nitroglycerin 0.3 MG SL tablet  Commonly known as: NITROSTAT      Place 0.3 mg under the tongue.       rosuvastatin 40 MG tablet  Commonly known as: CRESTOR      rosuvastatin 40 mg tablet       traMADol 50 MG tablet  Commonly known as: ULTRAM      Take 50 mg by mouth As Needed.       Vitamin D3 25 MCG (1000 UT) capsule      Take 25 mcg by mouth Daily.          STOP taking these medications    lisinopril-hydrochlorothiazide 20-12.5 MG per tablet  Commonly known as: KIZZY SOLOMONSTORETIC  Stopped by: Teresa Chapman DNP, APRN              Where to Get Your Medications      These medications were sent to [facility] [address] [place] [address] [id number] [place] [address] [phone]  - [phone] [address]    Phone: [phone]   · metoprolol succinate XL 50 MG 24 hr tablet         The above medication changes may not have been made by this provider.  Patient's medication list was updated to reflect medications they are currently taking including medication changes made by other providers.            Thanks,    Teresa Chapman DNP, APRN  04/28/2021         Dictated utilizing Dragon dictation

## 2021-04-29 ENCOUNTER — TELEPHONE (OUTPATIENT)
Dept: CARDIOLOGY | Facility: CLINIC | Age: 71
End: 2021-04-29

## 2021-04-29 DIAGNOSIS — I65.23 BILATERAL CAROTID ARTERY STENOSIS: Primary | ICD-10-CM

## 2021-04-30 ENCOUNTER — TELEPHONE (OUTPATIENT)
Dept: CARDIOLOGY | Facility: CLINIC | Age: 71
End: 2021-04-30

## 2021-04-30 DIAGNOSIS — I10 ESSENTIAL HYPERTENSION: Primary | ICD-10-CM

## 2021-05-03 RX ORDER — METOPROLOL SUCCINATE 100 MG/1
100 TABLET, EXTENDED RELEASE ORAL DAILY
Qty: 30 TABLET | Refills: 0 | Status: SHIPPED | OUTPATIENT
Start: 2021-05-03 | End: 2021-10-19

## 2021-05-03 NOTE — TELEPHONE ENCOUNTER
5/3/21  I spoke with patient - gave her these instructions/information.  She has bp check this week here in the ofc./tracey

## 2021-05-03 NOTE — TELEPHONE ENCOUNTER
Lets have her increase her metoprolol XL to 100 mg daily (I have sent in a higher dose tablet to her pharmacy).  Continue to monitor blood pressure at least 1 to 2 hours after morning medication after sitting calmly 5 to 10 minutes.  Call if blood pressure staying greater than 130/80 on average in 1 to 2 weeks or call sooner for symptoms or concerns or heart rate less than 50.

## 2021-05-05 ENCOUNTER — TELEPHONE (OUTPATIENT)
Dept: CARDIOLOGY | Facility: CLINIC | Age: 71
End: 2021-05-05

## 2021-05-05 NOTE — TELEPHONE ENCOUNTER
She is fine to go ahead and take the 100 mg.  She should have been advised only to call for heart rate less than 50.  Call for heart rate below 50 or other issues or concerns.  Okay to try taking her metoprolol at bedtime if it is causing fatigue.  If blood pressure still staying greater than 130/80 on average after being on the increased dose for 1 week or call sooner for issues or concerns.

## 2021-05-05 NOTE — TELEPHONE ENCOUNTER
Nael Saeed is calling in to let you know her bp is 165/93 hr 60.  She was told to call if her heart rate was 60 or below.  She is asking if she should take metoprolol 100mg or 50mg since her heart rate is 60.  I have advised her to go ahead a take 50mg now.  Is she ok to continue on 100mg per day?  She is feeling fine, just tired.  Please let me know how to proceed  Thanks  Carly Bermudez RN  Triage nurse

## 2021-05-05 NOTE — TELEPHONE ENCOUNTER
Pt notified and verbalized understanding    Pt is going to start taking the metoprolol 50mg BID as suggested to see if it helps her fatigue.  Thanks  Carly Bermudez RN  Triage nurse

## 2021-05-06 ENCOUNTER — CLINICAL SUPPORT (OUTPATIENT)
Dept: CARDIOLOGY | Facility: CLINIC | Age: 71
End: 2021-05-06

## 2021-05-06 NOTE — PROGRESS NOTES
Patient here for BP follow up  BP in office today 140/82 pulse 73  Patient is on Metoprolol succ 100mg 1/2 bid      5/5/21 11:00 am 165/93 pulse 60 took her medication at 11:05   5/5/21 12:30 pm 139/93 p 65    5/6/21 144/88 PULSE 66 BEFORE MEDS.  5/6/21 12.05 /95 PULSE 66 TOOK MEDS AT 11:00 AM    Per Dr. Fernando  Patient to monitor her BP over the weekend and call readings in  Wants BP around 130/80 and to call  Office on Monday with readings   If Pulse less then 50 call us. Should have machine checked either by us or her PCP

## 2021-05-11 ENCOUNTER — TELEPHONE (OUTPATIENT)
Dept: CARDIOLOGY | Facility: CLINIC | Age: 71
End: 2021-05-11

## 2021-05-11 DIAGNOSIS — I10 ESSENTIAL HYPERTENSION: Primary | ICD-10-CM

## 2021-05-12 RX ORDER — AMLODIPINE BESYLATE 2.5 MG/1
2.5 TABLET ORAL DAILY
Qty: 30 TABLET | Refills: 0 | Status: SHIPPED | OUTPATIENT
Start: 2021-05-12 | End: 2021-05-26 | Stop reason: SDUPTHER

## 2021-05-17 ENCOUNTER — CLINICAL SUPPORT (OUTPATIENT)
Dept: CARDIOLOGY | Facility: CLINIC | Age: 71
End: 2021-05-17

## 2021-05-17 VITALS — DIASTOLIC BLOOD PRESSURE: 97 MMHG | HEART RATE: 62 BPM | SYSTOLIC BLOOD PRESSURE: 146 MMHG

## 2021-05-21 ENCOUNTER — TELEPHONE (OUTPATIENT)
Dept: CARDIOLOGY | Facility: CLINIC | Age: 71
End: 2021-05-21

## 2021-05-21 NOTE — TELEPHONE ENCOUNTER
Pt called with update BP Readings:    5/18 155/98 55 140/91 68  5/19 126/85 64 155/108 66  5/20 168/100 63 132/86 63 155/108 66    Pt takes medication at 9am and 9pm    Amlodipine 5mg in the AM  Metoprolol 50mg BID

## 2021-05-21 NOTE — TELEPHONE ENCOUNTER
It has been less than a week since we increase the amlodipine to 5 mg a day.  Would have her continue with 5 mg of amlodipine as well as the metoprolol at current dose.  Make sure she is checking heart rate and blood pressure at least 1 to 2 hours after the morning medication and after sitting down calmly a good 10 minutes.  Please also see if she has room to cut back on high salt foods and ease into a light walking routine and slowly increase as tolerated.  Call with some updated heart rate and blood pressure readings in 1 week or sooner for issues or concerns.  If blood pressure still elevated in a week then we will likely increase amlodipine further but call first.

## 2021-05-21 NOTE — TELEPHONE ENCOUNTER
Spoke with pt.  Verbalized understanding.  No other questions.  She will call back in 1 week.  (Done)

## 2021-05-26 ENCOUNTER — TELEPHONE (OUTPATIENT)
Dept: CARDIOLOGY | Facility: CLINIC | Age: 71
End: 2021-05-26

## 2021-05-26 DIAGNOSIS — I10 ESSENTIAL HYPERTENSION: ICD-10-CM

## 2021-05-26 RX ORDER — AMLODIPINE BESYLATE 10 MG/1
10 TABLET ORAL DAILY
Qty: 30 TABLET | Refills: 0 | Status: SHIPPED | OUTPATIENT
Start: 2021-05-26 | End: 2021-12-02

## 2021-05-26 NOTE — TELEPHONE ENCOUNTER
Lets increase amlodipine to 10 mg daily in the morning.  Have her call back with some updated heart rate and blood pressure readings in 1 to 2-week if blood pressure still staying greater than 130/80 on average.  Really need to work on healthy diet and exercise and avoiding high salt foods as well.  If blood pressure remains elevated despite increase medicine dose then may need to consider renal artery ultrasounds.

## 2021-05-26 NOTE — TELEPHONE ENCOUNTER
Pt is calling re: BP Readings    140/89 71 159/103   65  154/85 64 141/93     77  144/80 65 this was taken at appt with Dr Prado  149/94 68 160/94     69    Current Meds:  Metoprolol 50mg BID  Amlodipine 2.5mg si mg in the AM    She is checking 1-2 hours after, no salt.

## 2021-05-28 ENCOUNTER — TELEPHONE (OUTPATIENT)
Dept: CARDIOLOGY | Facility: CLINIC | Age: 71
End: 2021-05-28

## 2021-05-28 RX ORDER — AMOXICILLIN 500 MG/1
2000 CAPSULE ORAL SEE ADMIN INSTRUCTIONS
Qty: 12 CAPSULE | Refills: 0 | Status: SHIPPED | OUTPATIENT
Start: 2021-05-28

## 2021-05-28 NOTE — TELEPHONE ENCOUNTER
Spoke with pt.  Verbalized understanding.  She does want to know if she can take 5mg BID.  She is still having intermittent diarrhea sx and has called her PCP and has appt on Tuesday.  She thinks the BP meds are the cause of this issues.    Please advise.

## 2021-05-28 NOTE — TELEPHONE ENCOUNTER
Left voicemail for patient advised that we were calling in amoxicillin and it is ok to proceed with the extraction and cleaning as well as gave your recommendations. Also advised patient to please call office so we can verify her understanding and to answer any additional questions she may have.

## 2021-05-28 NOTE — TELEPHONE ENCOUNTER
Amlodipine usually causes constipation.  Would have her follow-up with PCP on this.  Okay to try 5 mg twice a day.  Call if blood pressure staying greater than 130/80.

## 2021-05-28 NOTE — TELEPHONE ENCOUNTER
Normally discussed with the provider who last saw this patient indicated just saw her.  Please forward

## 2021-05-28 NOTE — TELEPHONE ENCOUNTER
As long as she is not having any cardiac symptoms and blood pressure is controlled then okay to proceed with tooth cleaning and extraction.  They are not using anesthesia for this, correct?  Just local anesthetic?  I have sent in amoxicillin for antibiotic prophylaxis.  She needs to take 4 tablets 1 time 1 hour prior to start time of procedure.  She needs to coordinate timing of this with her dentist.  Recommend avoiding epinephrine-containing agents.  Recommend not holding aspirin unless absolutely necessary at which point would have them contact us for clearance and would also need clearance from vascular.

## 2021-05-28 NOTE — TELEPHONE ENCOUNTER
Patient has an appointment on 6/2/2021 fir a teeth cleaning and on 6/7/2021 she has an appointment for a tooth extraction, that she has held off for a long time. She is wanting to make sure that both of these procedures are ok with Dr Fernando for her to do. Please advise.

## 2021-06-04 ENCOUNTER — TELEPHONE (OUTPATIENT)
Dept: CARDIOLOGY | Facility: CLINIC | Age: 71
End: 2021-06-04

## 2021-06-04 DIAGNOSIS — I10 ESSENTIAL HYPERTENSION: Primary | ICD-10-CM

## 2021-06-04 RX ORDER — LOSARTAN POTASSIUM 25 MG/1
25 TABLET ORAL DAILY
Qty: 30 TABLET | Refills: 0 | Status: SHIPPED | OUTPATIENT
Start: 2021-06-04 | End: 2021-06-17 | Stop reason: SDUPTHER

## 2021-06-04 NOTE — TELEPHONE ENCOUNTER
Called and left a message on patient's voicemail with Megan's instructions.  I asked that she please call me back to let me know she received the message. / KAMAR

## 2021-06-04 NOTE — TELEPHONE ENCOUNTER
Pt called re: BP Readings.    172/109 72 151/91 67 155/90 65  151/93 76  140/91 66  142/85 69 153/93 93  155/96 85 166/96 73  181/113 82 156/101 75    Current Meds:  Amlodipine 5mg BID  Metoprolol 50mg BID    Pt does not have any palpitations, some tightness with yardwork but better with rest, no SOA, she is fatigued, no edema.    Pt said she really would like to go back to Lisinopril/HCTZ 20-12.5mg.  She said PCP gave her an IBS medication that has helped diarrhea and she would like to retry Lisinopril/HCTZ again.

## 2021-06-04 NOTE — TELEPHONE ENCOUNTER
Let's try losartan 25mg daily in the AM.  This is very similar to lisinopril but does not usually cause dry cough and not as hard on the kidneys as HCTZ.  Needs to stay hydrated and avoid NSAIDs.  Call with some updated heart rate and blood pressure readings in 1 week or sooner for issues or concerns.  We will also need repeat metabolic panel at the outpatient lab in about 2 weeks but Call for readings first in case we need to increase dose further prior to rechecking labs.

## 2021-06-12 ENCOUNTER — TELEPHONE (OUTPATIENT)
Dept: CARDIOLOGY | Facility: CLINIC | Age: 71
End: 2021-06-12

## 2021-06-12 DIAGNOSIS — I10 ESSENTIAL HYPERTENSION: Primary | ICD-10-CM

## 2021-06-12 NOTE — TELEPHONE ENCOUNTER
Patient called to report a svetlana horse this morning that lasted approximately a minute. She had been notified to call with any change in symptoms so she wanted to let us know. She started her losartan last week and she was unsure if that was related. /95, HR 61.     Spoke with Dr. Conner. Advised her to continue the losartan for now. Recommended she hold the losartan and return call with any recurrence of symptoms. Patient verbalized understanding.

## 2021-06-14 NOTE — TELEPHONE ENCOUNTER
Do not think that this was likely related but please have her go the outpatient lab today to get repeat metabolic panel.  Please see if she has any other updated blood pressure readings since starting on the low-dose losartan.

## 2021-06-14 NOTE — TELEPHONE ENCOUNTER
BP still little high at times but overall improved.  Would continue to avoid high salt foods and continue with easing into a walking routine and continue to monitor and call if staying greater than 130/80 on average at which point may need to adjust medication further.    Okay to get tooth extracted as long as no new or worsening symptoms since last visit.  Please remind her about SBE prophylaxis.  Would avoid epinephrine-containing agents and continue beta-blocker periprocedurally.

## 2021-06-14 NOTE — TELEPHONE ENCOUNTER
Spoke with pt.  Verbalized understanding.  She will head over for the labs.       BP Readings:  156/90 67 141/86 66  164/109 68 132/94 61  105/78 67 123/83 63  139/96 71 133/84 68  118/80 73    She is also wanting to know to know if she can have tooth extraction?

## 2021-06-16 ENCOUNTER — LAB (OUTPATIENT)
Dept: LAB | Facility: HOSPITAL | Age: 71
End: 2021-06-16

## 2021-06-16 DIAGNOSIS — I10 ESSENTIAL HYPERTENSION: ICD-10-CM

## 2021-06-16 LAB
ANION GAP SERPL CALCULATED.3IONS-SCNC: 9.8 MMOL/L (ref 5–15)
BUN SERPL-MCNC: 20 MG/DL (ref 8–23)
BUN/CREAT SERPL: 17.9 (ref 7–25)
CALCIUM SPEC-SCNC: 9.6 MG/DL (ref 8.6–10.5)
CHLORIDE SERPL-SCNC: 105 MMOL/L (ref 98–107)
CO2 SERPL-SCNC: 28.2 MMOL/L (ref 22–29)
CREAT SERPL-MCNC: 1.12 MG/DL (ref 0.57–1)
GFR SERPL CREATININE-BSD FRML MDRD: 48 ML/MIN/1.73
GLUCOSE SERPL-MCNC: 97 MG/DL (ref 65–99)
POTASSIUM SERPL-SCNC: 4.4 MMOL/L (ref 3.5–5.2)
SODIUM SERPL-SCNC: 143 MMOL/L (ref 136–145)

## 2021-06-16 PROCEDURE — 36415 COLL VENOUS BLD VENIPUNCTURE: CPT

## 2021-06-16 PROCEDURE — 80048 BASIC METABOLIC PNL TOTAL CA: CPT

## 2021-06-17 RX ORDER — LOSARTAN POTASSIUM 25 MG/1
25 TABLET ORAL DAILY
Qty: 90 TABLET | Refills: 0 | Status: SHIPPED | OUTPATIENT
Start: 2021-06-17 | End: 2021-10-01 | Stop reason: SDUPTHER

## 2021-06-17 NOTE — TELEPHONE ENCOUNTER
Spoke with pt.  Verbalized understanding.  She will schedule tooth extraction.  No CP, no SOA, no palpitations.  She did have labs yesterday.

## 2021-06-17 NOTE — TELEPHONE ENCOUNTER
Called and had a long detailed discussion with patient.  She believes that the higher blood pressure readings have been when she has not waited a full hour or 2 after taking her morning medications.  She reports her blood pressure overall is much improved and she feels like she is tolerating the medication well.  She is going to increase her water intake and decrease intake of sugary drinks and caffeine as she feels like she has not been doing a good job of this recently.  She is also going to ease into a more regular exercise routine and continue to avoid high salt foods.  She will call if blood pressure staying greater than 130/80 on average.  We will look at rechecking a BMP in about 1 month to ensure things look stable.  Creatinine a little bit higher than what it was last time it was checked but overall pretty similar to baseline of what has been over the last year or so.  She will keep her December follow-up as scheduled but call sooner for any issues or concerns or if blood pressure stays high prior to that time.

## 2021-07-21 ENCOUNTER — TREATMENT (OUTPATIENT)
Dept: PHYSICAL THERAPY | Facility: CLINIC | Age: 71
End: 2021-07-21

## 2021-07-21 DIAGNOSIS — G89.29 CHRONIC PAIN OF BOTH KNEES: Primary | ICD-10-CM

## 2021-07-21 DIAGNOSIS — R29.898 BILATERAL LEG WEAKNESS: ICD-10-CM

## 2021-07-21 DIAGNOSIS — M25.562 CHRONIC PAIN OF BOTH KNEES: Primary | ICD-10-CM

## 2021-07-21 DIAGNOSIS — R52 PAIN AGGRAVATED BY SITTING: ICD-10-CM

## 2021-07-21 DIAGNOSIS — M25.561 CHRONIC PAIN OF BOTH KNEES: Primary | ICD-10-CM

## 2021-07-21 PROCEDURE — 97110 THERAPEUTIC EXERCISES: CPT | Performed by: PHYSICAL THERAPIST

## 2021-07-21 PROCEDURE — 97162 PT EVAL MOD COMPLEX 30 MIN: CPT | Performed by: PHYSICAL THERAPIST

## 2021-07-21 NOTE — PROGRESS NOTES
Physical Therapy Initial Evaluation and Plan of Care      Patient: Marion Anderson   : 1950  Diagnosis/ICD-10 Code:  Chronic pain of both knees [M25.561, M25.562, G89.29]  Referring practitioner: Alex Salazar MD  Date of Initial Visit: 2021  Today's Date: 2021  Patient seen for 1 sessions           Subjective Evaluation    History of Present Illness  Mechanism of injury: History of right knee arthroscopic surgery and right ankle ORIF.  She reports no recent falls or fracture.   Her knees hurt with prolonged sitting in car. She has difficulty getting up and down from chair. She is not walking as well.  She reports that she has not been active and has gained weight in last year.  PMH: Heart bypass 2017.      Pain  Current pain ratin  At best pain rating: 3  At worst pain ratin  Location: Knees  Relieving factors: ice (topical cream)  Aggravating factors: prolonged positioning and ambulation (standing up)    Social Support  Lives in: one-story house  Lives with: spouse    Patient Goals  Patient goals for therapy: decreased pain and increased strength             Objective          Static Posture     Knee   Genu valgus.     Observations     Right Knee   Positive for edema.       Tenderness     Additional Tenderness Details  Right knee tender anteriorly     Active Range of Motion   Left Knee   Flexion: 100 degrees   Extension: 2 degrees     Right Knee   Flexion: 102 degrees   Extension: 5 degrees     Passive Range of Motion   Left Knee   Flexion: 120 degrees     Right Knee   Flexion: 123 degrees   Extension: 2 degrees     Strength/Myotome Testing     Left Hip   Planes of Motion   Flexion: 3-  External rotation: 3    Right Hip   Planes of Motion   Flexion: 3-  External rotation: 3-    Left Knee   Flexion: 4-  Extension: 4-    Right Knee   Flexion: 3+  Extension: 3+    Left Ankle/Foot   Dorsiflexion: 4  Inversion: 3+  Eversion: 4-    Right Ankle/Foot   Dorsiflexion: 4  Inversion:  3+  Eversion: 4-    Ambulation   Weight-Bearing Status   Weight-Bearing Status (Left): weight-bearing as tolerated   Weight-Bearing Status (Right): weight-bearing as tolerated    Assistive device used: none    Observational Gait   Gait: antalgic   Decreased walking speed, stride length and left stance time.     Functional Assessment     Comments  Requires B hand support sit to stand  Difficulty with bed mobility         See Exercise, Manual, and Modality Logs for complete treatment.   Issued HEP    Functional Outcome Score: KOS ADL score 58% ability that is 42% disability         Assessment & Plan     Assessment  Impairments: abnormal gait, abnormal muscle firing, abnormal muscle tone, abnormal or restricted ROM, activity intolerance, impaired balance, impaired physical strength, lacks appropriate home exercise program and pain with function  Assessment details: Marion Anderson is a 70 y.o. year-old female referred to physical therapy for bilateral knee pain. She presents with a evolving clinical presentation.  She has comorbidities of obesity, history of right lower leg and ankle surgery, cardiac history and no known personal factors that may affect her progress in the plan of care.  Signs and symptoms are consistent with physical therapy diagnosis of bilateral knee pain with muscle weakness, impaired functional mobility and deconditioning.   Prognosis: good  Functional Limitations: sleeping, walking, uncomfortable because of pain, moving in bed, sitting, standing, stooping and unable to perform repetitive tasks  Goals  Plan Goals: PT Goal Recert Due Date: 10/19/21  STG Date to Achieve: 08/27/21     STG 1.  Patient will demonstrate an independent HEP for knee strength and ROM/flexibility.    STG 2  Patient will increase B leg strength to 4-/5     STG 3. Patient will stand from chair 5 times without hand support    STG 4 Patient will report decreased knee pain from 3-710 to 1-5/10     LT Date to Achieve:  10/19/21    LTG 1. Patient will increase knee AROM to 0-110 degrees    LTG 2 Patient will increase B leg strength to 4/5 to minimize knee giving     LTG 3: Patient will report decreased functional disability on KOS ADL score from 42 % to 32 %    LTG 4 Patient will report minimal knee pain while in car for 30 min rated 0-3/10    LTG 5 Patient will walk without limp        Plan  Therapy options: will be seen for skilled physical therapy services  Planned modality interventions: cryotherapy  Other planned modality interventions: aquatic therapy  Planned therapy interventions: balance/weight-bearing training, flexibility, functional ROM exercises, home exercise program, joint mobilization, therapeutic activities, stretching, strengthening, soft tissue mobilization, neuromuscular re-education, manual therapy, body mechanics training, gait training and transfer training  Frequency: 2x week  Duration in visits: 20  Duration in weeks: 12  Treatment plan discussed with: patient        Timed:  Manual Therapy:    0     mins  77162;  Therapeutic Exercise:    15     mins  48322;     Neuromuscular Rina:    0    mins  07475;    Therapeutic Activity:     0     mins  93409;     Gait Trainin     mins  86978;     Ultrasound:     0     mins  28523;    Electrical Stimulation:    0     mins  03819 ( );  Iontophoresis    0     mins 69097  Dry Needling   0     mins      Untimed:  Electrical Stimulation:    0     mins  80911 (MC );  Mechanical Traction:    0     mins  20354;     Timed Treatment:   15   mins   Total Treatment:     45   mins    PT SIGNATURE: Jennifer Moreno, PT   DATE TREATMENT INITIATED: 2021    Medicare Initial Certification  Certification Period: 10/19/2021  I certify that the therapy services are furnished while this patient is under my care.  The services outlined above are required by this patient, and will be reviewed every 90 days.     PHYSICIAN: Alex Salazar MD      DATE:     Please sign  and return via fax to [phone] Thank you, [facility].

## 2021-07-27 ENCOUNTER — TREATMENT (OUTPATIENT)
Dept: PHYSICAL THERAPY | Facility: CLINIC | Age: 71
End: 2021-07-27

## 2021-07-27 DIAGNOSIS — M25.561 CHRONIC PAIN OF BOTH KNEES: Primary | ICD-10-CM

## 2021-07-27 DIAGNOSIS — R29.898 BILATERAL LEG WEAKNESS: ICD-10-CM

## 2021-07-27 DIAGNOSIS — G89.29 CHRONIC PAIN OF BOTH KNEES: Primary | ICD-10-CM

## 2021-07-27 DIAGNOSIS — R52 PAIN AGGRAVATED BY SITTING: ICD-10-CM

## 2021-07-27 DIAGNOSIS — M25.562 CHRONIC PAIN OF BOTH KNEES: Primary | ICD-10-CM

## 2021-07-27 PROCEDURE — 97110 THERAPEUTIC EXERCISES: CPT | Performed by: PHYSICAL THERAPIST

## 2021-07-27 NOTE — PROGRESS NOTES
Physical Therapy Daily Progress Note    Patient: Marion Anderson   : 1950  Diagnosis/ICD-10 Code:  Chronic pain of both knees [M25.561, M25.562, G89.29]  Referring practitioner: Alex Salazar MD  Date of Initial Visit: Type: THERAPY  Noted: 2021  Today's Date: 2021  Patient seen for 2 sessions           Subjective Knees a little sore maybe from new exercises    Objective   See Exercise, Manual, and Modality Logs for complete treatment.       Assessment/Plan    Patient is walking a little better with a more fluid gait and slightly faster.  Knee pain is still worst with sitting while driving.  She will try to move the left knee more at stop lights.  Quads are very weak with exercises and do place some strain on her knees as they get tired.             Timed:    Manual Therapy:    0     mins  04530;  Therapeutic Exercise:    45     mins  15818;     Neuromuscular Rina:    0    mins  09616;    Therapeutic Activity:     0     mins  00298;     Gait Trainin     mins  82260;     Ultrasound:     0     mins  38560;    Electrical Stimulation:    0     mins  98277 ( );  Iontophoresis    0     mins 56752;  Aquatic Therapy    0     mins 61998;  Dry Needling              0     mins    Untimed:  Electrical Stimulation:    0     mins  62980 ( );  Mechanical Traction:    0     mins  41567;     Timed Treatment:   45   mins   Total Treatment:     45   mins  Jennifer Moreno PT  Physical Therapist

## 2021-08-02 ENCOUNTER — TREATMENT (OUTPATIENT)
Dept: PHYSICAL THERAPY | Facility: CLINIC | Age: 71
End: 2021-08-02

## 2021-08-02 DIAGNOSIS — R52 PAIN AGGRAVATED BY SITTING: ICD-10-CM

## 2021-08-02 DIAGNOSIS — M25.561 CHRONIC PAIN OF BOTH KNEES: Primary | ICD-10-CM

## 2021-08-02 DIAGNOSIS — R29.898 BILATERAL LEG WEAKNESS: ICD-10-CM

## 2021-08-02 DIAGNOSIS — M25.562 CHRONIC PAIN OF BOTH KNEES: Primary | ICD-10-CM

## 2021-08-02 DIAGNOSIS — G89.29 CHRONIC PAIN OF BOTH KNEES: Primary | ICD-10-CM

## 2021-08-02 PROCEDURE — 97110 THERAPEUTIC EXERCISES: CPT | Performed by: PHYSICAL THERAPIST

## 2021-08-02 NOTE — PROGRESS NOTES
Physical Therapy Daily Progress Note    Patient: Marion Anderson   : 1950  Diagnosis/ICD-10 Code:  Chronic pain of both knees [M25.561, M25.562, G89.29]  Referring practitioner: Alex Salazar MD  Date of Initial Visit: Type: THERAPY  Noted: 2021  Today's Date: 2021  Patient seen for 3 sessions           Subjective I did more activity this weekend    Objective   See Exercise, Manual, and Modality Logs for complete treatment.       Assessment/Plan    Patient reports increased walking activity this weekend. Core mat exercises remain challenging to her strength.        Timed:    Manual Therapy:    0     mins  99656;  Therapeutic Exercise:    42     mins  50801;     Neuromuscular Rina:    0    mins  11535;    Therapeutic Activity:     0     mins  14326;     Gait Trainin     mins  60512;     Ultrasound:     0     mins  48916;    Electrical Stimulation:    0     mins  29785 ( );  Iontophoresis    0     mins 70302;  Aquatic Therapy    0     mins 75572;  Dry Needling              0     mins    Untimed:  Electrical Stimulation:    0     mins  17863 ( );  Mechanical Traction:    0     mins  32984;     Timed Treatment:   42   mins   Total Treatment:     42   mins  Jennifer Moreno, PT  Physical Therapist

## 2021-08-09 ENCOUNTER — TELEPHONE (OUTPATIENT)
Dept: CARDIOLOGY | Facility: CLINIC | Age: 71
End: 2021-08-09

## 2021-08-09 NOTE — TELEPHONE ENCOUNTER
She reports she had no symptoms or exertional issues in the pool with the 45-60 minutes water aerobics, felt really good with it, but after she was getting out of the pool legs felt heavy, no associated symptoms.  She was concerned she over-did it and may be started out too fast with her exercise routine.  Did not check BP and has not been checking.  Denies chest pain, shortness of breath, or previous anginal equivalent.  Denies palpitations or dizziness.     I do think it sounds like she overdid it.  I recommended starting with 15 minutes of the class for the first week and then if she does well with that going up to 20 minutes of the class for the next week and continuing to add about 5 minutes each week as long as she continues to do well.  I also recommended that she check her blood pressure today and start to continue to monitor as she gets into this exercise routine and call for systolic blood pressure less than 110, heart rate less than 50, or other issues or concerns.  If she does not feel if she is able to build up some stamina or if she develops other symptoms or concerns she will contact the office right away, ER if severe or unrelieved however she feels that she likely started out to fast with her exercise and I agree with her evaluation.  She will call in a week or 2 with an update or sooner for issues or concerns.

## 2021-08-09 NOTE — TELEPHONE ENCOUNTER
Patient called and stated that Dr Fernando recommend she try water aerobics. She was able to do so and stated that immediately afterwards she had difficulty walking, legs felt very heavy and had a hard time moving in general. She is asking if it is safe for her to continue doing the water aerobics and if you believe it to still be beneficial. Please advise.

## 2021-08-10 ENCOUNTER — TREATMENT (OUTPATIENT)
Dept: PHYSICAL THERAPY | Facility: CLINIC | Age: 71
End: 2021-08-10

## 2021-08-10 DIAGNOSIS — M25.562 CHRONIC PAIN OF BOTH KNEES: Primary | ICD-10-CM

## 2021-08-10 DIAGNOSIS — M25.561 CHRONIC PAIN OF BOTH KNEES: Primary | ICD-10-CM

## 2021-08-10 DIAGNOSIS — R52 PAIN AGGRAVATED BY SITTING: ICD-10-CM

## 2021-08-10 DIAGNOSIS — R29.898 BILATERAL LEG WEAKNESS: ICD-10-CM

## 2021-08-10 DIAGNOSIS — G89.29 CHRONIC PAIN OF BOTH KNEES: Primary | ICD-10-CM

## 2021-08-10 PROCEDURE — 97110 THERAPEUTIC EXERCISES: CPT | Performed by: PHYSICAL THERAPIST

## 2021-08-10 NOTE — PROGRESS NOTES
Physical Therapy Daily Progress Note    Patient: Marion Anderson   : 1950  Diagnosis/ICD-10 Code:  Chronic pain of both knees [M25.561, M25.562, G89.29]  Referring practitioner: Alex Salazar MD  Date of Initial Visit: Type: THERAPY  Noted: 2021  Today's Date: 8/10/2021  Patient seen for 4 sessions           Subjective I did a 45 min aqua aerobic class and I was not able to get out of the pool without effort.    Objective   See Exercise, Manual, and Modality Logs for complete treatment.   Discussed starting aqua class with 15 min and working up slowly  Quad sets caused pain to her knees so she stopped.    Assessment/Plan    She does not have as much strength or endurance with gym exercises today and easily feels hot.        Timed:    Manual Therapy:    0     mins  35088;  Therapeutic Exercise:    50     mins  06476;     Neuromuscular Rina:    0    mins  81252;    Therapeutic Activity:     0     mins  91466;     Gait Trainin     mins  84102;     Ultrasound:     0     mins  47523;    Electrical Stimulation:    0     mins  48054 ( );  Iontophoresis    0     mins 95079;  Aquatic Therapy    0     mins 65742;  Dry Needling              0     mins    Untimed:  Electrical Stimulation:    0     mins  73406 ( );  Mechanical Traction:    0     mins  21480;     Timed Treatment:   50   mins   Total Treatment:     50   mins  Jennifer Moreno, PT  Physical Therapist

## 2021-08-12 ENCOUNTER — TREATMENT (OUTPATIENT)
Dept: PHYSICAL THERAPY | Facility: CLINIC | Age: 71
End: 2021-08-12

## 2021-08-12 DIAGNOSIS — R52 PAIN AGGRAVATED BY SITTING: ICD-10-CM

## 2021-08-12 DIAGNOSIS — M25.562 CHRONIC PAIN OF BOTH KNEES: Primary | ICD-10-CM

## 2021-08-12 DIAGNOSIS — M25.561 CHRONIC PAIN OF BOTH KNEES: Primary | ICD-10-CM

## 2021-08-12 DIAGNOSIS — R29.898 BILATERAL LEG WEAKNESS: ICD-10-CM

## 2021-08-12 DIAGNOSIS — G89.29 CHRONIC PAIN OF BOTH KNEES: Primary | ICD-10-CM

## 2021-08-12 PROCEDURE — 97110 THERAPEUTIC EXERCISES: CPT | Performed by: PHYSICAL THERAPIST

## 2021-08-12 NOTE — PROGRESS NOTES
Physical Therapy Daily Progress Note    Patient: Marion Anderson   : 1950  Diagnosis/ICD-10 Code:  Chronic pain of both knees [M25.561, M25.562, G89.29]  Referring practitioner: Alex Salazar MD  Date of Initial Visit: Type: THERAPY  Noted: 2021  Today's Date: 2021  Patient seen for 5 sessions           Subjective I have more energy today.    Objective   See Exercise, Manual, and Modality Logs for complete treatment.   Re-issued aquatic HEP    Assessment/Plan    She did better with exercises today with greater energy.  Standing up from chair is effort for her quads.         Timed:    Manual Therapy:    0     mins  30972;  Therapeutic Exercise:    43     mins  24266;     Neuromuscular Rina:    0    mins  43353;    Therapeutic Activity:     0     mins  50631;     Gait Trainin     mins  52257;     Ultrasound:     0     mins  88448;    Electrical Stimulation:    0     mins  77992 (MC );  Iontophoresis    0     mins 20251;  Aquatic Therapy    0     mins 17779;  Dry Needling              0     mins    Untimed:  Electrical Stimulation:    0     mins  71331 (MC );  Mechanical Traction:    0     mins  83149;     Timed Treatment:   43   mins   Total Treatment:     43   mins  Jennifer Moreno, PT  Physical Therapist

## 2021-08-16 NOTE — TELEPHONE ENCOUNTER
Pt called with BP Readings.    119/69 63  127/78 65  137/79 64  132/79 68  136/77 56  122/72 54    She will be starting back at water exercise today.  She will starting with 15 min and work her way up as tolerated.

## 2021-08-18 NOTE — TELEPHONE ENCOUNTER
Patient states she has Stopped knee exercises due to pain in the left side of her legs as well as back.

## 2021-08-18 NOTE — TELEPHONE ENCOUNTER
She called about a week or so ago and stated she was unable to do water aerobics due to her legs feeling heavy and having a difficult time walking. CHICHO spoke to her and advised her to call in about 2 weeks and let us know how she was doing.

## 2021-08-20 NOTE — TELEPHONE ENCOUNTER
These are little high at times but overall well look okay for right now.  Would continue to monitor and call if staying greater than 130/80 on average or call for systolic blood pressure reading less than 110.  Agree with easing back into water aerobics and starting slowly and increasing as tolerated.  Have her let us know if she develops issues with this.   20-Aug-2021 14:11

## 2021-08-24 ENCOUNTER — TREATMENT (OUTPATIENT)
Dept: PHYSICAL THERAPY | Facility: CLINIC | Age: 71
End: 2021-08-24

## 2021-08-24 DIAGNOSIS — G89.29 CHRONIC PAIN OF BOTH KNEES: Primary | ICD-10-CM

## 2021-08-24 DIAGNOSIS — M25.562 CHRONIC PAIN OF BOTH KNEES: Primary | ICD-10-CM

## 2021-08-24 DIAGNOSIS — R29.898 BILATERAL LEG WEAKNESS: ICD-10-CM

## 2021-08-24 DIAGNOSIS — M25.561 CHRONIC PAIN OF BOTH KNEES: Primary | ICD-10-CM

## 2021-08-24 DIAGNOSIS — R52 PAIN AGGRAVATED BY SITTING: ICD-10-CM

## 2021-08-24 PROCEDURE — 97110 THERAPEUTIC EXERCISES: CPT | Performed by: PHYSICAL THERAPIST

## 2021-08-24 NOTE — PROGRESS NOTES
30-Day / 10-Visit Progress Note         Patient: Marion Anderson   : 1950  Diagnosis/ICD-10 Code:  Chronic pain of both knees [M25.561, M25.562, G89.29]  Referring practitioner: Alex Salazar MD  Date of Initial Visit: Type: THERAPY  Noted: 2021  Today's Date: 2021  Patient seen for 6 sessions      Subjective:     Clinical Progress: improved  Home Program Compliance: Yes  Treatment has included:  therapeutic exercise and patient education with home exercise program     Subjective Evaluation    Pain  Current pain ratin  At best pain ratin  At worst pain ratin  Location: Knees       I hurt my lower back and it went down my leg after lifting a heavy bag of pet food. Knee hurts when I sit with it bent for a period of time.     Objective          Active Range of Motion   Left Knee   Flexion: 92 degrees   Extension: 10 degrees     Right Knee   Flexion: 97 degrees with pain  Extension: 2 degrees     Strength/Myotome Testing     Left Hip   Planes of Motion   Flexion: 4-    Right Hip   Planes of Motion   Flexion: 4    Left Knee   Flexion: 4-  Extension: 4-    Right Knee   Flexion: 4-  Extension: 4    Ambulation     Observational Gait   Decreased walking speed.         See Exercise, Manual, and Modality Logs for complete treatment.         Assessment & Plan     Assessment  Impairments: abnormal gait, abnormal muscle firing, abnormal muscle tone, abnormal or restricted ROM, activity intolerance, impaired balance, impaired physical strength, lacks appropriate home exercise program and pain with function  Assessment details: Marion Anderson is a 70 y.o. year-old female referred to physical therapy for bilateral knee pain. She has comorbidities of obesity, history of right lower leg and ankle surgery, and cardiac history.  She reports that she strained her left lower back lifting heavy bag of dog food last week that radiated down her left leg that is better today but she did not do any  exercises since injury.  Her knee pain is worse at night and with prolonged sitting. She is still weak in her legs and has painful and stiff knee ROM. She is trying to implement aquatic exercise starting with 15 min. She will benefit to continue therapy for strength, ROM and functional mobility.   Prognosis: good  Functional Limitations: sleeping, walking, uncomfortable because of pain, moving in bed, sitting, standing, stooping and unable to perform repetitive tasks  Goals  Plan Goals: PT Goal Recert Due Date: 10/19/21  STG Date to Achieve: 08/27/21     STG 1.  Patient will demonstrate an independent HEP for knee strength and ROM/flexibility.  Met    STG 2  Patient will increase B leg strength to 4-/5   Met  STG 3. Patient will stand from chair 5 times without hand support  Ongoing   STG 4 Patient will report decreased knee pain from 3-710 to 1-5/10   Ongoing 0-7/10    LTG Date to Achieve: 10/19/21    LTG 1. Patient will increase knee AROM to 0-110 degrees  Ongoing   LTG 2 Patient will increase B leg strength to 4/5 to minimize knee giving   Ongoing   LTG 3: Patient will report decreased functional disability on KOS ADL score from 42 % to 32 %  Ongoing   LTG 4 Patient will report minimal knee pain while in car for 30 min rated 0-3/10  Ongoing   LTG 5 Patient will walk without limp  Ongoing       Plan  Therapy options: will be seen for skilled physical therapy services  Planned modality interventions: cryotherapy  Other planned modality interventions: aquatic therapy  Planned therapy interventions: balance/weight-bearing training, flexibility, functional ROM exercises, home exercise program, joint mobilization, therapeutic activities, stretching, strengthening, soft tissue mobilization, neuromuscular re-education, manual therapy, body mechanics training, gait training and transfer training  Frequency: 2x week  Duration in visits: 20  Duration in weeks: 12  Treatment plan discussed with:  patient           Recommendations: Continue as planned  Timeframe: 6 weeks  Prognosis to achieve goals: good    PT Signature: Jennifer Moreno, BRO      Based upon review of the patient's progress and continued therapy plan, it is my medical opinion that Marion Anderson should continue physical therapy treatment at [facility]  [address] [place] [address]  [phone].    Signature: __________________________________  Alex Salazar MD    Timed:  Manual Therapy:    0     mins  22989;  Therapeutic Exercise:    45     mins  68124;     Neuromuscular Rina:    0    mins  93655;    Therapeutic Activity:     0     mins  34460;     Gait Trainin     mins  07885;     Ultrasound:     0     mins  68184;    Electrical Stimulation:    0     mins  49941 ( );  Iontophoresis    0     mins 47164;  Dry Needling              0     mins    Untimed:  Electrical Stimulation:    0     mins  70822 ( );  Mechanical Traction:    0     mins  87634;     Timed Treatment:   45   mins   Total Treatment:     45   mins

## 2021-08-31 ENCOUNTER — TREATMENT (OUTPATIENT)
Dept: PHYSICAL THERAPY | Facility: CLINIC | Age: 71
End: 2021-08-31

## 2021-08-31 DIAGNOSIS — M25.561 CHRONIC PAIN OF BOTH KNEES: Primary | ICD-10-CM

## 2021-08-31 DIAGNOSIS — G89.29 CHRONIC PAIN OF BOTH KNEES: Primary | ICD-10-CM

## 2021-08-31 DIAGNOSIS — R29.898 BILATERAL LEG WEAKNESS: ICD-10-CM

## 2021-08-31 DIAGNOSIS — R52 PAIN AGGRAVATED BY SITTING: ICD-10-CM

## 2021-08-31 DIAGNOSIS — M25.562 CHRONIC PAIN OF BOTH KNEES: Primary | ICD-10-CM

## 2021-08-31 PROCEDURE — 97110 THERAPEUTIC EXERCISES: CPT | Performed by: PHYSICAL THERAPIST

## 2021-08-31 NOTE — PROGRESS NOTES
Physical Therapy Daily Progress Note    Patient: Marion Anderson   : 1950  Diagnosis/ICD-10 Code:  Chronic pain of both knees [M25.561, M25.562, G89.29]  Referring practitioner: Alex Salazar MD  Date of Initial Visit: Type: THERAPY  Noted: 2021  Today's Date: 2021  Patient seen for 7 sessions           Subjective My knees feel pretty good.  I am tired from having to do a lot of running in the car and I have not made it to the pool.    Objective   See Exercise, Manual, and Modality Logs for complete treatment.   Encouraged going to gym.and discussed what exercises she can choose from when desired machine not available.     Assessment/Plan    Knee pain remains low.  Her activity is still overall more sedentary without much exercise.  Patient would benefit to get to gym more often for land or aquatic exercise.  She easily gets tired and that discourages her from exercise.   Plan to show her Octane.       Timed:    Manual Therapy:    0     mins  74121;  Therapeutic Exercise:    39     mins  04252;     Neuromuscular Rina:    0    mins  74373;    Therapeutic Activity:     0     mins  34003;     Gait Trainin     mins  29994;     Ultrasound:     0     mins  13348;    Electrical Stimulation:    0     mins  44012 ( );  Iontophoresis    0     mins 96728;  Aquatic Therapy    0     mins 05118;  Dry Needling              0     mins    Untimed:  Electrical Stimulation:    0     mins  43615 ( );  Mechanical Traction:    0     mins  54045;     Timed Treatment:   39   mins   Total Treatment:     39   mins  Jennifer Moreno, PT  Physical Therapist

## 2021-09-14 ENCOUNTER — TREATMENT (OUTPATIENT)
Dept: PHYSICAL THERAPY | Facility: CLINIC | Age: 71
End: 2021-09-14

## 2021-09-14 DIAGNOSIS — R52 PAIN AGGRAVATED BY SITTING: ICD-10-CM

## 2021-09-14 DIAGNOSIS — M25.562 CHRONIC PAIN OF BOTH KNEES: Primary | ICD-10-CM

## 2021-09-14 DIAGNOSIS — G89.29 CHRONIC PAIN OF BOTH KNEES: Primary | ICD-10-CM

## 2021-09-14 DIAGNOSIS — R29.898 BILATERAL LEG WEAKNESS: ICD-10-CM

## 2021-09-14 DIAGNOSIS — M25.561 CHRONIC PAIN OF BOTH KNEES: Primary | ICD-10-CM

## 2021-09-14 PROCEDURE — 97110 THERAPEUTIC EXERCISES: CPT | Performed by: PHYSICAL THERAPIST

## 2021-09-14 NOTE — PROGRESS NOTES
Physical Therapy Daily Progress Note    Patient: Marion Anderson   : 1950  Diagnosis/ICD-10 Code:  Chronic pain of both knees [M25.561, M25.562, G89.29]  Referring practitioner: Alex Salazar MD  Date of Initial Visit: Type: THERAPY  Noted: 2021  Today's Date: 2021  Patient seen for 8 sessions           Subjective I have been really busy and not able to get to pool. My knees and ankles are sore.  I think it may be the weather.     Objective   See Exercise, Manual, and Modality Logs for complete treatment.       Assessment/Plan    Marion is a little stronger with exercises.  She walks slowly due to knees and ankles.       Timed:    Manual Therapy:    0     mins  20628;  Therapeutic Exercise:    43     mins  91574;     Neuromuscular Rina:    0    mins  63477;    Therapeutic Activity:     0     mins  74676;     Gait Trainin     mins  37657;     Ultrasound:     0     mins  86076;    Electrical Stimulation:    0     mins  99166 ( );  Iontophoresis    0     mins 61974;  Aquatic Therapy    0     mins 43040;  Dry Needling              0     mins    Untimed:  Electrical Stimulation:    0     mins  54956 (MC );  Mechanical Traction:    0     mins  19224;     Timed Treatment:   43   mins   Total Treatment:     43   mins  Jennifer Moreno PT  Physical Therapist

## 2021-09-23 ENCOUNTER — TREATMENT (OUTPATIENT)
Dept: PHYSICAL THERAPY | Facility: CLINIC | Age: 71
End: 2021-09-23

## 2021-09-23 DIAGNOSIS — G89.29 CHRONIC PAIN OF BOTH KNEES: Primary | ICD-10-CM

## 2021-09-23 DIAGNOSIS — M25.561 CHRONIC PAIN OF BOTH KNEES: Primary | ICD-10-CM

## 2021-09-23 DIAGNOSIS — M25.562 CHRONIC PAIN OF BOTH KNEES: Primary | ICD-10-CM

## 2021-09-23 DIAGNOSIS — R29.898 BILATERAL LEG WEAKNESS: ICD-10-CM

## 2021-09-23 DIAGNOSIS — R52 PAIN AGGRAVATED BY SITTING: ICD-10-CM

## 2021-09-23 PROCEDURE — 97110 THERAPEUTIC EXERCISES: CPT | Performed by: PHYSICAL THERAPIST

## 2021-09-23 NOTE — PROGRESS NOTES
30-Day / 10-Visit Progress Note         Patient: Marion Anderson   : 1950  Diagnosis/ICD-10 Code:  Chronic pain of both knees [M25.561, M25.562, G89.29]  Referring practitioner: Alex Salazar MD  Date of Initial Visit: Type: THERAPY  Noted: 2021  Today's Date: 2021  Patient seen for 9 sessions      Subjective:     Clinical Progress: improved  Home Program Compliance: Yes  Treatment has included:  therapeutic exercise and patient education with home exercise program     Subjective Evaluation    Pain  Current pain ratin  At best pain ratin  At worst pain ratin  Location: Knees         Objective          Active Range of Motion   Left Knee   Flexion: 93 degrees   Extension: 15 degrees     Right Knee   Flexion: 104 degrees   Extension: 8 degrees     Strength/Myotome Testing     Left Knee   Flexion: 3+  Extension: 3+    Right Knee   Flexion: 4-  Extension: 4-    Ambulation   Weight-Bearing Status   Weight-Bearing Status (Left): weight-bearing as tolerated   Weight-Bearing Status (Right): weight-bearing as tolerated    Assistive device used: none    Observational Gait   Gait: antalgic   Decreased walking speed, stride length, left step length and right step length.   Left foot contact pattern: foot flat  Right foot contact pattern: foot flat        See Exercise, Manual, and Modality Logs for complete treatment.       Assessment & Plan     Assessment  Impairments: abnormal gait, abnormal muscle firing, abnormal muscle tone, abnormal or restricted ROM, activity intolerance, impaired balance, impaired physical strength, lacks appropriate home exercise program and pain with function  Assessment details: Marion Anderson is a 70 y.o. year-old female referred to physical therapy for bilateral knee pain. She has comorbidities of obesity, history of right lower leg and ankle surgery, and cardiac history.  Her knee pain is worse at night and with prolonged sitting. She is still weak in her legs  and has painful and stiff knee ROM. Progress has been limited in past month with ear ache, fatigue and with trying to do more household activities.  She will benefit to continue therapy for strength, ROM and functional mobility.   Prognosis: good  Functional Limitations: sleeping, walking, uncomfortable because of pain, moving in bed, sitting, standing, stooping and unable to perform repetitive tasks  Goals  Plan Goals: PT Goal Recert Due Date: 10/19/21  STG Date to Achieve: 08/27/21     STG 1.  Patient will demonstrate an independent HEP for knee strength and ROM/flexibility.  Met    STG 2  Patient will increase B leg strength to 4-/5   Met  STG 3. Patient will stand from chair 5 times without hand support  Ongoing   STG 4 Patient will report decreased knee pain from 3-710 to 1-5/10   Ongoing 0-7/10    LTG Date to Achieve: 10/19/21    LTG 1. Patient will increase knee AROM to 0-110 degrees  Ongoing   LTG 2 Patient will increase B leg strength to 4/5 to minimize knee giving   Ongoing   LTG 3: Patient will report decreased functional disability on KOS ADL score from 42 % to 32 %  Ongoing   LTG 4 Patient will report minimal knee pain while in car for 30 min rated 0-3/10  Ongoing   LTG 5 Patient will walk without limp  Ongoing       Plan  Therapy options: will be seen for skilled physical therapy services  Planned modality interventions: cryotherapy  Other planned modality interventions: aquatic therapy  Planned therapy interventions: balance/weight-bearing training, flexibility, functional ROM exercises, home exercise program, joint mobilization, therapeutic activities, stretching, strengthening, soft tissue mobilization, neuromuscular re-education, manual therapy, body mechanics training, gait training and transfer training  Frequency: 2x week  Duration in visits: 20  Duration in weeks: 12  Treatment plan discussed with: patient           Recommendations: Continue as planned  Timeframe: 1 month  Prognosis to achieve  goals: good    PT Signature: Jennifer Moreno, PT      Based upon review of the patient's progress and continued therapy plan, it is my medical opinion that Marion Anderson should continue physical therapy treatment at [facility]  [address] [place] [address] [address]   [place] [address]  [phone].    Signature: __________________________________  Alex Salazar MD    Timed:  Manual Therapy:    0     mins  07098;  Therapeutic Exercise:    42     mins  16953;     Neuromuscular Rina:    0    mins  89730;    Therapeutic Activity:     0     mins  87501;     Gait Trainin     mins  01003;     Ultrasound:     0     mins  97683;    Electrical Stimulation:    0     mins  62210 ( );  Iontophoresis    0     mins 40240;  Dry Needling              0     mins    Untimed:  Electrical Stimulation:    0     mins  14068 ( );  Mechanical Traction:    0     mins  97022;     Timed Treatment:   42   mins   Total Treatment:     42   mins

## 2021-09-28 ENCOUNTER — TREATMENT (OUTPATIENT)
Dept: PHYSICAL THERAPY | Facility: CLINIC | Age: 71
End: 2021-09-28

## 2021-09-28 DIAGNOSIS — G89.29 CHRONIC PAIN OF BOTH KNEES: Primary | ICD-10-CM

## 2021-09-28 DIAGNOSIS — M25.562 CHRONIC PAIN OF BOTH KNEES: Primary | ICD-10-CM

## 2021-09-28 DIAGNOSIS — R29.898 BILATERAL LEG WEAKNESS: ICD-10-CM

## 2021-09-28 DIAGNOSIS — M25.561 CHRONIC PAIN OF BOTH KNEES: Primary | ICD-10-CM

## 2021-09-28 DIAGNOSIS — R52 PAIN AGGRAVATED BY SITTING: ICD-10-CM

## 2021-09-28 PROCEDURE — 97110 THERAPEUTIC EXERCISES: CPT | Performed by: PHYSICAL THERAPIST

## 2021-09-28 NOTE — PROGRESS NOTES
Physical Therapy Daily Progress Note    Patient: Marion Anderson   : 1950  Diagnosis/ICD-10 Code:  Chronic pain of both knees [M25.561, M25.562, G89.29]  Referring practitioner: Alex Salazar MD  Date of Initial Visit: Type: THERAPY  Noted: 2021  Today's Date: 2021  Patient seen for 10 sessions           Subjective My knees are not bothering me today but my back is.  I have been stressed more and not sleeping as well.    Objective   See Exercise, Manual, and Modality Logs for complete treatment.       Assessment/Plan    Marion has more energy and leg strength to perform more leg exercises today with better endurance today.Good progress noted today.        Timed:    Manual Therapy:    0     mins  62911;  Therapeutic Exercise:    43     mins  14951;     Neuromuscular Rina:    0    mins  79237;    Therapeutic Activity:     0     mins  44721;     Gait Trainin     mins  19619;     Ultrasound:     0     mins  33154;    Electrical Stimulation:    0     mins  89676 ( );  Iontophoresis    0     mins 22312;  Aquatic Therapy    0     mins 11048;  Dry Needling              0     mins    Untimed:  Electrical Stimulation:    0     mins  09643 ( );  Mechanical Traction:    0     mins  21205;     Timed Treatment:   43   mins   Total Treatment:     43   mins  Jennifer Moreno PT  Physical Therapist

## 2021-10-01 DIAGNOSIS — I10 ESSENTIAL HYPERTENSION: ICD-10-CM

## 2021-10-01 RX ORDER — LOSARTAN POTASSIUM 25 MG/1
25 TABLET ORAL DAILY
Qty: 90 TABLET | Refills: 0 | Status: SHIPPED | OUTPATIENT
Start: 2021-10-01 | End: 2021-12-02 | Stop reason: SDUPTHER

## 2021-10-06 ENCOUNTER — TREATMENT (OUTPATIENT)
Dept: PHYSICAL THERAPY | Facility: CLINIC | Age: 71
End: 2021-10-06

## 2021-10-06 DIAGNOSIS — R52 PAIN AGGRAVATED BY SITTING: ICD-10-CM

## 2021-10-06 DIAGNOSIS — M25.561 CHRONIC PAIN OF BOTH KNEES: Primary | ICD-10-CM

## 2021-10-06 DIAGNOSIS — R29.898 BILATERAL LEG WEAKNESS: ICD-10-CM

## 2021-10-06 DIAGNOSIS — G89.29 CHRONIC PAIN OF BOTH KNEES: Primary | ICD-10-CM

## 2021-10-06 DIAGNOSIS — M25.562 CHRONIC PAIN OF BOTH KNEES: Primary | ICD-10-CM

## 2021-10-06 PROCEDURE — 97110 THERAPEUTIC EXERCISES: CPT | Performed by: PHYSICAL THERAPIST

## 2021-10-06 NOTE — PROGRESS NOTES
Physical Therapy Daily Progress Note    Patient: Marion Anderson   : 1950  Diagnosis/ICD-10 Code:  Chronic pain of both knees [M25.561, M25.562, G89.29]  Referring practitioner: Alex Salazar MD  Date of Initial Visit: Type: THERAPY  Noted: 2021  Today's Date: 10/6/2021  Patient seen for 11 sessions           Subjective I'm tired.    Objective   See Exercise, Manual, and Modality Logs for complete treatment.       Assessment/Plan    Marion reports knee pain increases after sitting for about 1 hour.  Although she still walks slowly and was more tired today she was able to perform more exercise for second session of increased activity.  She has not been to the gym to exercise as her energy has been spent doing household activities.       Timed:    Manual Therapy:    0     mins  48803;  Therapeutic Exercise:    45     mins  68069;     Neuromuscular Rina:    0    mins  75187;    Therapeutic Activity:     0     mins  02534;     Gait Trainin     mins  62144;     Ultrasound:     0     mins  49507;    Electrical Stimulation:    0     mins  58507 ( );  Iontophoresis    0     mins 74381;  Aquatic Therapy    0     mins 91523;  Dry Needling              0     mins    Untimed:  Electrical Stimulation:    0     mins  00558 ( );  Mechanical Traction:    0     mins  07006;     Timed Treatment:   45   mins   Total Treatment:     45   mins  Jennifer Moreno, PT  Physical Therapist

## 2021-10-07 NOTE — PROGRESS NOTES
Pt present in office to for a BP Check. Allergies and Medications reviewed and verified. Consulted w/ PANKAJ Moore and she recommended re-checking both Manually and w/ Home Cuff before pt leaves. Darlin stated if readings are w/in 10 points of each other, pt's hm cuff is considered accurate. Last Reading were as follows Manual- 144/88 HR 62; HM Cuff 146/97 HR 62. So, pt's cuff is considered accurate. Upon Darlin's recommendations, pt is to increase Amlodipine 5 mg QD. Pt is to continue recording BP's BID for the next wk and follow up w/ readings Monday. I advised pt that due to doubling up on Amlodipine to call if she begins to run out before Monday. Pt verbalized all understanding. Pt released to go home.    SWETHA Batista  
VTE (venous thromboembolism)

## 2021-10-19 RX ORDER — METOPROLOL SUCCINATE 50 MG/1
50 TABLET, EXTENDED RELEASE ORAL 2 TIMES DAILY
Qty: 180 TABLET | Refills: 0 | Status: SHIPPED | OUTPATIENT
Start: 2021-10-19 | End: 2021-12-02 | Stop reason: SDUPTHER

## 2021-10-19 NOTE — TELEPHONE ENCOUNTER
10/19/21  Spoke with patient - states she is taking 50mg of metoprolol bid and losartan 25mg qd.  /tracey - please change the rx and fill - pt is out of the metoprolol/tracey

## 2021-10-21 ENCOUNTER — TREATMENT (OUTPATIENT)
Dept: PHYSICAL THERAPY | Facility: CLINIC | Age: 71
End: 2021-10-21

## 2021-10-21 DIAGNOSIS — M25.561 CHRONIC PAIN OF BOTH KNEES: Primary | ICD-10-CM

## 2021-10-21 DIAGNOSIS — R52 PAIN AGGRAVATED BY SITTING: ICD-10-CM

## 2021-10-21 DIAGNOSIS — G89.29 CHRONIC PAIN OF BOTH KNEES: Primary | ICD-10-CM

## 2021-10-21 DIAGNOSIS — R29.898 BILATERAL LEG WEAKNESS: ICD-10-CM

## 2021-10-21 DIAGNOSIS — M25.562 CHRONIC PAIN OF BOTH KNEES: Primary | ICD-10-CM

## 2021-10-21 PROCEDURE — 97110 THERAPEUTIC EXERCISES: CPT | Performed by: PHYSICAL THERAPIST

## 2021-10-21 NOTE — PROGRESS NOTES
Physical Therapy Re-certification         Patient: Marion Anderson   : 1950  Diagnosis/ICD-10 Code:  Chronic pain of both knees [M25.561, M25.562, G89.29]  Referring practitioner: Alex Salazar MD  Date of Initial Visit: Type: THERAPY  Noted: 2021  Today's Date: 10/21/2021  Patient seen for 12 sessions      Subjective:     Clinical Progress: improved  Home Program Compliance: Yes  Treatment has included:  therapeutic exercise and patient education with home exercise program     Subjective Evaluation    History of Present Illness    Subjective comment: My back has been bothering me more than my knees, I think it is stress.Pain  Current pain ratin  Location: Knees         Objective          Active Range of Motion   Left Knee   Flexion: 106 degrees   Extension: 7 degrees     Right Knee   Flexion: 106 degrees   Extension: 7 degrees     Strength/Myotome Testing     Left Knee   Flexion: 4  Extension: 4-    Right Knee   Flexion: 4-  Extension: 4-    Ambulation   Weight-Bearing Status   Weight-Bearing Status (Left): weight-bearing as tolerated   Weight-Bearing Status (Right): weight-bearing as tolerated    Assistive device used: none    Observational Gait   Decreased walking speed, stride length, left step length and right step length.       See Exercise, Manual, and Modality Logs for complete treatment.       Functional Outcome Score: KOS ADL score 29% ability that is 71% disability       Assessment & Plan     Assessment  Impairments: abnormal gait, abnormal muscle firing, abnormal muscle tone, abnormal or restricted ROM, activity intolerance, impaired balance, impaired physical strength, lacks appropriate home exercise program and pain with function  Assessment details: Marion Anderson is a 70 y.o. year-old female referred to physical therapy for bilateral knee pain. She has comorbidities of obesity, history of right lower leg and ankle surgery, and cardiac history.  She has been seen for 12  sessions for knee strengthening exercises with HEP that she reports good compliance.  She reports overall decreased knee pain however is more bothered by increased back pain that affects her daily functional mobility.  She still has limited knee ROM and knee strength and her legs feel weak to her. She will benefit to progress her HEP to the [facility] when personal and family issues are not limiting.   Prognosis: good  Functional Limitations: sleeping, walking, uncomfortable because of pain, moving in bed, sitting, standing, stooping and unable to perform repetitive tasks  Goals  Plan Goals: PT Goal Recert Due Date: 01/19/22  STG Date to Achieve: 12/02/21     STG 1.  Patient will demonstrate an independent HEP for knee strength and ROM/flexibility.  Met    STG 2  Patient will increase B leg strength to 4-/5   Met  STG 3. Patient will stand from chair 5 times without hand support  Ongoing   STG 4 Patient will report decreased knee pain from 3-710 to 1-5/10   Met    LTG Date to Achieve: 01/19/22    LTG 1. Patient will increase knee AROM to 0-110 degrees  Ongoing   LTG 2 Patient will increase B leg strength to 4/5 to minimize knee giving   Ongoing   LTG 3: Patient will report decreased functional disability on KOS ADL score from 42 % to 32 %  Ongoing increased to 71% because of her back  LTG 4 Patient will report minimal knee pain while in car for 30 min rated 0-3/10  Ongoing   LTG 5 Patient will walk without limp  Met      Plan  Therapy options: will be seen for skilled physical therapy services  Planned modality interventions: cryotherapy  Other planned modality interventions: aquatic therapy  Planned therapy interventions: balance/weight-bearing training, flexibility, functional ROM exercises, home exercise program, joint mobilization, therapeutic activities, stretching, strengthening, soft tissue mobilization, neuromuscular re-education, manual therapy, body mechanics training, gait training and transfer  training  Frequency: 2x week  Duration in visits: 20  Duration in weeks: 12  Treatment plan discussed with: patient           Recommendations: Continue as planned  Timeframe: 3 months  Prognosis to achieve goals: good    PT Signature: Jennifer Moreno, PT      Based upon review of the patient's progress and continued therapy plan, it is my medical opinion that Marion Anderson should continue physical therapy treatment at [facility]  [address] [place] [address] [phone]. office phone  [phone] office fax      Re-certification  Certification Period: 2022  I certify that the therapy services are furnished while this patient is under my care.  The services outlined above are required by this patient, and will be reviewed every 90 days.     PHYSICIAN: Alex Salazar MD      DATE:     Signature: __________________________________  Alex Salazar MD    Please sign and return via fax to [phone]   Thank you, [facility].    Timed:  Manual Therapy:    0     mins  68272;  Therapeutic Exercise:    45     mins  57492;     Neuromuscular Rina:    0    mins  94585;    Therapeutic Activity:     0     mins  92120;     Gait Trainin     mins  98557;     Ultrasound:     0     mins  09929;    Electrical Stimulation:    0     mins  15485 ( );  Iontophoresis    0     mins 25132;  Orthotic Mgmt/training  0     mins 16468;  Orthotic check out  0     mins 65839;  Canalith Repositioning  0     mins 41942;  Aquatic Therapy    0     mins 44217;  Dry Needling              0     mins    Untimed:  Electrical Stimulation:    0     mins  12712 ( );  Mechanical Traction:    0     mins  55337;     Timed Treatment:   45   mins   Total Treatment:     45   mins

## 2021-11-03 ENCOUNTER — TREATMENT (OUTPATIENT)
Dept: PHYSICAL THERAPY | Facility: CLINIC | Age: 71
End: 2021-11-03

## 2021-11-03 DIAGNOSIS — R52 PAIN AGGRAVATED BY SITTING: ICD-10-CM

## 2021-11-03 DIAGNOSIS — M25.562 CHRONIC PAIN OF BOTH KNEES: Primary | ICD-10-CM

## 2021-11-03 DIAGNOSIS — M25.561 CHRONIC PAIN OF BOTH KNEES: Primary | ICD-10-CM

## 2021-11-03 DIAGNOSIS — R29.898 BILATERAL LEG WEAKNESS: ICD-10-CM

## 2021-11-03 DIAGNOSIS — G89.29 CHRONIC PAIN OF BOTH KNEES: Primary | ICD-10-CM

## 2021-11-03 PROCEDURE — 97110 THERAPEUTIC EXERCISES: CPT | Performed by: PHYSICAL THERAPIST

## 2021-11-03 NOTE — PROGRESS NOTES
Physical Therapy Daily Progress and Discharge Note    Patient: Marion Anderson   : 1950  Diagnosis/ICD-10 Code:  Chronic pain of both knees [M25.561, M25.562, G89.29]  Referring practitioner: Alex Salazar MD  Date of Initial Visit: Type: THERAPY  Noted: 2021  Today's Date: 11/3/2021  Patient seen for 13 sessions           Subjective My legs were hurting last week over two days of .  I am hoping to get back to the [facility] next week.    Objective          Active Range of Motion   Left Knee   Flexion: 106 degrees   Extension: 7 degrees     Right Knee   Flexion: 106 degrees   Extension: 7 degrees     Strength/Myotome Testing     Left Knee   Flexion: 4  Extension: 4-    Right Knee   Flexion: 4-  Extension: 4-    Ambulation   Weight-Bearing Status   Weight-Bearing Status (Left): weight-bearing as tolerated   Weight-Bearing Status (Right): weight-bearing as tolerated   Assistive device used: none    Observational Gait   Decreased walking speed, stride length, left step length and right step length.       See Exercise, Manual, and Modality Logs for complete treatment.     Encouraged her to return to the [facility] for exercise machines and pool  Issued list of gym exercises     Assessment & Plan     Assessment  Assessment details: Marion Anderson is a 70 y.o. year-old female referred to physical therapy for bilateral knee pain. She has comorbidities of obesity, history of right lower leg and ankle surgery, and cardiac history.  She has been seen for 13 sessions for knee strengthening exercises with HEP that she reports good compliance.  She reports overall decreased knee pain however is more bothered by increased back pain that affects her daily functional mobility.  She still has limited knee ROM and knee strength and her legs feel weak to her. She will benefit to progress her HEP to the [facility].   Prognosis: good    Goals  Plan Goals: PT Goal Recert Due Date: 22  STG Date to Achieve: 21     STG  1.  Patient will demonstrate an independent HEP for knee strength and ROM/flexibility.  Met    STG 2  Patient will increase B leg strength to 4-/5   Met  STG 3. Patient will stand from chair 5 times without hand support  Not met   STG 4 Patient will report decreased knee pain from 3-710 to 1-5/10   Met    LTG Date to Achieve: 22    LTG 1. Patient will increase knee AROM to 0-110 degrees  Not met  LTG 2 Patient will increase B leg strength to 4/5 to minimize knee giving   Not met   LTG 3: Patient will report decreased functional disability on KOS ADL score from 42 % to 32 %  Not met increased to 71% because of her back  LTG 4 Patient will report minimal knee pain while in car for 30 min rated 0-3/10  Not met   LTG 5 Patient will walk without limp  Met                 Timed:    Manual Therapy:    0     mins  04291;  Therapeutic Exercise:    45     mins  49208;     Neuromuscular Rina:    0    mins  85426;    Therapeutic Activity:     0     mins  59263;     Gait Trainin     mins  32649;     Ultrasound:     0     mins  94060;    Electrical Stimulation:    0     mins  88761 ( );  Iontophoresis    0     mins 60649;  Aquatic Therapy    0     mins 28410;  Dry Needling              0     mins    Untimed:  Electrical Stimulation:    0     mins  77953 ( );  Mechanical Traction:    0     mins  04440;     Timed Treatment:   45   mins   Total Treatment:     45   mins  Jennifer Moreno, PT  Physical Therapist

## 2021-12-02 ENCOUNTER — OFFICE VISIT (OUTPATIENT)
Dept: CARDIOLOGY | Facility: CLINIC | Age: 71
End: 2021-12-02

## 2021-12-02 VITALS
WEIGHT: 245 LBS | HEART RATE: 63 BPM | BODY MASS INDEX: 40.82 KG/M2 | DIASTOLIC BLOOD PRESSURE: 64 MMHG | HEIGHT: 65 IN | SYSTOLIC BLOOD PRESSURE: 120 MMHG

## 2021-12-02 DIAGNOSIS — I25.10 CORONARY ARTERY DISEASE INVOLVING NATIVE CORONARY ARTERY OF NATIVE HEART WITHOUT ANGINA PECTORIS: ICD-10-CM

## 2021-12-02 DIAGNOSIS — Z98.890 S/P MVR (MITRAL VALVE REPAIR): ICD-10-CM

## 2021-12-02 DIAGNOSIS — I25.708 CORONARY ARTERY DISEASE OF BYPASS GRAFT OF NATIVE HEART WITH STABLE ANGINA PECTORIS (HCC): Primary | ICD-10-CM

## 2021-12-02 DIAGNOSIS — E78.5 HYPERLIPIDEMIA LDL GOAL <70: ICD-10-CM

## 2021-12-02 DIAGNOSIS — I10 ESSENTIAL HYPERTENSION: ICD-10-CM

## 2021-12-02 DIAGNOSIS — I65.23 BILATERAL CAROTID ARTERY STENOSIS: ICD-10-CM

## 2021-12-02 PROCEDURE — 93000 ELECTROCARDIOGRAM COMPLETE: CPT | Performed by: INTERNAL MEDICINE

## 2021-12-02 PROCEDURE — 99213 OFFICE O/P EST LOW 20 MIN: CPT | Performed by: INTERNAL MEDICINE

## 2021-12-02 RX ORDER — METOPROLOL SUCCINATE 50 MG/1
50 TABLET, EXTENDED RELEASE ORAL 2 TIMES DAILY
Qty: 180 TABLET | Refills: 1 | Status: SHIPPED | OUTPATIENT
Start: 2021-12-02 | End: 2022-09-09

## 2021-12-02 RX ORDER — LOSARTAN POTASSIUM 25 MG/1
25 TABLET ORAL DAILY
Qty: 90 TABLET | Refills: 1 | Status: SHIPPED | OUTPATIENT
Start: 2021-12-02

## 2021-12-02 NOTE — PROGRESS NOTES
Date of Office Visit: 2021  Encounter Provider: Domonique Fernando MD  Place of Service: [facility] CARDIOLOGY  Patient Name: Marion Anderson  :1950    Chief complaint  Coronary artery disease    History of Present Illness  Patient is a 71-year-old female with history of hypertension, hyperlipidemia, coronary artery disease.  In  she was found to have LAD and right coronary artery stenosis for which she underwent stenting.  Patient was seen in  for progressive chest pain.  Cardiac catheterization showed multivessel coronary artery disease including left main disease.  She developed ventricular fibrillation in the Cath Lab and was successfully cardioverted.  On 2017 she had a LIMA graft to the LAD, vein graft to the right coronary artery, vein graft to ramus intermedius branch, vein graft to diagonal branch of the LAD.  She also had mitral valve repair with a 24 mm ring.  Postoperative course was complicated by encephalopathy CT imaging showed prior infarct.  She was treated with Keppra for a short period of time.  She had carotid Doppler imaging notes severe bilateral carotid artery disease/carotid occlusion which is felt to be difficult to revascularize and for which she was seen by Dr. Prado.  She had a follow-up echocardiogram showed an ejection fraction 53% with mitral valve ring in place that was functioning normally.    Since last visit she has had no chest pain dizziness palpitations she has occasional dependent edema she has shortness of breath when she is stressed.  She is going back to work but has not been exercising.  Blood work 10/2021 includes normal CMP creatinine of 1.5 with GFR of 34, hemoglobin A1c 5.8 with glucose of 93, TSH elevated at 12.39 with free T4 1.13, normal CBC    Past Medical History:   Diagnosis Date   • Arthritis    • Bilateral carotid artery stenosis 2020   • Coronary artery disease    • Disease of thyroid gland    •  Hyperlipidemia    • Hypertension    • PONV (postoperative nausea and vomiting)    • Spinal headache      Past Surgical History:   Procedure Laterality Date   • CARDIAC CATHETERIZATION Left 10/06/2017    [place], Dr. THAD Grant, coronary arteriography   • CARDIAC SURGERY     • CORONARY ARTERY BYPASS GRAFT N/A 10/12/2017    Procedure: FILEMON STERNOTOMY CORONARY ARTERY BYPASS GRAFT TIMES 4  USING LEFT INTERNAL MAMMARY ARTERY AND LEFT GREATER SAPHENOUS VEIN GRAFT PER  ENDOSCOPIC VEIN HARVESTING, MITRAL VALVE REPAIR AND PRP;  Surgeon: Ramos Muñoz MD;  Location: [facility];  Service:    • FRACTURE SURGERY      Ankle/Leg   • TONSILLECTOMY       Outpatient Medications Prior to Visit   Medication Sig Dispense Refill   • amoxicillin (AMOXIL) 500 MG capsule Take 4 capsules by mouth See Admin Instructions. 4 capsules 1 hour prior to procedure 12 capsule 0   • aspirin 81 MG chewable tablet Chew 81 mg Daily.     • Cholecalciferol (VITAMIN D3) 25 MCG (1000 UT) capsule Take 25 mcg by mouth Every Other Day.     • ezetimibe (ZETIA) 10 MG tablet Take 10 mg by mouth Daily.     • hydrOXYzine (ATARAX) 25 MG tablet Take 25 mg by mouth At Night As Needed for Itching.     • levothyroxine (SYNTHROID, LEVOTHROID) 125 MCG tablet Take 125 mcg by mouth Daily.     • MELATONIN GUMMIES PO Take 10 mg by mouth At Night As Needed.     • Multiple Vitamin (MULTI-DAY PO) Take 1 tablet by mouth Daily.     • nitroglycerin (NITROSTAT) 0.3 MG SL tablet Place 0.3 mg under the tongue Every 5 (Five) Minutes As Needed.     • Omega-3 Fatty Acids (FISH OIL) 1000 MG capsule capsule Take  by mouth Daily With Breakfast.     • rosuvastatin (CRESTOR) 40 MG tablet Take 40 mg by mouth Every Night.     • traMADol (ULTRAM) 50 MG tablet Take 50 mg by mouth As Needed.     • losartan (Cozaar) 25 MG tablet Take 1 tablet by mouth Daily. 90 tablet 0   • metoprolol succinate XL (TOPROL-XL) 50 MG 24 hr tablet Take 1 tablet by mouth 2 (two)  "times a day. 180 tablet 0   • amLODIPine (NORVASC) 10 MG tablet Take 1 tablet by mouth Daily. 30 tablet 0     No facility-administered medications prior to visit.       Allergies as of 12/02/2021 - Reviewed 12/02/2021   Allergen Reaction Noted   • Latex Itching and Other (See Comments) 12/03/2015     Social History     Socioeconomic History   • Marital status: Single   Tobacco Use   • Smoking status: Never Smoker   • Smokeless tobacco: Never Used   Substance and Sexual Activity   • Alcohol use: No   • Drug use: No   • Sexual activity: Defer     Birth control/protection: Post-menopausal     Family History   Problem Relation Age of Onset   • Heart disease Mother    • Hypertension Mother    • Stroke Mother    • Heart disease Father    • Hypertension Father    • Heart attack Father    • Cancer Brother    • Hypertension Brother      Review of Systems   Constitutional: Positive for weight gain. Negative for chills, fever and weight loss.   Cardiovascular: Negative for leg swelling.   Respiratory: Negative for cough, snoring and wheezing.    Hematologic/Lymphatic: Negative for bleeding problem. Does not bruise/bleed easily.   Skin: Negative for color change.   Musculoskeletal: Positive for joint pain and myalgias. Negative for falls.   Gastrointestinal: Negative for melena.   Genitourinary: Negative for hematuria.   Neurological: Negative for excessive daytime sleepiness.   Psychiatric/Behavioral: Positive for depression. The patient is not nervous/anxious.         Objective:     Vitals:    12/02/21 1104   BP: 120/64   Pulse: 63   Weight: 111 kg (245 lb)   Height: 165.1 cm (65\")     Body mass index is 40.77 kg/m².    Vitals reviewed.   Constitutional:       Appearance: Well-developed. Morbidly obese.   Eyes:      General: No scleral icterus.        Right eye: No discharge.      Conjunctiva/sclera: Conjunctivae normal.      Pupils: Pupils are equal, round, and reactive to light.   HENT:      Head: Normocephalic.      Nose: " Nose normal.   Neck:      Thyroid: No thyromegaly.      Vascular: No JVD.   Pulmonary:      Effort: Pulmonary effort is normal. No respiratory distress.      Breath sounds: Normal breath sounds. No wheezing. No rales.   Cardiovascular:      Normal rate. Regular rhythm.      No gallop.   Abdominal:      General: Bowel sounds are normal. There is no distension.      Palpations: Abdomen is soft.      Tenderness: There is no abdominal tenderness. There is no rebound.   Musculoskeletal: Normal range of motion.         General: No tenderness.      Cervical back: Normal range of motion and neck supple. Skin:     General: Skin is warm and dry.      Findings: No erythema or rash.   Neurological:      Mental Status: Alert and oriented to person, place, and time.   Psychiatric:         Behavior: Behavior normal.         Thought Content: Thought content normal.         Judgment: Judgment normal.       Lab Review:     ECG 12 Lead    Date/Time: 12/2/2021 11:05 AM  Performed by: Domonique Fernando MD  Authorized by: Domonique Fernando MD   Comparison: compared with previous ECG   Similar to previous ECG  Rhythm: sinus rhythm    Clinical impression: normal ECG          Assessment:       Diagnosis Plan   1. Coronary artery disease of bypass graft of native heart with stable angina pectoris (HCC)  ECG 12 Lead   2. Essential hypertension  losartan (Cozaar) 25 MG tablet    ECG 12 Lead   3. Coronary artery disease involving native coronary artery of native heart without angina pectoris     4. S/P MVR (mitral valve repair)     5. Hyperlipidemia LDL goal <70     6. Bilateral carotid artery stenosis       Plan:       1.  Coronary artery disease with prior stenting and subsequent coronary artery bypass grafting 2017.  No anginal symptoms though encouraged her to continue with plans to increase and resume exercise  2.  Status post mitral valve repair.  Continue with SBE prophylaxis  3.  Hypertension.  Controlled  4.  Hyperlipidemia.  Improved with  statin and Zetia.  HDL remains low and have asked her to increase omega-3 intake in the form of fish as well as flaxseed  5.  Renal insufficiency.  Remained stable and abnormal  6.  Hypothyroidism.  Recent labs with elevated TSH and T4 normal.  Recommendations per primary care provider  8.  Carotid artery disease, severe with bilateral occlusion.  Followed by Dr. Prado     Time Spent: I spent 25 minutes caring for Marion on this date of service. This time includes time spent by me in the following activities: preparing for the visit, reviewing tests, obtaining and/or reviewing a separately obtained history, performing a medically appropriate examination and/or evaluation, counseling and educating the patient/family/caregiver, documenting information in the medical record and independently interpreting results and communicating that information with the patient/family/caregiver.   I spent 1 minutes on the separately reported service of ECG. This time is not included in the time used to support the E/M service also reported today.        Your medication list          Accurate as of December 2, 2021 11:59 PM. If you have any questions, ask your nurse or doctor.            CHANGE how you take these medications      Instructions Last Dose Given Next Dose Due   metoprolol succinate XL 50 MG 24 hr tablet  Commonly known as: TOPROL-XL  What changed: when to take this  Changed by: Domonique Fernando MD      Take 1 tablet by mouth 2 (Two) Times a Day.          CONTINUE taking these medications      Instructions Last Dose Given Next Dose Due   amoxicillin 500 MG capsule  Commonly known as: AMOXIL      Take 4 capsules by mouth See Admin Instructions. 4 capsules 1 hour prior to procedure       aspirin 81 MG chewable tablet      Chew 81 mg Daily.       ezetimibe 10 MG tablet  Commonly known as: ZETIA      Take 10 mg by mouth Daily.       fish oil 1000 MG capsule capsule      Take  by mouth Daily With Breakfast.       hydrOXYzine 25  MG tablet  Commonly known as: ATARAX      Take 25 mg by mouth At Night As Needed for Itching.       levothyroxine 125 MCG tablet  Commonly known as: SYNTHROID, LEVOTHROID      Take 125 mcg by mouth Daily.       losartan 25 MG tablet  Commonly known as: Cozaar      Take 1 tablet by mouth Daily.       MELATONIN GUMMIES PO      Take 10 mg by mouth At Night As Needed.       multivitamin tablet tablet  Commonly known as: THERAGRAN      Take 1 tablet by mouth Daily.       nitroglycerin 0.3 MG SL tablet  Commonly known as: NITROSTAT      Place 0.3 mg under the tongue Every 5 (Five) Minutes As Needed.       rosuvastatin 40 MG tablet  Commonly known as: CRESTOR      Take 40 mg by mouth Every Night.       traMADol 50 MG tablet  Commonly known as: ULTRAM      Take 50 mg by mouth As Needed.       Vitamin D3 25 MCG (1000 UT) capsule      Take 25 mcg by mouth Every Other Day.          STOP taking these medications    amLODIPine 10 MG tablet  Commonly known as: NORVASC  Stopped by: Domonique Fernando MD              Where to Get Your Medications      These medications were sent to [facility] [address] [place] [address] - [address] [place] [address] - [phone]  - [phone] [address] [address]    Phone: [phone]   · losartan 25 MG tablet  · metoprolol succinate XL 50 MG 24 hr tablet         Patient is no longer taking -.  I corrected the med list to reflect this.  I did not stop these medications.      Dictated utilizing Dragon dictation

## 2021-12-21 ENCOUNTER — TELEPHONE (OUTPATIENT)
Dept: CARDIOLOGY | Facility: CLINIC | Age: 71
End: 2021-12-21

## 2022-06-15 NOTE — ANESTHESIA POSTPROCEDURE EVALUATION
Patient: Marion Anderson    Procedure Summary     Date Anesthesia Start Anesthesia Stop Room / Location    10/12/17 4722 2424  [facility] OR 17 / [facility]       Procedure Diagnosis Surgeon Provider    FILEMON STERNOTOMY CORONARY ARTERY BYPASS GRAFT TIMES 4  USING LEFT INTERNAL MAMMARY ARTERY AND LEFT GREATER SAPHENOUS VEIN GRAFT PER  ENDOSCOPIC VEIN HARVESTING, MITRAL VALVE REPAIR AND PRP (N/A Chest) Coronary artery disease of native heart with stable angina pectoris, unspecified vessel or lesion type  (Coronary artery disease of native heart with stable angina pectoris, unspecified vessel or lesion type [I25.118]) MD Jaxon Avelar MD          Anesthesia Type: general  Last vitals  BP   113/67 (10/12/17 2037)    Temp   36.5 °C (97.7 °F) (10/12/17 2000)    Pulse   95 (10/12/17 2045)   Resp   12 (10/12/17 2045)    SpO2   100 % (10/12/17 2045)      Post Anesthesia Care and Evaluation    Patient location during evaluation: bedside  Pain management: adequate  Airway patency: patent  Anesthetic complications: No anesthetic complications    Cardiovascular status: acceptable  Respiratory status: acceptable  Hydration status: acceptable       Possibly due to urinary retention, improved with placement of powers. D/c'd powers and started on Flomax for urinary retention which resolved symptoms  Pain resolved  Continue to monitor  Delirium and fall precautions   MRI brain showed no acute stroke

## 2022-07-28 ENCOUNTER — TREATMENT (OUTPATIENT)
Dept: PHYSICAL THERAPY | Facility: CLINIC | Age: 72
End: 2022-07-28

## 2022-07-28 DIAGNOSIS — R29.898 BILATERAL LEG WEAKNESS: ICD-10-CM

## 2022-07-28 DIAGNOSIS — G89.29 CHRONIC PAIN OF BOTH KNEES: Primary | ICD-10-CM

## 2022-07-28 DIAGNOSIS — M25.562 CHRONIC PAIN OF BOTH KNEES: Primary | ICD-10-CM

## 2022-07-28 DIAGNOSIS — M25.561 CHRONIC PAIN OF BOTH KNEES: Primary | ICD-10-CM

## 2022-07-28 PROCEDURE — 97110 THERAPEUTIC EXERCISES: CPT | Performed by: PHYSICAL THERAPIST

## 2022-07-28 PROCEDURE — 97161 PT EVAL LOW COMPLEX 20 MIN: CPT | Performed by: PHYSICAL THERAPIST

## 2022-07-28 NOTE — PROGRESS NOTES
Physical Therapy Initial Evaluation and Plan of Care      Patient: Marion Anderson   : 1950  Diagnosis/ICD-10 Code:  Chronic pain of both knees [M25.561, M25.562, G89.29]  Referring practitioner: Liv Galvan MD  Date of Initial Visit: 2022  Today's Date: 2022  Patient seen for 1 sessions           Subjective Evaluation    History of Present Illness  Onset date: May 2022.  Mechanism of injury: Chronic pain in knees and back.  She has not been active. She tried to get back to regular exercise at the [facility] but has not been regular since the Pandemic.  She has trouble getting up and down with her knees.   Her ankles hurt at night and may disturb her sleep.  She has history of B ankle fractures.  She takes Tramadol.  She walks slowly.  PMH:  Cardiac history, hypothyroidism    Pain  Current pain ratin  At worst pain ratin  Location: Knees   Exacerbated by: Getting up from car after prolonged sitting.    Social Support  Lives with: spouse    Treatments  Current treatment: medication  Patient Goals  Patient goals for therapy: increased strength             Objective          Active Range of Motion   Left Knee   Flexion: 93 degrees   Extension: 19 degrees     Right Knee   Flexion: 96 degrees   Extension: 11 degrees     Passive Range of Motion   Left Knee   Flexion: 125 degrees   Extension: 0 degrees     Right Knee   Flexion: 121 degrees   Extension: 3 degrees     Strength/Myotome Testing     Left Hip   Planes of Motion   Flexion: 3+  Abduction: 3-    Right Hip   Planes of Motion   Flexion: 3+  Abduction: 3-    Left Knee   Flexion: 3+  Extension: 3+    Right Knee   Flexion: 3+  Extension: 3+    Left Ankle/Foot   Dorsiflexion: 4    Right Ankle/Foot   Dorsiflexion: 4    Additional Strength Details  Able to bridge hips partially 3-/5     Ambulation   Weight-Bearing Status   Assistive device used: none    Observational Gait   Decreased walking speed and stride length.     Additional  Observational Gait Details  Decreased heel strike     Functional Assessment     Comments  Requires B hand support to stand from chair with muscle effort   Difficulty getting up from supine to sitting on table         See Exercise, Manual, and Modality Logs for complete treatment.   Issued HEP  Recommend starting walking at mall or going back to [facility]      Functional Outcome Score: KOS ADL score 45/80 is 56% ability that is 44% disability         Assessment & Plan     Assessment  Impairments: abnormal gait, abnormal or restricted ROM, impaired physical strength, lacks appropriate home exercise program and pain with function  Functional Limitations: sleeping, walking, uncomfortable because of pain, moving in bed and standing  Assessment details: Marion Anderson is a 70 y.o. year-old female referred to physical therapy for bilateral knee pain. She presents with a stable clinical presentation. She has comorbidities of obesity chronic, lower back pain, B ankle fractures, and cardiac history.  She demonstrates decreased B knee AROM, core and leg weakness,,slow gait, weakness with sit to stand transitions, and decreased endurance.  She will benefit from exercises for core and leg strength, ROM and functional mobility.  Prognosis: good    Goals  Plan Goals: PT Goal Recert Due Date: 10/26/22  STG Date to Achieve: 09/08/22     STG 1.  Patient will demonstrate an independent HEP for core and leg strength and ROM/flexibility.    STG 2  Patient will increase B leg strength to 4-/5     STG 3. Patient will stand from chair 10  times with hand support     STG 4 Patient will increase B knee AROM to 8-105 degrees    LTG Date to Achieve: 10/26/22    LTG 1. Patient will increase knee AROM to 5-110 degrees    LTG 2 Patient will increase B leg strength to 4/5 to stand from chair without hand support    LTG 3: Patient will report decreased functional disability on KOS ADL score from 44 % to 34 %    LTG 4 Patient will report decrease  sleep disturbance due to leg pain by 25% or more     Plan  Therapy options: will be seen for skilled therapy services  Other planned modality interventions: Aquatic therapy  Planned therapy interventions: balance/weight-bearing training, strengthening, stretching, gait training, home exercise program, therapeutic activities, neuromuscular re-education, manual therapy and transfer training  Frequency: 2x week  Duration in weeks: 12  Treatment plan discussed with: patient        Timed:  Manual Therapy:    0     mins  26514;  Therapeutic Exercise:    25     mins  59930;     Neuromuscular Rina:    0    mins  80696;    Therapeutic Activity:     0     mins  92805;     Gait Trainin     mins  90507;     Ultrasound:     0     mins  53257;    Iontophoresis    0     mins 42683  Dry Needling   0     mins 29214/ 66986 (Self-pay)      Untimed:  Electrical Stimulation:    0     mins  89522 ( );  Traction:  0     mins  40329;   Low Eval     25     Mins  74032  Mod Eval     0     Mins  94106  High Eval                       0     Mins  04348    Timed Treatment:   25   mins   Total Treatment:     50   mins    PT SIGNATURE: Jennifer Moreno PT     [place] License Number: [id number]    Electronically signed by Jennifer Moreno PT, 22, 7:58 AM EDT    DATE TREATMENT INITIATED: 2022    Medicare Initial Certification  Certification Period: 10/26/2022  I certify that the therapy services are furnished while this patient is under my care.  The services outlined above are required by this patient, and will be reviewed every 90 days.     PHYSICIAN: Liv Galvan MD   NPI: [id number]                                         DATE:     Please sign and return via fax to [phone] Thank you, [facility].

## 2022-08-25 ENCOUNTER — TREATMENT (OUTPATIENT)
Dept: PHYSICAL THERAPY | Facility: CLINIC | Age: 72
End: 2022-08-25

## 2022-08-25 DIAGNOSIS — R52 PAIN AGGRAVATED BY SITTING: ICD-10-CM

## 2022-08-25 DIAGNOSIS — G89.29 CHRONIC PAIN OF BOTH KNEES: Primary | ICD-10-CM

## 2022-08-25 DIAGNOSIS — M25.561 CHRONIC PAIN OF BOTH KNEES: Primary | ICD-10-CM

## 2022-08-25 DIAGNOSIS — R29.898 BILATERAL LEG WEAKNESS: ICD-10-CM

## 2022-08-25 DIAGNOSIS — M25.562 CHRONIC PAIN OF BOTH KNEES: Primary | ICD-10-CM

## 2022-08-25 PROCEDURE — 97110 THERAPEUTIC EXERCISES: CPT | Performed by: PHYSICAL THERAPIST

## 2022-08-25 NOTE — PROGRESS NOTES
Physical Therapy Daily Treatment Note    Patient: Marion Anderson   : 1950  Diagnosis/ICD-10 Code:  Chronic pain of both knees [M25.561, M25.562, G89.29]  Referring practitioner: Liv Galvan MD  Today's Date: 2022  Patient seen for 2 sessions           Subjective I had trouble sleeping last night with right knee and ankle pain.    Objective   See Exercise, Manual, and Modality Logs for complete treatment.   Assistance to get leg over hip adductor machine     Assessment/Plan    Increased muscle fatigue with exercises but able to progress to gym exercises.        Timed:    Manual Therapy:    0     mins  72543;  Therapeutic Exercise:    44     mins  65882;     Neuromuscular Rina:    0    mins  96659;    Therapeutic Activity:     0     mins  88958;     Gait Trainin     mins  89820;     Ultrasound:     0     mins  98392;    Electrical Stimulation:    0     mins  94154 ( );  Iontophoresis    0     mins 58779;  Aquatic Therapy    0     mins 82878;  Dry Needling              0     mins 23693/ 77654 (Self-pay)    Untimed:  Electrical Stimulation:    0     mins  03573 ( );  Traction:    0     mins  66481;     Timed Treatment:   44   mins   Total Treatment:     44   mins    Jennifer Moreno PT  Physical Therapist    [place] License:[id number]

## 2022-09-01 ENCOUNTER — TREATMENT (OUTPATIENT)
Dept: PHYSICAL THERAPY | Facility: CLINIC | Age: 72
End: 2022-09-01

## 2022-09-01 DIAGNOSIS — R52 PAIN AGGRAVATED BY SITTING: ICD-10-CM

## 2022-09-01 DIAGNOSIS — R29.898 BILATERAL LEG WEAKNESS: ICD-10-CM

## 2022-09-01 DIAGNOSIS — M25.561 CHRONIC PAIN OF BOTH KNEES: Primary | ICD-10-CM

## 2022-09-01 DIAGNOSIS — G89.29 CHRONIC PAIN OF BOTH KNEES: Primary | ICD-10-CM

## 2022-09-01 DIAGNOSIS — M25.562 CHRONIC PAIN OF BOTH KNEES: Primary | ICD-10-CM

## 2022-09-01 PROCEDURE — 97110 THERAPEUTIC EXERCISES: CPT | Performed by: PHYSICAL THERAPIST

## 2022-09-01 NOTE — PROGRESS NOTES
Physical Therapy 30-Day Progress Note    Patient: Marion Anderson   : 1950  Diagnosis/ICD-10 Code:  Chronic pain of both knees [M25.561, M25.562, G89.29]  Referring practitioner: Liv Galvan MD  Today's Date: 2022  Patient seen for 3 sessions           Subjective I bought new tennis shoes and they feel better than my old shoes     Objective   See Exercise, Manual, and Modality Logs for complete treatment.       Assessment & Plan     Assessment  Impairments: abnormal gait, abnormal or restricted ROM, impaired physical strength, lacks appropriate home exercise program and pain with function  Functional Limitations: sleeping, walking, uncomfortable because of pain, moving in bed and standing  Assessment details: Marion Anderson is a 70 y.o. year-old female referred to physical therapy for bilateral knee pain. She has only been seen for 3 sessions for knee strengthening exercises.  She is following HEP and performing gym exercises.  She demonstrates good progression with strength and endurance of therapy exercises.   Prognosis: good    Goals  Plan Goals: PT Goal Recert Due Date: 10/26/22  STG Date to Achieve: 22     STG 1.  Patient will demonstrate an independent HEP for core and leg strength and ROM/flexibility.  Met    STG 2  Patient will increase B leg strength to 4-/5   Met  STG 3. Patient will stand from chair 10  times with hand support     STG 4 Patient will increase B knee AROM to 8-105 degrees    LTG Date to Achieve: 10/26/22    LTG 1. Patient will increase knee AROM to 5-110 degrees    LTG 2 Patient will increase B leg strength to 4/5 to stand from chair without hand support    LTG 3: Patient will report decreased functional disability on KOS ADL score from 44 % to 34 %    LTG 4 Patient will report decrease sleep disturbance due to leg pain by 25% or more     Plan  Therapy options: will be seen for skilled therapy services  Other planned modality interventions: Aquatic  therapy  Planned therapy interventions: balance/weight-bearing training, strengthening, stretching, gait training, home exercise program, therapeutic activities, neuromuscular re-education, manual therapy and transfer training  Frequency: 2x week  Duration in weeks: 12  Treatment plan discussed with: patient        Marion demonstrated improved knee strength and stamina with exercises performed today including some increase in reps and / or weight        Timed:    Manual Therapy:    0     mins  38558;  Therapeutic Exercise:    44     mins  82465;     Neuromuscular Rina:    0    mins  21988;    Therapeutic Activity:     0     mins  39724;     Gait Trainin     mins  09483;     Ultrasound:     0     mins  13181;    Electrical Stimulation:    0     mins  86264 ( );  Iontophoresis    0     mins 77152;  Aquatic Therapy    0     mins 32112;  Dry Needling              0     mins 28719/  (Self-pay)    Untimed:  Electrical Stimulation:    0     mins  75444 ( );  Traction:    0     mins  33661;     Timed Treatment:   44   mins   Total Treatment:     44   mins    Jennifer Moreno PT  Physical Therapist    [place] License:[id number]

## 2022-09-08 ENCOUNTER — TREATMENT (OUTPATIENT)
Dept: PHYSICAL THERAPY | Facility: CLINIC | Age: 72
End: 2022-09-08

## 2022-09-08 DIAGNOSIS — M25.562 CHRONIC PAIN OF BOTH KNEES: Primary | ICD-10-CM

## 2022-09-08 DIAGNOSIS — G89.29 CHRONIC PAIN OF BOTH KNEES: Primary | ICD-10-CM

## 2022-09-08 DIAGNOSIS — R29.898 BILATERAL LEG WEAKNESS: ICD-10-CM

## 2022-09-08 DIAGNOSIS — M25.561 CHRONIC PAIN OF BOTH KNEES: Primary | ICD-10-CM

## 2022-09-08 PROCEDURE — 97110 THERAPEUTIC EXERCISES: CPT | Performed by: PHYSICAL THERAPIST

## 2022-09-08 NOTE — PROGRESS NOTES
Physical Therapy Daily Treatment Note    Patient: Marion Anderson   : 1950  Diagnosis/ICD-10 Code:  Chronic pain of both knees [M25.561, M25.562, G89.29]  Referring practitioner: Liv Galvan MD  Today's Date: 2022  Patient seen for 4 sessions           Subjective My legs are feeling stronger  My knees have not bothered me much.    Objective   See Exercise, Manual, and Modality Logs for complete treatment.   Suggest she look at more affordable gyms in [facility] for exercise machines     Assessment/Plan    Marion has demonstrated increased strength and endurance with exercise performance in the past 2 weeks.       Timed:    Manual Therapy:    0     mins  56124;  Therapeutic Exercise:    40     mins  35971;     Neuromuscular Rnia:    0    mins  14478;    Therapeutic Activity:     0     mins  73176;     Gait Trainin     mins  99453;     Ultrasound:     0     mins  89950;    Electrical Stimulation:    0     mins  09912 ( );  Iontophoresis    0     mins 05650;  Aquatic Therapy    0     mins 28120;  Dry Needling              0     mins 05265/  (Self-pay)    Untimed:  Electrical Stimulation:    0     mins  43712 ( );  Traction:    0     mins  86627;     Timed Treatment:   40   mins   Total Treatment:     40   mins    Jennifer Moreno PT  Physical Therapist    [place] License:[id number]

## 2022-09-09 RX ORDER — METOPROLOL SUCCINATE 50 MG/1
TABLET, EXTENDED RELEASE ORAL
Qty: 180 TABLET | Refills: 0 | Status: SHIPPED | OUTPATIENT
Start: 2022-09-09 | End: 2023-01-13

## 2022-09-15 ENCOUNTER — TREATMENT (OUTPATIENT)
Dept: PHYSICAL THERAPY | Facility: CLINIC | Age: 72
End: 2022-09-15

## 2022-09-15 DIAGNOSIS — M25.562 CHRONIC PAIN OF BOTH KNEES: Primary | ICD-10-CM

## 2022-09-15 DIAGNOSIS — G89.29 CHRONIC PAIN OF BOTH KNEES: Primary | ICD-10-CM

## 2022-09-15 DIAGNOSIS — R29.898 BILATERAL LEG WEAKNESS: ICD-10-CM

## 2022-09-15 DIAGNOSIS — M25.561 CHRONIC PAIN OF BOTH KNEES: Primary | ICD-10-CM

## 2022-09-15 PROCEDURE — 97110 THERAPEUTIC EXERCISES: CPT | Performed by: PHYSICAL THERAPIST

## 2022-09-15 NOTE — PROGRESS NOTES
Physical Therapy Daily Treatment Note    Patient: Marion Anderson   : 1950  Diagnosis/ICD-10 Code:  Chronic pain of both knees [M25.561, M25.562, G89.29]  Referring practitioner: Liv Galvan MD  Today's Date: 9/15/2022  Patient seen for 5 sessions           Subjective My knees hurt yesterday driving more.    Objective   See Exercise, Manual, and Modality Logs for complete treatment.   Discussed aquatic exercise classes.    Assessment/Plan    Some exercises are getting easier but there is still a good challenge with exercise machines.   Knees were more painful with prolonged sitting while driving yesterday like last year.    Timed:    Manual Therapy:    0     mins  60576;  Therapeutic Exercise:    45     mins  07316;     Neuromuscular Rina:    0    mins  43881;    Therapeutic Activity:     0     mins  43960;     Gait Trainin     mins  94459;     Ultrasound:     0     mins  88888;    Electrical Stimulation:    0     mins  70452 ( );  Iontophoresis    0     mins 08526;  Aquatic Therapy    0     mins 60282;      Untimed:  Electrical Stimulation:    0     mins  06605 ( );  Traction:    0     mins  14908;   Dry Needling  (1-2 muscles)            0     mins 86707 (Self-pay)  Dry Needling (3-4 muscles) 0      (Self-pay)  Dry Needling Trial    0     DRYNDLTRIAL  (No Charge)    Timed Treatment:   45   mins   Total Treatment:     45   mins    Jennifer Moreno, PT  Physical Therapist    [place] License:[id number]

## 2022-09-22 ENCOUNTER — TREATMENT (OUTPATIENT)
Dept: PHYSICAL THERAPY | Facility: CLINIC | Age: 72
End: 2022-09-22

## 2022-09-22 DIAGNOSIS — R29.898 BILATERAL LEG WEAKNESS: ICD-10-CM

## 2022-09-22 DIAGNOSIS — M25.561 CHRONIC PAIN OF BOTH KNEES: Primary | ICD-10-CM

## 2022-09-22 DIAGNOSIS — R52 PAIN AGGRAVATED BY SITTING: ICD-10-CM

## 2022-09-22 DIAGNOSIS — M25.562 CHRONIC PAIN OF BOTH KNEES: Primary | ICD-10-CM

## 2022-09-22 DIAGNOSIS — G89.29 CHRONIC PAIN OF BOTH KNEES: Primary | ICD-10-CM

## 2022-09-22 PROCEDURE — 97110 THERAPEUTIC EXERCISES: CPT | Performed by: PHYSICAL THERAPIST

## 2022-09-22 NOTE — PROGRESS NOTES
Physical Therapy Daily Treatment Note    Patient: Marion Anderson   : 1950  Diagnosis/ICD-10 Code:  Chronic pain of both knees [M25.561, M25.562, G89.29]  Referring practitioner: Liv Galvan MD  Today's Date: 2022  Patient seen for 6 sessions           Subjective My knees hurt at night may be the change in weather and stress.  It takes me a while to start walking after sitting.    Objective   See Exercise, Manual, and Modality Logs for complete treatment.   Suggest she follow up with knee physician to consider injections.     Assessment/Plan    Marion has slowly progressing with exercises.  She moves slowly due to pain in knees and chronic right ankle.  She is slowly increasing her strength.  She has not been able to get to gym as desired due to personal reasons. She will benefit to see knee ortho for possible injections.       Timed:    Manual Therapy:    0     mins  98214;  Therapeutic Exercise:    44     mins  70981;     Neuromuscular Rina:    0    mins  84248;    Therapeutic Activity:     0     mins  56053;     Gait Trainin     mins  74473;     Ultrasound:     0     mins  06071;    Electrical Stimulation:    0     mins  84341 ( );  Iontophoresis    0     mins 56277;  Aquatic Therapy    0     mins 37255;      Untimed:  Electrical Stimulation:    0     mins  26897 ( );  Traction:    0     mins  76934;   Dry Needling  (1-2 muscles)            0     mins 12908 (Self-pay)  Dry Needling (3-4 muscles) 0     64506 (Self-pay)  Dry Needling Trial    0     DRYNDLTRIAL  (No Charge)    Timed Treatment:   44   mins   Total Treatment:     44   mins    Jennifer Moreno, PT  Physical Therapist    [place] License:[id number]

## 2022-09-29 ENCOUNTER — TREATMENT (OUTPATIENT)
Dept: PHYSICAL THERAPY | Facility: CLINIC | Age: 72
End: 2022-09-29

## 2022-09-29 DIAGNOSIS — M25.561 CHRONIC PAIN OF BOTH KNEES: Primary | ICD-10-CM

## 2022-09-29 DIAGNOSIS — G89.29 CHRONIC PAIN OF BOTH KNEES: Primary | ICD-10-CM

## 2022-09-29 DIAGNOSIS — R29.898 BILATERAL LEG WEAKNESS: ICD-10-CM

## 2022-09-29 DIAGNOSIS — M25.562 CHRONIC PAIN OF BOTH KNEES: Primary | ICD-10-CM

## 2022-09-29 PROCEDURE — 97110 THERAPEUTIC EXERCISES: CPT | Performed by: PHYSICAL THERAPIST

## 2022-09-29 NOTE — PROGRESS NOTES
Physical Therapy Daily Treatment Note    Patient: Marion Anderson   : 1950  Diagnosis/ICD-10 Code:  Chronic pain of both knees [M25.561, M25.562, G89.29]  Referring practitioner: Liv Galvan MD  Today's Date: 2022  Patient seen for 7 sessions           Subjective Family issue this week prevented her from going to gym    Objective   See Exercise, Manual, and Modality Logs for complete treatment.   Encouraged her to get try to get to gym    Assessment/Plan    Marion reports she has lost 4 pounds and feels that walking more and eating fruit has been helpful.  She is more motivated to re-join gym for exercise.  She was still fatigued with exercises today but is slowly gaining more strength with the exercises.        Timed:    Manual Therapy:    0     mins  42085;  Therapeutic Exercise:    45     mins  73669;     Neuromuscular Rina:    0    mins  87658;    Therapeutic Activity:     0     mins  61334;     Gait Trainin     mins  69922;     Ultrasound:     0     mins  69847;    Electrical Stimulation:    0     mins  79723 ( );  Iontophoresis    0     mins 79999;  Aquatic Therapy    0     mins 83465;      Untimed:  Electrical Stimulation:    0     mins  86036 ( );  Traction:    0     mins  28294;   Dry Needling  (1-2 muscles)            0     mins 94801 (Self-pay)  Dry Needling (3-4 muscles) 0      (Self-pay)  Dry Needling Trial    0     DRYNDLTRIAL  (No Charge)    Timed Treatment:   45   mins   Total Treatment:     45   mins    Jennifer Moreno, PT  Physical Therapist    [place] License:[id number]

## 2022-10-06 ENCOUNTER — TREATMENT (OUTPATIENT)
Dept: PHYSICAL THERAPY | Facility: CLINIC | Age: 72
End: 2022-10-06

## 2022-10-06 DIAGNOSIS — G89.29 CHRONIC PAIN OF BOTH KNEES: Primary | ICD-10-CM

## 2022-10-06 DIAGNOSIS — M25.562 CHRONIC PAIN OF BOTH KNEES: Primary | ICD-10-CM

## 2022-10-06 DIAGNOSIS — M25.561 CHRONIC PAIN OF BOTH KNEES: Primary | ICD-10-CM

## 2022-10-06 DIAGNOSIS — R29.898 BILATERAL LEG WEAKNESS: ICD-10-CM

## 2022-10-06 PROCEDURE — 97110 THERAPEUTIC EXERCISES: CPT | Performed by: PHYSICAL THERAPIST

## 2022-10-06 NOTE — PROGRESS NOTES
30-Day / 10-Visit Progress Note         Patient: Marion Anderson   : 1950  Diagnosis/ICD-10 Code:  Chronic pain of both knees [M25.561, M25.562, G89.29]  Referring practitioner: Liv Galvan MD  Date of Initial Visit: Type: THERAPY  Noted: 2022  Today's Date: 10/6/2022  Patient seen for 8 sessions      Subjective:     Clinical Progress: Slow due to increased knee pain  Home Program Compliance: Yes  Treatment has included:  therapeutic exercise and patient education with home exercise program     Subjective Evaluation    History of Present Illness    Subjective comment: Knees hurt more getting up from sitting to go to walking. I have to stand before walking.  I am appointment with MD Oct 20.Pain  Pain scale: 6-7/10.         Objective          Active Range of Motion   Left Knee   Flexion: 118 degrees   Extension: 17 (Shaky) degrees     Right Knee   Flexion: 120 degrees   Extension: 11 (Shaky) degrees     Strength/Myotome Testing     Left Knee   Flexion: 4-  Extension: 4-    Right Knee   Flexion: 4-  Extension: 4-    Ambulation     Observational Gait   Decreased walking speed.         See Exercise, Manual, and Modality Logs for complete treatment.     Assessment & Plan     Assessment  Impairments: abnormal gait, abnormal or restricted ROM, impaired physical strength, lacks appropriate home exercise program and pain with function  Functional Limitations: sleeping, walking, uncomfortable because of pain, moving in bed and standing  Assessment details: Marion Anderson is a 70 y.o. year-old female referred to physical therapy for bilateral knee pain. She has only been seen for 8 sessions for knee strengthening exercises.  She has experienced greater knee pain in past 2 weeks.  She has scheduled an appointment with ortho MD to further evaluate her knees.  She is bending her knees well however she is limited in active knee extension with quadriceps weakness.  She is planning to join gym next month  to continue exercise and add pool.  Prognosis: good    Goals  Plan Goals: PT Goal Recert Due Date: 10/26/22  STG Date to Achieve: 09/08/22     STG 1.  Patient will demonstrate an independent HEP for core and leg strength and ROM/flexibility.  Met    STG 2  Patient will increase B leg strength to 4-/5   Met  STG 3. Patient will stand from chair 10  times with hand support  Not met      STG 4 Patient will increase B knee AROM to 8-105 degrees  Met for knee flexion   LTG Date to Achieve: 10/26/22    LTG 1. Patient will increase knee AROM to 5-110 degrees    LTG 2 Patient will increase B leg strength to 4/5 to stand from chair without hand support    LTG 3: Patient will report decreased functional disability on KOS ADL score from 44 % to 34 %    LTG 4 Patient will report decrease sleep disturbance due to leg pain by 25% or more     Plan  Therapy options: will be seen for skilled therapy services  Other planned modality interventions: Aquatic therapy  Planned therapy interventions: balance/weight-bearing training, strengthening, stretching, gait training, home exercise program, therapeutic activities, neuromuscular re-education, manual therapy and transfer training  Frequency: 2x week  Duration in weeks: 12  Treatment plan discussed with: patient           Recommendations: Continue as planned  Timeframe: 1 month  Prognosis to achieve goals: good    PT Signature: Jennifer Moreno PT    [place] License Number: [id number]    Electronically signed by Jennifer Moreno PT, 10/06/22, 2:53 PM EDT      Based upon review of the patient's progress and continued therapy plan, it is my medical opinion that Marion Anderson should continue physical therapy treatment at [facility]  [address] [place] [address] [address] [place] [address]  [phone].    Signature: __________________________________  Liv Galvan MD    Timed:  Manual Therapy:    0     mins  98948;  Therapeutic Exercise:    43      mins  74005;     Neuromuscular Rina:    0    mins  37598;    Therapeutic Activity:     0     mins  08694;     Gait Trainin     mins  77243;     Ultrasound:     0     mins  87737;    Iontophoresis    0     mins 50965;    Untimed:  Electrical Stimulation:    0     mins  68273 ( );  Traction:    0     mins  03425;   Dry Needling  (1-2 muscles)            0     mins 98488 (Self-pay)  Dry Needling (3-4 muscles) 0     mins  (Self-pay)  Dry Needling Trial    0     mins DRYNDLTRIAL  (No Charge)      Timed Treatment:   43   mins   Total Treatment:     43   mins

## 2022-10-12 ENCOUNTER — TREATMENT (OUTPATIENT)
Dept: PHYSICAL THERAPY | Facility: CLINIC | Age: 72
End: 2022-10-12

## 2022-10-12 DIAGNOSIS — R29.898 BILATERAL LEG WEAKNESS: ICD-10-CM

## 2022-10-12 DIAGNOSIS — M25.562 CHRONIC PAIN OF BOTH KNEES: Primary | ICD-10-CM

## 2022-10-12 DIAGNOSIS — M25.561 CHRONIC PAIN OF BOTH KNEES: Primary | ICD-10-CM

## 2022-10-12 DIAGNOSIS — G89.29 CHRONIC PAIN OF BOTH KNEES: Primary | ICD-10-CM

## 2022-10-12 PROCEDURE — 97110 THERAPEUTIC EXERCISES: CPT | Performed by: PHYSICAL THERAPIST

## 2022-10-12 NOTE — PROGRESS NOTES
Physical Therapy Daily Treatment Note    [facility]  [address] [place] [address]  [phone] (phone)  [phone] (fax)    Patient: Marion Anderson   : 1950  Diagnosis/ICD-10 Code:  Chronic pain of both knees [M25.561, M25.562, G89.29]  Referring practitioner: Liv Galvan MD  Today's Date: 10/12/2022  Patient seen for 9 sessions           Subjective My knee hurts too much to do the pushing.    Objective   See Exercise, Manual, and Modality Logs for complete treatment.   Omitted thel leg press due to knee pain    Assessment/Plan    Knee pain more limiting to exercises.  She sees ortho MD 10/20.        Timed:    Manual Therapy:    0     mins  90924;  Therapeutic Exercise:    40     mins  63780;     Neuromuscular Rina:    0    mins  60419;    Therapeutic Activity:     0     mins  76327;     Gait Trainin     mins  36471;     Ultrasound:     0     mins  36475;    Electrical Stimulation:    0     mins  93035 ( );  Iontophoresis    0     mins 92761;  Aquatic Therapy    0     mins 87282;      Untimed:  Electrical Stimulation:    0     mins  40514 ([facility] );  Traction:    0     mins  41829;   Dry Needling  (1-2 muscles)            0     mins 74280 (Self-pay)  Dry Needling (3-4 muscles) 0     84451 (Self-pay)  Dry Needling Trial    0     DRYNDLTRIAL  (No Charge)    Timed Treatment:   40   mins   Total Treatment:     40   mins    Jennifer Moreno, PT  Physical Therapist    [place] License:[id number]

## 2022-10-26 ENCOUNTER — TREATMENT (OUTPATIENT)
Dept: PHYSICAL THERAPY | Facility: CLINIC | Age: 72
End: 2022-10-26

## 2022-10-26 DIAGNOSIS — M25.562 CHRONIC PAIN OF BOTH KNEES: Primary | ICD-10-CM

## 2022-10-26 DIAGNOSIS — G89.29 CHRONIC PAIN OF BOTH KNEES: Primary | ICD-10-CM

## 2022-10-26 DIAGNOSIS — R29.898 BILATERAL LEG WEAKNESS: ICD-10-CM

## 2022-10-26 DIAGNOSIS — R52 PAIN AGGRAVATED BY SITTING: ICD-10-CM

## 2022-10-26 DIAGNOSIS — M25.561 CHRONIC PAIN OF BOTH KNEES: Primary | ICD-10-CM

## 2022-10-26 PROCEDURE — 97110 THERAPEUTIC EXERCISES: CPT | Performed by: PHYSICAL THERAPIST

## 2022-10-26 NOTE — PROGRESS NOTES
Physical Therapy Progress Note    [facility]  [address] [place] [address]  [phone] (phone)  [phone] (fax)    Patient: Marion Anderson   : 1950  Diagnosis/ICD-10 Code:  Chronic pain of both knees [M25.561, M25.562, G89.29]  Referring practitioner: Liv Galvan MD  Today's Date: 10/26/2022  Patient seen for 10 sessions           Subjective Evaluation    History of Present Illness    Subjective comment: My appointment was canceled with knee doctor last week so I see Dr. Riggins Nov 1.Pain  Current pain ratin  Location: L more than R knee            Objective          Static Posture     Knee   Genu valgus.     Observations   Left Knee   Positive for edema.       Tenderness   Left Knee   Tenderness in the lateral joint line and medial joint line.     Right Knee   Tenderness in the lateral joint line and medial joint line.     Active Range of Motion   Left Knee   Flexion: 118 degrees   Extension: 10 degrees     Right Knee   Flexion: 118 degrees   Extension: 7 degrees     Passive Range of Motion   Left Knee   Extension: 7 degrees     Right Knee   Extension: 5 degrees     Strength/Myotome Testing     Left Knee   Flexion: 4-  Extension: 3+    Right Knee   Flexion: 4-  Extension: 3+    Ambulation     Observational Gait   Decreased walking speed.       See Exercise, Manual, and Modality Logs for complete treatment.   Issued her copy of gym exercises and recommended beginning classes to try at gym    Assessment & Plan     Assessment  Impairments: abnormal gait, abnormal or restricted ROM, impaired physical strength, lacks appropriate home exercise program and pain with function  Functional Limitations: sleeping, walking, uncomfortable because of pain, moving in bed and standing  Assessment details: Marion Anderson is a 70 y.o. year-old female referred to physical therapy for bilateral knee pain. She has only been seen for 10 sessions for knee  strengthening exercises.  She has experienced greater knee pain in past month and that has become more limiting with her exercise.  She has scheduled an appointment with ortho MD to further evaluate her knees.  She has greater pain with knee ROM and resistive testing. She is planning to join gym next month to continue exercise and add pool.  Prognosis: good    Goals  Plan Goals: PT Goal Recert Due Date: 10/26/22  STG Date to Achieve: 22     STG 1.  Patient will demonstrate an independent HEP for core and leg strength and ROM/flexibility.  Met    STG 2  Patient will increase B leg strength to 4-/5   Partially met   STG 3. Patient will stand from chair 10  times with hand support  Not met      STG 4 Patient will increase B knee AROM to 8-105 degrees  Met for knee flexion and right knee  LTG Date to Achieve: 10/26/22    LTG 1. Patient will increase knee AROM to 5-110 degrees  Ongoing     LTG 2 Patient will increase B leg strength to 4/5 to stand from chair without hand support  Ongoing   LTG 3: Patient will report decreased functional disability on KOS ADL score from 44 % to 34 %  Ongoing   LTG 4 Patient will report decrease sleep disturbance due to leg pain by 25% or more   Ongoing     Plan  Therapy options: will be seen for skilled therapy services  Other planned modality interventions: Aquatic therapy  Planned therapy interventions: balance/weight-bearing training, strengthening, stretching, gait training, home exercise program, therapeutic activities, neuromuscular re-education, manual therapy and transfer training  Frequency: 2x week  Duration in weeks: 12  Treatment plan discussed with: patient               Timed:    Manual Therapy:    0     mins  28760;  Therapeutic Exercise:    40     mins  16200;     Neuromuscular Rina:    0    mins  07335;    Therapeutic Activity:     0     mins  22196;     Gait Trainin     mins  86545;     Ultrasound:     0     mins  68620;    Electrical Stimulation:    0      mins  21889 ( );  Iontophoresis    0     mins 57435;  Aquatic Therapy    0     mins 99470;      Untimed:  Electrical Stimulation:    0     mins  03750 ( );  Traction:    0     mins  95462;   Dry Needling  (1-2 muscles)            0     mins 62646 (Self-pay)  Dry Needling (3-4 muscles) 0     20561 (Self-pay)  Dry Needling Trial    0     DRYNDLTRIAL  (No Charge)    Timed Treatment:   40   mins   Total Treatment:     40   mins    Jennifer Moreno, PT  Physical Therapist    [place] License:[id number]

## 2022-12-02 ENCOUNTER — OFFICE VISIT (OUTPATIENT)
Dept: CARDIOLOGY | Facility: CLINIC | Age: 72
End: 2022-12-02

## 2022-12-02 VITALS
DIASTOLIC BLOOD PRESSURE: 80 MMHG | SYSTOLIC BLOOD PRESSURE: 122 MMHG | WEIGHT: 244 LBS | HEIGHT: 65 IN | BODY MASS INDEX: 40.65 KG/M2 | HEART RATE: 83 BPM

## 2022-12-02 DIAGNOSIS — Z98.890 S/P MVR (MITRAL VALVE REPAIR): ICD-10-CM

## 2022-12-02 DIAGNOSIS — I10 ESSENTIAL HYPERTENSION: ICD-10-CM

## 2022-12-02 DIAGNOSIS — I25.10 CORONARY ARTERY DISEASE INVOLVING NATIVE CORONARY ARTERY OF NATIVE HEART WITHOUT ANGINA PECTORIS: Primary | ICD-10-CM

## 2022-12-02 DIAGNOSIS — Z95.1 S/P CABG X 4: ICD-10-CM

## 2022-12-02 PROCEDURE — 93000 ELECTROCARDIOGRAM COMPLETE: CPT | Performed by: NURSE PRACTITIONER

## 2022-12-02 PROCEDURE — 99214 OFFICE O/P EST MOD 30 MIN: CPT | Performed by: NURSE PRACTITIONER

## 2022-12-02 RX ORDER — ALENDRONATE SODIUM 70 MG/1
70 TABLET ORAL WEEKLY
COMMUNITY
Start: 2022-11-21

## 2022-12-02 NOTE — PROGRESS NOTES
Date of Office Visit: 2022  Encounter Provider: Teresa Chapman, MARIBEL, APRN  Place of Service: [facility] CARDIOLOGY  Patient Name: Marion Anderson  :1950        Subjective:     Chief Complaint:  Hypertension, coronary artery diease, MVR      History of Present Illness:  Marion Anderson is a 72 y.o. female patient of Dr. Fernando.  I am seeing patient in office and have reviewed her records.     Patient has a history of hypertension, hyperlipidemia, coronary artery disease, mitral valve repair     PER PREVIOUS OFFICE NOTE: In  she was found to have LAD and right coronary artery stenosis for which she underwent stenting.  Patient was seen in 2017 for progressive chest pain.  Cardiac catheterization showed multivessel coronary artery disease including left main disease.  She developed ventricular fibrillation in the Cath Lab and was successfully cardioverted.  On 2017 she had a LIMA graft to the LAD, vein graft to the right coronary artery, vein graft to ramus intermedius branch, vein graft to diagonal branch of the LAD.  She also had mitral valve repair with a 24 mm ring.  Postoperative course was complicated by encephalopathy CT imaging showed prior infarct.  She was treated with Keppra for a short period of time.  She had carotid Doppler imaging notes severe bilateral carotid artery disease/carotid occlusion which is felt to be difficult to revascularize and for which she was seen by Dr. Prado. THE FOLLOWING IS MY CONTRIBUTION TO NOTE: Follow-up echo 3/2020 showed normal LV systolic function with EF of 63%, grade 2 diastolic dysfunction, mitral valve ring with mitral valve gradient of 3 mmHg and no regurgitation or stenosis.        Patient presents to office today for follow-up appointment.  Patient reports she is doing well since last visit.  She has been doing some light exercise classes at [facility].  She has done some water aerobics in the past as well  without issues and she plans to resume this as well.  Denies exertional symptoms or concerns.  No chest pain or discomfort, dyspnea, palpitations, dizziness, syncope, near syncope, falls, or abnormal bleeding.  She is following with Ortho for chronic knee pain and was told that she does not need surgery is going to try injection.  Blood pressure and heart rate appear well controlled.  She is using SBE prophylaxis prior to dental cleanings/procedures.        Past Medical History:   Diagnosis Date   • Arthritis    • Bilateral carotid artery stenosis 2/22/2020   • Coronary artery disease    • Disease of thyroid gland    • Hyperlipidemia    • Hypertension    • PONV (postoperative nausea and vomiting)    • Spinal headache      Past Surgical History:   Procedure Laterality Date   • CARDIAC CATHETERIZATION Left 10/06/2017    [facility], Dr. THAD Grant, coronary arteriography   • CARDIAC SURGERY     • CORONARY ARTERY BYPASS GRAFT N/A 10/12/2017    Procedure: FILEMON STERNOTOMY CORONARY ARTERY BYPASS GRAFT TIMES 4  USING LEFT INTERNAL MAMMARY ARTERY AND LEFT GREATER SAPHENOUS VEIN GRAFT PER  ENDOSCOPIC VEIN HARVESTING, MITRAL VALVE REPAIR AND PRP;  Surgeon: Ramos Muñoz MD;  Location: [facility];  Service:    • FRACTURE SURGERY      Ankle/Leg   • TONSILLECTOMY       Outpatient Medications Prior to Visit   Medication Sig Dispense Refill   • alendronate (FOSAMAX) 70 MG tablet Take 70 mg by mouth 1 (One) Time Per Week.     • amoxicillin (AMOXIL) 500 MG capsule Take 4 capsules by mouth See Admin Instructions. 4 capsules 1 hour prior to procedure 12 capsule 0   • aspirin 81 MG chewable tablet Chew 81 mg Daily.     • Cholecalciferol (VITAMIN D3) 25 MCG (1000 UT) capsule Take 25 mcg by mouth Every Other Day.     • ezetimibe (ZETIA) 10 MG tablet Take 10 mg by mouth Daily.     • hydrOXYzine (ATARAX) 25 MG tablet Take 25 mg by mouth At Night As Needed for Itching.     • levothyroxine (SYNTHROID,  LEVOTHROID) 125 MCG tablet Take 125 mcg by mouth Daily.     • losartan (Cozaar) 25 MG tablet Take 1 tablet by mouth Daily. 90 tablet 1   • MELATONIN GUMMIES PO Take 10 mg by mouth At Night As Needed.     • metoprolol succinate XL (TOPROL-XL) 50 MG 24 hr tablet Take 1 tablet by mouth twice daily 180 tablet 0   • Multiple Vitamin (MULTI-DAY PO) Take 1 tablet by mouth Daily.     • nitroglycerin (NITROSTAT) 0.3 MG SL tablet Place 0.3 mg under the tongue Every 5 (Five) Minutes As Needed.     • Omega-3 Fatty Acids (FISH OIL) 1000 MG capsule capsule Take  by mouth Daily With Breakfast.     • rosuvastatin (CRESTOR) 40 MG tablet Take 40 mg by mouth Every Night.     • traMADol (ULTRAM) 50 MG tablet Take 50 mg by mouth As Needed.       No facility-administered medications prior to visit.       Allergies as of 12/02/2022 - Reviewed 12/02/2022   Allergen Reaction Noted   • Latex Itching and Other (See Comments) 12/03/2015     Social History     Socioeconomic History   • Marital status: Single   Tobacco Use   • Smoking status: Never   • Smokeless tobacco: Never   Substance and Sexual Activity   • Alcohol use: No   • Drug use: No   • Sexual activity: Defer     Birth control/protection: Post-menopausal     Family History   Problem Relation Age of Onset   • Heart disease Mother    • Hypertension Mother    • Stroke Mother    • Heart disease Father    • Hypertension Father    • Heart attack Father    • Cancer Brother    • Hypertension Brother        Review of Systems   Constitutional: Positive for malaise/fatigue (Occasional at night).   Cardiovascular:        SEE HPI   Respiratory: Negative for shortness of breath.    Hematologic/Lymphatic: Negative for bleeding problem.   Musculoskeletal: Negative for falls.   Gastrointestinal: Negative for melena.   Genitourinary: Negative for hematuria.   Neurological: Negative for dizziness.   Psychiatric/Behavioral: Negative for altered mental status.          Objective:     Vitals:    12/02/22  "1424   BP: 122/80   Pulse: 83   Weight: 111 kg (244 lb)   Height: 165.1 cm (65\")     Body mass index is 40.6 kg/m².      PHYSICAL EXAM:  Constitutional:       General: Not in acute distress.     Appearance: Well-developed. Not diaphoretic.   Eyes:      Pupils: Pupils are equal, round, and reactive to light.   HENT:      Head: Normocephalic and atraumatic.   Pulmonary:      Effort: Pulmonary effort is normal. No respiratory distress.      Breath sounds: Normal breath sounds. No wheezing. No rales.   Cardiovascular:      Normal rate. Regular rhythm.      Murmurs: There is no murmur.      No gallop. No click. No rub.   Edema:     Peripheral edema absent.   Abdominal:      General: Bowel sounds are normal.      Palpations: Abdomen is soft.   Musculoskeletal:         General: No tenderness or deformity. Skin:     General: Skin is warm and dry.      Findings: No erythema or rash.   Neurological:      Mental Status: Alert and oriented to person, place, and time.   Psychiatric:         Behavior: Behavior normal.         Judgment: Judgment normal.             ECG 12 Lead    Date/Time: 12/2/2022 3:44 PM  Performed by: Teresa Chapman DNP, APRN  Authorized by: Teresa Chapman DNP, PANKAJ   Comparison: compared with previous ECG from 12/2/2021  Rhythm: sinus rhythm  BPM: 83  Comments: No significant changes from previous EKGs              Assessment:       Diagnosis Plan   1. Coronary artery disease involving native coronary artery of native heart without angina pectoris        2. S/P CABG x 4        3. S/P MVR (mitral valve repair)        4. Essential hypertension              Plan:     1. Coronary artery disease with history of stenting and subsequent coronary artery bypass grafting in 2017: Denies anginal symptoms.  She is doing some light exercise and plans to resume her water aerobics class which she has also done recently but not regularly without exertional symptoms.  Discussed importance of risk factor " modification.  2. Hypertension: Controlled  3. History of mitral valve repair: Continue with SBE prophylaxis  4. Hyperlipidemia: PCP managing  5. Chronic renal insufficiency: PCP managing  6. Hypothyroidism: PCP managing  7. Carotid artery disease: Follows with Dr. Prado with vascular surgery    Patient to follow-up with Dr. Fernando in 1 year or follow-up sooner if needed for any new, recurrent, or worsening symptoms or concerns.  Discussed in detail signs/symptoms that warrant sooner call the office or ER visit.           Your medication list          Accurate as of December 2, 2022  3:49 PM. If you have any questions, ask your nurse or doctor.            CONTINUE taking these medications      Instructions Last Dose Given Next Dose Due   alendronate 70 MG tablet  Commonly known as: FOSAMAX      Take 70 mg by mouth 1 (One) Time Per Week.       amoxicillin 500 MG capsule  Commonly known as: AMOXIL      Take 4 capsules by mouth See Admin Instructions. 4 capsules 1 hour prior to procedure       aspirin 81 MG chewable tablet      Chew 81 mg Daily.       ezetimibe 10 MG tablet  Commonly known as: ZETIA      Take 10 mg by mouth Daily.       fish oil 1000 MG capsule capsule      Take  by mouth Daily With Breakfast.       hydrOXYzine 25 MG tablet  Commonly known as: ATARAX      Take 25 mg by mouth At Night As Needed for Itching.       levothyroxine 125 MCG tablet  Commonly known as: SYNTHROID, LEVOTHROID      Take 125 mcg by mouth Daily.       losartan 25 MG tablet  Commonly known as: Cozaar      Take 1 tablet by mouth Daily.       MELATONIN GUMMIES PO      Take 10 mg by mouth At Night As Needed.       metoprolol succinate XL 50 MG 24 hr tablet  Commonly known as: TOPROL-XL      Take 1 tablet by mouth twice daily       multivitamin tablet tablet  Commonly known as: THERAGRAN      Take 1 tablet by mouth Daily.       nitroglycerin 0.3 MG SL tablet  Commonly known as: NITROSTAT      Place 0.3 mg under the tongue Every 5 (Five)  Minutes As Needed.       rosuvastatin 40 MG tablet  Commonly known as: CRESTOR      Take 40 mg by mouth Every Night.       traMADol 50 MG tablet  Commonly known as: ULTRAM      Take 50 mg by mouth As Needed.       Vitamin D3 25 MCG (1000 UT) capsule      Take 25 mcg by mouth Every Other Day.              The above medication changes may not have been made by this provider.  Patient's medication list was updated to reflect medications they are currently taking including medication changes made by other providers.            Thanks,    Teresa Chapman, DNP, APRN  12/02/2022         Dictated utilizing Dragon dictation

## 2022-12-29 NOTE — TELEPHONE ENCOUNTER
Pt called re: Dr Prado.  It has been since 2017 when she was last seen.  Dr Prado is asking that we place a referral for this pt to be seen.  Please place referral.    
Referral placed.  Please have her let us know if she does not get a call within the next week to schedule this.   
Spoke with pt.  No other questions.  She will call if no call.  (done)  
oriented to person, place, time and situation

## 2023-01-13 RX ORDER — METOPROLOL SUCCINATE 50 MG/1
TABLET, EXTENDED RELEASE ORAL
Qty: 180 TABLET | Refills: 2 | Status: SHIPPED | OUTPATIENT
Start: 2023-01-13

## 2023-04-25 ENCOUNTER — TELEPHONE (OUTPATIENT)
Dept: CARDIOLOGY | Facility: CLINIC | Age: 73
End: 2023-04-25
Payer: MEDICARE

## 2023-04-25 NOTE — TELEPHONE ENCOUNTER
Patient called, informing me that she had an abnormal mammogram and is scheduled to have a biopsy Friday at [facility].  Patient would like to know if she needs to stop any medications prior from a cardiac standpoint.   Patient does take Asprin 81mg daily.  Also patent does have to be premedicated for dental visits and wandered if she needs to be premedicated for the biopsy.  Please advise.  Courtney

## 2023-05-17 ENCOUNTER — TELEPHONE (OUTPATIENT)
Dept: CARDIOLOGY | Facility: CLINIC | Age: 73
End: 2023-05-17

## 2023-05-17 NOTE — TELEPHONE ENCOUNTER
Wm with Dr Aviles office ([facility]) @ [phone] ext: [phone] called re: Cardiac Clearance needed for lumpectomy scheduled for newly dx left sided ductal breast cancer.      Called pt and LMM re: Call back to schedule an appointment for Cardio-Onco workup. (within the next 1 week).  They are planning surgery in 2 weeks.        Into to Wm and he will await further clearance.

## 2023-05-23 NOTE — TELEPHONE ENCOUNTER
LMM with pt re: Please call back to schedule appointment for Cardiac Clearance requested from Dr Aviles.

## 2023-05-26 NOTE — TELEPHONE ENCOUNTER
Scheduled pt for 6/1/2023 @ 9am.  Called pt and verbalized understanding.  She said [facility] was having issues and were unable to schedule any procedures so hopefully they are back up.  Called Dr Jacob's office and     LMM re: Pt scheduled 6/1/2023 and we wanted to see if they had any idea when the pt would be scheduled for lumpectomy.     Dr Fernando: JUST MINERVA.

## 2023-06-01 ENCOUNTER — OFFICE VISIT (OUTPATIENT)
Dept: CARDIOLOGY | Facility: CLINIC | Age: 73
End: 2023-06-01

## 2023-06-01 VITALS
DIASTOLIC BLOOD PRESSURE: 64 MMHG | WEIGHT: 234 LBS | BODY MASS INDEX: 38.99 KG/M2 | HEIGHT: 65 IN | HEART RATE: 62 BPM | SYSTOLIC BLOOD PRESSURE: 112 MMHG

## 2023-06-01 DIAGNOSIS — Z98.890 S/P MVR (MITRAL VALVE REPAIR): ICD-10-CM

## 2023-06-01 DIAGNOSIS — Z51.81 ENCOUNTER FOR MONITORING CARDIOTOXIC DRUG THERAPY: ICD-10-CM

## 2023-06-01 DIAGNOSIS — I25.10 CORONARY ARTERY DISEASE INVOLVING NATIVE CORONARY ARTERY OF NATIVE HEART WITHOUT ANGINA PECTORIS: Primary | ICD-10-CM

## 2023-06-01 DIAGNOSIS — Z79.899 ENCOUNTER FOR MONITORING CARDIOTOXIC DRUG THERAPY: ICD-10-CM

## 2023-06-01 DIAGNOSIS — N28.9 RENAL INSUFFICIENCY: ICD-10-CM

## 2023-06-01 DIAGNOSIS — R06.09 DOE (DYSPNEA ON EXERTION): ICD-10-CM

## 2023-06-01 DIAGNOSIS — I10 ESSENTIAL HYPERTENSION: ICD-10-CM

## 2023-06-01 RX ORDER — LOSARTAN POTASSIUM 50 MG/1
50 TABLET ORAL DAILY
COMMUNITY
Start: 2023-03-22

## 2023-06-01 NOTE — PROGRESS NOTES
Date of Office Visit: 2023  Encounter Provider: Domonique Fernando MD  Place of Service: [facility] CARDIOLOGY  Patient Name: Marion Anderson  :1950    Chief complaint  Coronary artery disease, valvular heart disease    History of Present Illness  Patient is a 72-year-old female with history of hypertension, hyperlipidemia, coronary artery disease.  In  she was found to have LAD and right coronary artery stenosis for which she underwent stenting.  Patient was seen in 2017 for progressive chest pain.  Cardiac catheterization showed multivessel coronary artery disease including left main disease.  She developed ventricular fibrillation in the Cath Lab and was successfully cardioverted.  On 2017 she had a LIMA graft to the LAD, vein graft to the right coronary artery, vein graft to ramus intermedius branch, vein graft to diagonal branch of the LAD.  She also had mitral valve repair with a 24 mm ring.  Postoperative course was complicated by encephalopathy CT imaging showed prior infarct.  She was treated with Keppra for a short period of time.  She had carotid Doppler imaging notes severe bilateral carotid artery disease/carotid occlusion which is felt to be difficult to revascularize and for which she was seen by Dr. Prado.  Last echo dated 3/2020 showed an ejection fraction of 63%, mitral valve ring in place with mean gradient of 3 mmHg.    More recently she was diagnosed with left-sided breast cancer and follows with Dr. Aviles and Dr. Jacob.  Lumpectomy is being considered, possibly with radiation to follow-up.  Records are not available through [facility].    She has been exercising sporadically.  She has had no chest pain dizziness palpitations. She has had shortness of breath on occasion when she is rushing or is anxious.  This resolves within 5 minutes.  She has had mild dependent edema.      Past Medical History:   Diagnosis Date    Arthritis     Bilateral  carotid artery stenosis 2/22/2020    Coronary artery disease     Disease of thyroid gland     Hyperlipidemia     Hypertension     PONV (postoperative nausea and vomiting)     Spinal headache      Past Surgical History:   Procedure Laterality Date    CARDIAC CATHETERIZATION Left 10/06/2017    [facility], Dr. THAD Grant, coronary arteriography    CARDIAC SURGERY      CORONARY ARTERY BYPASS GRAFT N/A 10/12/2017    Procedure: FILEMON STERNOTOMY CORONARY ARTERY BYPASS GRAFT TIMES 4  USING LEFT INTERNAL MAMMARY ARTERY AND LEFT GREATER SAPHENOUS VEIN GRAFT PER  ENDOSCOPIC VEIN HARVESTING, MITRAL VALVE REPAIR AND PRP;  Surgeon: Ramos Muñoz MD;  Location: [facility];  Service:     FRACTURE SURGERY      Ankle/Leg    TONSILLECTOMY       Outpatient Medications Prior to Visit   Medication Sig Dispense Refill    alendronate (FOSAMAX) 70 MG tablet Take 1 tablet by mouth 1 (One) Time Per Week.      amoxicillin (AMOXIL) 500 MG capsule Take 4 capsules by mouth See Admin Instructions. 4 capsules 1 hour prior to procedure 12 capsule 0    aspirin 81 MG chewable tablet Chew 1 tablet Daily.      Cholecalciferol (VITAMIN D3) 25 MCG (1000 UT) capsule Take 1 capsule by mouth Every Other Day.      ezetimibe (ZETIA) 10 MG tablet Take 1 tablet by mouth Daily.      hydrOXYzine (ATARAX) 25 MG tablet Take 1 tablet by mouth At Night As Needed for Itching.      levothyroxine (SYNTHROID, LEVOTHROID) 125 MCG tablet Take 1 tablet by mouth Daily.      losartan (COZAAR) 50 MG tablet Take 1 tablet by mouth Daily. for blood pressure      metoprolol succinate XL (TOPROL-XL) 50 MG 24 hr tablet Take 1 tablet by mouth twice daily 180 tablet 2    Multiple Vitamin (MULTI-DAY PO) Take 1 tablet by mouth Daily.      nitroglycerin (NITROSTAT) 0.3 MG SL tablet Place 1 tablet under the tongue Every 5 (Five) Minutes As Needed.      Omega-3 Fatty Acids (FISH OIL) 1000 MG capsule capsule Take  by mouth Daily With Breakfast.       "rosuvastatin (CRESTOR) 40 MG tablet Take 1 tablet by mouth Every Night.      traMADol (ULTRAM) 50 MG tablet Take 1 tablet by mouth As Needed.      losartan (Cozaar) 25 MG tablet Take 1 tablet by mouth Daily. 90 tablet 1    MELATONIN GUMMIES PO Take 10 mg by mouth At Night As Needed. (Patient not taking: Reported on 6/1/2023)       No facility-administered medications prior to visit.       Allergies as of 06/01/2023 - Reviewed 06/01/2023   Allergen Reaction Noted    Latex Itching and Other (See Comments) 12/03/2015     Social History     Socioeconomic History    Marital status: Single   Tobacco Use    Smoking status: Never    Smokeless tobacco: Never   Substance and Sexual Activity    Alcohol use: No    Drug use: No    Sexual activity: Defer     Birth control/protection: Post-menopausal     Family History   Problem Relation Age of Onset    Heart disease Mother     Hypertension Mother     Stroke Mother     Heart disease Father     Hypertension Father     Heart attack Father     Cancer Brother     Hypertension Brother      Review of Systems   Constitutional: Negative for chills, fever, weight gain and weight loss.   Cardiovascular:  Negative for leg swelling.   Respiratory:  Negative for cough, snoring and wheezing.    Hematologic/Lymphatic: Negative for bleeding problem. Does not bruise/bleed easily.   Skin:  Negative for color change.   Musculoskeletal:  Negative for falls, joint pain and myalgias.   Gastrointestinal:  Negative for melena.   Genitourinary:  Negative for hematuria.   Neurological:  Negative for excessive daytime sleepiness.   Psychiatric/Behavioral:  Negative for depression. The patient is not nervous/anxious.       Objective:     Vitals:    06/01/23 0841   BP: 112/64   Pulse: 62   Weight: 106 kg (234 lb)   Height: 165.1 cm (65\")     Body mass index is 38.94 kg/m².    Vitals reviewed.   Constitutional:       Appearance: Well-developed.   Eyes:      General: No scleral icterus.        Right eye: No " discharge.      Conjunctiva/sclera: Conjunctivae normal.      Pupils: Pupils are equal, round, and reactive to light.   HENT:      Head: Normocephalic.      Nose: Nose normal.   Neck:      Thyroid: No thyromegaly.      Vascular: No JVD.   Pulmonary:      Effort: Pulmonary effort is normal. No respiratory distress.      Breath sounds: Normal breath sounds. No wheezing. No rales.   Cardiovascular:      Normal rate. Regular rhythm. Normal S1. Normal S2.       Murmurs: There is no murmur.      No gallop.    Pulses:     Intact distal pulses.   Edema:     Peripheral edema absent.   Abdominal:      General: Bowel sounds are normal. There is no distension.      Palpations: Abdomen is soft.      Tenderness: There is no abdominal tenderness. There is no rebound.   Musculoskeletal: Normal range of motion.         General: No tenderness.      Cervical back: Normal range of motion and neck supple. Skin:     General: Skin is warm and dry.      Findings: No erythema or rash.   Neurological:      Mental Status: Alert and oriented to person, place, and time.   Psychiatric:         Behavior: Behavior normal.         Thought Content: Thought content normal.         Judgment: Judgment normal.     Lab Review:         ECG 12 Lead    Date/Time: 6/1/2023 8:58 AM  Performed by: Domonique Fernando MD  Authorized by: Domonique Fernando MD   Comparison: compared with previous ECG   Similar to previous ECG  Rhythm: sinus rhythm    Clinical impression: normal ECG      Assessment:       Diagnosis Plan   1. Coronary artery disease involving native coronary artery of native heart without angina pectoris  ECG 12 Lead    Adult Transthoracic Echo Complete W/ Cont if Necessary Per Protocol    Stress Test With Myocardial Perfusion One Day      2. Encounter for monitoring cardiotoxic drug therapy  ECG 12 Lead    Adult Transthoracic Echo Complete W/ Cont if Necessary Per Protocol    Stress Test With Myocardial Perfusion One Day      3. SHIPLEY (dyspnea on exertion)   Adult Transthoracic Echo Complete W/ Cont if Necessary Per Protocol    Stress Test With Myocardial Perfusion One Day      4. S/P MVR (mitral valve repair)        5. Essential hypertension        6. Renal insufficiency          Plan:       1.  Left-sided breast cancer.  For lumpectomy and possible radiation therapy.  2.  Card oncology care.  With ongoing dyspnea with strenuous activity or stress we will check an echocardiogram and Lexiscan Cardiolite stress test  3.  Coronary artery disease with prior stenting and subsequent coronary artery bypass grafting 2017. As above  4.  Status post mitral valve repair.  Continue with SBE prophylaxis  5.  Hypertension.  Controlled  6.  Hyperlipidemia.  Improved with statin and Zetia.  HDL remains low and have asked her to increase omega-3 intake in the form of fish as well as flaxseed  7.  Renal insufficiency.  Remained stable and abnormal  8.  Hypothyroidism.  Recent labs with elevated TSH and T4 normal.    9.  Carotid artery disease, severe with bilateral occlusion.  Followed by Dr. Prado       Time Spent: I spent 35 minutes caring for Marion on this date of service. This time includes time spent by me in the following activities: preparing for the visit, reviewing tests, obtaining and/or reviewing a separately obtained history, performing a medically appropriate examination and/or evaluation, counseling and educating the patient/family/caregiver, ordering medications, tests, or procedures, documenting information in the medical record, and independently interpreting results and communicating that information with the patient/family/caregiver.   I spent 1 minutes on the separately reported service of ECG. This time is not included in the time used to support the E/M service also reported today.        Your medication list            Accurate as of June 1, 2023 11:59 PM. If you have any questions, ask your nurse or doctor.                CONTINUE taking these medications         Instructions Last Dose Given Next Dose Due   alendronate 70 MG tablet  Commonly known as: FOSAMAX      Take 1 tablet by mouth 1 (One) Time Per Week.       amoxicillin 500 MG capsule  Commonly known as: AMOXIL      Take 4 capsules by mouth See Admin Instructions. 4 capsules 1 hour prior to procedure       aspirin 81 MG chewable tablet      Chew 1 tablet Daily.       ezetimibe 10 MG tablet  Commonly known as: ZETIA      Take 1 tablet by mouth Daily.       fish oil 1000 MG capsule capsule      Take  by mouth Daily With Breakfast.       hydrOXYzine 25 MG tablet  Commonly known as: ATARAX      Take 1 tablet by mouth At Night As Needed for Itching.       levothyroxine 125 MCG tablet  Commonly known as: SYNTHROID, LEVOTHROID      Take 1 tablet by mouth Daily.       losartan 50 MG tablet  Commonly known as: COZAAR      Take 1 tablet by mouth Daily. for blood pressure       metoprolol succinate XL 50 MG 24 hr tablet  Commonly known as: TOPROL-XL      Take 1 tablet by mouth twice daily       multivitamin tablet tablet  Commonly known as: THERAGRAN      Take 1 tablet by mouth Daily.       nitroglycerin 0.3 MG SL tablet  Commonly known as: NITROSTAT      Place 1 tablet under the tongue Every 5 (Five) Minutes As Needed.       rosuvastatin 40 MG tablet  Commonly known as: CRESTOR      Take 1 tablet by mouth Every Night.       traMADol 50 MG tablet  Commonly known as: ULTRAM      Take 1 tablet by mouth As Needed.       Vitamin D3 25 MCG (1000 UT) capsule      Take 1 capsule by mouth Every Other Day.                Patient is no longer taking -.  I corrected the med list to reflect this.  I did not stop these medications.      Dictated utilizing Dragon dictation

## 2023-06-12 ENCOUNTER — HOSPITAL ENCOUNTER (OUTPATIENT)
Dept: CARDIOLOGY | Facility: HOSPITAL | Age: 73
Discharge: HOME | End: 2023-06-12
Payer: MEDICARE

## 2023-06-12 VITALS
BODY MASS INDEX: 38.99 KG/M2 | HEIGHT: 65 IN | HEART RATE: 61 BPM | WEIGHT: 234 LBS | DIASTOLIC BLOOD PRESSURE: 78 MMHG | SYSTOLIC BLOOD PRESSURE: 178 MMHG

## 2023-06-12 DIAGNOSIS — Z79.899 ENCOUNTER FOR MONITORING CARDIOTOXIC DRUG THERAPY: ICD-10-CM

## 2023-06-12 DIAGNOSIS — R06.09 DOE (DYSPNEA ON EXERTION): ICD-10-CM

## 2023-06-12 DIAGNOSIS — I25.10 CORONARY ARTERY DISEASE INVOLVING NATIVE CORONARY ARTERY OF NATIVE HEART WITHOUT ANGINA PECTORIS: ICD-10-CM

## 2023-06-12 DIAGNOSIS — Z51.81 ENCOUNTER FOR MONITORING CARDIOTOXIC DRUG THERAPY: ICD-10-CM

## 2023-06-12 LAB
AORTIC ARCH: 2.5 CM
AORTIC DIMENSIONLESS INDEX: 0.8 (DI)
BH CV ECHO LEFT VENTRICLE GLOBAL LONGITUDINAL STRAIN: 17.8 %
BH CV ECHO MEAS - ACS: 1.34 CM
BH CV ECHO MEAS - AO MAX PG: 12.5 MMHG
BH CV ECHO MEAS - AO MEAN PG: 6.2 MMHG
BH CV ECHO MEAS - AO ROOT AREA (BSA CORRECTED): 1.3 CM2
BH CV ECHO MEAS - AO ROOT DIAM: 2.8 CM
BH CV ECHO MEAS - AO V2 MAX: 176.5 CM/SEC
BH CV ECHO MEAS - AO V2 VTI: 41.9 CM
BH CV ECHO MEAS - AVA(I,D): 1.65 CM2
BH CV ECHO MEAS - CONTRAST EF (2CH): 69.7 CM2
BH CV ECHO MEAS - CONTRAST EF 4CH: 74.7 CM2
BH CV ECHO MEAS - EDV(CUBED): 61.9 ML
BH CV ECHO MEAS - EDV(MOD-SP2): 76 ML
BH CV ECHO MEAS - EDV(MOD-SP4): 75 ML
BH CV ECHO MEAS - EF(MOD-BP): 71.3 %
BH CV ECHO MEAS - EF(MOD-SP2): 69.7 %
BH CV ECHO MEAS - EF(MOD-SP4): 74.7 %
BH CV ECHO MEAS - ESV(CUBED): 34.7 ML
BH CV ECHO MEAS - ESV(MOD-SP2): 23 ML
BH CV ECHO MEAS - ESV(MOD-SP4): 19 ML
BH CV ECHO MEAS - FS: 17.5 %
BH CV ECHO MEAS - IVS/LVPW: 1.14 CM
BH CV ECHO MEAS - IVSD: 1.2 CM
BH CV ECHO MEAS - LA A2CS (ATRIAL LENGTH): 5.1 CM
BH CV ECHO MEAS - LA A4C LENGTH: 5.1 CM
BH CV ECHO MEAS - LAT PEAK E' VEL: 8.3 CM/SEC
BH CV ECHO MEAS - LV DIASTOLIC VOL/BSA (35-75): 35.5 CM2
BH CV ECHO MEAS - LV MASS(C)D: 148.1 GRAMS
BH CV ECHO MEAS - LV MAX PG: 9.3 MMHG
BH CV ECHO MEAS - LV MEAN PG: 4.5 MMHG
BH CV ECHO MEAS - LV SYSTOLIC VOL/BSA (12-30): 9 CM2
BH CV ECHO MEAS - LV V1 MAX: 152.3 CM/SEC
BH CV ECHO MEAS - LV V1 VTI: 33.2 CM
BH CV ECHO MEAS - LVIDD: 4 CM
BH CV ECHO MEAS - LVIDS: 3.3 CM
BH CV ECHO MEAS - LVOT AREA: 2.08 CM2
BH CV ECHO MEAS - LVOT DIAM: 1.63 CM
BH CV ECHO MEAS - LVPWD: 1.05 CM
BH CV ECHO MEAS - MED PEAK E' VEL: 7.2 CM/SEC
BH CV ECHO MEAS - MR MAX PG: 45 MMHG
BH CV ECHO MEAS - MR MAX VEL: 335.3 CM/SEC
BH CV ECHO MEAS - MV A DUR: 0.13 SEC
BH CV ECHO MEAS - MV A MAX VEL: 131.2 CM/SEC
BH CV ECHO MEAS - MV DEC SLOPE: 615.9 CM/SEC2
BH CV ECHO MEAS - MV DEC TIME: 0.37 MSEC
BH CV ECHO MEAS - MV E MAX VEL: 164 CM/SEC
BH CV ECHO MEAS - MV E/A: 1.25
BH CV ECHO MEAS - MV MAX PG: 11.5 MMHG
BH CV ECHO MEAS - MV MEAN PG: 3.9 MMHG
BH CV ECHO MEAS - MV P1/2T: 86.5 MSEC
BH CV ECHO MEAS - MV V2 VTI: 65.7 CM
BH CV ECHO MEAS - MVA(P1/2T): 2.5 CM2
BH CV ECHO MEAS - MVA(VTI): 1.05 CM2
BH CV ECHO MEAS - PA ACC TIME: 0.12 SEC
BH CV ECHO MEAS - PA V2 MAX: 88.2 CM/SEC
BH CV ECHO MEAS - PULM A REVS DUR: 0.12 SEC
BH CV ECHO MEAS - PULM A REVS VEL: 28.5 CM/SEC
BH CV ECHO MEAS - PULM DIAS VEL: 45.8 CM/SEC
BH CV ECHO MEAS - PULM S/D: 1.14
BH CV ECHO MEAS - PULM SYS VEL: 52.4 CM/SEC
BH CV ECHO MEAS - QP/QS: 0.35
BH CV ECHO MEAS - RAP SYSTOLE: 8 MMHG
BH CV ECHO MEAS - RV MAX PG: 2 MMHG
BH CV ECHO MEAS - RV V1 MAX: 70.7 CM/SEC
BH CV ECHO MEAS - RV V1 VTI: 14.3 CM
BH CV ECHO MEAS - RVOT DIAM: 1.46 CM
BH CV ECHO MEAS - RVSP: 34 MMHG
BH CV ECHO MEAS - SI(MOD-SP2): 25.1 ML/M2
BH CV ECHO MEAS - SI(MOD-SP4): 26.5 ML/M2
BH CV ECHO MEAS - SUP REN AO DIAM: 1.8 CM
BH CV ECHO MEAS - SV(LVOT): 69.1 ML
BH CV ECHO MEAS - SV(MOD-SP2): 53 ML
BH CV ECHO MEAS - SV(MOD-SP4): 56 ML
BH CV ECHO MEAS - SV(RVOT): 24 ML
BH CV ECHO MEAS - TAPSE (>1.6): 1.9 CM
BH CV ECHO MEAS - TR MAX PG: 25.8 MMHG
BH CV ECHO MEAS - TR MAX VEL: 253.8 CM/SEC
BH CV ECHO MEASUREMENTS AVERAGE E/E' RATIO: 21.16
BH CV NUCLEAR PRIOR STUDY: 2
BH CV REST NUCLEAR ISOTOPE DOSE: 11.3 MCI
BH CV STRESS BP STAGE 1: NORMAL
BH CV STRESS COMMENTS STAGE 1: NORMAL
BH CV STRESS DOSE REGADENOSON STAGE 1: 0.4
BH CV STRESS DURATION MIN STAGE 1: 0
BH CV STRESS DURATION SEC STAGE 1: 10
BH CV STRESS HR STAGE 1: 61
BH CV STRESS NUCLEAR ISOTOPE DOSE: 35.3 MCI
BH CV STRESS PROTOCOL 1: NORMAL
BH CV STRESS RECOVERY BP: NORMAL MMHG
BH CV STRESS RECOVERY HR: 58 BPM
BH CV STRESS STAGE 1: 1
BH CV XLRA - RV BASE: 2.6 CM
BH CV XLRA - RV LENGTH: 7.1 CM
BH CV XLRA - RV MID: 1.49 CM
BH CV XLRA - TDI S': 8.1 CM/SEC
LEFT ATRIUM VOLUME INDEX: 14.7 ML/M2
LV EF NUC BP: 68 %
MAXIMAL PREDICTED HEART RATE: 149 BPM
PERCENT MAX PREDICTED HR: 46.31 %
SINUS: 2.24 CM
STJ: 1.79 CM
STRESS BASELINE BP: NORMAL MMHG
STRESS BASELINE HR: 61 BPM
STRESS PERCENT HR: 54 %
STRESS POST EXERCISE DUR SEC: 10 SEC
STRESS POST PEAK BP: NORMAL MMHG
STRESS POST PEAK HR: 69 BPM
STRESS TARGET HR: 127 BPM

## 2023-06-12 PROCEDURE — 78452 HT MUSCLE IMAGE SPECT MULT: CPT

## 2023-06-12 PROCEDURE — 93017 CV STRESS TEST TRACING ONLY: CPT

## 2023-06-12 PROCEDURE — 25010000002 AMINOPHYLLINE PER 250 MG: Performed by: INTERNAL MEDICINE

## 2023-06-12 PROCEDURE — 93356 MYOCRD STRAIN IMG SPCKL TRCK: CPT | Performed by: INTERNAL MEDICINE

## 2023-06-12 PROCEDURE — 25010000002 REGADENOSON 0.4 MG/5ML SOLUTION: Performed by: INTERNAL MEDICINE

## 2023-06-12 PROCEDURE — 25510000001 PERFLUTREN (DEFINITY) 8.476 MG IN SODIUM CHLORIDE (PF) 0.9 % 10 ML INJECTION: Performed by: INTERNAL MEDICINE

## 2023-06-12 PROCEDURE — 93356 MYOCRD STRAIN IMG SPCKL TRCK: CPT

## 2023-06-12 PROCEDURE — A9502 TC99M TETROFOSMIN: HCPCS | Performed by: INTERNAL MEDICINE

## 2023-06-12 PROCEDURE — 0 TECHNETIUM TETROFOSMIN KIT: Performed by: INTERNAL MEDICINE

## 2023-06-12 PROCEDURE — 93306 TTE W/DOPPLER COMPLETE: CPT

## 2023-06-12 PROCEDURE — 93306 TTE W/DOPPLER COMPLETE: CPT | Performed by: INTERNAL MEDICINE

## 2023-06-12 RX ORDER — AMINOPHYLLINE DIHYDRATE 25 MG/ML
125 INJECTION, SOLUTION INTRAVENOUS ONCE
Status: COMPLETED | OUTPATIENT
Start: 2023-06-12 | End: 2023-06-12

## 2023-06-12 RX ORDER — REGADENOSON 0.08 MG/ML
0.4 INJECTION, SOLUTION INTRAVENOUS
Status: COMPLETED | OUTPATIENT
Start: 2023-06-12 | End: 2023-06-12

## 2023-06-12 RX ADMIN — TETROFOSMIN 1 DOSE: 1.38 INJECTION, POWDER, LYOPHILIZED, FOR SOLUTION INTRAVENOUS at 08:12

## 2023-06-12 RX ADMIN — REGADENOSON 0.4 MG: 0.08 INJECTION, SOLUTION INTRAVENOUS at 09:17

## 2023-06-12 RX ADMIN — AMINOPHYLLINE 125 MG: 25 INJECTION, SOLUTION INTRAVENOUS at 09:17

## 2023-06-12 RX ADMIN — TETROFOSMIN 1 DOSE: 1.38 INJECTION, POWDER, LYOPHILIZED, FOR SOLUTION INTRAVENOUS at 09:17

## 2023-06-12 RX ADMIN — PERFLUTREN 3 ML: 6.52 INJECTION, SUSPENSION INTRAVENOUS at 08:19

## 2023-06-13 ENCOUNTER — TELEPHONE (OUTPATIENT)
Dept: CARDIOLOGY | Facility: CLINIC | Age: 73
End: 2023-06-13
Payer: MEDICARE

## 2023-06-13 NOTE — TELEPHONE ENCOUNTER
Left voicemail for Marion Anderson requesting callback.    Thank you,  Paris COTTER RN  Triage Nurse [facility]   14:41 EDT

## 2023-06-13 NOTE — TELEPHONE ENCOUNTER
I tried to call Marion WU Bieniek but there was no answer.  Pt identified themselves on mailbox so I left a detailed message relaying information from provider regarding clearance.  I encouraged pt to call our office back if they have any further questions.    Thank you,    Liana Hodges, RN  Triage [facility]  06/13/23 16:13 EDT

## 2023-06-13 NOTE — TELEPHONE ENCOUNTER
Please let her know that echocardiogram looks good.  Her heart is strong and functioning well.  There was mild leakage from her mitral and tricuspid valve that we will follow going forward.  Please also let her know that stress test was normal with no evidence of tight blockage in the heart at this time.

## 2023-06-15 NOTE — TELEPHONE ENCOUNTER
Spoke with pt .  No other questions.  She will have surgery tomorrow and no other questions.  (Done)

## 2023-08-15 ENCOUNTER — TRANSCRIBE ORDERS (OUTPATIENT)
Dept: PHYSICAL THERAPY | Facility: CLINIC | Age: 73
End: 2023-08-15
Payer: COMMERCIAL

## 2023-08-15 DIAGNOSIS — M47.816 LUMBAR SPONDYLOSIS: Primary | ICD-10-CM

## 2023-08-31 ENCOUNTER — TREATMENT (OUTPATIENT)
Dept: PHYSICAL THERAPY | Facility: CLINIC | Age: 73
End: 2023-08-31
Payer: COMMERCIAL

## 2023-08-31 DIAGNOSIS — Z78.9 IMPAIRED MOBILITY AND ACTIVITIES OF DAILY LIVING: ICD-10-CM

## 2023-08-31 DIAGNOSIS — R52 PAIN AGGRAVATED BY WALKING: ICD-10-CM

## 2023-08-31 DIAGNOSIS — Z74.09 IMPAIRED MOBILITY AND ACTIVITIES OF DAILY LIVING: ICD-10-CM

## 2023-08-31 DIAGNOSIS — M79.604 PAIN IN RIGHT LEG: ICD-10-CM
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
## 2023-08-31 DIAGNOSIS — S39.012D STRAIN OF LUMBAR REGION, SUBSEQUENT ENCOUNTER: Primary | ICD-10-CM

## 2023-08-31 PROCEDURE — 97162 PT EVAL MOD COMPLEX 30 MIN: CPT | Performed by: PHYSICAL THERAPIST
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
## 2023-09-08 ENCOUNTER — TREATMENT (OUTPATIENT)
Dept: PHYSICAL THERAPY | Facility: CLINIC | Age: 73
End: 2023-09-08
Payer: COMMERCIAL

## 2023-09-08 DIAGNOSIS — M25.562 CHRONIC PAIN OF BOTH KNEES: ICD-10-CM

## 2023-09-08 DIAGNOSIS — Z74.09 IMPAIRED MOBILITY AND ACTIVITIES OF DAILY LIVING: ICD-10-CM

## 2023-09-08 DIAGNOSIS — M79.604 PAIN IN RIGHT LEG: ICD-10-CM

## 2023-09-08 DIAGNOSIS — R52 PAIN AGGRAVATED BY WALKING: ICD-10-CM

## 2023-09-08 DIAGNOSIS — R29.898 BILATERAL LEG WEAKNESS: ICD-10-CM

## 2023-09-08 DIAGNOSIS — G89.29 CHRONIC PAIN OF BOTH KNEES: ICD-10-CM

## 2023-09-08 DIAGNOSIS — Z78.9 IMPAIRED MOBILITY AND ACTIVITIES OF DAILY LIVING: ICD-10-CM
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
## 2023-09-08 DIAGNOSIS — M25.561 CHRONIC PAIN OF BOTH KNEES: ICD-10-CM

## 2023-09-08 DIAGNOSIS — S39.012D STRAIN OF LUMBAR REGION, SUBSEQUENT ENCOUNTER: Primary | ICD-10-CM

## 2023-09-08 DIAGNOSIS — R52 PAIN AGGRAVATED BY SITTING: ICD-10-CM

## 2023-09-08 PROCEDURE — 97113 AQUATIC THERAPY/EXERCISES: CPT | Performed by: PHYSICAL THERAPIST
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
## 2023-09-11 ENCOUNTER — TREATMENT (OUTPATIENT)
Dept: PHYSICAL THERAPY | Facility: CLINIC | Age: 73
End: 2023-09-11
Payer: COMMERCIAL

## 2023-09-11 DIAGNOSIS — M79.604 PAIN IN RIGHT LEG: ICD-10-CM

## 2023-09-11 DIAGNOSIS — Z74.09 IMPAIRED MOBILITY AND ACTIVITIES OF DAILY LIVING: ICD-10-CM

## 2023-09-11 DIAGNOSIS — R52 PAIN AGGRAVATED BY WALKING: ICD-10-CM

## 2023-09-11 DIAGNOSIS — Z78.9 IMPAIRED MOBILITY AND ACTIVITIES OF DAILY LIVING: ICD-10-CM
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
## 2023-09-11 DIAGNOSIS — S39.012D STRAIN OF LUMBAR REGION, SUBSEQUENT ENCOUNTER: Primary | ICD-10-CM

## 2023-09-11 PROCEDURE — 97110 THERAPEUTIC EXERCISES: CPT | Performed by: PHYSICAL THERAPIST
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
## 2023-09-15 ENCOUNTER — TREATMENT (OUTPATIENT)
Dept: PHYSICAL THERAPY | Facility: CLINIC | Age: 73
End: 2023-09-15
Payer: COMMERCIAL

## 2023-09-15 DIAGNOSIS — M79.604 PAIN IN RIGHT LEG: ICD-10-CM

## 2023-09-15 DIAGNOSIS — Z74.09 IMPAIRED MOBILITY AND ACTIVITIES OF DAILY LIVING: ICD-10-CM
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
## 2023-09-15 DIAGNOSIS — R52 PAIN AGGRAVATED BY WALKING: ICD-10-CM

## 2023-09-15 DIAGNOSIS — Z78.9 IMPAIRED MOBILITY AND ACTIVITIES OF DAILY LIVING: ICD-10-CM

## 2023-09-15 DIAGNOSIS — S39.012D STRAIN OF LUMBAR REGION, SUBSEQUENT ENCOUNTER: Primary | ICD-10-CM

## 2023-09-15 NOTE — PROGRESS NOTES
Physical Therapy Daily Treatment Note    [facility]  [address] [place] [address]  [phone] (phone)  [phone] (fax)    Patient: Marion Anderson   : 1950  Diagnosis/ICD-10 Code:  Strain of lumbar region, subsequent encounter [S39.012D]  Referring practitioner: PANKAJ Rankin  Date of Initial Visit: Type: THERAPY  Noted: 2023  Today's Date: 9/15/2023  Patient seen for 4 sessions             Subjective   Rates pain in low back and down right LE a 5/10.    Objective     AQUATIC EX:     Water Walk                 Forward 25 ft X 4 and Sidestepping at rail 20 ft X 2  HS Stretch  seated     2 x 20 sec ea  Vertical Traction          LN/rail x 3 min  Abdominals                 SN pushdowns 2 X 10 (chest depth)  Hip Abd/Add                12x ea  Hip Flex (SLR)            12x ea   March in Place            10X  Mini Squat                   Hold  Heel Raises                 10X  Uni-Clock                    -  Step Ups                     -  Bicycle suspended Deep end X 2 min. (Feet behind, trunk extended to neutral)  Seated LAQ x 15 ea      Assessment/Plan  Omitted mini squats as patient felt this aggravated her knees on the last pool session.  Slightly increased repetitions on some exercises and modified bicycling exercise from a seated position to suspended on a noodle.          Timed:  Aquatic Therapy    38     mins 22817;    Alek Singh, PT  Physical Therapist    [place] License:  [id number]

## 2023-09-18 ENCOUNTER — TREATMENT (OUTPATIENT)
Dept: PHYSICAL THERAPY | Facility: CLINIC | Age: 73
End: 2023-09-18
Payer: COMMERCIAL

## 2023-09-18 DIAGNOSIS — S39.012D STRAIN OF LUMBAR REGION, SUBSEQUENT ENCOUNTER: Primary | ICD-10-CM

## 2023-09-18 DIAGNOSIS — Z78.9 IMPAIRED MOBILITY AND ACTIVITIES OF DAILY LIVING: ICD-10-CM
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
## 2023-09-18 DIAGNOSIS — Z74.09 IMPAIRED MOBILITY AND ACTIVITIES OF DAILY LIVING: ICD-10-CM

## 2023-09-18 DIAGNOSIS — M79.604 PAIN IN RIGHT LEG: ICD-10-CM

## 2023-09-18 DIAGNOSIS — R52 PAIN AGGRAVATED BY WALKING: ICD-10-CM

## 2023-09-18 PROCEDURE — 97110 THERAPEUTIC EXERCISES: CPT | Performed by: PHYSICAL THERAPIST
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
## 2023-09-28 ENCOUNTER — TELEPHONE (OUTPATIENT)
Dept: PHYSICAL THERAPY | Facility: CLINIC | Age: 73
End: 2023-09-28
Payer: COMMERCIAL

## 2023-09-28 NOTE — TELEPHONE ENCOUNTER
Caller: Marion Anderson    Relationship: Self    Best call back number: [phone]         What was the call regarding: HAVING BAD BACK PAIN, TROUBLE WALKING, WOULD STEPHANIE PLEASE GIVE HER A CALL TO TALK

## 2023-10-06 ENCOUNTER — TREATMENT (OUTPATIENT)
Dept: PHYSICAL THERAPY | Facility: CLINIC | Age: 73
End: 2023-10-06
Payer: MEDICARE

## 2023-10-06 DIAGNOSIS — M25.561 CHRONIC PAIN OF BOTH KNEES: ICD-10-CM

## 2023-10-06 DIAGNOSIS — R29.898 BILATERAL LEG WEAKNESS: ICD-10-CM

## 2023-10-06 DIAGNOSIS — M25.562 CHRONIC PAIN OF BOTH KNEES: ICD-10-CM

## 2023-10-06 DIAGNOSIS — M79.604 PAIN IN RIGHT LEG: ICD-10-CM

## 2023-10-06 DIAGNOSIS — S39.012D STRAIN OF LUMBAR REGION, SUBSEQUENT ENCOUNTER: Primary | ICD-10-CM

## 2023-10-06 DIAGNOSIS — Z78.9 IMPAIRED MOBILITY AND ACTIVITIES OF DAILY LIVING: ICD-10-CM

## 2023-10-06 DIAGNOSIS — R52 PAIN AGGRAVATED BY WALKING: ICD-10-CM
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
## 2023-10-06 DIAGNOSIS — Z74.09 IMPAIRED MOBILITY AND ACTIVITIES OF DAILY LIVING: ICD-10-CM

## 2023-10-06 DIAGNOSIS — G89.29 CHRONIC PAIN OF BOTH KNEES: ICD-10-CM

## 2023-10-06 DIAGNOSIS — R52 PAIN AGGRAVATED BY SITTING: ICD-10-CM
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
## 2023-10-09 ENCOUNTER — TREATMENT (OUTPATIENT)
Dept: PHYSICAL THERAPY | Facility: CLINIC | Age: 73
End: 2023-10-09
Payer: COMMERCIAL
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
## 2023-10-09 DIAGNOSIS — M79.604 PAIN IN RIGHT LEG: ICD-10-CM

## 2023-10-09 DIAGNOSIS — S39.012D STRAIN OF LUMBAR REGION, SUBSEQUENT ENCOUNTER: Primary | ICD-10-CM

## 2023-10-09 PROCEDURE — 97113 AQUATIC THERAPY/EXERCISES: CPT | Performed by: PHYSICAL THERAPIST
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
## 2023-10-13 ENCOUNTER — TREATMENT (OUTPATIENT)
Dept: PHYSICAL THERAPY | Facility: CLINIC | Age: 73
End: 2023-10-13
Payer: COMMERCIAL
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
## 2023-10-13 DIAGNOSIS — M79.604 PAIN IN RIGHT LEG: ICD-10-CM

## 2023-10-13 DIAGNOSIS — R52 PAIN AGGRAVATED BY WALKING: ICD-10-CM

## 2023-10-13 DIAGNOSIS — S39.012D STRAIN OF LUMBAR REGION, SUBSEQUENT ENCOUNTER: Primary | ICD-10-CM

## 2023-10-13 DIAGNOSIS — M25.561 CHRONIC PAIN OF BOTH KNEES: ICD-10-CM

## 2023-10-13 DIAGNOSIS — R29.898 BILATERAL LEG WEAKNESS: ICD-10-CM

## 2023-10-13 DIAGNOSIS — M25.562 CHRONIC PAIN OF BOTH KNEES: ICD-10-CM

## 2023-10-13 DIAGNOSIS — G89.29 CHRONIC PAIN OF BOTH KNEES: ICD-10-CM

## 2023-10-13 DIAGNOSIS — R52 PAIN AGGRAVATED BY SITTING: ICD-10-CM

## 2023-10-13 DIAGNOSIS — Z78.9 IMPAIRED MOBILITY AND ACTIVITIES OF DAILY LIVING: ICD-10-CM

## 2023-10-13 DIAGNOSIS — Z74.09 IMPAIRED MOBILITY AND ACTIVITIES OF DAILY LIVING: ICD-10-CM

## 2023-10-13 PROCEDURE — 97113 AQUATIC THERAPY/EXERCISES: CPT | Performed by: PHYSICAL THERAPIST

## 2023-10-13 NOTE — PROGRESS NOTES
"Physical Therapy Daily Treatment Note    [facility]  [address] [place] [address]  [phone] (phone)  [phone] (fax)    Patient: Marion Anderson   : 1950  Diagnosis/ICD-10 Code:  Strain of lumbar region, subsequent encounter [S39.012D]  Referring practitioner: PANKAJ Rankin  Date of Initial Visit: Type: THERAPY  Noted: 2023  Today's Date: 10/13/2023  Patient seen for 8 sessions             Subjective   \"Tenderness\" in left low back from sliding down the steps over the weekend. I've been putting ice on my back at night that helps. The last water therapy treatment helped the soreness in my back.    Objective     AQUATIC THERAPY:  Exercises performed in warm water pool:  Walking forwards and Sideways X 5-6 min.  Standing straight leg raises 5 x 3 B  Standing hip abduction 5 x 3 B  Standing marches x 15 (w/o UE support)  Standing B heel raises x 15  Standing Abdominal press with large noodle x 15  Standing Uni Clock X 7 each leg w/ one hand support  Tandem Walk at rail 10 ft X 4  Decompression hanging on large noodle x 3 min  Bicycling supported by on large noodle with one hand supported on rail x 3 min  Seated long arc quads x 15 each  Seated hip abd/add x 15  Seated with lower back against warn water jets x 5 min  post treatment      Assessment/Plan  Decreased  low back soreness since last aquatic therapy treatment reported by patient.  Greater ease with sidestepping today.  We added balance training with tandem walk and uni clock.  Encouraged focused deep breathing (5 reps) for muscle relaxation daily.  Plan: Progress to backwards walking next visit.          Timed:  Aquatic Therapy    40     mins 47327;    Alek Singh, PT  Physical Therapist    [place] License:  [id number]  "

## 2023-10-16 ENCOUNTER — TREATMENT (OUTPATIENT)
Dept: PHYSICAL THERAPY | Facility: CLINIC | Age: 73
End: 2023-10-16
Payer: COMMERCIAL
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
## 2023-10-16 DIAGNOSIS — S39.012D STRAIN OF LUMBAR REGION, SUBSEQUENT ENCOUNTER: Primary | ICD-10-CM

## 2023-10-16 DIAGNOSIS — Z78.9 IMPAIRED MOBILITY AND ACTIVITIES OF DAILY LIVING: ICD-10-CM

## 2023-10-16 DIAGNOSIS — R52 PAIN AGGRAVATED BY WALKING: ICD-10-CM

## 2023-10-16 DIAGNOSIS — Z74.09 IMPAIRED MOBILITY AND ACTIVITIES OF DAILY LIVING: ICD-10-CM

## 2023-10-16 DIAGNOSIS — M79.604 PAIN IN RIGHT LEG: ICD-10-CM

## 2023-10-16 PROCEDURE — 97113 AQUATIC THERAPY/EXERCISES: CPT | Performed by: PHYSICAL THERAPIST
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
## 2023-10-19 ENCOUNTER — TREATMENT (OUTPATIENT)
Dept: PHYSICAL THERAPY | Facility: CLINIC | Age: 73
End: 2023-10-19
Payer: COMMERCIAL

## 2023-10-19 DIAGNOSIS — Z78.9 IMPAIRED MOBILITY AND ACTIVITIES OF DAILY LIVING: ICD-10-CM

## 2023-10-19 DIAGNOSIS — S39.012D STRAIN OF LUMBAR REGION, SUBSEQUENT ENCOUNTER: Primary | ICD-10-CM

## 2023-10-19 DIAGNOSIS — R52 PAIN AGGRAVATED BY WALKING: ICD-10-CM
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
## 2023-10-19 DIAGNOSIS — Z74.09 IMPAIRED MOBILITY AND ACTIVITIES OF DAILY LIVING: ICD-10-CM

## 2023-10-19 DIAGNOSIS — M79.604 PAIN IN RIGHT LEG: ICD-10-CM

## 2023-10-19 NOTE — PROGRESS NOTES
Physical Therapy Daily Treatment Note    Patient: Marion Anderson   : 1950  Diagnosis/ICD-10 Code:  Strain of lumbar region, subsequent encounter [S39.012D]  Referring practitioner: PANKAJ Rankin  Date of Initial Visit: Type: THERAPY  Noted: 2023  Today's Date: 10/19/2023  Patient seen for 10 sessions             Subjective   Pt reports 6/10 pain in knees and low back today.    Objective   AQUATIC THERAPY:  Exercises performed in warm water pool:  Walking forwards and Sideways X 5-6 min.  Added in one lap of backwards gait today.  Standing straight leg raises x15 B  Standing hip abduction 5 x 3 B  Standing marches x 15 (w/o UE support)  Standing B heel raises x 15  Standing Abdominal press with large noodle x 15  Standing Uni Clock X10 each leg w/ one hand support  Tandem Walk at rail 10 ft X 4  Decompression hanging on large noodle x 2 min  Bicycling supported by on large noodle with one hand supported on rail x 2 min  Seated long arc quads x 15 each  Seated hip abd/add x 15  Seated with lower back against warn water jets x 5 min  post treatment         Assessment/Plan  Pt progressing with aquatic therapy program - demonstrates decreased step length on land and in the pool.  Pt required verbal cues to perform abdominal pushdowns correctly without substitution as well as cues during ambulation to increase step length B.  Added backwards gait today to work on balance.  Plan to continue to add balance training exercises as able.         Timed:  Aquatic Therapy    29     mins 05910;    Sheila Hurtado, PT  Physical Therapist    [place] License: [id number]

## 2023-10-23 ENCOUNTER — TREATMENT (OUTPATIENT)
Dept: PHYSICAL THERAPY | Facility: CLINIC | Age: 73
End: 2023-10-23
Payer: COMMERCIAL

## 2023-10-23 DIAGNOSIS — Z74.09 IMPAIRED MOBILITY AND ACTIVITIES OF DAILY LIVING: ICD-10-CM

## 2023-10-23 DIAGNOSIS — M79.604 PAIN IN RIGHT LEG: ICD-10-CM
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
## 2023-10-23 DIAGNOSIS — Z78.9 IMPAIRED MOBILITY AND ACTIVITIES OF DAILY LIVING: ICD-10-CM

## 2023-10-23 DIAGNOSIS — R52 PAIN AGGRAVATED BY WALKING: ICD-10-CM

## 2023-10-23 DIAGNOSIS — S39.012D STRAIN OF LUMBAR REGION, SUBSEQUENT ENCOUNTER: Primary | ICD-10-CM

## 2023-10-23 PROCEDURE — 97113 AQUATIC THERAPY/EXERCISES: CPT | Performed by: PHYSICAL THERAPIST

## 2023-10-23 RX ORDER — METOPROLOL SUCCINATE 50 MG/1
TABLET, EXTENDED RELEASE ORAL
Qty: 180 TABLET | Refills: 0 | Status: SHIPPED | OUTPATIENT
Start: 2023-10-23
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
## 2023-10-27 ENCOUNTER — TREATMENT (OUTPATIENT)
Dept: PHYSICAL THERAPY | Facility: CLINIC | Age: 73
End: 2023-10-27
Payer: COMMERCIAL

## 2023-10-27 DIAGNOSIS — M79.604 PAIN IN RIGHT LEG: ICD-10-CM

## 2023-10-27 DIAGNOSIS — S39.012D STRAIN OF LUMBAR REGION, SUBSEQUENT ENCOUNTER: Primary | ICD-10-CM

## 2023-10-27 DIAGNOSIS — Z78.9 IMPAIRED MOBILITY AND ACTIVITIES OF DAILY LIVING: ICD-10-CM
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
## 2023-10-27 DIAGNOSIS — R52 PAIN AGGRAVATED BY WALKING: ICD-10-CM

## 2023-10-27 DIAGNOSIS — Z74.09 IMPAIRED MOBILITY AND ACTIVITIES OF DAILY LIVING: ICD-10-CM

## 2023-10-27 PROCEDURE — 97113 AQUATIC THERAPY/EXERCISES: CPT | Performed by: PHYSICAL THERAPIST

## 2023-10-27 NOTE — PROGRESS NOTES
Physical Therapy Daily Treatment Note    [facility]  [address] [place] [address]  [phone] (phone)  [phone] (fax)    Patient: Marion Anderson   : 1950  Diagnosis/ICD-10 Code:  Strain of lumbar region, subsequent encounter [S39.012D]  Referring practitioner: PANKAJ Rankin  Date of Initial Visit: Type: THERAPY  Noted: 2023  Today's Date: 10/27/2023  Patient seen for 12 sessions             Subjective   I'm having more trouble with my back and my right leg today. I feel more balanced when I get up from sitting.    Objective     AQUATIC THERAPY:  Exercises performed in warm water pool:  Walking forwards and sideways 100 ft each  NEW: Backwards Walk w/ noodle support 25 ft X 2  Standing straight leg raises x15 ea  Standing hip abduction 15X ea  Standing marches x 15 (w/o UE support)  Standing B heel raises x 20  Sit to Stands, No hand support X 5  Standing abdominal press with large noodle x 15  Tandem walk w/ intermittent use of rail 15 feet x 3  Decompression hanging on large noodle x 3 min  Bicycling supported by on large noodle x 2 min  Standing rows with small aqualogix X 15  NEW: Shoulder Ext/Flex w/ small aqualogix X 10  Seated hip abd/add x 15         Assessment/Plan  Improved balance with first steps after rising from sitting.  Patient needs B hand support on railing during hip abduction to avoid leaning to the side.  Added resistance equipment - small aqualogix with core stability training.          Timed:  Aquatic Therapy    38     mins 54842;    Alek Singh, PT  Physical Therapist    [place] License:  [id number]

## 2023-10-30 ENCOUNTER — TREATMENT (OUTPATIENT)
Dept: PHYSICAL THERAPY | Facility: CLINIC | Age: 73
End: 2023-10-30
Payer: COMMERCIAL

## 2023-10-30 DIAGNOSIS — M79.604 PAIN IN RIGHT LEG: ICD-10-CM

## 2023-10-30 DIAGNOSIS — S39.012D STRAIN OF LUMBAR REGION, SUBSEQUENT ENCOUNTER: Primary | ICD-10-CM

## 2023-10-30 DIAGNOSIS — Z74.09 IMPAIRED MOBILITY AND ACTIVITIES OF DAILY LIVING: ICD-10-CM

## 2023-10-30 DIAGNOSIS — Z78.9 IMPAIRED MOBILITY AND ACTIVITIES OF DAILY LIVING: ICD-10-CM

## 2023-10-30 DIAGNOSIS — R52 PAIN AGGRAVATED BY WALKING: ICD-10-CM

## 2023-10-30 PROCEDURE — 97113 AQUATIC THERAPY/EXERCISES: CPT | Performed by: PHYSICAL THERAPIST

## 2023-10-30 NOTE — PROGRESS NOTES
Physical Therapy Daily Treatment Note    [facility] [place] [address]  [phone] (phone)  [phone] (fax)    Patient: Marion Anderson   : 1950  Diagnosis/ICD-10 Code:  Strain of lumbar region, subsequent encounter [S39.012D]  Referring practitioner: PANKAJ Rankin  Today's Date: 10/30/2023  Patient seen for 13 sessions    Visit Diagnoses:    ICD-10-CM ICD-9-CM   1. Strain of lumbar region, subsequent encounter  S39.012D V58.89     847.2   2. Pain in right leg  M79.604 729.5   3. Pain aggravated by walking  R52 780.96   4. Impaired mobility and activities of daily living  Z74.09 V49.89    Z78.9               Subjective Marion reports that she has some pain today at the right side of her lower back and hip that may be due to the colder weather.  She reports that she is feeling stronger.    Objective   See Exercise, Manual, and Modality Logs for complete treatment.   AQUATIC THERAPY:  Exercises performed in warm water pool:  Walking forwards and sideways independently  Backwards walk without support 25 ft x 2  Standing straight leg raises x15 B  Standing hip abduction alternating side x 15 B  Standing marches x 20 without hand support   Standing B heel raises x 30  Sit to Stands, No hand support x 5  Standing abdominal press with large noodle x 20 (able to hold the noodle out away from other further  Standing trunk rotation holding noodle x 10 B  Tandem walk w/ intermittent use of rail 20 feet x 2  Decompression hanging on large noodle x 3 min  Bicycling supported by on large noodle x 2 min  Standing rows with small aqualogix x 15  Arm bicycle w/ small aqualogix x 1 min  Shoulder horizontal abd/add with small aqualogix x 10  Seated hip abd/add x 15    Assessment/Plan    Marion is more aware to verbalize that she is standing with good posture and celio her abdominals while exercising.  Overall Marion has much better  stamina in the pool when we were unsure how much exercise she would be able to tolerate when she started the pool.  This has allowed her to feel stronger and she feels ready to add one more day in the pool per week. I plan to issued her a written exercise program in the pool next visit so she can practice on her own.         Timed:    Manual Therapy:    0     mins  46769;  Therapeutic Exercise:    0     mins  51508;     Neuromuscular Rina:    0    mins  87894;    Therapeutic Activity:     0     mins  16511;     Gait Trainin     mins  25088;     Ultrasound:     0     mins  87499;    Aquatic Therapy    42     mins 01130;  Self Care                       0     mins   79581        Untimed:  Electrical Stimulation:    0     mins  14946 ( );  Traction:    0     mins  68305;   Dry Needling  (1-2 muscles)            0     mins 52827 (Self-pay)  Dry Needling (3-4 muscles) 0     65558 (Self-pay)  Dry Needling Trial    0     DRYNDLTRIAL  (No Charge)    Timed Treatment:   42   mins   Total Treatment:     42   mins    Jennifer Moreno PT  Physical Therapist    [place] License:[id number]

## 2023-11-01 RX ORDER — METOPROLOL SUCCINATE 50 MG/1
50 TABLET, EXTENDED RELEASE ORAL 2 TIMES DAILY
Qty: 180 TABLET | Refills: 0 | OUTPATIENT
Start: 2023-11-01 | End: 2024-01-30

## 2023-11-03 ENCOUNTER — TREATMENT (OUTPATIENT)
Dept: PHYSICAL THERAPY | Facility: CLINIC | Age: 73
End: 2023-11-03
Payer: COMMERCIAL

## 2023-11-03 DIAGNOSIS — Z78.9 IMPAIRED MOBILITY AND ACTIVITIES OF DAILY LIVING: ICD-10-CM

## 2023-11-03 DIAGNOSIS — R52 PAIN AGGRAVATED BY WALKING: ICD-10-CM

## 2023-11-03 DIAGNOSIS — M79.604 PAIN IN RIGHT LEG: ICD-10-CM

## 2023-11-03 DIAGNOSIS — S39.012D STRAIN OF LUMBAR REGION, SUBSEQUENT ENCOUNTER: Primary | ICD-10-CM

## 2023-11-03 DIAGNOSIS — Z74.09 IMPAIRED MOBILITY AND ACTIVITIES OF DAILY LIVING: ICD-10-CM
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
## 2023-11-03 PROCEDURE — 97113 AQUATIC THERAPY/EXERCISES: CPT | Performed by: PHYSICAL THERAPIST

## 2023-11-03 NOTE — PROGRESS NOTES
Physical Therapy Daily Treatment Note    [facility] Therapy [facility]  [address] [place] [address]  [phone] (phone)  [phone] (fax)    Patient: Marion Anderson   : 1950  Diagnosis/ICD-10 Code:  Strain of lumbar region, subsequent encounter [S39.012D]  Referring practitioner: PANKAJ Rankin  Date of Initial Visit: Type: THERAPY  Noted: 2023  Today's Date: 11/3/2023  Patient seen for 14 sessions             Subjective   Pleased that she can perform the sit to stand exercise in the pool without hand support as this has been a difficult for her previously.    Objective     AQUATIC THERAPY:  Exercises performed in warm water pool:  Walking forwards, backwards and sideways independently  Standing straight leg raises 3 X 5 each  Standing hip abduction alternating side 3 X 5 each  Standing marches x 20 without hand support   Standing B heel raises x 30  Sit to Stands, No hand support x 5  Standing abdominal press with large noodle x 20   Standing trunk rotation holding noodle x 10 B  Tandem walk across pool 25 ft X 4 at 3 ft 8 in depth  Decompression hanging on large noodle x 3 min  Bicycling supported by on large noodle x 2 min Deferred  Standing rows with small aqualogix x 15  Shoulder Flex/Ext w/ small aqualogix X 15  Arm bicycle w/ small aqualogix x 1 min  Shoulder horizontal abd/add with small aqualogix x 10  Tuck Ups X 15    Assessment/Plan  Marion was provided with written instructions/pictures of the prescribed aquatic exercises so she can practice on her own 1 day/week. She was able to progress tandem walk from use of railing support to walking across pool with noodle support.  Progressed tandem walk from rail support         Timed:  Aquatic Therapy    40     mins 26695;    Alek Singh, PT  Physical Therapist    [place] License:  [id number]

## 2023-11-08 ENCOUNTER — TREATMENT (OUTPATIENT)
Dept: PHYSICAL THERAPY | Facility: CLINIC | Age: 73
End: 2023-11-08
Payer: MEDICARE

## 2023-11-08 DIAGNOSIS — Z74.09 IMPAIRED MOBILITY AND ACTIVITIES OF DAILY LIVING: ICD-10-CM

## 2023-11-08 DIAGNOSIS — Z78.9 IMPAIRED MOBILITY AND ACTIVITIES OF DAILY LIVING: ICD-10-CM

## 2023-11-08 DIAGNOSIS — R52 PAIN AGGRAVATED BY WALKING: ICD-10-CM

## 2023-11-08 DIAGNOSIS — M79.604 PAIN IN RIGHT LEG: ICD-10-CM
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
## 2023-11-08 DIAGNOSIS — S39.012D STRAIN OF LUMBAR REGION, SUBSEQUENT ENCOUNTER: Primary | ICD-10-CM

## 2023-11-08 PROCEDURE — 97113 AQUATIC THERAPY/EXERCISES: CPT | Performed by: PHYSICAL THERAPIST
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
## 2023-11-10 ENCOUNTER — TREATMENT (OUTPATIENT)
Dept: PHYSICAL THERAPY | Facility: CLINIC | Age: 73
End: 2023-11-10
Payer: COMMERCIAL

## 2023-11-10 DIAGNOSIS — Z74.09 IMPAIRED MOBILITY AND ACTIVITIES OF DAILY LIVING: ICD-10-CM
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
## 2023-11-10 DIAGNOSIS — S39.012D STRAIN OF LUMBAR REGION, SUBSEQUENT ENCOUNTER: Primary | ICD-10-CM

## 2023-11-10 DIAGNOSIS — M79.604 PAIN IN RIGHT LEG: ICD-10-CM

## 2023-11-10 DIAGNOSIS — R52 PAIN AGGRAVATED BY WALKING: ICD-10-CM

## 2023-11-10 DIAGNOSIS — Z78.9 IMPAIRED MOBILITY AND ACTIVITIES OF DAILY LIVING: ICD-10-CM

## 2023-11-10 PROCEDURE — 97113 AQUATIC THERAPY/EXERCISES: CPT | Performed by: PHYSICAL THERAPIST

## 2023-11-10 NOTE — PROGRESS NOTES
Physical Therapy Daily Treatment Note    [facility] Physical Therapy [facility]  [address] [id number]  [phone] (phone)  [phone] (fax)    Patient: Marion Anderson   : 1950  Diagnosis/ICD-10 Code:  Strain of lumbar region, subsequent encounter [S39.012D]  Referring practitioner: PANKAJ Rankin  Date of Initial Visit: Type: THERAPY  Noted: 2023  Today's Date: 11/10/2023  Patient seen for 16 sessions             Subjective   I think I'm ready to try the bigger paddles.    Objective     AQUATIC THERAPY:     Walking forwards, backwards and sideways   Straight leg raises 3 X 5 each  Standing hip abduction alternating side 3 X 5 each  Standing marches x 20 without hand support   Standing B heel raises x 20  Sit to Stands, No hand support x 6  Standing abdominal press with large noodle x 20   Standing trunk rotation holding noodle x 10 B  Tandem walk across pool 25 ft X 4 at 3 ft 6 in depth  Decompression hanging on large noodle -Defer  Bicycling supported by on large noodle x 2 min   Standing rows with closed paddles x 15  Shoulder Flex/Ext w/ open paddles X 15  Shoulder horizontal abd/add with open paddles x 10  Tuck Ups X 15      Assessment/Plan  Progressed from smaller aqualogix resistance to larger paddles for core strengthening.  Marion is becoming more independent with the aquatic exercises, however still needs guidance/instruction and assistance in following the printed program.          Timed:  Aquatic Therapy    25     mins 03524;    Alek Singh, PT  Physical Therapist    [place] License:  [id number]

## 2023-11-14 ENCOUNTER — TREATMENT (OUTPATIENT)
Dept: PHYSICAL THERAPY | Facility: CLINIC | Age: 73
End: 2023-11-14
Payer: COMMERCIAL

## 2023-11-14 DIAGNOSIS — M79.604 PAIN IN RIGHT LEG: ICD-10-CM

## 2023-11-14 DIAGNOSIS — S39.012D STRAIN OF LUMBAR REGION, SUBSEQUENT ENCOUNTER: Primary | ICD-10-CM

## 2023-11-14 DIAGNOSIS — R52 PAIN AGGRAVATED BY WALKING: ICD-10-CM

## 2023-11-14 PROCEDURE — 97113 AQUATIC THERAPY/EXERCISES: CPT | Performed by: PHYSICAL THERAPIST

## 2023-11-14 NOTE — PROGRESS NOTES
Physical Therapy Daily Treatment Note    [facility] Physical Therapy Milestone  [address] [place] [address]  [phone] (phone)  [phone] (fax)    Patient: Marion Anderson   : 1950  Diagnosis/ICD-10 Code:  Strain of lumbar region, subsequent encounter [S39.012D]  Referring practitioner: PANKAJ Rankin  Date of Initial Visit: Type: THERAPY  Noted: 2023  Today's Date: 2023  Patient seen for 17 sessions             Subjective   Marion is pleased with her progress in her ability to get up from sitting with greater ease.    Objective     AQUATIC THERAPY:     Walking forwards, backwards and sideways 50-75 ft each  Straight leg raises 3 X 5 each  Standing hip abduction 3 X 5 each  Standing marches x 20 without hand support   Standing B heel raises x 20  Sit to Stands, No hand support x 7  Standing abdominal press with large noodle x 20   Standing trunk rotation holding noodle x 10  Tandem walk across pool 25 ft X 4 in shallow end w/ Noodle support  Decompression hanging on large noodle -Defer  Bicycling supported on large noodle, holding rail x 2 min   Standing rows with closed paddles x 15  Shoulder horizontal abd/add with open paddles x 15  Tuck Ups X 15  Seated LAQs X 15 and Scissors Kick X 20  Supervised use of spa X 2.5 minutes    Assessment/Plan  Marion has progressed from smaller aqualogix resistance to larger paddles without problem.  Her balance with tandem walk in the pool is improving as she is able to complete in shallower water, however does need support of a foam noodle.  She has increased sit to stand exercise from 5 to 7 with the plan to increase to 10 gradually.  She is using a print out of the exercises as a guide during therapy, however she does still need instruction and clarification on several exercises to perform correctly.  Plan: Continue 2X/week for 2 more weeks, then discharge.          Timed:  Aquatic Therapy    41     mins  95372;    Alek Singh, PT  Physical Therapist    [place] License:  [id number]

## 2023-11-17 ENCOUNTER — TREATMENT (OUTPATIENT)
Dept: PHYSICAL THERAPY | Facility: CLINIC | Age: 73
End: 2023-11-17
Payer: COMMERCIAL

## 2023-11-17 DIAGNOSIS — Z78.9 IMPAIRED MOBILITY AND ACTIVITIES OF DAILY LIVING: ICD-10-CM

## 2023-11-17 DIAGNOSIS — M79.604 PAIN IN RIGHT LEG: ICD-10-CM
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
## 2023-11-17 DIAGNOSIS — Z74.09 IMPAIRED MOBILITY AND ACTIVITIES OF DAILY LIVING: ICD-10-CM

## 2023-11-17 DIAGNOSIS — S39.012D STRAIN OF LUMBAR REGION, SUBSEQUENT ENCOUNTER: Primary | ICD-10-CM

## 2023-11-17 DIAGNOSIS — R52 PAIN AGGRAVATED BY WALKING: ICD-10-CM

## 2023-11-17 PROCEDURE — 97113 AQUATIC THERAPY/EXERCISES: CPT | Performed by: PHYSICAL THERAPIST
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
## 2023-11-20 ENCOUNTER — TREATMENT (OUTPATIENT)
Dept: PHYSICAL THERAPY | Facility: CLINIC | Age: 73
End: 2023-11-20
Payer: COMMERCIAL

## 2023-11-20 DIAGNOSIS — Z74.09 IMPAIRED MOBILITY AND ACTIVITIES OF DAILY LIVING: ICD-10-CM
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
## 2023-11-20 DIAGNOSIS — M79.604 PAIN IN RIGHT LEG: ICD-10-CM

## 2023-11-20 DIAGNOSIS — Z78.9 IMPAIRED MOBILITY AND ACTIVITIES OF DAILY LIVING: ICD-10-CM

## 2023-11-20 DIAGNOSIS — S39.012D STRAIN OF LUMBAR REGION, SUBSEQUENT ENCOUNTER: Primary | ICD-10-CM

## 2023-11-20 DIAGNOSIS — R52 PAIN AGGRAVATED BY WALKING: ICD-10-CM

## 2023-11-20 PROCEDURE — 97113 AQUATIC THERAPY/EXERCISES: CPT | Performed by: PHYSICAL THERAPIST

## 2023-11-20 NOTE — PROGRESS NOTES
Physical Therapy Daily Treatment Note    [facility] Physical Therapy [facility]  [address] [place] [address]  [phone] (phone)  [phone] (fax)    Patient: Marion Anderson   : 1950  Diagnosis/ICD-10 Code:  Strain of lumbar region, subsequent encounter [S39.012D]  Referring practitioner: PANKAJ Rankin  Date of Initial Visit: Type: THERAPY  Noted: 2023  Today's Date: 2023  Patient seen for 19 sessions             Subjective   I don't want to do the rotation exercise anymore because I think it bothered my back last time. I'm tired today, was a busy weekend.  My right knee arthritis is bothering me more.    Objective     AQUATIC THERAPY:  Performed in warm water pool     Walking forwards, backwards and sideways x 1 lap each  Straight leg raises 3 X 5 each  Standing marches x 20 without hand support   Standing B heel raises x 20  Standing abdominal press with large noodle x 15   Tandem walk across pool 25 ft X 2  Hip Abduction 3 X 5 each  Decompression hanging on large noodle --  Bicycling seated x 3 min   Tuck ups x 15  Standing rows with closed paddles x 15  Shoulder horizontal abd/add with open paddles x 15  UTR - Deferred at patient request  Seated LAQs X 15  Seated Hip Abduction X 15     Supervised use of spa x 3 minutes      Assessment/Plan  Discussed group aquatic classes that would be appropriate for Marion to try (Water Wellness and Pilates/[place]).  Recommended using a visual focal point at eye level to help balance with tandem walk.          Timed:  Aquatic Therapy    40     mins 74064;    Alek Singh, PT  Physical Therapist    [place] License:  [id number]

## 2023-11-27 ENCOUNTER — TREATMENT (OUTPATIENT)
Dept: PHYSICAL THERAPY | Facility: CLINIC | Age: 73
End: 2023-11-27
Payer: COMMERCIAL

## 2023-11-27 DIAGNOSIS — Z78.9 IMPAIRED MOBILITY AND ACTIVITIES OF DAILY LIVING: ICD-10-CM

## 2023-11-27 DIAGNOSIS — Z74.09 IMPAIRED MOBILITY AND ACTIVITIES OF DAILY LIVING: ICD-10-CM

## 2023-11-27 DIAGNOSIS — R52 PAIN AGGRAVATED BY WALKING: ICD-10-CM
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
## 2023-11-27 DIAGNOSIS — S39.012D STRAIN OF LUMBAR REGION, SUBSEQUENT ENCOUNTER: Primary | ICD-10-CM

## 2023-11-27 DIAGNOSIS — M79.604 PAIN IN RIGHT LEG: ICD-10-CM

## 2023-11-27 PROCEDURE — 97113 AQUATIC THERAPY/EXERCISES: CPT | Performed by: PHYSICAL THERAPIST

## 2023-11-27 NOTE — PROGRESS NOTES
Physical Therapy Daily Treatment Note    [facility] Physical Therapy [facility]estone  [address] [place] [address]  [phone] (phone)  [phone] (fax)    Patient: Marion Anderson   : 1950  Diagnosis/ICD-10 Code:  Strain of lumbar region, subsequent encounter [S39.012D]  Referring practitioner: PANKAJ Rankin  Date of Initial Visit: Type: THERAPY  Noted: 2023  Today's Date: 2023  Patient seen for 20 sessions             Subjective   Yesterday my upper back hurt and today my lower back is sore.    Objective     AQUATIC THERAPY:  Performed in warm water pool     Walking forwards, backwards and sideways x 1 lap each  Straight leg raises 3 X 5 each  Standing marches x 20 without hand support   Standing B heel raises x 20  Abdominal press with large noodle x 15   Tandem walk across pool 25 ft X 2  Hip Abduction 3 X 5 each  Bicycling seated x 3 min   Tuck ups x 15 (seated due to large group class occupying deep end)  Standing rows with closed paddles x 15  Shoulder horizontal abd/add with open paddles x 15  12 Seated LAQs X 15  13 Sit to Stands X 9 (No hands)  Supervised use of spa X 5 min.    Assessment/Plan  Reviewed appropriate group classes for Marion to participate in after discharge from therapy.   Plan: Discharge after next appointment.          Timed:  Aquatic Therapy    40     mins 09274;    Alek Singh, PT  Physical Therapist    [place] License:  [id number]

## 2023-11-29 ENCOUNTER — TREATMENT (OUTPATIENT)
Dept: PHYSICAL THERAPY | Facility: CLINIC | Age: 73
End: 2023-11-29
Payer: COMMERCIAL

## 2023-11-29 DIAGNOSIS — M79.604 PAIN IN RIGHT LEG: ICD-10-CM

## 2023-11-29 DIAGNOSIS — Z74.09 IMPAIRED MOBILITY AND ACTIVITIES OF DAILY LIVING: ICD-10-CM

## 2023-11-29 DIAGNOSIS — Z78.9 IMPAIRED MOBILITY AND ACTIVITIES OF DAILY LIVING: ICD-10-CM
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
## 2023-11-29 DIAGNOSIS — S39.012D STRAIN OF LUMBAR REGION, SUBSEQUENT ENCOUNTER: Primary | ICD-10-CM

## 2023-11-29 DIAGNOSIS — R52 PAIN AGGRAVATED BY WALKING: ICD-10-CM

## 2023-11-29 PROCEDURE — 97113 AQUATIC THERAPY/EXERCISES: CPT | Performed by: PHYSICAL THERAPIST
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
## 2023-12-01 ENCOUNTER — OFFICE VISIT (OUTPATIENT)
Dept: CARDIOLOGY | Facility: CLINIC | Age: 73
End: 2023-12-01
Payer: MEDICARE

## 2023-12-01 VITALS
DIASTOLIC BLOOD PRESSURE: 74 MMHG | WEIGHT: 234.6 LBS | BODY MASS INDEX: 39.09 KG/M2 | SYSTOLIC BLOOD PRESSURE: 118 MMHG | HEIGHT: 65 IN | HEART RATE: 74 BPM

## 2023-12-01 DIAGNOSIS — I25.10 CORONARY ARTERY DISEASE INVOLVING NATIVE CORONARY ARTERY OF NATIVE HEART WITHOUT ANGINA PECTORIS: Primary | ICD-10-CM

## 2023-12-01 DIAGNOSIS — E78.5 HYPERLIPIDEMIA LDL GOAL <70: ICD-10-CM

## 2023-12-01 DIAGNOSIS — Z51.81 ENCOUNTER FOR MONITORING CARDIOTOXIC DRUG THERAPY: ICD-10-CM

## 2023-12-01 DIAGNOSIS — Z79.899 ENCOUNTER FOR MONITORING CARDIOTOXIC DRUG THERAPY: ICD-10-CM

## 2023-12-01 DIAGNOSIS — I10 ESSENTIAL HYPERTENSION: ICD-10-CM

## 2023-12-01 DIAGNOSIS — N28.9 RENAL INSUFFICIENCY: ICD-10-CM

## 2023-12-01 DIAGNOSIS — I65.23 BILATERAL CAROTID ARTERY STENOSIS: ICD-10-CM

## 2023-12-01 DIAGNOSIS — Z98.890 S/P MVR (MITRAL VALVE REPAIR): ICD-10-CM

## 2023-12-01 DIAGNOSIS — Z95.1 S/P CABG X 4: ICD-10-CM

## 2023-12-01 RX ORDER — LOSARTAN POTASSIUM 50 MG/1
50 TABLET ORAL DAILY
Qty: 90 TABLET | Refills: 3 | Status: SHIPPED | OUTPATIENT
Start: 2023-12-01 | End: 2023-12-01 | Stop reason: SDUPTHER

## 2023-12-01 RX ORDER — LOSARTAN POTASSIUM 50 MG/1
50 TABLET ORAL DAILY
Qty: 90 TABLET | Refills: 3 | Status: SHIPPED | OUTPATIENT
Start: 2023-12-01

## 2023-12-01 RX ORDER — TAMOXIFEN CITRATE 10 MG/1
5 TABLET ORAL DAILY
COMMUNITY
Start: 2023-07-11 | End: 2024-07-05

## 2023-12-01 NOTE — PROGRESS NOTES
Date of Office Visit: 2023  Encounter Provider: PANKAJ Garner  Place of Service: [facility] CARDIOLOGY  Patient Name: Marion Anderson  :1950    Chief complaint  Coronary artery disease, valvular heart disease     History of Present Illness  Patient is a 73 y.o. year old female  patient of Dr. Fernando. Past medical history includes hypertension, hyperlipidemia, coronary artery disease.  In  she was found to have LAD and right coronary artery stenosis for which she underwent stenting.  Patient was seen in  for progressive chest pain.  Cardiac catheterization showed multivessel coronary artery disease including left main disease.  She developed ventricular fibrillation in the Cath Lab and was successfully cardioverted.  On 2017 she had a LIMA graft to the LAD, vein graft to the right coronary artery, vein graft to ramus intermedius branch, vein graft to diagonal branch of the LAD.  She also had mitral valve repair with a 24 mm ring.  Postoperative course was complicated by encephalopathy CT imaging showed prior infarct.  She was treated with Keppra for a short period of time.  She had carotid Doppler imaging notes severe bilateral carotid artery disease/carotid occlusion which is felt to be difficult to revascularize and for which she was seen by Dr. Prado.  Last echo dated 3/2020 showed an ejection fraction of 63%, mitral valve ring in place with mean gradient of 3 mmHg.     More recently she was diagnosed with left-sided breast cancer and follows with Dr. Aviles and Dr. Jacob.  Lumpectomy is being considered, possibly with radiation to follow-up.      Interval history  Patient presents today for routine follow-up.  I will visit with her for the first time today and have reviewed her medical record.  Since last visit she is overall doing well.  She denies palpitations, shortness of breath, dizziness, chest pain or chest pressure, fatigue, syncope or  presyncope.  She reports edema is stable.  She is participating in water aerobics and denies exertional symptoms.  Blood pressure today is at goal and she reports similar readings at home. She has had sinus congestion for the past 3-4 days and is planning on going to urgent care to rule out sinusitis later today if she cannot get in to see PCP.    Past Medical History:   Diagnosis Date    Arthritis     Bilateral carotid artery stenosis 2/22/2020    Coronary artery disease     Disease of thyroid gland     Hyperlipidemia     Hypertension     PONV (postoperative nausea and vomiting)     Spinal headache      Past Surgical History:   Procedure Laterality Date    CARDIAC CATHETERIZATION Left 10/06/2017    [facility], Dr. THAD Grant, coronary arteriography    CARDIAC SURGERY      CORONARY ARTERY BYPASS GRAFT N/A 10/12/2017    Procedure: FILEMON STERNOTOMY CORONARY ARTERY BYPASS GRAFT TIMES 4  USING LEFT INTERNAL MAMMARY ARTERY AND LEFT GREATER SAPHENOUS VEIN GRAFT PER  ENDOSCOPIC VEIN HARVESTING, MITRAL VALVE REPAIR AND PRP;  Surgeon: Ramos Muñoz MD;  Location: [facility];  Service:     FRACTURE SURGERY      Ankle/Leg    TONSILLECTOMY       Outpatient Medications Prior to Visit   Medication Sig Dispense Refill    alendronate (FOSAMAX) 70 MG tablet Take 1 tablet by mouth 1 (One) Time Per Week.      amoxicillin (AMOXIL) 500 MG capsule Take 4 capsules by mouth See Admin Instructions. 4 capsules 1 hour prior to procedure 12 capsule 0    aspirin 81 MG chewable tablet Chew 1 tablet Daily.      Cholecalciferol (VITAMIN D3) 25 MCG (1000 UT) capsule Take 1 capsule by mouth Every Other Day.      ezetimibe (ZETIA) 10 MG tablet Take 1 tablet by mouth Daily.      hydrOXYzine (ATARAX) 25 MG tablet Take 1 tablet by mouth At Night As Needed for Itching.      levothyroxine (SYNTHROID, LEVOTHROID) 125 MCG tablet Take 1 tablet by mouth Daily.      metoprolol succinate XL (TOPROL-XL) 50 MG 24 hr tablet Take 1  tablet by mouth twice daily 180 tablet 0    Multiple Vitamin (MULTI-DAY PO) Take 1 tablet by mouth Daily.      nitroglycerin (NITROSTAT) 0.3 MG SL tablet Place 1 tablet under the tongue Every 5 (Five) Minutes As Needed.      Omega-3 Fatty Acids (FISH OIL) 1000 MG capsule capsule Take  by mouth Daily With Breakfast.      rosuvastatin (CRESTOR) 40 MG tablet Take 1 tablet by mouth Every Night.      tamoxifen (NOLVADEX) 10 MG tablet Take 0.5 tablets by mouth Daily.      traMADol (ULTRAM) 50 MG tablet Take 1 tablet by mouth As Needed.      losartan (COZAAR) 50 MG tablet Take 1 tablet by mouth Daily. for blood pressure       No facility-administered medications prior to visit.       Allergies as of 12/01/2023 - Reviewed 06/01/2023   Allergen Reaction Noted    Latex Itching and Other (See Comments) 12/03/2015     Social History     Socioeconomic History    Marital status: Single   Tobacco Use    Smoking status: Never    Smokeless tobacco: Never   Vaping Use    Vaping Use: Never used   Substance and Sexual Activity    Alcohol use: No    Drug use: No    Sexual activity: Defer     Birth control/protection: Post-menopausal     Family History   Problem Relation Age of Onset    Heart disease Mother     Hypertension Mother     Stroke Mother     Heart disease Father     Hypertension Father     Heart attack Father     Cancer Brother     Hypertension Brother      Review of Systems   Constitutional: Negative for malaise/fatigue.   Cardiovascular:  Positive for leg swelling. Negative for chest pain, claudication, dyspnea on exertion, near-syncope, orthopnea, palpitations, paroxysmal nocturnal dyspnea and syncope.   Respiratory:  Negative for shortness of breath.    Neurological:  Negative for brief paralysis, dizziness, headaches and light-headedness.   All other systems reviewed and are negative.       Objective:     Vitals:    12/01/23 0953   BP: 118/74   BP Location: Left arm   Patient Position: Sitting   Cuff Size: Large Adult  "  Pulse: 74   Weight: 106 kg (234 lb 9.6 oz)   Height: 165.1 cm (65\")     Body mass index is 39.04 kg/m².    Vitals reviewed.   Constitutional:       General: Not in acute distress.     Appearance: Well-developed and not in distress. Not diaphoretic.   HENT:      Head: Normocephalic.   Pulmonary:      Effort: Pulmonary effort is normal. No respiratory distress.      Breath sounds: Normal breath sounds. No wheezing. No rhonchi. No rales.   Cardiovascular:      Normal rate. Regular rhythm.      Murmurs: There is no murmur.   Pulses:     Radial: 2+ bilaterally.  Edema:     Peripheral edema absent.   Skin:     General: Skin is warm and dry. There is no cyanosis.      Findings: No rash.   Neurological:      Mental Status: Alert and oriented to person, place, and time.   Psychiatric:         Behavior: Behavior normal. Behavior is cooperative.         Thought Content: Thought content normal.         Judgment: Judgment normal.     Lab Review:     Lab Results   Component Value Date     03/02/2023     05/05/2022    K 4.2 03/02/2023    K 4.8 05/05/2022     03/02/2023     05/05/2022    CO2 25 03/02/2023    CO2 26 05/05/2022    BUN 21 (H) 03/02/2023    BUN 24 (H) 05/05/2022    CREATININE 1.31 (H) 03/02/2023    CREATININE 1.34 (H) 05/05/2022    EGFRIFNONA 48 (L) 06/16/2021    EGFRIFNONA 53 (L) 03/04/2020    EGFRIFAFRI 49 (L) 03/02/2023    GLUCOSE 97 06/16/2021    GLUCOSE 104 (H) 03/04/2020    CALCIUM 9.2 03/02/2023    CALCIUM 10.1 05/05/2022    ALBUMIN 4.4 03/02/2023    ALBUMIN 4.4 05/05/2022    BILITOT 0.5 03/02/2023    BILITOT 0.5 05/05/2022    AST 38 (H) 03/02/2023    AST 30 05/05/2022    ALT 47 (H) 03/02/2023    ALT 28 05/05/2022     Lab Results   Component Value Date    WBC 9.61 09/07/2023    WBC 6.13 03/02/2023    HGB 13.5 09/07/2023    HGB 13.8 03/02/2023    HCT 42.6 09/07/2023    HCT 41.7 03/02/2023    MCV 92.2 09/07/2023    MCV 90.3 03/02/2023     09/07/2023     03/02/2023     No " "results found for: \"PROBNP\", \"BNP\"  No results found for: \"CKTOTAL\", \"CKMB\", \"CKMBINDEX\", \"TROPONINI\", \"TROPONINT\"  Lab Results   Component Value Date    TSH 8.740 (H) 05/05/2022    TSH 12.390 (H) 10/19/2021             ECG 12 Lead    Date/Time: 12/1/2023 10:02 AM  Performed by: Roberta Liu APRN    Authorized by: Roberta Liu APRN  Comparison: compared with previous ECG   Similar to previous ECG  Rhythm: sinus rhythm  Rate: normal  BPM: 74  QRS axis: normal  Other findings: low voltage, poor R wave progression and early transition  Comments: Similar to prior.  No new ischemic changes      Assessment:       Diagnosis Plan   1. Coronary artery disease involving native coronary artery of native heart without angina pectoris        2. Encounter for monitoring cardiotoxic drug therapy        3. S/P MVR (mitral valve repair)        4. Essential hypertension        5. Renal insufficiency        6. S/P CABG x 4        7. Hyperlipidemia LDL goal <70        8. Bilateral carotid artery stenosis          Plan:       1.  Left-sided breast cancer.  Had lumpectomy but did not require chemotherapy or radiation.  2.  Card oncology care.  Echo and stress test stable with no ischemia.   3.  Coronary artery disease with prior stenting and subsequent coronary artery bypass grafting 2017. As above  4.  Status post mitral valve repair.  Continue with SBE prophylaxis. She will go to urgent care/PCP for sinusitis workup.  5.  Hypertension.  Controlled on current regimen. Refilled losartan  6.  Hyperlipidemia.  Improved with statin and Zetia.    7.  Renal insufficiency.  Remained stable though abnormal  8.  Hypothyroidism.  Per PCP  9.  Carotid artery disease, severe with bilateral occlusion.  Followed by Dr. Prado      Time Spent: I spent 30 minutes caring for Marion on this date of service. This time includes time spent by me in the following activities: preparing for the visit, reviewing tests, performing a medically " appropriate examination and/or evaluations, counseling and educating the patient/family/caregiver, ordering medications, tests, or procedures, documenting information in the medical record, and independently interpreting results and communicating that information with the patient/family/caregiver.   I spent 1 minutes on the separately reported service of ECG. This time is not included in the time used to support the E/M service also reported today.        Your medication list            Accurate as of December 1, 2023 10:04 AM. If you have any questions, ask your nurse or doctor.                CONTINUE taking these medications        Instructions Last Dose Given Next Dose Due   alendronate 70 MG tablet  Commonly known as: FOSAMAX      Take 1 tablet by mouth 1 (One) Time Per Week.       amoxicillin 500 MG capsule  Commonly known as: AMOXIL      Take 4 capsules by mouth See Admin Instructions. 4 capsules 1 hour prior to procedure       aspirin 81 MG chewable tablet      Chew 1 tablet Daily.       ezetimibe 10 MG tablet  Commonly known as: ZETIA      Take 1 tablet by mouth Daily.       fish oil 1000 MG capsule capsule      Take  by mouth Daily With Breakfast.       hydrOXYzine 25 MG tablet  Commonly known as: ATARAX      Take 1 tablet by mouth At Night As Needed for Itching.       levothyroxine 125 MCG tablet  Commonly known as: SYNTHROID, LEVOTHROID      Take 1 tablet by mouth Daily.       losartan 50 MG tablet  Commonly known as: COZAAR      Take 1 tablet by mouth Daily. for blood pressure       metoprolol succinate XL 50 MG 24 hr tablet  Commonly known as: TOPROL-XL      Take 1 tablet by mouth twice daily       multivitamin tablet tablet  Commonly known as: THERAGRAN      Take 1 tablet by mouth Daily.       nitroglycerin 0.3 MG SL tablet  Commonly known as: NITROSTAT      Place 1 tablet under the tongue Every 5 (Five) Minutes As Needed.       rosuvastatin 40 MG tablet  Commonly known as: CRESTOR      Take 1 tablet  by mouth Every Night.       tamoxifen 10 MG tablet  Commonly known as: NOLVADEX      Take 0.5 tablets by mouth Daily.       traMADol 50 MG tablet  Commonly known as: ULTRAM      Take 1 tablet by mouth As Needed.       Vitamin D3 25 MCG (1000 UT) capsule      Take 1 capsule by mouth Every Other Day.                 Where to Get Your Medications        These medications were sent to [facility] [address] [place] [address] [facility] - [phone]  - [phone]   [address] [facility] [address]      Phone: [phone]   losartan 50 MG tablet         Patient is no longer taking -.  I corrected the med list to reflect this.  I did not stop these medications.    No follow-ups on file.      Dictated utilizing Dragon dictation

## 2024-01-10 RX ORDER — METOPROLOL SUCCINATE 50 MG/1
TABLET, EXTENDED RELEASE ORAL
Qty: 180 TABLET | Refills: 2 | Status: SHIPPED | OUTPATIENT
Start: 2024-01-10

## 2024-02-12 ENCOUNTER — TREATMENT (OUTPATIENT)
Dept: PHYSICAL THERAPY | Facility: CLINIC | Age: 74
End: 2024-02-12
Payer: COMMERCIAL

## 2024-02-12 DIAGNOSIS — M25.561 PAIN OF RIGHT KNEE AFTER INJURY: Primary | ICD-10-CM

## 2024-02-12 DIAGNOSIS — R26.89 ANTALGIC GAIT: ICD-10-CM

## 2024-02-12 DIAGNOSIS — R29.898 COMPLAINTS OF LEG WEAKNESS: ICD-10-CM

## 2024-02-12 PROCEDURE — 97162 PT EVAL MOD COMPLEX 30 MIN: CPT | Performed by: PHYSICAL THERAPIST

## 2024-02-12 PROCEDURE — 97110 THERAPEUTIC EXERCISES: CPT | Performed by: PHYSICAL THERAPIST

## 2024-02-23 ENCOUNTER — TREATMENT (OUTPATIENT)
Dept: PHYSICAL THERAPY | Facility: CLINIC | Age: 74
End: 2024-02-23
Payer: COMMERCIAL

## 2024-02-23 DIAGNOSIS — R29.898 COMPLAINTS OF LEG WEAKNESS: ICD-10-CM

## 2024-02-23 DIAGNOSIS — R26.89 ANTALGIC GAIT: ICD-10-CM

## 2024-02-23 DIAGNOSIS — M25.561 PAIN OF RIGHT KNEE AFTER INJURY: Primary | ICD-10-CM

## 2024-02-23 PROCEDURE — 97110 THERAPEUTIC EXERCISES: CPT | Performed by: PHYSICAL THERAPIST

## 2024-02-23 NOTE — PROGRESS NOTES
Physical Therapy Daily Treatment Note    [facility] [place] [address]  [phone] (phone)  [phone] (fax)    Patient: Marion Anderson   : 1950  Diagnosis/ICD-10 Code:  Pain of right knee after injury [M25.561]  Referring practitioner: Liv Galvan MD  Today's Date: 2024  Patient seen for 2 sessions    Visit Diagnoses:    ICD-10-CM ICD-9-CM   1. Pain of right knee after injury  M25.561 719.46     959.7   2. Complaints of leg weakness  R29.898 729.89   3. Antalgic gait  R26.89 781.2              Subjective Marion reports that she has not felt her right knee give out the past 2 days so she did not bring her cane today.  She does continue to use the walking at home.  She had to walk last evening further to get to restaurant and theater.     Objective   See Exercise, Manual, and Modality Logs for complete treatment.   Access Code: [email or web address]  URL: [email or web address]  Date: 2024  Prepared by: Jennifer Moreno    Exercises  - Hooklying Gluteal Sets  - 1 x daily - 7 x weekly - 1 sets - 5 reps - 5 sec hold  - Small Range Straight Leg Raise  - 1 x daily - 7 x weekly - 1 sets - 5 reps  - Clamshell  - 1 x daily - 7 x weekly - 1 sets - 5 reps  - Seated Hip Adduction Isometrics with Ball  - 1 x daily - 7 x weekly - 1 sets - 5 reps - 5 sec hold  - Seated Long Arc Quad  - 1 x daily - 7 x weekly - 1 sets - 5 reps  - Seated March  - 1 x daily - 7 x weekly - 1 sets - 5 reps  - Standing Knee Flexion AROM with Chair Support  - 1 x daily - 7 x weekly - 1 sets - 5 reps    Assessment/Plan    Marion feels weak in her legs and tired with exercises limited to 5 reps of most. She is not able to extend her right knee through full ROM so most exercises are performed in limited ROM with fair strength.  She feels she may be able to start aquatic exercise if the weather temperature  remains mild.  She reports reduced giving way of the right  knee in the past 2 days and is trying to be less dependent on her cane for short community distances.        Timed:    Manual Therapy:    0     mins  10354;  Therapeutic Exercise:    38     mins  81362;     Neuromuscular Rina:    0    mins  87343;    Therapeutic Activity:     0     mins  16331;     Gait Trainin     mins  24821;     Ultrasound:     0     mins  85033;    Aquatic Therapy    0     mins 67765;  Self Care                       0     mins   27434        Untimed:  Electrical Stimulation:    0     mins  47919 ( );  Traction:    0     mins  38763;   Dry Needling  (1-2 muscles)            0     mins 87465 (Self-pay)  Dry Needling (3-4 muscles) 0      (Self-pay)  Dry Needling Trial    0     DRYNDLTRIAL  (No Charge)    Timed Treatment:   38   mins   Total Treatment:     38   mins    Jennifer Moreno PT  Physical Therapist    [place] License:[id number]

## 2024-03-05 ENCOUNTER — TREATMENT (OUTPATIENT)
Dept: PHYSICAL THERAPY | Facility: CLINIC | Age: 74
End: 2024-03-05
Payer: COMMERCIAL

## 2024-03-05 DIAGNOSIS — R26.89 ANTALGIC GAIT: ICD-10-CM

## 2024-03-05 DIAGNOSIS — R29.898 COMPLAINTS OF LEG WEAKNESS: ICD-10-CM

## 2024-03-05 DIAGNOSIS — M25.561 PAIN OF RIGHT KNEE AFTER INJURY: Primary | ICD-10-CM

## 2024-03-05 PROCEDURE — 97110 THERAPEUTIC EXERCISES: CPT | Performed by: PHYSICAL THERAPIST

## 2024-03-05 NOTE — PROGRESS NOTES
Physical Therapy Daily Treatment Note    [facility]  [address] [place] [address]  [phone] (phone)  [phone] (fax)    Patient: Marion Anderson   : 1950  Diagnosis/ICD-10 Code:  Pain of right knee after injury [M25.561]  Referring practitioner: Liv Galvan MD  Today's Date: 3/5/2024  Patient seen for 3 sessions    Visit Diagnoses:    ICD-10-CM ICD-9-CM   1. Pain of right knee after injury  M25.561 719.46     959.7   2. Complaints of leg weakness  R29.898 729.89   3. Antalgic gait  R26.89 781.2              Subjective Marion reports that her knee is stiff and sore getting out of the car or chair and starting to walk.  She reports that the knee feels swollen.    Objective   See Exercise, Manual, and Modality Logs for complete treatment.       Assessment/Plan    Marion was able to perform more exercises today than last session with slow pace and periodic rest breaks although knee pain would limit her with each exercise. She relies on cane sor walking support today.       Timed:    Manual Therapy:    0     mins  33122;  Therapeutic Exercise:    60     mins  15767;     Neuromuscular Rina:    0    mins  02060;    Therapeutic Activity:     0     mins  39007;     Gait Trainin     mins  51486;     Ultrasound:     0     mins  37157;    Aquatic Therapy    0     mins 75072;  Self Care                       0     mins   58914        Untimed:  Electrical Stimulation:    0     mins  70948 ( );  Traction:    0     mins  10269;   Dry Needling  (1-2 muscles)            0     mins  (Self-pay)  Dry Needling (3-4 muscles) 0      (Self-pay)  Dry Needling Trial    0     DRYNDLTRIAL  (No Charge)    Timed Treatment:   60   mins   Total Treatment:     60   mins    Jennifer Moreno, PT  Physical Therapist    [place] License:[id number]

## 2024-03-08 ENCOUNTER — TREATMENT (OUTPATIENT)
Dept: PHYSICAL THERAPY | Facility: CLINIC | Age: 74
End: 2024-03-08
Payer: COMMERCIAL

## 2024-03-08 DIAGNOSIS — M25.561 PAIN OF RIGHT KNEE AFTER INJURY: Primary | ICD-10-CM

## 2024-03-08 DIAGNOSIS — R29.898 COMPLAINTS OF LEG WEAKNESS: ICD-10-CM

## 2024-03-08 DIAGNOSIS — R26.89 ANTALGIC GAIT: ICD-10-CM

## 2024-03-08 PROCEDURE — 97110 THERAPEUTIC EXERCISES: CPT | Performed by: PHYSICAL THERAPIST

## 2024-03-08 NOTE — PROGRESS NOTES
Physical Therapy Daily Treatment Note    [facility]  [address] [place] [address]  [phone] (phone)  [phone] (fax)    Patient: Marion Anderson   : 1950  Diagnosis/ICD-10 Code:  Pain of right knee after injury [M25.561]  Referring practitioner: Liv Galvan MD  Today's Date: 3/8/2024  Patient seen for 4 sessions    Visit Diagnoses:    ICD-10-CM ICD-9-CM   1. Pain of right knee after injury  M25.561 719.46     959.7   2. Complaints of leg weakness  R29.898 729.89   3. Antalgic gait  R26.89 781.2              Subjective Right side of lower back was more sore after last session.    Objective   See Exercise, Manual, and Modality Logs for complete treatment.   Marion has goal to return to aquatic exercises soon that have been helpful to her lower back in the past    Assessment/Plan    Marion is able to perform a variety of exercises but only a limited number of repetitions of each before muscle fatigue.  She has some mild crepitus of her knees with leg press more so on the left knee today and appears to use the right knee more.        Timed:    Manual Therapy:    0     mins  98581;  Therapeutic Exercise:    44     mins  33416;     Neuromuscular Rina:    0    mins  14645;    Therapeutic Activity:     0     mins  20572;     Gait Trainin     mins  21320;     Ultrasound:     0     mins  24570;    Aquatic Therapy    0     mins 35798;  Self Care                       0     mins   74401        Untimed:  Electrical Stimulation:    0     mins  07122 ( );  Traction:    0     mins  87116;   Dry Needling  (1-2 muscles)            0     mins 18273 (Self-pay)  Dry Needling (3-4 muscles) 0      (Self-pay)  Dry Needling Trial    0     DRYNDLTRIAL  (No Charge)    Timed Treatment:   44   mins   Total Treatment:     44   mins    Jennifer Moreno, PT  Physical Therapist    [place] License:[id number]

## 2024-03-12 ENCOUNTER — TREATMENT (OUTPATIENT)
Dept: PHYSICAL THERAPY | Facility: CLINIC | Age: 74
End: 2024-03-12
Payer: COMMERCIAL

## 2024-03-12 DIAGNOSIS — M25.561 PAIN OF RIGHT KNEE AFTER INJURY: Primary | ICD-10-CM

## 2024-03-12 DIAGNOSIS — R26.89 ANTALGIC GAIT: ICD-10-CM

## 2024-03-12 DIAGNOSIS — R29.898 COMPLAINTS OF LEG WEAKNESS: ICD-10-CM

## 2024-03-12 PROCEDURE — 97110 THERAPEUTIC EXERCISES: CPT | Performed by: PHYSICAL THERAPIST
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
## 2024-03-19 ENCOUNTER — TREATMENT (OUTPATIENT)
Dept: PHYSICAL THERAPY | Facility: CLINIC | Age: 74
End: 2024-03-19
Payer: COMMERCIAL

## 2024-03-19 DIAGNOSIS — R29.898 COMPLAINTS OF LEG WEAKNESS: ICD-10-CM

## 2024-03-19 DIAGNOSIS — R26.89 ANTALGIC GAIT: ICD-10-CM

## 2024-03-19 DIAGNOSIS — M25.561 PAIN OF RIGHT KNEE AFTER INJURY: Primary | ICD-10-CM

## 2024-03-19 PROCEDURE — 97110 THERAPEUTIC EXERCISES: CPT | Performed by: PHYSICAL THERAPIST

## 2024-03-19 NOTE — PROGRESS NOTES
Physical Therapy Daily Treatment Note    [facility]  [address] [place] [address]  [phone] (phone)  [phone] (fax)    Patient: Marion Anderson   : 1950  Diagnosis/ICD-10 Code:  Pain of right knee after injury [M25.561]  Referring practitioner: Liv Galvan MD  Today's Date: 3/19/2024  Patient seen for 6 sessions    Visit Diagnoses:    ICD-10-CM ICD-9-CM   1. Pain of right knee after injury  M25.561 719.46     959.7   2. Complaints of leg weakness  R29.898 729.89   3. Antalgic gait  R26.89 781.2              Subjective My right toes feel swollen and tight in my shoes today.  I don't know why. I did rake the yard some this weekend but my  bent over and bagged the leaves.    Objective   See Exercise, Manual, and Modality Logs for complete treatment.   Recommended that she ice her right foot and knee    Assessment/Plan    Marion was able to perform most exercises today as long as she could pace her activity.  She is still weaker on her right side compared to the left side.       Timed:    Manual Therapy:    0     mins  90681;  Therapeutic Exercise:    43     mins  37918;     Neuromuscular Rina:    0    mins  31112;    Therapeutic Activity:     0     mins  78085;     Gait Trainin     mins  66335;     Ultrasound:     0     mins  25592;    Aquatic Therapy    0     mins 56950;  Self Care                       0     mins   10967        Untimed:  Electrical Stimulation:    0     mins  19655 ( );  Traction:    0     mins  85520;   Dry Needling  (1-2 muscles)            0     mins 04761 (Self-pay)  Dry Needling (3-4 muscles) 0      (Self-pay)  Dry Needling Trial    0     DRYNDLTRIAL  (No Charge)    Timed Treatment:   43   mins   Total Treatment:     43   mins    Jennifer Moreno, PT  Physical Therapist    [place] License:[id number]

## 2024-03-21 ENCOUNTER — TREATMENT (OUTPATIENT)
Dept: PHYSICAL THERAPY | Facility: CLINIC | Age: 74
End: 2024-03-21
Payer: COMMERCIAL

## 2024-03-21 DIAGNOSIS — R29.898 COMPLAINTS OF LEG WEAKNESS: ICD-10-CM

## 2024-03-21 DIAGNOSIS — R26.89 ANTALGIC GAIT: ICD-10-CM

## 2024-03-21 DIAGNOSIS — M25.561 PAIN OF RIGHT KNEE AFTER INJURY: Primary | ICD-10-CM

## 2024-03-21 PROCEDURE — 97110 THERAPEUTIC EXERCISES: CPT | Performed by: PHYSICAL THERAPIST

## 2024-03-21 NOTE — PROGRESS NOTES
Physical Therapy Daily Treatment Note    [facility]  [address] [place] [address]  [phone] (phone)  [phone] (fax)    Patient: Marion Anderson   : 1950  Diagnosis/ICD-10 Code:  Pain of right knee after injury [M25.561]  Referring practitioner: Liv Galvan MD  Today's Date: 3/21/2024  Patient seen for 7 sessions    Visit Diagnoses:    ICD-10-CM ICD-9-CM   1. Pain of right knee after injury  M25.561 719.46     959.7   2. Complaints of leg weakness  R29.898 729.89   3. Antalgic gait  R26.89 781.2              Subjective Right leg does not feel right today.    Objective   See Exercise, Manual, and Modality Logs for complete treatment.       Assessment/Plan    Marion still perceives a bog difference in her leg strength with the right leg weaker and exercises staying challenging to perform.       Timed:    Manual Therapy:    0     mins  01333;  Therapeutic Exercise:    43     mins  43079;     Neuromuscular Rina:    0    mins  59206;    Therapeutic Activity:     0     mins  28009;     Gait Trainin     mins  24435;     Ultrasound:     0     mins  71342;    Aquatic Therapy    0     mins 35051;  Self Care                       0     mins   58448        Untimed:  Electrical Stimulation:    0     mins  79066 ( );  Traction:    0     mins  40910;   Dry Needling  (1-2 muscles)            0     mins 44276 (Self-pay)  Dry Needling (3-4 muscles) 0      (Self-pay)  Dry Needling Trial    0     DRYNDLTRIAL  (No Charge)    Timed Treatment:   43   mins   Total Treatment:     43   mins    Jennifer Moreno, PT  Physical Therapist    [place] License:[id number]

## 2024-03-26 ENCOUNTER — TREATMENT (OUTPATIENT)
Dept: PHYSICAL THERAPY | Facility: CLINIC | Age: 74
End: 2024-03-26
Payer: COMMERCIAL

## 2024-03-26 DIAGNOSIS — M25.561 PAIN OF RIGHT KNEE AFTER INJURY: Primary | ICD-10-CM

## 2024-03-26 DIAGNOSIS — R26.89 ANTALGIC GAIT: ICD-10-CM

## 2024-03-26 DIAGNOSIS — R29.898 COMPLAINTS OF LEG WEAKNESS: ICD-10-CM

## 2024-03-26 PROCEDURE — 97110 THERAPEUTIC EXERCISES: CPT | Performed by: PHYSICAL THERAPIST

## 2024-03-26 NOTE — PROGRESS NOTES
Physical Therapy Daily Treatment Note    [facility]  [address] [place] [address]  [phone] (phone)  [phone] (fax)    Patient: Marion Anderson   : 1950  Diagnosis/ICD-10 Code:  Pain of right knee after injury [M25.561]  Referring practitioner: Liv Galvan MD  Today's Date: 3/26/2024  Patient seen for 8 sessions    Visit Diagnoses:    ICD-10-CM ICD-9-CM   1. Pain of right knee after injury  M25.561 719.46     959.7   2. Complaints of leg weakness  R29.898 729.89   3. Antalgic gait  R26.89 781.2              Subjective My right knee gave out this morning while getting ready.    Objective   See Exercise, Manual, and Modality Logs for complete treatment.       Assessment/Plan    Marion had a few limitations with her right knee during exercises today but able to get through close to her prior routine.  She is walking very carefully with her right knee after it gave out this morning.        Timed:    Manual Therapy:    0     mins  29165;  Therapeutic Exercise:    43     mins  90203;     Neuromuscular Rina:    0    mins  53272;    Therapeutic Activity:     0     mins  16622;     Gait Trainin     mins  47241;     Ultrasound:     0     mins  55159;    Aquatic Therapy    0     mins 86397;  Self Care                       0     mins   94078        Untimed:  Electrical Stimulation:    0     mins  98850 ( );  Traction:    0     mins  59756;   Dry Needling  (1-2 muscles)            0     mins  (Self-pay)  Dry Needling (3-4 muscles) 0      (Self-pay)  Dry Needling Trial    0     DRYNDLTRIAL  (No Charge)    Timed Treatment:   43   mins   Total Treatment:     43   mins    Jennifer Moreno, PT  Physical Therapist    [place] License:[id number]

## 2024-03-29 ENCOUNTER — TREATMENT (OUTPATIENT)
Dept: PHYSICAL THERAPY | Facility: CLINIC | Age: 74
End: 2024-03-29
Payer: COMMERCIAL

## 2024-03-29 DIAGNOSIS — M25.561 PAIN OF RIGHT KNEE AFTER INJURY: Primary | ICD-10-CM

## 2024-03-29 DIAGNOSIS — R26.89 ANTALGIC GAIT: ICD-10-CM

## 2024-03-29 DIAGNOSIS — R29.898 COMPLAINTS OF LEG WEAKNESS: ICD-10-CM

## 2024-03-29 PROCEDURE — 97110 THERAPEUTIC EXERCISES: CPT | Performed by: PHYSICAL THERAPIST

## 2024-03-29 NOTE — PROGRESS NOTES
Physical Therapy Daily Treatment Note    [facility]  [address] [place] [address]  [phone] (phone)  [phone] (fax)    Patient: Marion Anderson   : 1950  Diagnosis/ICD-10 Code:  Pain of right knee after injury [M25.561]  Referring practitioner: Liv Galvan MD  Today's Date: 3/29/2024  Patient seen for 9 sessions    Visit Diagnoses:    ICD-10-CM ICD-9-CM   1. Pain of right knee after injury  M25.561 719.46     959.7   2. Complaints of leg weakness  R29.898 729.89   3. Antalgic gait  R26.89 781.2              Subjective Knee hurt more yesterday possibly riding around more in the car and getting in and out more    Objective   See Exercise, Manual, and Modality Logs for complete treatment.       Assessment/Plan    Right knee is a bit more limited and painful straightening out.  She was able to perform more repetitions of some exercises today while others are still difficult and limited. She feels motivated to start back to aquatic exercise next week.     Timed:    Manual Therapy:    0     mins  91666;  Therapeutic Exercise:    40     mins  84454;     Neuromuscular Rina:    0    mins  36178;    Therapeutic Activity:     0     mins  35258;     Gait Trainin     mins  14124;     Ultrasound:     0     mins  22689;    Aquatic Therapy    0     mins 12929;  Self Care                       0     mins   95599        Untimed:  Electrical Stimulation:    0     mins  05317 (MC );  Traction:    0     mins  36327;   Dry Needling  (1-2 muscles)            0     mins  (Self-pay)  Dry Needling (3-4 muscles) 0      (Self-pay)  Dry Needling Trial    0     DRYNDLTRIAL  (No Charge)    Timed Treatment:   40   mins   Total Treatment:     40   mins    Jennifer Moreno, PT  Physical Therapist    [place] License:[id number]

## 2024-04-08 ENCOUNTER — TREATMENT (OUTPATIENT)
Dept: PHYSICAL THERAPY | Facility: CLINIC | Age: 74
End: 2024-04-08
Payer: COMMERCIAL

## 2024-04-08 DIAGNOSIS — M25.561 PAIN OF RIGHT KNEE AFTER INJURY: Primary | ICD-10-CM

## 2024-04-08 DIAGNOSIS — R26.89 ANTALGIC GAIT: ICD-10-CM

## 2024-04-08 DIAGNOSIS — R29.898 COMPLAINTS OF LEG WEAKNESS: ICD-10-CM

## 2024-04-08 PROCEDURE — 97110 THERAPEUTIC EXERCISES: CPT | Performed by: PHYSICAL THERAPIST

## 2024-04-08 NOTE — PROGRESS NOTES
Physical Therapy Daily Treatment Note    [facility]  [address] [place] [address]  [phone] (phone)  [phone] (fax)    Patient: Marion Anderson   : 1950  Diagnosis/ICD-10 Code:  Pain of right knee after injury [M25.561]  Referring practitioner: Liv Galvan MD  Today's Date: 2024  Patient seen for 10 sessions    Visit Diagnoses:    ICD-10-CM ICD-9-CM   1. Pain of right knee after injury  M25.561 719.46     959.7   2. Complaints of leg weakness  R29.898 729.89   3. Antalgic gait  R26.89 781.2              Subjective Evaluation    History of Present Illness    Subjective comment: Knee has been bothering me more.  I had trouble getting up and down from restaurant chair yesterday.  Knee hurts being in car for longer period of time so I could not drive long distances.Pain  Current pain ratin  Location: Right knee         Objective          Active Range of Motion     Right Knee   Flexion: 105 degrees with pain  Extension: 0 degrees   Extensor la degrees with pain      See Exercise, Manual, and Modality Logs for complete treatment.   Encouraged to start back to pool exercises.    Assessment/Plan    Right knee lacks more extension today due to increased knee pain. She is more limited in exercise performance compared to having a better session last time.       Timed:    Manual Therapy:    0     mins  98416;  Therapeutic Exercise:    42     mins  49429;     Neuromuscular Rina:    0    mins  24917;    Therapeutic Activity:     0     mins  68055;     Gait Trainin     mins  93048;     Ultrasound:     0     mins  07197;    Aquatic Therapy    0     mins 17540;  Self Care                       0     mins   05083        Untimed:  Electrical Stimulation:    0     mins  24314 ( );  Traction:    0     mins  04791;   Dry Needling  (1-2 muscles)            0     mins  (Self-pay)  Dry Needling (3-4 muscles) 0       (Self-pay)  Dry Needling Trial    0     DRYNDLTRIAL  (No Charge)    Timed Treatment:   42   mins   Total Treatment:     42   mins    Jennifer Moreno, PT  Physical Therapist    [place] License:[id number]

## 2024-04-15 ENCOUNTER — TREATMENT (OUTPATIENT)
Dept: PHYSICAL THERAPY | Facility: CLINIC | Age: 74
End: 2024-04-15
Payer: COMMERCIAL

## 2024-04-15 DIAGNOSIS — R26.89 ANTALGIC GAIT: ICD-10-CM

## 2024-04-15 DIAGNOSIS — R29.898 COMPLAINTS OF LEG WEAKNESS: ICD-10-CM

## 2024-04-15 DIAGNOSIS — M25.561 PAIN OF RIGHT KNEE AFTER INJURY: Primary | ICD-10-CM

## 2024-04-15 PROCEDURE — 97110 THERAPEUTIC EXERCISES: CPT | Performed by: PHYSICAL THERAPIST
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
## 2024-04-22 ENCOUNTER — TREATMENT (OUTPATIENT)
Dept: PHYSICAL THERAPY | Facility: CLINIC | Age: 74
End: 2024-04-22
Payer: COMMERCIAL

## 2024-04-22 DIAGNOSIS — R29.898 COMPLAINTS OF LEG WEAKNESS: ICD-10-CM

## 2024-04-22 DIAGNOSIS — R26.89 ANTALGIC GAIT: ICD-10-CM

## 2024-04-22 DIAGNOSIS — M25.561 PAIN OF RIGHT KNEE AFTER INJURY: Primary | ICD-10-CM

## 2024-04-22 PROCEDURE — 97110 THERAPEUTIC EXERCISES: CPT | Performed by: PHYSICAL THERAPIST

## 2024-04-22 NOTE — PROGRESS NOTES
Physical Therapy Daily Treatment Note    [facility]  [address] [place] [address]  [phone] (phone)  [phone] (fax)    Patient: Marion Anderson   : 1950  Diagnosis/ICD-10 Code:  Pain of right knee after injury [M25.561]  Referring practitioner: PANKAJ Rankin  Today's Date: 2024  Patient seen for 12 sessions    Visit Diagnoses:    ICD-10-CM ICD-9-CM   1. Pain of right knee after injury  M25.561 719.46     959.7   2. Complaints of leg weakness  R29.898 729.89   3. Antalgic gait  R26.89 781.2              Subjective Marion reports that she came to the pool to do exercises but does not know if she is doing them correctly.    Objective   See Exercise, Manual, and Modality Logs for complete treatment.   GYM EXERCISES  Nustep     Workload 4  10 minutes  Matrix leg curls    Seat 6  12.5 pounds 10 reps x 3 sets  [place] leg press   Seat 8  75 pounds 15 reps x 2 sets    Assessment/Plan    Marion demonstrated better strength with her exercises today although still only able to tolerate about the same number of repetitions.  Plan to see her in pool for next session because the pool environment likely works best for her knee and back.       Timed:    Manual Therapy:    0     mins  92575;  Therapeutic Exercise:    43     mins  18602;     Neuromuscular Rina:    0    mins  38982;    Therapeutic Activity:     0     mins  77774;     Gait Trainin     mins  72274;     Ultrasound:     0     mins  76255;    Aquatic Therapy    0     mins 30667;  Self Care                       0     mins   07838        Untimed:  Electrical Stimulation:    0     mins  06149 ( );  Traction:    0     mins  89391;   Dry Needling  (1-2 muscles)            0     mins  (Self-pay)  Dry Needling (3-4 muscles) 0      (Self-pay)  Dry Needling Trial    0     DRYNDLTRIAL  (No Charge)    Timed Treatment:   43   mins   Total Treatment:     43    mins    Jennifer Moreno, PT  Physical Therapist    [place] License:[id number]

## 2024-06-03 ENCOUNTER — OFFICE VISIT (OUTPATIENT)
Dept: CARDIOLOGY | Facility: CLINIC | Age: 74
End: 2024-06-03
Payer: MEDICARE

## 2024-06-03 VITALS
HEART RATE: 65 BPM | DIASTOLIC BLOOD PRESSURE: 76 MMHG | WEIGHT: 232 LBS | HEIGHT: 65 IN | SYSTOLIC BLOOD PRESSURE: 158 MMHG | BODY MASS INDEX: 38.65 KG/M2

## 2024-06-03 DIAGNOSIS — I10 ESSENTIAL HYPERTENSION: ICD-10-CM

## 2024-06-03 DIAGNOSIS — I25.10 CORONARY ARTERY DISEASE INVOLVING NATIVE CORONARY ARTERY OF NATIVE HEART WITHOUT ANGINA PECTORIS: ICD-10-CM

## 2024-06-03 DIAGNOSIS — E78.5 HYPERLIPIDEMIA LDL GOAL <70: ICD-10-CM

## 2024-06-03 DIAGNOSIS — Z98.890 S/P MVR (MITRAL VALVE REPAIR): ICD-10-CM

## 2024-06-03 DIAGNOSIS — R07.89 CHEST TIGHTNESS: Primary | ICD-10-CM

## 2024-06-03 DIAGNOSIS — Z95.1 S/P CABG X 4: ICD-10-CM

## 2024-06-03 PROCEDURE — 99214 OFFICE O/P EST MOD 30 MIN: CPT | Performed by: INTERNAL MEDICINE

## 2024-06-03 PROCEDURE — 1159F MED LIST DOCD IN RCRD: CPT | Performed by: INTERNAL MEDICINE

## 2024-06-03 PROCEDURE — 93000 ELECTROCARDIOGRAM COMPLETE: CPT | Performed by: INTERNAL MEDICINE

## 2024-06-03 PROCEDURE — 3077F SYST BP >= 140 MM HG: CPT | Performed by: INTERNAL MEDICINE

## 2024-06-03 PROCEDURE — 1160F RVW MEDS BY RX/DR IN RCRD: CPT | Performed by: INTERNAL MEDICINE

## 2024-06-03 PROCEDURE — 3078F DIAST BP <80 MM HG: CPT | Performed by: INTERNAL MEDICINE

## 2024-06-03 NOTE — PROGRESS NOTES
Date of Office Visit: 2024  Encounter Provider: Domonique Fernando MD  Place of Service: [facility] CARDIOLOGY  Patient Name: Marion Anderson  :1950    Chief complaint  Coronary artery disease, valvular heart disease    History of Present Illness  Patient is a 73-year-old female with history of hypertension, hyperlipidemia, coronary artery disease.  In  she was found to have LAD and right coronary artery stenosis for which she underwent stenting.  Patient was seen in  for progressive chest pain.  Cardiac catheterization showed multivessel coronary artery disease including left main disease.  She developed ventricular fibrillation in the Cath Lab and was successfully cardioverted.  On 2017 she had a LIMA graft to the LAD, vein graft to the right coronary artery, vein graft to ramus intermedius branch, vein graft to diagonal branch of the LAD.  She also had mitral valve repair with a 24 mm ring.  Postoperative course was complicated by encephalopathy CT imaging showed prior infarct.  She was treated with Keppra for a short period of time.  She had carotid Doppler imaging notes severe bilateral carotid artery disease/carotid occlusion which is felt to be difficult to revascularize and for which she was seen by Dr. Prado.     On 2023 with complaints of dyspnea and part of chemotherapy evaluation when she was diagnosed with left-sided breast cancer.  She had an echocardiogram that showed an ejection fraction 71%, GLS -17.8% mild ventricular hypertrophy with normal wall motion and grade 2 diastolic dysfunction.  This is present, there is mild mitral regurgitation with mitral valve ring in place and the mean gradient of 4 mmHg at a heart rate of 56 bpm, small tricuspid regurgitation normal right-sided pressures.Stress perfusion study was normal for ischemia.  Abnormal septal motion consistent with prior pericardiectomy.  Patient underwent lumpectomy on 2023  and subsequently was treated with tamoxifen.    Patient has noticed intermittent chest tightness with walking or when she is stressed.  There is no radiation to her arm.  It is a milder version of what she had prior to surgery.  Typically will last less than 5 minutes.  This has been noticeable in the past 6 months..  She has chronic dyspnea on exertion she has chronic dependent edema.  She denies any palpitations or dizziness.      Past Medical History:   Diagnosis Date    Arthritis     Bilateral carotid artery stenosis 2/22/2020    Coronary artery disease     Disease of thyroid gland     Hyperlipidemia     Hypertension     PONV (postoperative nausea and vomiting)     Spinal headache      Past Surgical History:   Procedure Laterality Date    CARDIAC CATHETERIZATION Left 10/06/2017    [facility], Dr. THAD Grant, coronary arteriography    CARDIAC SURGERY      CORONARY ARTERY BYPASS GRAFT N/A 10/12/2017    Procedure: FILEMON STERNOTOMY CORONARY ARTERY BYPASS GRAFT TIMES 4  USING LEFT INTERNAL MAMMARY ARTERY AND LEFT GREATER SAPHENOUS VEIN GRAFT PER  ENDOSCOPIC VEIN HARVESTING, MITRAL VALVE REPAIR AND PRP;  Surgeon: Ramos Muñoz MD;  Location: [facility];  Service:     FRACTURE SURGERY      Ankle/Leg    TONSILLECTOMY       Outpatient Medications Prior to Visit   Medication Sig Dispense Refill    alendronate (FOSAMAX) 70 MG tablet Take 1 tablet by mouth 1 (One) Time Per Week.      amoxicillin (AMOXIL) 500 MG capsule Take 4 capsules by mouth See Admin Instructions. 4 capsules 1 hour prior to procedure 12 capsule 0    aspirin 81 MG chewable tablet Chew 1 tablet Daily.      Cholecalciferol (VITAMIN D3) 25 MCG (1000 UT) capsule Take 1 capsule by mouth Every Other Day.      ezetimibe (ZETIA) 10 MG tablet Take 1 tablet by mouth Daily.      hydrOXYzine (ATARAX) 25 MG tablet Take 1 tablet by mouth At Night As Needed for Itching.      levothyroxine (SYNTHROID, LEVOTHROID) 125 MCG tablet Take 1  tablet by mouth Daily.      losartan (COZAAR) 50 MG tablet Take 1 tablet by mouth Daily. for blood pressure 90 tablet 3    metoprolol succinate XL (TOPROL-XL) 50 MG 24 hr tablet Take 1 tablet by mouth twice daily 180 tablet 2    Multiple Vitamin (MULTI-DAY PO) Take 1 tablet by mouth Daily.      nitroglycerin (NITROSTAT) 0.3 MG SL tablet Place 1 tablet under the tongue Every 5 (Five) Minutes As Needed.      Omega-3 Fatty Acids (FISH OIL) 1000 MG capsule capsule Take  by mouth Daily With Breakfast.      rosuvastatin (CRESTOR) 40 MG tablet Take 1 tablet by mouth Every Night.      tamoxifen (NOLVADEX) 10 MG tablet Take 0.5 tablets by mouth Daily.      traMADol (ULTRAM) 50 MG tablet Take 1 tablet by mouth As Needed.       No facility-administered medications prior to visit.       Allergies as of 06/03/2024 - Reviewed 06/03/2024   Allergen Reaction Noted    Latex Itching and Other (See Comments) 12/03/2015     Social History     Socioeconomic History    Marital status: Single   Tobacco Use    Smoking status: Never    Smokeless tobacco: Never   Vaping Use    Vaping status: Never Used   Substance and Sexual Activity    Alcohol use: No    Drug use: No    Sexual activity: Defer     Birth control/protection: Post-menopausal     Family History   Problem Relation Age of Onset    Heart disease Mother     Hypertension Mother     Stroke Mother     Heart disease Father     Hypertension Father     Heart attack Father     Cancer Brother     Hypertension Brother      Review of Systems   Constitutional: Negative for chills, fever, weight gain and weight loss.   Cardiovascular:  Negative for leg swelling.   Respiratory:  Negative for cough, snoring and wheezing.    Hematologic/Lymphatic: Negative for bleeding problem. Does not bruise/bleed easily.   Skin:  Negative for color change.   Musculoskeletal:  Negative for falls, joint pain and myalgias.   Gastrointestinal:  Negative for melena.   Genitourinary:  Negative for hematuria.  "  Neurological:  Negative for excessive daytime sleepiness.   Psychiatric/Behavioral:  Negative for depression. The patient is not nervous/anxious.         Objective:     Vitals:    06/03/24 1054   BP: 158/76   Pulse: 65   Weight: 105 kg (232 lb)   Height: 165.1 cm (65\")     Body mass index is 38.61 kg/m².    Vitals reviewed.   Constitutional:       Appearance: Well-developed.   Eyes:      General: No scleral icterus.        Right eye: No discharge.      Conjunctiva/sclera: Conjunctivae normal.      Pupils: Pupils are equal, round, and reactive to light.   HENT:      Head: Normocephalic.      Nose: Nose normal.   Neck:      Thyroid: No thyromegaly.      Vascular: No JVD.   Pulmonary:      Effort: Pulmonary effort is normal. No respiratory distress.      Breath sounds: Normal breath sounds. No wheezing. No rales.   Cardiovascular:      Normal rate. Regular rhythm. Normal S1. Normal S2.       Murmurs: There is no murmur.      No gallop.    Pulses:     Intact distal pulses.      Carotid: 2+ bilaterally.     Radial: 2+ bilaterally.     Femoral: 2+ bilaterally.     Popliteal: 2+ bilaterally.     Dorsalis pedis: 2+ bilaterally.     Posterior tibial: 2+ bilaterally.  Edema:     Peripheral edema absent.   Abdominal:      General: Bowel sounds are normal. There is no distension.      Palpations: Abdomen is soft.      Tenderness: There is no abdominal tenderness. There is no rebound.   Musculoskeletal: Normal range of motion.         General: No tenderness.      Cervical back: Normal range of motion and neck supple. Skin:     General: Skin is warm and dry.      Findings: No erythema or rash.   Neurological:      Mental Status: Alert and oriented to person, place, and time.   Psychiatric:         Behavior: Behavior normal.         Thought Content: Thought content normal.         Judgment: Judgment normal.       Lab Review:   Lab Results - Last 18 Months   Lab Units 09/07/23  0916 03/02/23  0918   WBC 10*3/uL 9.61 6.13   RBC " 10*6/uL 4.62 4.62   HEMOGLOBIN g/dL 13.5 13.8   HEMATOCRIT % 42.6 41.7   MCV fL 92.2 90.3   MCH pg 29.2 29.9   MCHC g/dL 31.7 33.1   RDW % 12.6 12.5   PLATELETS 10*3/uL 247 244   NEUTROPHIL % % 70.9 50.1   LYMPHOCYTE % % 15.9 27.9   MONOCYTES % % 9.1 12.2   EOSINOPHIL % % 3.4 8.8*   BASOPHIL % % 0.4 0.8   NEUTROS ABS 10*3/uL 6.81 3.07   LYMPHS ABS 10*3/uL 1.53 1.71   MONOS ABS 10*3/uL 0.87 0.75   EOS ABS 10*3/uL 0.33 0.54   BASOS ABS 10*3/uL 0.04 0.05   MPV fL 10.2 10.5       Lab Results - Last 18 Months   Lab Units 03/02/23  0918   BUN mg/dL 21*   CREATININE mg/dL 1.31*   SODIUM mmol/L 140   POTASSIUM mmol/L 4.2   CHLORIDE mmol/L 101   TOTAL CO2 mmol/L 25   CALCIUM mg/dL 9.2   TOTAL PROTEIN g/dL 7.1   ALBUMIN g/dL 4.4   ALT (SGPT) U/L 47*   AST (SGOT) U/L 38*   ALK PHOS U/L 82   BILIRUBIN mg/dL 0.5   GLOBULIN g/dL 2.7   BUN / CREAT RATIO RATIO 16.3   ANION GAP (arb'U) 14*           ECG 12 Lead    Date/Time: 6/3/2024 11:38 AM  Performed by: Domonique Fernando MD    Authorized by: Domonique Fernando MD  Comparison: compared with previous ECG   Similar to previous ECG  Rhythm: sinus rhythm    Clinical impression: normal ECG            Diagnosis Plan   1. Chest tightness  ECG 12 Lead      2. Coronary artery disease involving native coronary artery of native heart without angina pectoris        3. Essential hypertension        4. Hyperlipidemia LDL goal <70        5. S/P CABG x 4        6. S/P MVR (mitral valve repair)          Plan:       1.  Left-sided breast cancer.  For lumpectomy 6/2023 and therapy and not radiation therapy.  2.  Chest tightness.  Concerning but she had a stress test last year that was normal.  She really thinks this is stress related.  I have asked her to check her blood pressure with these occurrences and if symptoms persist despite improvement in stress level which anticipates up in the near future, would consider further coronary evaluation including possible cardiac cath.  3.  Coronary artery disease with  prior stenting and subsequent coronary artery bypass grafting 2017. As above with negative stress test 6/2023 with abnormal septal motion with prior pericardiectomy.  4.  Status post mitral valve repair.  Continue with SBE prophylaxis.  Clinically doing well.  5.  Hypertension.  Pressure is high today but is much lower at home typically in the 120s over 80s.  Observe for now on the current regimen  6.  Hyperlipidemia.   7.  Renal insufficiency.  Remained stable   8.  Hypothyroidism.  Recommendations per Dr. Kc  9.  Carotid artery disease, severe with bilateral occlusion.  Followed by Dr. Prado  9.  Elevated glucose      Time Spent: I spent 35 minutes caring for Marion on this date of service. This time includes time spent by me in the following activities: preparing for the visit, reviewing tests, obtaining and/or reviewing a separately obtained history, performing a medically appropriate examination and/or evaluation, counseling and educating the patient/family/caregiver, ordering medications, tests, or procedures, documenting information in the medical record, and independently interpreting results and communicating that information with the patient/family/caregiver.   I spent 1 minutes on the separately reported service of ECG. This time is not included in the time used to support the E/M service also reported today.        Your medication list            Accurate as of Yeni 3, 2024 11:59 PM. If you have any questions, ask your nurse or doctor.                CONTINUE taking these medications        Instructions Last Dose Given Next Dose Due   alendronate 70 MG tablet  Commonly known as: FOSAMAX      Take 1 tablet by mouth 1 (One) Time Per Week.       amoxicillin 500 MG capsule  Commonly known as: AMOXIL      Take 4 capsules by mouth See Admin Instructions. 4 capsules 1 hour prior to procedure       aspirin 81 MG chewable tablet      Chew 1 tablet Daily.       ezetimibe 10 MG tablet  Commonly known as:  ZETIA      Take 1 tablet by mouth Daily.       fish oil 1000 MG capsule capsule      Take  by mouth Daily With Breakfast.       hydrOXYzine 25 MG tablet  Commonly known as: ATARAX      Take 1 tablet by mouth At Night As Needed for Itching.       levothyroxine 125 MCG tablet  Commonly known as: SYNTHROID, LEVOTHROID      Take 1 tablet by mouth Daily.       losartan 50 MG tablet  Commonly known as: COZAAR      Take 1 tablet by mouth Daily. for blood pressure       metoprolol succinate XL 50 MG 24 hr tablet  Commonly known as: TOPROL-XL      Take 1 tablet by mouth twice daily       multivitamin tablet tablet  Commonly known as: THERAGRAN      Take 1 tablet by mouth Daily.       nitroglycerin 0.3 MG SL tablet  Commonly known as: NITROSTAT      Place 1 tablet under the tongue Every 5 (Five) Minutes As Needed.       rosuvastatin 40 MG tablet  Commonly known as: CRESTOR      Take 1 tablet by mouth Every Night.       tamoxifen 10 MG tablet  Commonly known as: NOLVADEX      Take 0.5 tablets by mouth Daily.       traMADol 50 MG tablet  Commonly known as: ULTRAM      Take 1 tablet by mouth As Needed.       Vitamin D3 25 MCG (1000 UT) capsule      Take 1 capsule by mouth Every Other Day.                Patient is no longer taking -.  I corrected the med list to reflect this.  I did not stop these medications.      Dictated utilizing Dragon dictation

## 2024-06-13 ENCOUNTER — TREATMENT (OUTPATIENT)
Dept: PHYSICAL THERAPY | Facility: CLINIC | Age: 74
End: 2024-06-13
Payer: COMMERCIAL

## 2024-06-13 DIAGNOSIS — R29.898 COMPLAINTS OF LEG WEAKNESS: ICD-10-CM

## 2024-06-13 DIAGNOSIS — M25.561 PAIN OF RIGHT KNEE AFTER INJURY: Primary | ICD-10-CM

## 2024-06-13 DIAGNOSIS — R26.89 ANTALGIC GAIT: ICD-10-CM
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
## 2024-06-17 ENCOUNTER — TREATMENT (OUTPATIENT)
Dept: PHYSICAL THERAPY | Facility: CLINIC | Age: 74
End: 2024-06-17
Payer: MEDICARE

## 2024-06-17 DIAGNOSIS — R26.89 ANTALGIC GAIT: ICD-10-CM

## 2024-06-17 DIAGNOSIS — R29.898 COMPLAINTS OF LEG WEAKNESS: ICD-10-CM

## 2024-06-17 DIAGNOSIS — M25.561 PAIN OF RIGHT KNEE AFTER INJURY: Primary | ICD-10-CM

## 2024-06-17 NOTE — PROGRESS NOTES
Physical Therapy Daily Treatment Note    [facility]  [address] [place] [address]  [phone] (phone)  [phone] (fax)    Patient: Marion Anderson   : 1950  Diagnosis/ICD-10 Code:  Pain of right knee after injury [M25.561]  Referring practitioner: Liv Galvan MD  Today's Date: 2024  Patient seen for 14 sessions    Visit Diagnoses:    ICD-10-CM ICD-9-CM   1. Pain of right knee after injury  M25.561 719.46     959.7   2. Complaints of leg weakness  R29.898 729.89   3. Antalgic gait  R26.89 781.2              Subjective Marion reports that her legs are more tired today.  May be the hot weather.  She is not sure if she is doing her water exercises correctly.    Objective   See Exercise, Manual, and Modality Logs for complete treatment.       Assessment/Plan    Marion is able to do more exercises in the pool for her knee than in the past and more than she can perform on land.  I plan to progress her aquatic HEP.       Timed:    Manual Therapy:    0     mins  86433;  Therapeutic Exercise:    0     mins  40056;     Neuromuscular Rina:    0    mins  16385;    Therapeutic Activity:     0     mins  09464;     Gait Trainin     mins  39314;     Ultrasound:     0     mins  86419;    Aquatic Therapy    42     mins 32575;  Self Care                       0     mins   53123        Untimed:  Electrical Stimulation:    0     mins  19857 ( );  Traction:    0     mins  23990;   Dry Needling  (1-2 muscles)            0     mins  (Self-pay)  Dry Needling (3-4 muscles) 0      (Self-pay)  Dry Needling Trial    0     DRYNDLTRIAL  (No Charge)    Timed Treatment:   42   mins   Total Treatment:     42   mins    Jennifer Moreno, PT  Physical Therapist    [place] License:[id number]

## 2024-06-24 ENCOUNTER — TREATMENT (OUTPATIENT)
Dept: PHYSICAL THERAPY | Facility: CLINIC | Age: 74
End: 2024-06-24
Payer: MEDICARE

## 2024-06-24 DIAGNOSIS — M25.561 PAIN OF RIGHT KNEE AFTER INJURY: Primary | ICD-10-CM

## 2024-06-24 DIAGNOSIS — R26.89 ANTALGIC GAIT: ICD-10-CM

## 2024-06-24 DIAGNOSIS — R29.898 COMPLAINTS OF LEG WEAKNESS: ICD-10-CM

## 2024-06-24 PROCEDURE — 97110 THERAPEUTIC EXERCISES: CPT | Performed by: PHYSICAL THERAPIST

## 2024-06-24 NOTE — PROGRESS NOTES
Physical Therapy Daily Treatment Note    [facility]  [address] [place] [address]  [phone] (phone)  [phone] (fax)    Patient: Marion Anderson   : 1950  Diagnosis/ICD-10 Code:  Pain of right knee after injury [M25.561]  Referring practitioner: Liv Galvan MD  Today's Date: 2024  Patient seen for 15 sessions    Visit Diagnoses:    ICD-10-CM ICD-9-CM   1. Pain of right knee after injury  M25.561 719.46     959.7   2. Complaints of leg weakness  R29.898 729.89   3. Antalgic gait  R26.89 781.2              Subjective Knee was more sore one night last week.    Objective   See Exercise, Manual, and Modality Logs for complete treatment.   Access Code: [email or web address]  URL: [email or web address]  Date: 2024  Prepared by: Jennifer Moreno    Exercises  - Seated Hamstring Stretch  - 1 x daily - 7 x weekly - 1 sets - 2 reps - 30 sec hold  - Quadricep Stretch with Chair and Counter Support  - 1 x daily - 7 x weekly - 1 sets - 2 reps - 30 sec hold  - Standing Gastroc Stretch  - 1 x daily - 7 x weekly - 1 sets - 2 reps - 30 sec hold  - Supine Bridge  - 1 x daily - 3 x weekly - 1 sets - 10 reps - 5 sec hold  - Single Leg Bridge  - 1 x daily - 3 x weekly - 1 sets - 5 reps  - Active Straight Leg Raise Advanced  - 1 x daily - 3 x weekly - 1 sets - 10 reps    Issued updated aquatic exercise program    Assessment/Plan    Marion did overall better with exercises today after she had a more restful weekend and her knee pain was not as bad.  She does still lack muscle endurance with basic exercises that she has not been performing as regularly. Re-issued HEP to promote more regularity.        Timed:    Manual Therapy:    0     mins  75528;  Therapeutic Exercise:    43     mins  66136;     Neuromuscular Rina:    0    mins  75279;    Therapeutic Activity:     0     mins  65076;     Gait Trainin     mins  54344;     Ultrasound:     0     mins   48263;    Aquatic Therapy    0     mins 66272;  Self Care                       0     mins   80840        Untimed:  Electrical Stimulation:    0     mins  86681 ( );  Traction:    0     mins  99408;   Dry Needling  (1-2 muscles)            0     mins 20560 (Self-pay)  Dry Needling (3-4 muscles) 0     20561 (Self-pay)  Dry Needling Trial    0     DRYNDLTRIAL  (No Charge)    Timed Treatment:   43   mins   Total Treatment:     43   mins    Jennifer Moreno PT  Physical Therapist    [place] License:[id number]

## 2024-07-01 ENCOUNTER — TREATMENT (OUTPATIENT)
Dept: PHYSICAL THERAPY | Facility: CLINIC | Age: 74
End: 2024-07-01
Payer: MEDICARE

## 2024-07-01 DIAGNOSIS — M25.561 PAIN OF RIGHT KNEE AFTER INJURY: Primary | ICD-10-CM

## 2024-07-01 DIAGNOSIS — R26.89 ANTALGIC GAIT: ICD-10-CM

## 2024-07-01 DIAGNOSIS — R29.898 COMPLAINTS OF LEG WEAKNESS: ICD-10-CM

## 2024-07-01 NOTE — PROGRESS NOTES
Physical Therapy Daily Treatment Note    [facility]  [address] [place] [address]  [phone] (phone)  [phone] (fax)    Patient: Marion Anderson   : 1950  Diagnosis/ICD-10 Code:  Pain of right knee after injury [M25.561]  Referring practitioner: Liv Galvan MD  Today's Date: 2024  Patient seen for 16 sessions    Visit Diagnoses:    ICD-10-CM ICD-9-CM   1. Pain of right knee after injury  M25.561 719.46     959.7   2. Complaints of leg weakness  R29.898 729.89   3. Antalgic gait  R26.89 781.2              Subjective Some days the knee is better than others.  I have to be careful to lift my leg high enough to go up stairs.     Objective   See Exercise, Manual, and Modality Logs for complete treatment.       Assessment/Plan    Marion demonstrates similar early fatigue in her legs with exercises.  Plan to work on marches and step taps to work on foot clearance.        Timed:    Manual Therapy:    0     mins  08414;  Therapeutic Exercise:    41     mins  50499;     Neuromuscular Rina:    0    mins  89019;    Therapeutic Activity:     0     mins  55219;     Gait Trainin     mins  51393;     Ultrasound:     0     mins  75867;    Aquatic Therapy    0     mins 01046;  Self Care                       0     mins   71790        Untimed:  Electrical Stimulation:    0     mins  76356 ( );  Traction:    0     mins  60906;   Dry Needling  (1-2 muscles)            0     mins  (Self-pay)  Dry Needling (3-4 muscles) 0      (Self-pay)  Dry Needling Trial    0     DRYNDLTRIAL  (No Charge)    Timed Treatment:   41   mins   Total Treatment:     41   mins    Jennifer Moreno, PT  Physical Therapist    [place] License:[id number]

## 2024-07-09 ENCOUNTER — TREATMENT (OUTPATIENT)
Dept: PHYSICAL THERAPY | Facility: CLINIC | Age: 74
End: 2024-07-09
Payer: MEDICARE

## 2024-07-09 DIAGNOSIS — R26.89 ANTALGIC GAIT: ICD-10-CM

## 2024-07-09 DIAGNOSIS — R29.898 COMPLAINTS OF LEG WEAKNESS: ICD-10-CM

## 2024-07-09 DIAGNOSIS — M25.561 PAIN OF RIGHT KNEE AFTER INJURY: Primary | ICD-10-CM

## 2024-07-09 NOTE — PROGRESS NOTES
Physical Therapy Daily Treatment Note    [facility]  [address] [place] [address]  [phone] (phone)  [phone] (fax)    Patient: Marion Anderson   : 1950  Diagnosis/ICD-10 Code:  Pain of right knee after injury [M25.561]  Referring practitioner: Liv Galvan MD  Today's Date: 2024  Patient seen for 17 sessions    Visit Diagnoses:    ICD-10-CM ICD-9-CM   1. Pain of right knee after injury  M25.561 719.46     959.7   2. Complaints of leg weakness  R29.898 729.89   3. Antalgic gait  R26.89 781.2              Subjective Evaluation    History of Present Illness    Subjective comment: Right knee is hurting.  I think stress affects it.Pain  Current pain ratin  Location: Right knee         Objective   See Exercise, Manual, and Modality Logs for complete treatment.       Assessment/Plan    Marion reports increased right knee pain after driving for about 45 min.  She demonstrates some increased muscle fatigue with the exercises performed towards the end of the session today.       Timed:    Manual Therapy:    0     mins  11274;  Therapeutic Exercise:    43     mins  90593;     Neuromuscular Rina:    0    mins  91061;    Therapeutic Activity:     0     mins  59716;     Gait Trainin     mins  26644;     Ultrasound:     0     mins  42373;    Aquatic Therapy    0     mins 86166;  Self Care                       0     mins   55455        Untimed:  Electrical Stimulation:    0     mins  13828 (MC );  Traction:    0     mins  95765;   Dry Needling  (1-2 muscles)            0     mins 58837 (Self-pay)  Dry Needling (3-4 muscles) 0      (Self-pay)  Dry Needling Trial    0     DRYNDLTRIAL  (No Charge)    Timed Treatment:   43   mins   Total Treatment:     43   mins    Jennifer Moreno, PT  Physical Therapist    [place] License:[id number]

## 2024-07-15 ENCOUNTER — TELEPHONE (OUTPATIENT)
Dept: CARDIOLOGY | Facility: CLINIC | Age: 74
End: 2024-07-15
Payer: COMMERCIAL

## 2024-07-15 NOTE — TELEPHONE ENCOUNTER
Covering Dr. Fernando's in basket.  Patient was last seen by Dr. Fernando on 6/3 and I will defer to her.

## 2024-07-15 NOTE — TELEPHONE ENCOUNTER
Pt called re: Pt is awaiting to be scheduled for a colonoscopy for + fecal occult with the PCP.  Pt wanted to verify if she would be ok to proceed from a cardiac standpoint.

## 2024-07-16 NOTE — TELEPHONE ENCOUNTER
Spoke with pt.  She has NOT been checking her BP.  She is NOT having any chest pain.  She will call Friday with update on BP.  I set a reminder.

## 2024-07-23 ENCOUNTER — TREATMENT (OUTPATIENT)
Dept: PHYSICAL THERAPY | Facility: CLINIC | Age: 74
End: 2024-07-23
Payer: MEDICARE

## 2024-07-23 DIAGNOSIS — R26.89 ANTALGIC GAIT: ICD-10-CM

## 2024-07-23 DIAGNOSIS — M25.561 PAIN OF RIGHT KNEE AFTER INJURY: Primary | ICD-10-CM

## 2024-07-23 DIAGNOSIS — R29.898 COMPLAINTS OF LEG WEAKNESS: ICD-10-CM

## 2024-07-23 PROCEDURE — 97110 THERAPEUTIC EXERCISES: CPT | Performed by: PHYSICAL THERAPIST
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
## 2024-07-23 NOTE — TELEPHONE ENCOUNTER
LMM re: what are the BP Readings with pulse?  Is she having any sx?  Pt call leave message with HUB or on my Voicemail.

## 2024-07-30 ENCOUNTER — TREATMENT (OUTPATIENT)
Dept: PHYSICAL THERAPY | Facility: CLINIC | Age: 74
End: 2024-07-30
Payer: MEDICARE

## 2024-07-30 DIAGNOSIS — R29.898 COMPLAINTS OF LEG WEAKNESS: ICD-10-CM

## 2024-07-30 DIAGNOSIS — R26.89 ANTALGIC GAIT: ICD-10-CM

## 2024-07-30 DIAGNOSIS — M25.561 PAIN OF RIGHT KNEE AFTER INJURY: Primary | ICD-10-CM

## 2024-07-30 PROCEDURE — 97110 THERAPEUTIC EXERCISES: CPT | Performed by: PHYSICAL THERAPIST

## 2024-07-30 NOTE — PROGRESS NOTES
Physical Therapy Daily Treatment Note    [facility]  [address] [place] [address]  [phone] (phone)  [phone] (fax)    Patient: Marion Anderson   : 1950  Diagnosis/ICD-10 Code:  Pain of right knee after injury [M25.561]  Referring practitioner: Liv Galvan MD  Today's Date: 2024  Patient seen for 19 sessions    Visit Diagnoses:    ICD-10-CM ICD-9-CM   1. Pain of right knee after injury  M25.561 719.46     959.7   2. Complaints of leg weakness  R29.898 729.89   3. Antalgic gait  R26.89 781.2              Subjective Marion reports that she woke up in the middle of last night with a Manpreet Horse in her left leg.  She had performed 15 minutes on exercise bike yesterday and not sure if she did too much.    Objective   See Exercise, Manual, and Modality Logs for complete treatment.   Encouraged Marion to continue knee exercises including gym and pool.  Recommend she decrease bike to 12 minutes next time.    Performed calf stretching in standing that is tighter on right side despite muscle cramping on left side.     Assessment/Plan    Marion demonstrates leg muscle fatigue and effort with level of exercises performed in therapy.  It is an effort for her to negotiate stairs when she does not have an elevator option to use but she is able to do so one at a time.          Timed:    Manual Therapy:    0     mins  08638;  Therapeutic Exercise:    45     mins  35953;     Neuromuscular Rina:    0    mins  16486;    Therapeutic Activity:     0     mins  86331;     Gait Trainin     mins  20734;     Ultrasound:     0     mins  94674;    Aquatic Therapy    0     mins 24125;  Self Care                       0     mins   82434        Untimed:  Electrical Stimulation:    0     mins  72222 ( );  Traction:    0     mins  23798;   Dry Needling  (1-2 muscles)            0     mins 03568 (Self-pay)  Dry Needling (3-4 muscles) 0      45443 (Self-pay)  Dry Needling Trial    0     DRYNDLTRIAL  (No Charge)    Timed Treatment:   45   mins   Total Treatment:     45   mins    Jennifer Moreno PT  Physical Therapist    [place] License:[id number]

## 2024-08-12 ENCOUNTER — PATIENT ROUNDING (BHMG ONLY) (OUTPATIENT)
Dept: INTERNAL MEDICINE | Facility: CLINIC | Age: 74
End: 2024-08-12
Payer: COMMERCIAL

## 2024-08-12 ENCOUNTER — OFFICE VISIT (OUTPATIENT)
Dept: INTERNAL MEDICINE | Facility: CLINIC | Age: 74
End: 2024-08-12
Payer: MEDICARE

## 2024-08-12 VITALS
DIASTOLIC BLOOD PRESSURE: 68 MMHG | WEIGHT: 229.1 LBS | HEART RATE: 67 BPM | OXYGEN SATURATION: 96 % | HEIGHT: 65 IN | BODY MASS INDEX: 38.17 KG/M2 | SYSTOLIC BLOOD PRESSURE: 136 MMHG | TEMPERATURE: 98.1 F

## 2024-08-12 DIAGNOSIS — E03.8 HYPOTHYROIDISM DUE TO HASHIMOTO'S THYROIDITIS: ICD-10-CM

## 2024-08-12 DIAGNOSIS — M79.672 BILATERAL FOOT PAIN: ICD-10-CM

## 2024-08-12 DIAGNOSIS — M21.41 PES PLANUS OF BOTH FEET: ICD-10-CM

## 2024-08-12 DIAGNOSIS — M85.852 OSTEOPENIA OF FEMORAL NECK, BILATERAL: ICD-10-CM

## 2024-08-12 DIAGNOSIS — E06.3 HYPOTHYROIDISM DUE TO HASHIMOTO'S THYROIDITIS: ICD-10-CM

## 2024-08-12 DIAGNOSIS — I10 ESSENTIAL HYPERTENSION: ICD-10-CM

## 2024-08-12 DIAGNOSIS — D05.12 DUCTAL CARCINOMA IN SITU (DCIS) OF LEFT BREAST: ICD-10-CM

## 2024-08-12 DIAGNOSIS — M21.42 PES PLANUS OF BOTH FEET: ICD-10-CM

## 2024-08-12 DIAGNOSIS — E78.5 HYPERLIPIDEMIA LDL GOAL <70: ICD-10-CM

## 2024-08-12 DIAGNOSIS — Z12.11 SCREEN FOR COLON CANCER: ICD-10-CM

## 2024-08-12 DIAGNOSIS — M79.671 BILATERAL FOOT PAIN: ICD-10-CM

## 2024-08-12 DIAGNOSIS — I65.23 BILATERAL CAROTID ARTERY STENOSIS: ICD-10-CM

## 2024-08-12 DIAGNOSIS — G89.29 CHRONIC PAIN OF RIGHT KNEE: Primary | ICD-10-CM

## 2024-08-12 DIAGNOSIS — M85.851 OSTEOPENIA OF FEMORAL NECK, BILATERAL: ICD-10-CM

## 2024-08-12 DIAGNOSIS — I25.10 CORONARY ARTERY DISEASE INVOLVING NATIVE CORONARY ARTERY OF NATIVE HEART WITHOUT ANGINA PECTORIS: ICD-10-CM

## 2024-08-12 DIAGNOSIS — M25.561 CHRONIC PAIN OF RIGHT KNEE: Primary | ICD-10-CM

## 2024-08-12 PROBLEM — M17.0 OSTEOARTHRITIS OF KNEES, BILATERAL: Status: ACTIVE | Noted: 2023-03-09

## 2024-08-12 PROBLEM — S06.5XAA SUBDURAL HEMATOMA: Status: RESOLVED | Noted: 2017-10-19 | Resolved: 2024-08-12

## 2024-08-12 PROBLEM — R73.02 IMPAIRED GLUCOSE TOLERANCE: Status: ACTIVE | Noted: 2023-03-09

## 2024-08-12 PROBLEM — R73.03 PREDIABETES: Status: ACTIVE | Noted: 2023-03-09

## 2024-08-12 PROCEDURE — 1126F AMNT PAIN NOTED NONE PRSNT: CPT | Performed by: NURSE PRACTITIONER

## 2024-08-12 PROCEDURE — 99214 OFFICE O/P EST MOD 30 MIN: CPT | Performed by: NURSE PRACTITIONER

## 2024-08-12 PROCEDURE — 3078F DIAST BP <80 MM HG: CPT | Performed by: NURSE PRACTITIONER

## 2024-08-12 PROCEDURE — 3075F SYST BP GE 130 - 139MM HG: CPT | Performed by: NURSE PRACTITIONER

## 2024-08-12 PROCEDURE — 1159F MED LIST DOCD IN RCRD: CPT | Performed by: NURSE PRACTITIONER

## 2024-08-12 PROCEDURE — 1160F RVW MEDS BY RX/DR IN RCRD: CPT | Performed by: NURSE PRACTITIONER

## 2024-08-12 RX ORDER — TRAZODONE HYDROCHLORIDE 50 MG/1
TABLET ORAL EVERY 24 HOURS
COMMUNITY

## 2024-08-12 RX ORDER — TAMOXIFEN CITRATE 10 MG/1
5 TABLET ORAL 2 TIMES DAILY
COMMUNITY
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
## 2024-08-12 NOTE — PROGRESS NOTES
August 12, 2024    Patient rounding obtained a checkout, patient was referred by a friend and did not have any problems making her appointment. Patient states visit went great and yes she would refer us to friends and family.

## 2024-08-13 ENCOUNTER — TREATMENT (OUTPATIENT)
Dept: PHYSICAL THERAPY | Facility: CLINIC | Age: 74
End: 2024-08-13
Payer: MEDICARE

## 2024-08-13 DIAGNOSIS — R29.898 COMPLAINTS OF LEG WEAKNESS: ICD-10-CM

## 2024-08-13 DIAGNOSIS — R26.89 ANTALGIC GAIT: ICD-10-CM

## 2024-08-13 DIAGNOSIS — M25.561 PAIN OF RIGHT KNEE AFTER INJURY: Primary | ICD-10-CM

## 2024-08-13 PROCEDURE — 97110 THERAPEUTIC EXERCISES: CPT | Performed by: PHYSICAL THERAPIST

## 2024-08-13 NOTE — PROGRESS NOTES
Physical Therapy Daily Treatment Note    [facility]  [address] [place] [address]  [phone] (phone)  [phone] (fax)    Patient: Marion Anderson   : 1950  Diagnosis/ICD-10 Code:  Pain of right knee after injury [M25.561]  Referring practitioner: Liv Galvan MD  Today's Date: 2024  Patient seen for 20 sessions    Visit Diagnoses:    ICD-10-CM ICD-9-CM   1. Pain of right knee after injury  M25.561 719.46     959.7   2. Complaints of leg weakness  R29.898 729.89   3. Antalgic gait  R26.89 781.2              Subjective The knee still bothers me after driving in car 20-30 minutes and then I have to stand and wait when I get up to walk.  My neck and back were bothering me more last week and the chiropractor told me to rest from exercise.    Objective   See Exercise, Manual, and Modality Logs for complete treatment.       Assessment/Plan    Marion lacks strength and endurance in her legs resulting in earlier exercise fatigue.  She reports that she is more tired in the afternoon and her next appointments are scheduled in the morning for comparison to see if she can perform exercises better.       Timed:    Manual Therapy:    0     mins  07386;  Therapeutic Exercise:    43     mins  30482;     Neuromuscular Rina:    0    mins  97299;    Therapeutic Activity:     0     mins  02779;     Gait Trainin     mins  21773;     Ultrasound:     0     mins  28196;    Aquatic Therapy    0     mins 57744;  Self Care                       0     mins   87477        Untimed:  Electrical Stimulation:    0     mins  46509 ( );  Traction:    0     mins  82058;   Dry Needling  (1-2 muscles)            0     mins 51783 (Self-pay)  Dry Needling (3-4 muscles) 0      (Self-pay)  Dry Needling Trial    0     DRYNDLTRIAL  (No Charge)    Timed Treatment:   43   mins   Total Treatment:     43   mins    Jennifer Moreno, PT  Physical Therapist    [place]  License:[id number]

## 2024-08-22 ENCOUNTER — TREATMENT (OUTPATIENT)
Dept: PHYSICAL THERAPY | Facility: CLINIC | Age: 74
End: 2024-08-22
Payer: MEDICARE

## 2024-08-22 DIAGNOSIS — R26.89 ANTALGIC GAIT: ICD-10-CM

## 2024-08-22 DIAGNOSIS — R29.898 COMPLAINTS OF LEG WEAKNESS: ICD-10-CM

## 2024-08-22 DIAGNOSIS — M25.561 PAIN OF RIGHT KNEE AFTER INJURY: Primary | ICD-10-CM

## 2024-08-22 PROCEDURE — 97110 THERAPEUTIC EXERCISES: CPT | Performed by: PHYSICAL THERAPIST
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
## 2024-08-28 ENCOUNTER — TREATMENT (OUTPATIENT)
Dept: PHYSICAL THERAPY | Facility: CLINIC | Age: 74
End: 2024-08-28
Payer: MEDICARE

## 2024-08-28 DIAGNOSIS — M25.561 PAIN OF RIGHT KNEE AFTER INJURY: Primary | ICD-10-CM

## 2024-08-28 DIAGNOSIS — R26.89 ANTALGIC GAIT: ICD-10-CM

## 2024-08-28 DIAGNOSIS — R29.898 COMPLAINTS OF LEG WEAKNESS: ICD-10-CM

## 2024-08-28 PROCEDURE — 97110 THERAPEUTIC EXERCISES: CPT | Performed by: PHYSICAL THERAPIST

## 2024-08-28 NOTE — PROGRESS NOTES
Physical Therapy Daily Treatment Note    [facility]  [address] [place] [address]  [phone] (phone)  [phone] (fax)    Patient: Marion Anderson   : 1950  Diagnosis/ICD-10 Code:  Pain of right knee after injury [M25.561]  Referring practitioner: Liv Galvan MD  Today's Date: 2024  Patient seen for 22 sessions    Visit Diagnoses:    ICD-10-CM ICD-9-CM   1. Pain of right knee after injury  M25.561 719.46     959.7   2. Complaints of leg weakness  R29.898 729.89   3. Antalgic gait  R26.89 781.2              Subjective The knee has been giving out more in the past 4 days.  She has been using her cane.  She thinks she needs more support like a knee brace.    Objective          Ambulation   Weight-Bearing Status   Assistive device used: single point cane    Observational Gait   Gait: antalgic   Decreased walking speed and right stance time.       See Exercise, Manual, and Modality Logs for complete treatment.   I recommend that Marion continue with knee exercises that do not increase her knee pain including mat exercises, Nustep and aquatic exercise and use her cane for safety.  I recommend that she see orthopedic MD because her knee has not improved and the giving out is concerning.    Assessment/Plan    Marion reports worsening knee symptoms of pain and giving out so she is now regularly using her cane.  I think she needs to see ortho to see if she has options for her knee that might include injection or bracing but do want her to continue her therapy exercises.          Timed:    Manual Therapy:    0     mins  50747;  Therapeutic Exercise:    43     mins  22699;     Neuromuscular Rina:    0    mins  93118;    Therapeutic Activity:     0     mins  17392;     Gait Trainin     mins  01372;     Ultrasound:     0     mins  30103;    Aquatic Therapy    0     mins 22673;  Self Care                       0     mins    51443        Untimed:  Electrical Stimulation:    0     mins  47114 ( );  Traction:    0     mins  44961;   Dry Needling  (1-2 muscles)            0     mins 20560 (Self-pay)  Dry Needling (3-4 muscles) 0     20561 (Self-pay)  Dry Needling Trial    0     DRYNDLTRIAL  (No Charge)    Timed Treatment:   43   mins   Total Treatment:     43   mins    Jennifer Moreno PT  Physical Therapist    [place] License:[id number]

## 2024-09-04 NOTE — PROGRESS NOTES
Date of Office Visit: 2024  Encounter Provider: PANKAJ Garner  Place of Service: [facility] CARDIOLOGY  Patient Name: Marion Anderson  :1950    Chief complaint  Coronary artery disease, valvular heart disease     History of Present Illness  Patient is a 74 y.o. year old female  patient of Dr. Fernando. Past medical history includes hypertension, hyperlipidemia, coronary artery disease.  In  she was found to have LAD and right coronary artery stenosis for which she underwent stenting.  Patient was seen in  for progressive chest pain.  Cardiac catheterization showed multivessel coronary artery disease including left main disease.  She developed ventricular fibrillation in the Cath Lab and was successfully cardioverted.  On 2017 she had a LIMA graft to the LAD, vein graft to the right coronary artery, vein graft to ramus intermedius branch, vein graft to diagonal branch of the LAD.  She also had mitral valve repair with a 24 mm ring.  Postoperative course was complicated by encephalopathy CT imaging showed prior infarct.  She was treated with Keppra for a short period of time.  She had carotid Doppler imaging notes severe bilateral carotid artery disease/carotid occlusion which is felt to be difficult to revascularize and for which she was seen by Dr. Prado.      On 2023 with complaints of dyspnea and part of chemotherapy evaluation when she was diagnosed with left-sided breast cancer.  She had an echocardiogram that showed an ejection fraction 71%, GLS -17.8% mild ventricular hypertrophy with normal wall motion and grade 2 diastolic dysfunction.  This is present, there is mild mitral regurgitation with mitral valve ring in place and the mean gradient of 4 mmHg at a heart rate of 56 bpm, small tricuspid regurgitation normal right-sided pressures.Stress perfusion study was normal for ischemia.  Abnormal septal motion consistent with prior  pericardiectomy.  Patient underwent lumpectomy on 6/16/2023 and subsequently was treated with tamoxifen.    Interval History  Patient presents today for routine follow-up.  I will visit with her today and have reviewed her medical record.  Since last visit she denies palpitations, shortness of breath, edema, dizziness, chest pain or chest pressure, fatigue, syncope or presyncope.  She is participating in physical therapy and denies exertional symptoms.  Blood pressure today is elevated in office and appears elevated at home as well.  She is currently maintained on losartan 50 mg daily, metoprolol succinate 50 mg twice daily.    Past Medical History:   Diagnosis Date    Arthritis     Bilateral carotid artery stenosis 2/22/2020    Coronary artery disease     Disease of thyroid gland     Hyperlipidemia     Hypertension     PONV (postoperative nausea and vomiting)     Spinal headache      Past Surgical History:   Procedure Laterality Date    CARDIAC CATHETERIZATION Left 10/06/2017    [facility], Dr. THAD Grant, coronary arteriography    CARDIAC SURGERY      CORONARY ARTERY BYPASS GRAFT N/A 10/12/2017    Procedure: FILEMON STERNOTOMY CORONARY ARTERY BYPASS GRAFT TIMES 4  USING LEFT INTERNAL MAMMARY ARTERY AND LEFT GREATER SAPHENOUS VEIN GRAFT PER  ENDOSCOPIC VEIN HARVESTING, MITRAL VALVE REPAIR AND PRP;  Surgeon: Ramos Muñoz MD;  Location: [facility];  Service:     FRACTURE SURGERY      Ankle/Leg    TONSILLECTOMY       Outpatient Medications Prior to Visit   Medication Sig Dispense Refill    alendronate (FOSAMAX) 70 MG tablet Take 1 tablet by mouth 1 (One) Time Per Week.      amoxicillin (AMOXIL) 500 MG capsule Take 4 capsules by mouth See Admin Instructions. 4 capsules 1 hour prior to procedure 12 capsule 0    aspirin 81 MG chewable tablet Chew 1 tablet Daily.      Cholecalciferol (VITAMIN D3) 25 MCG (1000 UT) capsule Take 1 capsule by mouth Every Other Day.      ezetimibe (ZETIA) 10 MG  tablet Take 1 tablet by mouth Daily.      hydrOXYzine (ATARAX) 25 MG tablet Take 1 tablet by mouth At Night As Needed for Itching.      levothyroxine (SYNTHROID, LEVOTHROID) 125 MCG tablet Take 1 tablet by mouth Daily.      metoprolol succinate XL (TOPROL-XL) 50 MG 24 hr tablet Take 1 tablet by mouth twice daily 180 tablet 2    Multiple Vitamin (MULTI-DAY PO) Take 1 tablet by mouth Daily.      nitroglycerin (NITROSTAT) 0.3 MG SL tablet Place 1 tablet under the tongue Every 5 (Five) Minutes As Needed.      Omega-3 Fatty Acids (FISH OIL) 1000 MG capsule capsule Take  by mouth Daily With Breakfast.      rosuvastatin (CRESTOR) 40 MG tablet Take 1 tablet by mouth Every Night.      tamoxifen (NOLVADEX) 10 MG tablet Take 0.5 tablets by mouth 2 (Two) Times a Day.      traMADol (ULTRAM) 50 MG tablet Take 1 tablet by mouth As Needed.      traZODone (DESYREL) 50 MG tablet Daily.      losartan (COZAAR) 50 MG tablet Take 1 tablet by mouth Daily. for blood pressure 90 tablet 3     No facility-administered medications prior to visit.       Allergies as of 09/05/2024 - Reviewed 09/05/2024   Allergen Reaction Noted    Hydroxyzine hcl Nausea Only 08/12/2024    Latex Itching and Other (See Comments) 12/03/2015     Social History     Socioeconomic History    Marital status: Single   Tobacco Use    Smoking status: Never    Smokeless tobacco: Never   Vaping Use    Vaping status: Never Used   Substance and Sexual Activity    Alcohol use: Not Currently    Drug use: Yes    Sexual activity: Defer     Birth control/protection: Post-menopausal     Family History   Problem Relation Age of Onset    Heart disease Mother     Hypertension Mother     Stroke Mother     Heart disease Father     Hypertension Father     Heart attack Father     Cancer Brother     Hypertension Brother     No Known Problems Daughter     No Known Problems Daughter     Colon cancer Neg Hx      Review of Systems   Constitutional: Negative for malaise/fatigue.  "  Cardiovascular:  Negative for chest pain, claudication, dyspnea on exertion, leg swelling, near-syncope, orthopnea, palpitations, paroxysmal nocturnal dyspnea and syncope.   Respiratory:  Negative for shortness of breath.    Neurological:  Negative for brief paralysis, dizziness, headaches and light-headedness.   All other systems reviewed and are negative.       Objective:     Vitals:    09/05/24 1328   BP: 140/88   Pulse: 67   SpO2: 98%   Weight: 104 kg (230 lb)   Height: 165.1 cm (65\")     Body mass index is 38.27 kg/m².    Vitals reviewed.   Constitutional:       General: Not in acute distress.     Appearance: Well-developed and not in distress. Not diaphoretic.   HENT:      Head: Normocephalic.   Pulmonary:      Effort: Pulmonary effort is normal. No respiratory distress.      Breath sounds: Normal breath sounds. No wheezing. No rhonchi. No rales.   Cardiovascular:      Normal rate. Regular rhythm.      Murmurs: There is no murmur.   Pulses:     Radial: 2+ bilaterally.  Edema:     Peripheral edema absent.   Skin:     General: Skin is warm and dry. There is no cyanosis.      Findings: No rash.   Neurological:      Mental Status: Alert and oriented to person, place, and time.   Psychiatric:         Behavior: Behavior normal. Behavior is cooperative.         Thought Content: Thought content normal.         Judgment: Judgment normal.       Lab Review:     Lab Results   Component Value Date     03/02/2023     05/05/2022    K 4.2 03/02/2023    K 4.8 05/05/2022     03/02/2023     05/05/2022    CO2 25 03/02/2023    CO2 26 05/05/2022    BUN 21 (H) 03/02/2023    BUN 24 (H) 05/05/2022    CREATININE 1.31 (H) 03/02/2023    CREATININE 1.34 (H) 05/05/2022    EGFRIFNONA 48 (L) 06/16/2021    EGFRIFNONA 53 (L) 03/04/2020    EGFRIFAFRI 49 (L) 03/02/2023    GLUCOSE 97 06/16/2021    GLUCOSE 104 (H) 03/04/2020    CALCIUM 9.2 03/02/2023    CALCIUM 10.1 05/05/2022    ALBUMIN 4.4 03/02/2023    ALBUMIN 4.4 " "05/05/2022    BILITOT 0.5 03/02/2023    BILITOT 0.5 05/05/2022    AST 38 (H) 03/02/2023    AST 30 05/05/2022    ALT 47 (H) 03/02/2023    ALT 28 05/05/2022     Lab Results   Component Value Date    WBC 9.61 09/07/2023    WBC 6.13 03/02/2023    HGB 13.5 09/07/2023    HGB 13.8 03/02/2023    HCT 42.6 09/07/2023    HCT 41.7 03/02/2023    MCV 92.2 09/07/2023    MCV 90.3 03/02/2023     09/07/2023     03/02/2023     No results found for: \"PROBNP\", \"BNP\"  No results found for: \"CKTOTAL\", \"CKMB\", \"CKMBINDEX\", \"TROPONINI\", \"TROPONINT\"  Lab Results   Component Value Date    TSH 8.740 (H) 05/05/2022    TSH 12.390 (H) 10/19/2021             ECG 12 Lead    Date/Time: 9/5/2024 4:00 PM  Performed by: Roberta Liu APRN    Authorized by: Roberta Liu APRN  Comparison: compared with previous ECG   Similar to previous ECG  Rhythm: sinus rhythm  Rate: normal  BPM: 64  QRS axis: normal  Other findings: low voltage  Comments: Similar to prior        Assessment:       Diagnosis Plan   1. Bilateral carotid artery stenosis  Ambulatory Referral to Vascular Surgery        Plan:       1.  Left-sided breast cancer.  For lumpectomy 6/2023 and tamoxifen therapy and not radiation therapy.  2.  Chest tightness.  Resolved with improvement in stress.  3.  Coronary artery disease with prior stenting and subsequent coronary artery bypass grafting 2017. As above with negative stress test 6/2023 with abnormal septal motion with prior pericardiectomy.  4.  Status post mitral valve repair.  Continue with SBE prophylaxis.  Clinically doing well.  5.  Hypertension.  Elevated in office and at home. Will increase losartan to 100 mg daily.  6.  Hyperlipidemia. On statin  7.  Renal insufficiency.  Remained stable   8.  Hypothyroidism.  Recommendations per PCP  9.  Carotid artery disease, severe with bilateral occlusion.  Followed by Dr. Prado, though she has not seen him in many years. Entered new referral.  9.  Elevated " glucose      Time Spent: I spent 30 minutes caring for Marion on this date of service. This time includes time spent by me in the following activities: preparing for the visit, reviewing tests, performing a medically appropriate examination and/or evaluations, counseling and educating the patient/family/caregiver, ordering medications, tests, or procedures, documenting information in the medical record, and independently interpreting results and communicating that information with the patient/family/caregiver.   I spent 1 minutes on the separately reported service of ECG. This time is not included in the time used to support the E/M service also reported today.        Your medication list            Accurate as of September 5, 2024  4:02 PM. If you have any questions, ask your nurse or doctor.                CHANGE how you take these medications        Instructions Last Dose Given Next Dose Due   losartan 100 MG tablet  Commonly known as: COZAAR  What changed:   medication strength  how much to take  Changed by: Roberta Liu      Take 1 tablet by mouth Daily. for blood pressure              CONTINUE taking these medications        Instructions Last Dose Given Next Dose Due   alendronate 70 MG tablet  Commonly known as: FOSAMAX      Take 1 tablet by mouth 1 (One) Time Per Week.       amoxicillin 500 MG capsule  Commonly known as: AMOXIL      Take 4 capsules by mouth See Admin Instructions. 4 capsules 1 hour prior to procedure       aspirin 81 MG chewable tablet      Chew 1 tablet Daily.       ezetimibe 10 MG tablet  Commonly known as: ZETIA      Take 1 tablet by mouth Daily.       fish oil 1000 MG capsule capsule      Take  by mouth Daily With Breakfast.       hydrOXYzine 25 MG tablet  Commonly known as: ATARAX      Take 1 tablet by mouth At Night As Needed for Itching.       levothyroxine 125 MCG tablet  Commonly known as: SYNTHROID, LEVOTHROID      Take 1 tablet by mouth Daily.       metoprolol succinate XL  50 MG 24 hr tablet  Commonly known as: TOPROL-XL      Take 1 tablet by mouth twice daily       multivitamin tablet tablet  Commonly known as: THERAGRAN      Take 1 tablet by mouth Daily.       nitroglycerin 0.3 MG SL tablet  Commonly known as: NITROSTAT      Place 1 tablet under the tongue Every 5 (Five) Minutes As Needed.       rosuvastatin 40 MG tablet  Commonly known as: CRESTOR      Take 1 tablet by mouth Every Night.       tamoxifen 10 MG tablet  Commonly known as: NOLVADEX      Take 0.5 tablets by mouth 2 (Two) Times a Day.       traMADol 50 MG tablet  Commonly known as: ULTRAM      Take 1 tablet by mouth As Needed.       traZODone 50 MG tablet  Commonly known as: DESYREL      Daily.       Vitamin D3 25 MCG (1000 UT) capsule      Take 1 capsule by mouth Every Other Day.                 Where to Get Your Medications        These medications were sent to [facility] - [place], [place] - [address] [place] [address] - [phone]  - [phone] [address], [facility] [address]      Phone: [phone]   losartan 100 MG tablet         Patient is no longer taking -.  I corrected the med list to reflect this.  I did not stop these medications.    Return for Keep December follow-up.      Dictated utilizing Dragon dictation

## 2024-09-05 ENCOUNTER — OFFICE VISIT (OUTPATIENT)
Dept: CARDIOLOGY | Facility: CLINIC | Age: 74
End: 2024-09-05
Payer: MEDICARE

## 2024-09-05 VITALS
BODY MASS INDEX: 38.32 KG/M2 | WEIGHT: 230 LBS | SYSTOLIC BLOOD PRESSURE: 140 MMHG | DIASTOLIC BLOOD PRESSURE: 88 MMHG | HEIGHT: 65 IN | OXYGEN SATURATION: 98 % | HEART RATE: 67 BPM

## 2024-09-05 DIAGNOSIS — Z95.1 S/P CABG X 4: ICD-10-CM

## 2024-09-05 DIAGNOSIS — I65.23 BILATERAL CAROTID ARTERY STENOSIS: Primary | ICD-10-CM

## 2024-09-05 DIAGNOSIS — I25.10 CORONARY ARTERY DISEASE INVOLVING NATIVE CORONARY ARTERY OF NATIVE HEART WITHOUT ANGINA PECTORIS: ICD-10-CM

## 2024-09-05 DIAGNOSIS — Z98.890 S/P MVR (MITRAL VALVE REPAIR): ICD-10-CM

## 2024-09-05 DIAGNOSIS — E78.5 HYPERLIPIDEMIA LDL GOAL <70: ICD-10-CM

## 2024-09-05 DIAGNOSIS — N28.9 RENAL INSUFFICIENCY: ICD-10-CM

## 2024-09-05 DIAGNOSIS — I10 ESSENTIAL HYPERTENSION: ICD-10-CM

## 2024-09-05 PROCEDURE — 99214 OFFICE O/P EST MOD 30 MIN: CPT | Performed by: NURSE PRACTITIONER

## 2024-09-05 PROCEDURE — 3079F DIAST BP 80-89 MM HG: CPT | Performed by: NURSE PRACTITIONER

## 2024-09-05 PROCEDURE — 93000 ELECTROCARDIOGRAM COMPLETE: CPT | Performed by: NURSE PRACTITIONER

## 2024-09-05 PROCEDURE — 3077F SYST BP >= 140 MM HG: CPT | Performed by: NURSE PRACTITIONER

## 2024-09-05 RX ORDER — LOSARTAN POTASSIUM 100 MG/1
100 TABLET ORAL DAILY
Qty: 90 TABLET | Refills: 3 | Status: SHIPPED | OUTPATIENT
Start: 2024-09-05

## 2024-09-09 ENCOUNTER — TREATMENT (OUTPATIENT)
Dept: PHYSICAL THERAPY | Facility: CLINIC | Age: 74
End: 2024-09-09
Payer: MEDICARE

## 2024-09-09 DIAGNOSIS — M25.561 PAIN OF RIGHT KNEE AFTER INJURY: Primary | ICD-10-CM

## 2024-09-09 DIAGNOSIS — R26.89 ANTALGIC GAIT: ICD-10-CM

## 2024-09-09 DIAGNOSIS — R29.898 COMPLAINTS OF LEG WEAKNESS: ICD-10-CM

## 2024-09-09 PROCEDURE — 97110 THERAPEUTIC EXERCISES: CPT | Performed by: PHYSICAL THERAPIST

## 2024-09-09 NOTE — PROGRESS NOTES
Physical Therapy Daily Treatment Note    [facility]  [address] [place] [address]  [phone] (phone)  [phone] (fax)    Patient: Marion Anderson   : 1950  Diagnosis/ICD-10 Code:  Pain of right knee after injury [M25.561]  Referring practitioner: Liv Galvan MD  Today's Date: 2024  Patient seen for 23 sessions    Visit Diagnoses:    ICD-10-CM ICD-9-CM   1. Pain of right knee after injury  M25.561 719.46     959.7   2. Complaints of leg weakness  R29.898 729.89   3. Antalgic gait  R26.89 781.2              Subjective I came to the pool yesterday.  The knee has not been giving out like it was. She reports personal stress.    Objective   See Exercise, Manual, and Modality Logs for complete treatment.       Assessment/Plan    Marion is not needing to use the cane today since the giving has not been giving out as it had been.  There is increased right knee popping limiting to some exercises. Exercises are also limited by muscle fatigue as well as knee pain.       Timed:    Manual Therapy:    0     mins  92261;  Therapeutic Exercise:    42     mins  86139;     Neuromuscular Rina:    0    mins  14929;    Therapeutic Activity:     0     mins  12738;     Gait Trainin     mins  49659;     Ultrasound:     0     mins  60196;    Aquatic Therapy    0     mins 50969;  Self Care                       0     mins   82982        Untimed:  Electrical Stimulation:    0     mins  24829 ( );  Traction:    0     mins  38458;   Dry Needling  (1-2 muscles)            0     mins  (Self-pay)  Dry Needling (3-4 muscles) 0      (Self-pay)  Dry Needling Trial    0     DRYNDLTRIAL  (No Charge)    Timed Treatment:   42   mins   Total Treatment:     42   mins    Jennifer Moreno, PT  Physical Therapist    [place] License:[id number]

## 2024-09-16 ENCOUNTER — TREATMENT (OUTPATIENT)
Dept: PHYSICAL THERAPY | Facility: CLINIC | Age: 74
End: 2024-09-16
Payer: MEDICARE

## 2024-09-16 DIAGNOSIS — R29.898 COMPLAINTS OF LEG WEAKNESS: ICD-10-CM

## 2024-09-16 DIAGNOSIS — R26.89 ANTALGIC GAIT: ICD-10-CM

## 2024-09-16 DIAGNOSIS — M25.561 PAIN OF RIGHT KNEE AFTER INJURY: Primary | ICD-10-CM

## 2024-09-16 PROCEDURE — 97110 THERAPEUTIC EXERCISES: CPT | Performed by: PHYSICAL THERAPIST

## 2024-09-24 NOTE — PROGRESS NOTES
Initial SW/CM Assessment/Plan of Care Note     Baseline Assessment  77 year old admitted 9/23/2024 as Inpatient  Prior to admission patient was living with Spouse and residing at House    .  Patient does  have a Power of  for Healthcare.  Agent is wife and children. Patient’s Primary Care Provider is Selwyn Coronel MD.     Progress Note    Met with patient, son and wife with patient permission. Reviewed face sheet/confirmed PCP, discussed role of care management.  Lately has been requiring increased assist with ADL from family but at baseline is able to ambulate on his own with rollator, use toilet. Wife helps as needed  Family feels patient will benefit from SNF. Seen by PT and OT also to see.  Has been to [facility] in the past and referral made via [facility]. Asked for [facility].  Discussed with family that patient may be able to return home dependent on improvement and referral made to [facility].   Lives with wife.     Plan  Patient/Family Discharge Goal: SNF, Home therapy   Is patient/family goal achievable: Yes    SW/CM - Recommendations for Discharge: SNF, Home therapy, Home care   Anticipate patient will need post-hospital services. Necessary services are available.    Refer to SW/CM Flowsheet for objective data.     Medical History  Past Medical History:   Diagnosis Date    Anxiety 02/15/2022    [facility]    Arthritis 02/15/2022    [facility]    Benign prostatic hyperplasia with lower urinary tract symptoms     Bursitis     right hip    CAD (coronary artery disease) 2007    [facility]    Chronic constipation 02/15/2022    [facility]    Cluster headache, not intractable, unspecified chronicity pattern 07/06/2020    [facility]    DM (diabetes mellitus)  (CMD) 02/15/2022    [facility]    Drug induced retention of urine     GERD (gastroesophageal reflux disease)     [facility]  See session report   [facility]    HTN (hypertension)     [facility]    Hyperlipidemia     Nephrolithiasis     Nonintractable headache 07/06/2020    [facility]    OAB (overactive bladder)     HECTOR (obstructive sleep apnea) 02/15/2022    [facility]    Parkinson disease  (CMD) 02/15/2022    [facility]    Postsurgical percutaneous transluminal coronary angioplasty status 01/29/2007 1/29/07 medicated stent LAD; PLAVIX NONSTOP x 1 YR       Prior to Admission Status  Functional Status  Ambulation: Rolling walker  Bathing: Chair/Bench  Dressing: Family (wife helps with lower extremity dressing)  Toileting: Independent/Self  Meal Preparation: Family  Medication Preparation: Family  Medication Administration: Family  Transportation: Family    Agency/Support  Type of Services Prior to Hospitalization: None               Support Systems: Spouse, Children   Home Devices/Equipment: Blood glucose monitor, Blood pressure monitor, Shower chair           Mobility Assist Devices: Front-wheeled walker, Four wheel walker  Sensory Support Devices: Eyeglasses    Prior Function        Ambulation in the Home: Supervision (Supv) (09/24/24 1021 : Judy Mayer, PT)       Current Function  Last Filed Values         Value Time User    Current Function  significantly below baseline level of function 9/24/2024 10:38 AM Judy Mayer, PT    Therapy Impairments  abnormal tone; activity tolerance; balance; pain; motor planning 9/24/2024 10:38 AM Judy Mayer PT    ADLs Requiring Support  bed mobility; transfers; ambulation; stairs 9/24/2024 10:38 AM Judy Mayer, PT            Therapy Recommendations for Discharge:   PT:      Last Filed Values         Value Time User    PT Discharge Needs  therapy 5 or more times per week 9/24/2024 10:38 AM Judy Mayer PT          Insurance  Primary: MEDICARE  Secondary: [facility]  Recommendations:  SW/CM recommendation for discharge: SNF, Home therapy, Home care

## 2024-10-08 ENCOUNTER — TREATMENT (OUTPATIENT)
Dept: PHYSICAL THERAPY | Facility: CLINIC | Age: 74
End: 2024-10-08
Payer: MEDICARE

## 2024-10-08 DIAGNOSIS — R29.898 COMPLAINTS OF LEG WEAKNESS: ICD-10-CM

## 2024-10-08 DIAGNOSIS — R26.89 ANTALGIC GAIT: ICD-10-CM

## 2024-10-08 DIAGNOSIS — M25.561 PAIN OF RIGHT KNEE AFTER INJURY: Primary | ICD-10-CM

## 2024-10-08 PROCEDURE — 97110 THERAPEUTIC EXERCISES: CPT | Performed by: PHYSICAL THERAPIST

## 2024-10-08 NOTE — PROGRESS NOTES
Physical Therapy Daily Treatment Note    [facility]  [address] [place] [address]  [phone] (phone)  [phone] (fax)    Patient: Marion Anderson   : 1950  Diagnosis/ICD-10 Code:  Pain of right knee after injury [M25.561]  Referring practitioner: Liv Galvan MD  Today's Date: 10/8/2024  Patient seen for 25 sessions    Visit Diagnoses:    ICD-10-CM ICD-9-CM   1. Pain of right knee after injury  M25.561 719.46     959.7   2. Complaints of leg weakness  R29.898 729.89   3. Antalgic gait  R26.89 781.2              Subjective Marion reports that she has been able to get to pool and gym a few times.  She reports that she has felt some new pain on the inner side of the right knee.      Objective   See Exercise, Manual, and Modality Logs for complete treatment.       Assessment/Plan    Overall exercises were somewhat limited today by strain in right knee and some new popping in the left knee.         Timed:    Manual Therapy:    0     mins  72145;  Therapeutic Exercise:    39     mins  78746;     Neuromuscular Rina:    0    mins  62562;    Therapeutic Activity:     0     mins  69937;     Gait Trainin     mins  45973;     Ultrasound:     0     mins  50597;    Aquatic Therapy    0     mins 32852;  Self Care                       0     mins   31773        Untimed:  Electrical Stimulation:    0     mins  50476 (MC );  Traction:    0     mins  05285;   Dry Needling  (1-2 muscles)            0     mins  (Self-pay)  Dry Needling (3-4 muscles) 0      (Self-pay)  Dry Needling Trial    0     DRYNDLTRIAL  (No Charge)    Timed Treatment:   39   mins   Total Treatment:     39   mins    Jennifer Moreno, PT  Physical Therapist    [place] License:[id number]

## 2024-10-14 ENCOUNTER — TELEPHONE (OUTPATIENT)
Dept: INTERNAL MEDICINE | Facility: CLINIC | Age: 74
End: 2024-10-14
Payer: COMMERCIAL

## 2024-10-14 NOTE — TELEPHONE ENCOUNTER
"----- Message from Shazia Suarez sent at 10/9/2024 12:14 PM EDT -----  Can you call her to see about scheduling her carotid study?  ----- Message -----  From: Jordana Fisher RegSched Rep  Sent: 10/9/2024  11:41 AM EDT  To: PANKAJ Loyola    Order was placed about two months ago for  carotid study.  Patient has not returned calls from [facility] and in addition I have sent her an unable to reach letter, yet she still has not scheduled.  Have Tequila call her or cancel Order and reema \"patient refused?\"  "

## 2024-10-23 ENCOUNTER — TREATMENT (OUTPATIENT)
Dept: PHYSICAL THERAPY | Facility: CLINIC | Age: 74
End: 2024-10-23
Payer: MEDICARE

## 2024-10-23 DIAGNOSIS — R26.89 ANTALGIC GAIT: ICD-10-CM

## 2024-10-23 DIAGNOSIS — M25.561 PAIN OF RIGHT KNEE AFTER INJURY: Primary | ICD-10-CM

## 2024-10-23 DIAGNOSIS — R29.898 COMPLAINTS OF LEG WEAKNESS: ICD-10-CM

## 2024-10-23 PROCEDURE — 97110 THERAPEUTIC EXERCISES: CPT | Performed by: PHYSICAL THERAPIST
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
## 2024-10-29 ENCOUNTER — OFFICE VISIT (OUTPATIENT)
Dept: ORTHOPEDIC SURGERY | Facility: CLINIC | Age: 74
End: 2024-10-29
Payer: COMMERCIAL

## 2024-10-29 VITALS — TEMPERATURE: 97.1 F | BODY MASS INDEX: 37.92 KG/M2 | HEIGHT: 65 IN | WEIGHT: 227.6 LBS

## 2024-10-29 DIAGNOSIS — M17.11 PRIMARY OSTEOARTHRITIS OF RIGHT KNEE: ICD-10-CM

## 2024-10-29 DIAGNOSIS — M70.50 PES ANSERINE BURSITIS: ICD-10-CM

## 2024-10-29 DIAGNOSIS — M25.561 RIGHT KNEE PAIN, UNSPECIFIED CHRONICITY: Primary | ICD-10-CM

## 2024-10-29 PROCEDURE — 73562 X-RAY EXAM OF KNEE 3: CPT | Performed by: NURSE PRACTITIONER

## 2024-10-29 PROCEDURE — 99213 OFFICE O/P EST LOW 20 MIN: CPT | Performed by: NURSE PRACTITIONER
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
## 2024-10-30 ENCOUNTER — TREATMENT (OUTPATIENT)
Dept: PHYSICAL THERAPY | Facility: CLINIC | Age: 74
End: 2024-10-30
Payer: MEDICARE

## 2024-10-30 DIAGNOSIS — M25.561 PAIN OF RIGHT KNEE AFTER INJURY: Primary | ICD-10-CM

## 2024-10-30 DIAGNOSIS — R26.89 ANTALGIC GAIT: ICD-10-CM

## 2024-10-30 DIAGNOSIS — R29.898 COMPLAINTS OF LEG WEAKNESS: ICD-10-CM

## 2024-10-30 PROCEDURE — 97110 THERAPEUTIC EXERCISES: CPT | Performed by: PHYSICAL THERAPIST

## 2024-10-30 NOTE — PROGRESS NOTES
Physical Therapy Daily Treatment Note    [facility]  [address] [place] [address]  [phone] (phone)  [phone] (fax)    Patient: Marion Anderson   : 1950  Diagnosis/ICD-10 Code:  Pain of right knee after injury [M25.561]  Referring practitioner: Liv Galvan MD  Today's Date: 10/30/2024  Patient seen for 27 sessions    Visit Diagnoses:    ICD-10-CM ICD-9-CM   1. Pain of right knee after injury  M25.561 719.46     959.7   2. Complaints of leg weakness  R29.898 729.89   3. Antalgic gait  R26.89 781.2              Subjective She went to [facility] yesterday.  She was diagnosed with moderate arthritis, patellar tendonitis and pes anserine bursitis. She has been prescribed a topical anti-inflammatory cream for her knee.    Objective   See Exercise, Manual, and Modality Logs for complete treatment.       Assessment/Plan    Marion continues to be able to perform exercises better with the right knee.  She does have some  muscle fatigue today but she has been able to perform more repetitions of the exercises over the past 2 visits.       Timed:    Manual Therapy:    0     mins  57346;  Therapeutic Exercise:    45     mins  06397;     Neuromuscular Rina:    0    mins  64529;    Therapeutic Activity:     0     mins  20999;     Gait Trainin     mins  69747;     Ultrasound:     0     mins  07868;    Aquatic Therapy           mins     45505;  Self Care                               mins   27874        Untimed:  Electrical Stimulation:           mins  74710 ( );  Traction:           mins  58649;   Dry Needling  (1-2 muscles)                  mins  (Self-pay)  Dry Needling (3-4 muscles)  ____   (Self-pay)  Dry Needling Trial             DRYNDLTRIAL  (No Charge)    Timed Treatment:   45   mins   Total Treatment:     45   mins    Jennifer Moreno PT  Physical Therapist    [place] License:[id number]

## 2024-11-20 ENCOUNTER — TREATMENT (OUTPATIENT)
Dept: PHYSICAL THERAPY | Facility: CLINIC | Age: 74
End: 2024-11-20
Payer: MEDICARE

## 2024-11-20 DIAGNOSIS — R29.898 COMPLAINTS OF LEG WEAKNESS: ICD-10-CM

## 2024-11-20 DIAGNOSIS — R26.89 ANTALGIC GAIT: ICD-10-CM

## 2024-11-20 DIAGNOSIS — M25.561 PAIN OF RIGHT KNEE AFTER INJURY: Primary | ICD-10-CM

## 2024-11-20 PROCEDURE — 97110 THERAPEUTIC EXERCISES: CPT | Performed by: PHYSICAL THERAPIST
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
## 2024-11-21 NOTE — THERAPY PROGRESS REPORT/RE-CERT
Outpatient Physical Therapy Ortho Treatment Note  [facility]     Patient Name: Marion Anderson  : 1950  MRN: [id number]  Today's Date: 2018      Visit Date: 2018    Visit Dx:    ICD-10-CM ICD-9-CM   1. Ankle joint stiffness, bilateral M25.671 719.57    M25.672    2. Ankle weakness M62.81 719.67   3. Difficulty walking R26.2 719.7   4. Chronic pain of right ankle M25.571 719.47    G89.29 338.29       Patient Active Problem List   Diagnosis   • Coronary artery disease of native heart with stable angina pectoris (CMS/HCC)   • CAD (coronary artery disease)   • Subdural hematoma (CMS/HCC)   • S/P CABG x 4   • S/P MVR (mitral valve repair)        Past Medical History:   Diagnosis Date   • Arthritis    • Coronary artery disease    • Disease of thyroid gland    • Hypertension    • PONV (postoperative nausea and vomiting)    • Spinal headache         Past Surgical History:   Procedure Laterality Date   • CARDIAC SURGERY     • CORONARY ARTERY BYPASS GRAFT N/A 10/12/2017    Procedure: FILEMON STERNOTOMY CORONARY ARTERY BYPASS GRAFT TIMES 4  USING LEFT INTERNAL MAMMARY ARTERY AND LEFT GREATER SAPHENOUS VEIN GRAFT PER  ENDOSCOPIC VEIN HARVESTING, MITRAL VALVE REPAIR AND PRP;  Surgeon: Ramos Muñoz MD;  Location: [facility];  Service:    • FRACTURE SURGERY      ankle. leg   • TONSILLECTOMY               PT Ortho     Row Name 18 1100       Subjective Pain    Pre-Treatment Pain Level 7  -RA    Subjective Pain Comment 6-7/10  -RA       Right Lower Ext    Rt Ankle Dorsiflexion AROM neutral in sitting  -RA    Rt Ankle Dorsiflexion PROM 3   pain anterior/lateral  -RA    Rt Ankle Plantarflexion AROM 45   ant pain   -RA    Rt Ankle Inversion AROM 16   pain laterally  -RA    Rt Ankle Eversion AROM 15   pain laterally   -RA       Right Ankle/Foot (Manual Muscle Testing)    MMT, Gross Movement: Right Ankle Plantarflexion (3/5) fair   3+/5  -RA    MMT: Dorsiflexion, Right Ankle Muscles dorsiflexion   -RA    MMT, Gross Movement: Right Ankle Dorsiflexion (3/5) fair   pain   -RA    MMT: Inversion, Right Ankle subtalar inversion  -RA    MMT, Gross Movement: Right Ankle Subtalar Inversion (3/5) fair   pain  -RA    MMT: Eversion, Right Ankle subtalar eversion  -RA    MMT, Gross Movement: Right Ankle Subtalar Eversion (3+/5) fair plus   pain  -RA      User Key  (r) = Recorded By, (t) = Taken By, (c) = Cosigned By    Initials Name Provider Type    Elissa Tello, PT Physical Therapist                            PT Assessment/Plan     Row Name 08/07/18 2814          PT Assessment    Functional Limitations Impaired gait;Limitation in home management;Limitations in community activities;Performance in leisure activities;Performance in self-care ADL;Performance in work activities  -RA     Impairments Gait;Pain;Muscle strength;Range of motion;Posture;Joint mobility;Endurance;Edema;Balance  -RA     Assessment Comments Patient has been seen for 7 therapy sessions working on ankle ROM, strength, gait, and functional WB tolerance.  She demonstrates slow antalgic gait with shortened steps, widened NADEEN, and increased lateral trunk lean.  She does report increased ankle pain as well as R knee pain and LBP this date compared to previous sessions.  She has been progressing with therapy ex and was tolerating 20-30 reps with most ex, however, today would only perform 17-20 reps secondary to increased pain.  Her ankle ROM has improved but remains limited/painful.  R ankle MMT is grossly 3/5 to 3+/5 with pain.  She does have tenderness with palpation laterally along R knee joint line and inferior/lateral to patella with some mild swelling.  She indicates her back pain is worse with getting up from sitting.  Her FAAM score has increased from 25/84 to 30/84 indicating some improvement in perceived level of function.  She will continue to benefit from skilled therapy to help reduce pain and improve mobility, strength, function, and gait  for greater ease/tolerance with daily activities.   -RA     Please refer to paper survey for additional self-reported information Yes  -RA     Rehab Potential Good  -RA     Patient/caregiver participated in establishment of treatment plan and goals Yes  -RA     Patient would benefit from skilled therapy intervention Yes  -RA        PT Plan    PT Frequency 2x/week  -RA     Predicted Duration of Therapy Intervention (Therapy Eval) 2-4 additional weeks  -RA     Planned CPT's? PT THER PROC EA 15 MIN: 35156;PT GAIT TRAINING EA 15 MIN: 82714;PT HOT OR COLD PACK TREAT MCARE;PT ELECTRICAL STIM UNATTEND: ;PT THER ACT EA 15 MIN: 60624;PT MANUAL THERAPY EA 15 MIN: 65227;PT AQUATIC THERAPY EA 15 MIN: 80797;PT ELECTRICAL STIM ATTD EA 15 MIN: 65343;PT IONTOPHORESIS EA 15 MIN: 10068;PT ULTRASOUND EA 15 MIN: 46934;PT SELF CARE/HOME MGMT/TRAIN EA 15: 41305;PT NEUROMUSC RE-EDUCATION EA 15 MIN: 15978;PT RE-EVAL: 32564  -RA     PT Plan Comments Continue with skilled therapy for ankle ROM/mobility, LE/ankle strengthening, gait training  -RA       User Key  (r) = Recorded By, (t) = Taken By, (c) = Cosigned By    Initials Name Provider Type    RA Elissa Ortega, PT Physical Therapist                    Exercises     Row Name 08/07/18 1100             Subjective Comments    Subjective Comments Patient noting increased R knee pain and back pain especially with getting up after sitting in addition to ankle pain limiting her with daily function.  -RA         Subjective Pain    Able to rate subjective pain? yes  -RA      Pre-Treatment Pain Level 7  -RA      Post-Treatment Pain Level 7  -RA      Subjective Pain Comment 6-7/10  -RA         Total Minutes    61269 - PT Therapeutic Exercise Minutes 42   includes reassessment  -RA         Exercise 1    Exercise Name 1 R ankle AROM x 20; YTB ankle INV, EV, DF x 20 ea (EV 18 only), standing heel raises x 17; side steps 15/2 B; heel to toe walk 15/2 B; [place] calf press 65# x 20, leg press 80# x  "20; step ups - 4 inch x 10, step downs from 4\" step x 3 (stopped 2/2 knee pain), Nustep L3 x 10 min  -RA        User Key  (r) = Recorded By, (t) = Taken By, (c) = Cosigned By    Initials Name Provider Type    MANASA JordanElissa medina, PT Physical Therapist                               PT OP Goals     Row Name 08/07/18 1200          PT Short Term Goals    STG 1 Patient will be independent and compliant with initial HEP for ankle mobility without increased symptoms  -RA     STG 1 Progress Ongoing  -RA     STG 1 Progress Comments reports doing ex at home as tolerated  -RA     STG 2 Patient will increase standing tolerance >/=15 min to reduce pain with daily ADLs  -RA     STG 2 Progress Ongoing  -RA     STG 2 Progress Comments remains limited with extended standing (but voices back, knee, and ankle pain limiting)  -RA     STG 3 Patient will have ankle strength >/= 3+/5 in available ROM for improved stability with functional WB activity  -RA     STG 3 Progress Ongoing  -RA     STG 3 Progress Comments ankle strength grossly 3/5 with PF/DF/INV (painful), EV grossly 3+/5  -RA     STG 4 Patient will have >/= 5 degree improvement in Right ankle DF ROM for increased ease with functional activities  -RA     STG 4 Progress Met  -RA     STG 4 Progress Comments R ankle DF ROM to neutral actively with pain reported  -RA        Long Term Goals    LTG 1 Patient will be independent with established HEP for strength/stabilization to facilitate self management of condition  -RA     LTG 1 Progress Ongoing  -RA     LTG 2 Patient will tolerate walking >/= 10-15 minutes with no AD without pain > 3-4/10 for increased cardiovascular training  -RA     LTG 2 Progress Ongoing  -RA     LTG 3 Patient will have ankle strength >/= 4-/5 in available ROM for improved stability with functional WB activity  -RA     LTG 3 Progress Ongoing  -RA     LTG 4 Patient will report reduced functional limitations on FAAM with score >/= 35/84 (from 25/84 at eval)  -RA  "    LTG 4 Progress Progressing  -RA     LTG 4 Progress Comments FAAM score = 30/84  -RA       User Key  (r) = Recorded By, (t) = Taken By, (c) = Cosigned By    Initials Name Provider Type    RA Elissa Ortega, PT Physical Therapist          Therapy Education  Given: HEP, Symptoms/condition management, Pain management, Mobility training, Edema management  Program: Reinforced  How Provided: Verbal, Demonstration  Provided to: Patient  Level of Understanding: Teach back education performed, Verbalized    Outcome Measure Options: FAAM  FAAM  FAAM: score = 30/84      Time Calculation:   Start Time: 1126  Stop Time: 1209  Time Calculation (min): 43 min  Therapy Suggested Charges     Code   Minutes Charges    90274 (CPT®) Hc Pt Neuromusc Re Education Ea 15 Min      86471 (CPT®) Hc Pt Ther Proc Ea 15 Min 42 3    39412 (CPT®) Hc Gait Training Ea 15 Min      72580 (CPT®) Hc Pt Therapeutic Act Ea 15 Min      43417 (CPT®) Hc Pt Manual Therapy Ea 15 Min      11421 (CPT®) Hc Pt Ther Massage- Per 15 Min      42533 (CPT®) Hc Pt Iontophoresis Ea 15 Min      96427 (CPT®) Hc Pt Elec Stim Ea-Per 15 Min      03871 (CPT®) Hc Pt Ultrasound Ea 15 Min      06413 (CPT®) Hc Pt Self Care/Mgmt/Train Ea 15 Min      Total  42 3        Therapy Charges for Today     Code Description Service Date Service Provider Modifiers Qty    06508939443 HC PT THER PROC EA 15 MIN 8/7/2018 Elissa Ortega, PT GP 3          PT G-Codes  Outcome Measure Options: KHURRAM Ortega, PT  8/7/2018      PAST SURGICAL HISTORY:  H/O hernia repair     History of appendectomy

## 2024-12-05 RX ORDER — ROSUVASTATIN CALCIUM 40 MG/1
40 TABLET, COATED ORAL NIGHTLY
Qty: 90 TABLET | Refills: 0 | Status: SHIPPED | OUTPATIENT
Start: 2024-12-05

## 2024-12-05 NOTE — TELEPHONE ENCOUNTER
Caller: BakaripennyKatieMarion M    Relationship: Self    Best call back number: [phone]     Requested Prescriptions:   Requested Prescriptions     Pending Prescriptions Disp Refills    rosuvastatin (CRESTOR) 40 MG tablet 90 tablet      Sig: Take 1 tablet by mouth Every Night.        Pharmacy where request should be sent: [facility] [address] [address] [facility] [phone] [facility] [phone] FX     Last office visit with prescribing clinician: 8/12/2024   Last telemedicine visit with prescribing clinician: Visit date not found   Next office visit with prescribing clinician: 2/12/2025     Additional details provided by patient: PATIENT STATED SHE CURRENTLY HAS ABOUT 7 TABLETS REMAINING OF THIS MEDICATION.    PATIENT STATED THIS MEDICATION WAS PRESCRIBED BY HER PREVIOUS PRIMARY CARE PROVIDER, AND SHE WILL NEED A NEW PRESCRIPTION FROM [facility].    Does the patient have less than a 3 day supply:  [] Yes  [x] No    Would you like a call back once the refill request has been completed: [x] Yes [] No    If the office needs to give you a call back, can they leave a voicemail: [x] Yes [] No    Chacha Mendoza Rep   12/05/24 11:29 EST      Subjective   Patient ID: Anitha Andrew is a 22 y.o. female who presents for 1 month follow up with pap     HPI  Anx/dep - continues to do well with Zoloft and trazodone, has not yet reestablished with therapist but is looking, overall doing well    Contraception - using Nuvaring continuously, denies side effects aside from minimal breast discomfort, overall tolerating and liking    Cervical Cancer Screening: updated today  Breast Cancer Screening: cbe today, plan to start mammo at age 41  Colon Cancer Screening: no fhx, asymptomatic, plan to start at age 46  Tobacco: denies  Alcohol: denies  Recreational Drugs: denies  Immunizations: utd    Review of Systems   Constitutional:  Negative for chills and fever.   Respiratory:  Negative for cough and shortness of breath.    Cardiovascular:  Negative for chest pain and palpitations.   Skin:  Negative for rash.   Psychiatric/Behavioral:  Negative for dysphoric mood. The patient is not nervous/anxious.      Objective   /70   Pulse 84   Ht 1.524 m (5')   Wt 55.9 kg (123 lb 3.2 oz)   BMI 24.06 kg/m²     Physical Exam  Exam conducted with a chaperone present.   Constitutional:       Appearance: Normal appearance.   HENT:      Head: Normocephalic.   Eyes:      General: No scleral icterus.     Conjunctiva/sclera: Conjunctivae normal.   Pulmonary:      Effort: Pulmonary effort is normal. No respiratory distress.   Chest:   Breasts:     Right: Normal. No nipple discharge, skin change or tenderness.      Left: Normal. No nipple discharge, skin change or tenderness.   Genitourinary:     Labia:         Right: No lesion.         Left: No lesion.       Urethra: No prolapse or urethral lesion.      Vagina: Normal. No vaginal discharge, tenderness or lesions.      Cervix: Normal. No cervical motion tenderness, discharge, friability, lesion, erythema or cervical bleeding.      Uterus: Normal. Not enlarged and not tender.       Adnexa:         Right: No mass or tenderness.           Left: No mass or tenderness.     Musculoskeletal:         General: Normal range of motion.   Lymphadenopathy:      Upper Body:      Right upper body: No axillary adenopathy.      Left upper body: No axillary adenopathy.   Skin:     Findings: No rash.   Neurological:      Mental Status: She is alert.   Psychiatric:         Mood and Affect: Mood normal.         Behavior: Behavior normal.       Assessment/Plan   Problem List Items Addressed This Visit             ICD-10-CM    Current moderate episode of major depressive disorder without prior episode (Multi) - Primary F32.1     Stable and doing well at this time. Continue Zoloft and trazodone. Return precautions reviewed.          Contraception management Z30.9     Continue Nuvaring.         Relevant Orders    Follow Up In Primary Care - Established    Anxiety F41.9     Stable and doing well at this time. Continue Zoloft and trazodone. Return precautions reviewed.           Other Visit Diagnoses         Codes    Cervical cancer screening     Z12.4    Relevant Orders    THINPREP PAP TEST    Follow Up In Primary Care - Established        Follow up in 6mo for recheck, sooner if needed.

## 2025-01-02 ENCOUNTER — TREATMENT (OUTPATIENT)
Dept: PHYSICAL THERAPY | Facility: CLINIC | Age: 75
End: 2025-01-02
Payer: COMMERCIAL

## 2025-01-02 DIAGNOSIS — M25.561 PAIN OF RIGHT KNEE AFTER INJURY: Primary | ICD-10-CM

## 2025-01-02 DIAGNOSIS — R26.89 ANTALGIC GAIT: ICD-10-CM

## 2025-01-02 DIAGNOSIS — R29.898 COMPLAINTS OF LEG WEAKNESS: ICD-10-CM

## 2025-01-02 PROCEDURE — 97110 THERAPEUTIC EXERCISES: CPT | Performed by: PHYSICAL THERAPIST
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
## 2025-01-20 NOTE — PROGRESS NOTES
Date of Office Visit: 2025  Encounter Provider: Domonique Fernando MD  Place of Service: [facility] CARDIOLOGY  Patient Name: Marion Anderson  :1950    Chief complaint  Coronary artery disease, hypertension, mitral Gertsch Tatian    History of Present Illness  Patient is a 73-year-old female with history of hypertension, hyperlipidemia, coronary artery disease.  In  she was found to have LAD and right coronary artery stenosis for which she underwent stenting.  Patient was seen in  for progressive chest pain.  Cardiac catheterization showed multivessel coronary artery disease including left main disease.  She developed ventricular fibrillation in the Cath Lab and was successfully cardioverted.  On 2017 she had a LIMA graft to the LAD, vein graft to the right coronary artery, vein graft to ramus intermedius branch, vein graft to diagonal branch of the LAD.  She also had mitral valve repair with a 24 mm ring.  Postoperative course was complicated by encephalopathy CT imaging showed prior infarct.  She was treated with Keppra for a short period of time.  She had carotid Doppler imaging notes severe bilateral carotid artery disease/carotid occlusion which is felt to be difficult to revascularize and for which she was seen by Dr. Prado.     On 2023 with complaints of dyspnea and part of chemotherapy evaluation when she was diagnosed with left-sided breast cancer.  She had an echocardiogram that showed an ejection fraction 71%, GLS -17.8% mild ventricular hypertrophy with normal wall motion and grade 2 diastolic dysfunction.  This is present, there is mild mitral regurgitation with mitral valve ring in place and the mean gradient of 4 mmHg at a heart rate of 56 bpm, small tricuspid regurgitation normal right-sided pressures.Stress perfusion study was normal for ischemia.  Abnormal septal motion consistent with prior pericardiectomy.  Patient underwent lumpectomy on  6/16/2023 and subsequently was treated with tamoxifen.      Since last visit patient's had no chest pain shortness of breath palpitations syncope near syncope..  Patient remains active      Past Medical History:   Diagnosis Date    Arthritis     Bilateral carotid artery stenosis 2/22/2020    Coronary artery disease     Disease of thyroid gland     Hyperlipidemia     Hypertension     PONV (postoperative nausea and vomiting)     Spinal headache      Past Surgical History:   Procedure Laterality Date    CARDIAC CATHETERIZATION Left 10/06/2017    [facility], Dr. THAD Grant, coronary arteriography    CARDIAC SURGERY      CORONARY ARTERY BYPASS GRAFT N/A 10/12/2017    Procedure: FILEMON STERNOTOMY CORONARY ARTERY BYPASS GRAFT TIMES 4  USING LEFT INTERNAL MAMMARY ARTERY AND LEFT GREATER SAPHENOUS VEIN GRAFT PER  ENDOSCOPIC VEIN HARVESTING, MITRAL VALVE REPAIR AND PRP;  Surgeon: Ramos Muñoz MD;  Location: [facility];  Service:     FRACTURE SURGERY      Ankle/Leg    TONSILLECTOMY       Outpatient Medications Prior to Visit   Medication Sig Dispense Refill    alendronate (FOSAMAX) 70 MG tablet Take 1 tablet by mouth 1 (One) Time Per Week.      aspirin 81 MG chewable tablet Chew 1 tablet Daily.      Cholecalciferol (VITAMIN D3) 25 MCG (1000 UT) capsule Take 1 capsule by mouth Every Other Day.      ezetimibe (ZETIA) 10 MG tablet Take 1 tablet by mouth Daily.      hydrOXYzine (ATARAX) 25 MG tablet Take 1 tablet by mouth At Night As Needed for Itching.      Ibuprofen 3 %, Gabapentin 10 %, Baclofen 2 %, lidocaine 4 % Apply 1-2 g topically to the appropriate area as directed 3 (Three) to 4 (Four) times daily. 90 g 2    levothyroxine (SYNTHROID, LEVOTHROID) 125 MCG tablet Take 1 tablet by mouth Daily.      losartan (COZAAR) 100 MG tablet Take 1 tablet by mouth Daily. for blood pressure 90 tablet 3    metoprolol succinate XL (TOPROL-XL) 50 MG 24 hr tablet Take 1 tablet by mouth twice daily 180 tablet  2    Multiple Vitamin (MULTI-DAY PO) Take 1 tablet by mouth Daily.      nitroglycerin (NITROSTAT) 0.3 MG SL tablet Place 1 tablet under the tongue Every 5 (Five) Minutes As Needed.      Omega-3 Fatty Acids (FISH OIL) 1000 MG capsule capsule Take  by mouth Daily With Breakfast.      rosuvastatin (CRESTOR) 40 MG tablet Take 1 tablet by mouth Every Night. 90 tablet 0    tamoxifen (NOLVADEX) 10 MG tablet Take 0.5 tablets by mouth 2 (Two) Times a Day.      traMADol (ULTRAM) 50 MG tablet Take 1 tablet by mouth As Needed.      traZODone (DESYREL) 50 MG tablet Daily.      amoxicillin (AMOXIL) 500 MG capsule Take 4 capsules by mouth See Admin Instructions. 4 capsules 1 hour prior to procedure (Patient not taking: Reported on 1/21/2025) 12 capsule 0     No facility-administered medications prior to visit.       Allergies as of 01/21/2025 - Reviewed 01/21/2025   Allergen Reaction Noted    Hydroxyzine hcl Nausea Only 08/12/2024    Latex Itching and Other (See Comments) 12/03/2015     Social History     Socioeconomic History    Marital status: Single   Tobacco Use    Smoking status: Never    Smokeless tobacco: Never   Vaping Use    Vaping status: Never Used   Substance and Sexual Activity    Alcohol use: Not Currently    Drug use: Yes    Sexual activity: Defer     Birth control/protection: Post-menopausal     Family History   Problem Relation Age of Onset    Heart disease Mother     Hypertension Mother     Stroke Mother     Heart disease Father     Hypertension Father     Heart attack Father     Cancer Brother     Hypertension Brother     No Known Problems Daughter     No Known Problems Daughter     Colon cancer Neg Hx      Review of Systems   Constitutional: Negative for chills, fever, weight gain and weight loss.   Cardiovascular:  Negative for leg swelling.   Respiratory:  Negative for cough, snoring and wheezing.    Hematologic/Lymphatic: Negative for bleeding problem. Does not bruise/bleed easily.   Skin:  Negative for  "color change.   Musculoskeletal:  Positive for joint pain and myalgias. Negative for falls.   Gastrointestinal:  Negative for melena.   Genitourinary:  Negative for hematuria.   Neurological:  Negative for excessive daytime sleepiness.   Psychiatric/Behavioral:  Negative for depression. The patient is not nervous/anxious.         Objective:     Vitals:    01/21/25 0700   BP: 140/82   BP Location: Right arm   Patient Position: Sitting   Cuff Size: Adult   Pulse: 58   SpO2: 98%   Weight: 102 kg (225 lb 12.8 oz)   Height: 165.1 cm (65\")     Body mass index is 37.58 kg/m².    Vitals reviewed.   Constitutional:       Appearance: Well-developed. Obese.   Eyes:      General: No scleral icterus.        Right eye: No discharge.      Conjunctiva/sclera: Conjunctivae normal.      Pupils: Pupils are equal, round, and reactive to light.   HENT:      Head: Normocephalic.      Nose: Nose normal.   Neck:      Thyroid: No thyromegaly.      Vascular: No JVD.   Pulmonary:      Effort: Pulmonary effort is normal. No respiratory distress.      Breath sounds: Normal breath sounds. No wheezing. No rales.   Cardiovascular:      Normal rate. Regular rhythm. Normal S1. Normal S2.       Murmurs: There is no murmur.      No gallop.    Pulses:     Intact distal pulses.      Carotid: 2+ bilaterally.     Radial: 2+ bilaterally.     Femoral: 2+ bilaterally.     Popliteal: 2+ bilaterally.     Dorsalis pedis: 2+ bilaterally.     Posterior tibial: 2+ bilaterally.  Edema:     Peripheral edema absent.   Abdominal:      General: Bowel sounds are normal. There is no distension.      Palpations: Abdomen is soft.      Tenderness: There is no abdominal tenderness. There is no rebound.   Musculoskeletal: Normal range of motion.         General: No tenderness.      Cervical back: Normal range of motion and neck supple. Skin:     General: Skin is warm and dry.      Findings: No erythema or rash.   Neurological:      Mental Status: Alert and oriented to person, " place, and time.   Psychiatric:         Behavior: Behavior normal.         Thought Content: Thought content normal.         Judgment: Judgment normal.       Lab Review:   Lab Results - Last 18 Months   Lab Units 09/10/24  0839 09/07/23  0916   WBC 10*3/uL 6.89 9.61   RBC 10*6/uL 4.55 4.62   HEMOGLOBIN g/dL 13.9 13.5   HEMATOCRIT % 41.8 42.6   MCV fL 91.9 92.2   MCH pg 30.5 29.2   MCHC g/dL 33.3 31.7   RDW % 12.5 12.6   PLATELETS 10*3/uL 212 247   NEUTROPHIL % % 64.6 70.9   LYMPHOCYTE % % 19.3 15.9   MONOCYTES % % 10.4 9.1   EOSINOPHIL % % 4.8 3.4   BASOPHIL % % 0.6 0.4   NEUTROS ABS 10*3/uL 4.45 6.81   LYMPHS ABS 10*3/uL 1.33 1.53   MONOS ABS 10*3/uL 0.72 0.87   EOS ABS 10*3/uL 0.33 0.33   BASOS ABS 10*3/uL 0.04 0.04   MPV fL 10.2 10.2     Blood work dated 11/2024 at [facility] showed an LDL of 64 HDL 43 [id number] CMP unremarkable except for glucose of 106, creatinine 1.5 with GFR 37 hemoglobin A1c in September 6 0.3      ECG 12 Lead    Date/Time: 1/21/2025 7:29 AM  Performed by: Domonique Fernando MD    Authorized by: Domonique Fernando MD  Comparison: compared with previous ECG   Rhythm: sinus rhythm  Other findings: low voltage and early transition    Clinical impression: abnormal EKG        Assessment:       Diagnosis Plan   1. Coronary artery disease of bypass graft of native heart with stable angina pectoris  ECG 12 Lead      2. S/P MVR (mitral valve repair)  ECG 12 Lead      3. Ductal carcinoma in situ (DCIS) of left breast        4. Prediabetes        5. Hyperlipidemia LDL goal <70        6. Essential hypertension          Plan:       1.  Left-sided breast cancer.  For lumpectomy 6/2023 and therapy and not radiation therapy.  2.  Chest tightness. Stress test 2023 that was normal.  She really thinks this is stress related.  Discomfort has resolved.  3.  Coronary artery disease with prior stenting and subsequent coronary artery bypass grafting 2017. As above with negative stress test 6/2023 with abnormal  septal motion with prior pericardiectomy.  4.  Status post mitral valve repair.  Continue with SBE prophylaxis.  Clinically doing well.  5.  Hypertension.  Readings at home are typically lower.  6.  Hyperlipidemia.   7.  Renal insufficiency.   8.  Hypothyroidism.  Recommendations per Dr. Kc  9.  Carotid artery disease, severe with bilateral occlusion.  Followed by Dr. Prado  9.  Prediabetes      Time Spent: I spent 25 minutes caring for Marion on this date of service. This time includes time spent by me in the following activities: preparing for the visit, reviewing tests, obtaining and/or reviewing a separately obtained history, performing a medically appropriate examination and/or evaluation, counseling and educating the patient/family/caregiver, documenting information in the medical record, and independently interpreting results and communicating that information with the patient/family/caregiver.   I spent 1 minutes on the separately reported service of ECG. This time is not included in the time used to support the E/M service also reported today.        Your medication list            Accurate as of January 21, 2025 11:59 PM. If you have any questions, ask your nurse or doctor.                CONTINUE taking these medications        Instructions Last Dose Given Next Dose Due   alendronate 70 MG tablet  Commonly known as: FOSAMAX      Take 1 tablet by mouth 1 (One) Time Per Week.       amoxicillin 500 MG capsule  Commonly known as: AMOXIL      Take 4 capsules by mouth See Admin Instructions. 4 capsules 1 hour prior to procedure       aspirin 81 MG chewable tablet      Chew 1 tablet Daily.       ezetimibe 10 MG tablet  Commonly known as: ZETIA      Take 1 tablet by mouth Daily.       fish oil 1000 MG capsule capsule      Take  by mouth Daily With Breakfast.       hydrOXYzine 25 MG tablet  Commonly known as: ATARAX      Take 1 tablet by mouth At Night As Needed for Itching.       Ibuprofen 3 %,  Gabapentin 10 %, Baclofen 2 %, lidocaine 4 %      Apply 1-2 g topically to the appropriate area as directed 3 (Three) to 4 (Four) times daily.       levothyroxine 125 MCG tablet  Commonly known as: SYNTHROID, LEVOTHROID      Take 1 tablet by mouth Daily.       losartan 100 MG tablet  Commonly known as: COZAAR      Take 1 tablet by mouth Daily. for blood pressure       metoprolol succinate XL 50 MG 24 hr tablet  Commonly known as: TOPROL-XL      Take 1 tablet by mouth twice daily       multivitamin tablet tablet  Commonly known as: THERAGRAN      Take 1 tablet by mouth Daily.       nitroglycerin 0.3 MG SL tablet  Commonly known as: NITROSTAT      Place 1 tablet under the tongue Every 5 (Five) Minutes As Needed.       rosuvastatin 40 MG tablet  Commonly known as: CRESTOR      Take 1 tablet by mouth Every Night.       tamoxifen 10 MG tablet  Commonly known as: NOLVADEX      Take 0.5 tablets by mouth 2 (Two) Times a Day.       traMADol 50 MG tablet  Commonly known as: ULTRAM      Take 1 tablet by mouth As Needed.       traZODone 50 MG tablet  Commonly known as: DESYREL      Daily.       Vitamin D3 25 MCG (1000 UT) capsule      Take 1 capsule by mouth Every Other Day.                Patient is no longer taking -.  I corrected the med list to reflect this.  I did not stop these medications.      Dictated utilizing Dragon dictation

## 2025-01-21 ENCOUNTER — OFFICE VISIT (OUTPATIENT)
Dept: CARDIOLOGY | Facility: CLINIC | Age: 75
End: 2025-01-21
Payer: MEDICARE

## 2025-01-21 VITALS
SYSTOLIC BLOOD PRESSURE: 140 MMHG | HEART RATE: 58 BPM | DIASTOLIC BLOOD PRESSURE: 82 MMHG | WEIGHT: 225.8 LBS | OXYGEN SATURATION: 98 % | HEIGHT: 65 IN | BODY MASS INDEX: 37.62 KG/M2

## 2025-01-21 DIAGNOSIS — E78.5 HYPERLIPIDEMIA LDL GOAL <70: ICD-10-CM

## 2025-01-21 DIAGNOSIS — I25.708 CORONARY ARTERY DISEASE OF BYPASS GRAFT OF NATIVE HEART WITH STABLE ANGINA PECTORIS: Primary | ICD-10-CM

## 2025-01-21 DIAGNOSIS — D05.12 DUCTAL CARCINOMA IN SITU (DCIS) OF LEFT BREAST: ICD-10-CM

## 2025-01-21 DIAGNOSIS — I10 ESSENTIAL HYPERTENSION: ICD-10-CM

## 2025-01-21 DIAGNOSIS — R73.03 PREDIABETES: ICD-10-CM

## 2025-01-21 DIAGNOSIS — Z98.890 S/P MVR (MITRAL VALVE REPAIR): ICD-10-CM

## 2025-01-21 PROCEDURE — 93000 ELECTROCARDIOGRAM COMPLETE: CPT | Performed by: INTERNAL MEDICINE

## 2025-01-21 PROCEDURE — 3077F SYST BP >= 140 MM HG: CPT | Performed by: INTERNAL MEDICINE

## 2025-01-21 PROCEDURE — 99213 OFFICE O/P EST LOW 20 MIN: CPT | Performed by: INTERNAL MEDICINE

## 2025-01-21 PROCEDURE — 1159F MED LIST DOCD IN RCRD: CPT | Performed by: INTERNAL MEDICINE

## 2025-01-21 PROCEDURE — 1160F RVW MEDS BY RX/DR IN RCRD: CPT | Performed by: INTERNAL MEDICINE

## 2025-01-21 PROCEDURE — 3079F DIAST BP 80-89 MM HG: CPT | Performed by: INTERNAL MEDICINE

## 2025-01-23 ENCOUNTER — TREATMENT (OUTPATIENT)
Dept: PHYSICAL THERAPY | Facility: CLINIC | Age: 75
End: 2025-01-23
Payer: MEDICARE

## 2025-01-23 DIAGNOSIS — M25.561 PAIN OF RIGHT KNEE AFTER INJURY: Primary | ICD-10-CM

## 2025-01-23 DIAGNOSIS — R29.898 COMPLAINTS OF LEG WEAKNESS: ICD-10-CM

## 2025-01-23 DIAGNOSIS — R26.89 ANTALGIC GAIT: ICD-10-CM

## 2025-01-23 PROCEDURE — 97110 THERAPEUTIC EXERCISES: CPT | Performed by: PHYSICAL THERAPIST

## 2025-01-23 NOTE — PROGRESS NOTES
Physical Therapy Daily Treatment Note    [facility]  [address] [place] [address]  [phone] (phone)  [phone] (fax)    Patient: Marion Anderson   : 1950  Diagnosis/ICD-10 Code:  Pain of right knee after injury [M25.561]  Referring practitioner: Liv Galvan MD  Today's Date: 2025  Patient seen for 30 sessions    Visit Diagnoses:    ICD-10-CM ICD-9-CM   1. Pain of right knee after injury  M25.561 719.46     959.7   2. Complaints of leg weakness  R29.898 729.89   3. Antalgic gait  R26.89 781.2              Subjective Knee pain increased going from bent to straight like when standing up from chair or car  She has been using bike at home 5 min a day after recommendation from cardiologist.  It is helping her sleep better.     Objective   See Exercise, Manual, and Modality Logs for complete treatment.   Recommend that she increase her bike by 1 minute at a time.  Try to get to gym.    Assessment/Plan    Marion reports a different strain on her knee with positional changes.  She still has similar muscle fatigue with exercises but able to perform more repetitions of SLR to start.  She is hesitant to perform weight machines today.         Timed:    Manual Therapy:    0     mins  59482;  Therapeutic Exercise:    43     mins  31531;     Neuromuscular Rina:    0    mins  93206;    Therapeutic Activity:     0     mins  26716;     Gait Trainin     mins  05139;     Ultrasound:     0     mins  65267;    Aquatic Therapy           mins     14310;  Self Care                               mins   32369        Untimed:  Electrical Stimulation:           mins  85540 ( );  Traction:           mins  79300;   Dry Needling  (1-2 muscles)                  mins  (Self-pay)  Dry Needling (3-4 muscles)  ____   (Self-pay)  Dry Needling Trial             DRYNDLTRIAL  (No Charge)    Timed Treatment:   43   mins   Total Treatment:     43    mins    Jennifer Moreno, PT  Physical Therapist    [place] License:[id number]

## 2025-01-31 ENCOUNTER — TELEPHONE (OUTPATIENT)
Dept: INTERNAL MEDICINE | Facility: CLINIC | Age: 75
End: 2025-01-31
Payer: COMMERCIAL
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
## 2025-02-07 ENCOUNTER — TREATMENT (OUTPATIENT)
Dept: PHYSICAL THERAPY | Facility: CLINIC | Age: 75
End: 2025-02-07
Payer: MEDICARE

## 2025-02-07 DIAGNOSIS — R26.89 ANTALGIC GAIT: ICD-10-CM

## 2025-02-07 DIAGNOSIS — M25.561 PAIN OF RIGHT KNEE AFTER INJURY: Primary | ICD-10-CM

## 2025-02-07 DIAGNOSIS — R29.898 COMPLAINTS OF LEG WEAKNESS: ICD-10-CM

## 2025-02-07 PROCEDURE — 97110 THERAPEUTIC EXERCISES: CPT | Performed by: PHYSICAL THERAPIST
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
## 2025-02-12 ENCOUNTER — OFFICE VISIT (OUTPATIENT)
Dept: INTERNAL MEDICINE | Facility: CLINIC | Age: 75
End: 2025-02-12
Payer: MEDICARE

## 2025-02-12 VITALS
DIASTOLIC BLOOD PRESSURE: 86 MMHG | BODY MASS INDEX: 37.5 KG/M2 | HEART RATE: 67 BPM | SYSTOLIC BLOOD PRESSURE: 122 MMHG | OXYGEN SATURATION: 99 % | TEMPERATURE: 97.8 F | HEIGHT: 65 IN | WEIGHT: 225.1 LBS

## 2025-02-12 DIAGNOSIS — N28.9 RENAL INSUFFICIENCY: ICD-10-CM

## 2025-02-12 DIAGNOSIS — Z00.00 MEDICARE ANNUAL WELLNESS VISIT, SUBSEQUENT: Primary | ICD-10-CM

## 2025-02-12 DIAGNOSIS — D05.12 DUCTAL CARCINOMA IN SITU (DCIS) OF LEFT BREAST: ICD-10-CM

## 2025-02-12 DIAGNOSIS — E78.5 HYPERLIPIDEMIA LDL GOAL <70: ICD-10-CM

## 2025-02-12 DIAGNOSIS — M85.852 OSTEOPENIA OF FEMORAL NECK, BILATERAL: ICD-10-CM

## 2025-02-12 DIAGNOSIS — R73.03 PREDIABETES: ICD-10-CM

## 2025-02-12 DIAGNOSIS — I25.10 CORONARY ARTERY DISEASE INVOLVING NATIVE CORONARY ARTERY OF NATIVE HEART WITHOUT ANGINA PECTORIS: ICD-10-CM

## 2025-02-12 DIAGNOSIS — E06.3 HYPOTHYROIDISM DUE TO HASHIMOTO'S THYROIDITIS: ICD-10-CM

## 2025-02-12 DIAGNOSIS — M85.851 OSTEOPENIA OF FEMORAL NECK, BILATERAL: ICD-10-CM

## 2025-02-12 DIAGNOSIS — Z12.11 SCREEN FOR COLON CANCER: ICD-10-CM

## 2025-02-12 DIAGNOSIS — E55.9 VITAMIN D INSUFFICIENCY: ICD-10-CM

## 2025-02-12 DIAGNOSIS — I65.23 BILATERAL CAROTID ARTERY STENOSIS: ICD-10-CM

## 2025-02-12 DIAGNOSIS — Z00.00 HEALTHCARE MAINTENANCE: ICD-10-CM

## 2025-02-12 DIAGNOSIS — I10 ESSENTIAL HYPERTENSION: ICD-10-CM

## 2025-02-12 PROCEDURE — 1159F MED LIST DOCD IN RCRD: CPT | Performed by: NURSE PRACTITIONER

## 2025-02-12 PROCEDURE — 1160F RVW MEDS BY RX/DR IN RCRD: CPT | Performed by: NURSE PRACTITIONER

## 2025-02-12 PROCEDURE — 3074F SYST BP LT 130 MM HG: CPT | Performed by: NURSE PRACTITIONER

## 2025-02-12 PROCEDURE — 1170F FXNL STATUS ASSESSED: CPT | Performed by: NURSE PRACTITIONER

## 2025-02-12 PROCEDURE — G0439 PPPS, SUBSEQ VISIT: HCPCS | Performed by: NURSE PRACTITIONER

## 2025-02-12 PROCEDURE — 3079F DIAST BP 80-89 MM HG: CPT | Performed by: NURSE PRACTITIONER

## 2025-02-12 PROCEDURE — 99397 PER PM REEVAL EST PAT 65+ YR: CPT | Performed by: NURSE PRACTITIONER

## 2025-02-12 PROCEDURE — 1125F AMNT PAIN NOTED PAIN PRSNT: CPT | Performed by: NURSE PRACTITIONER

## 2025-02-12 RX ORDER — EVOLOCUMAB 140 MG/ML
INJECTION, SOLUTION SUBCUTANEOUS
COMMUNITY

## 2025-02-12 NOTE — PATIENT INSTRUCTIONS
Medicare Wellness  Personal Prevention Plan of Service     Date of Office Visit:    Encounter Provider:  PANKAJ Loyola  Place of Service:  [facility] INTER[facility]  Patient Name: Marion Anderson  :  1950    As part of the Medicare Wellness portion of your visit today, we are providing you with this personalized preventive plan of services (PPPS). This plan is based upon recommendations of the [place] Preventive Services Task Force (USPSTF) and the Advisory Committee on Immunization Practices (ACIP).    This lists the preventive care services that should be considered, and provides dates of when you are due. Items listed as completed are up-to-date and do not require any further intervention.    Health Maintenance   Topic Date Due    COLORECTAL CANCER SCREENING  Never done    Pneumococcal Vaccine 50+ (1 of 2 - PCV) Never done    TDAP/TD VACCINES (1 - Tdap) Never done    ZOSTER VACCINE (1 of 2) Never done    DXA SCAN  2024    INFLUENZA VACCINE  2024    COVID-19 Vaccine ( season) 2024    LIPID PANEL  2025    ANNUAL WELLNESS VISIT  2026    BMI FOLLOWUP  2026    MAMMOGRAM  2026    HEPATITIS C SCREENING  Completed       Orders Placed This Encounter   Procedures    Cologuard - Stool, Per Rectum     Standing Status:   Future     Standing Expiration Date:   2026     Order Specific Question:   Release to patient     Answer:   Routine Release [[id number]]       Return in about 1 year (around 2026) for Medicare Wellness.        Fall Prevention in the Home, Adult  Falls can cause injuries and affect people of all ages. There are many simple things that you can do to make your home safe and to help prevent falls.  If you need it, ask for help making these changes.  What actions can I take to prevent falls?  General information  Use good lighting in all rooms. Make sure to:  Replace any light bulbs that burn out.  Turn  on lights if it is dark and use night-lights.  Keep items that you use often in easy-to-reach places. Lower the shelves around your home if needed.  Move furniture so that there are clear paths around it.  Do not keep throw rugs or other things on the floor that can make you trip.  If any of your floors are uneven, fix them.  Add color or contrast paint or tape to clearly reema and help you see:  Grab bars or handrails.  First and last steps of staircases.  Where the edge of each step is.  If you use a ladder or stepladder:  Make sure that it is fully opened. Do not climb a closed ladder.  Make sure the sides of the ladder are locked in place.  Have someone hold the ladder while you use it.  Know where your pets are as you move through your home.  What can I do in the bathroom?         Keep the floor dry. Clean up any water that is on the floor right away.  Remove soap buildup in the bathtub or shower. Buildup makes bathtubs and showers slippery.  Use non-skid mats or decals on the floor of the bathtub or shower.  Attach bath mats securely with double-sided, non-slip rug tape.  If you need to sit down while you are in the shower, use a non-slip stool.  Install grab bars by the toilet and in the bathtub and shower. Do not use towel bars as grab bars.  What can I do in the bedroom?  Make sure that you have a light by your bed that is easy to reach.  Do not use any sheets or blankets on your bed that hang to the floor.  Have a firm bench or chair with side arms that you can use for support when you get dressed.  What can I do in the kitchen?  Clean up any spills right away.  If you need to reach something above you, use a sturdy step stool that has a grab bar.  Keep electrical cables out of the way.  Do not use floor polish or wax that makes floors slippery.  What can I do with my stairs?  Do not leave anything on the stairs.  Make sure that you have a light switch at the top and the bottom of the stairs. Have them  installed if you do not have them.  Make sure that there are handrails on both sides of the stairs. Fix handrails that are broken or loose. Make sure that handrails are as long as the staircases.  Install non-slip stair treads on all stairs in your home if they do not have carpet.  Avoid having throw rugs at the top or bottom of stairs, or secure the rugs with carpet tape to prevent them from moving.  Choose a carpet design that does not hide the edge of steps on the stairs. Make sure that carpet is firmly attached to the stairs. Fix any carpet that is loose or worn.  What can I do on the outside of my home?  Use bright outdoor lighting.  Repair the edges of walkways and driveways and fix any cracks. Clear paths of anything that can make you trip, such as tools or rocks.  Add color or contrast paint or tape to clearly reema and help you see high doorway thresholds.  Trim any bushes or trees on the main path into your home.  Check that handrails are securely fastened and in good repair. Both sides of all steps should have handrails.  Install guardrails along the edges of any raised decks or porches.  Have leaves, snow, and ice cleared regularly. Use sand, salt, or ice melt on walkways during winter months if you live where there is ice and snow.  In the garage, clean up any spills right away, including grease or oil spills.  What other actions can I take?  Review your medicines with your health care provider. Some medicines can make you confused or feel dizzy. This can increase your chance of falling.  Wear closed-toe shoes that fit well and support your feet. Wear shoes that have rubber soles and low heels.  Use a cane, walker, scooter, or crutches that help you move around if needed.  Talk with your provider about other ways that you can decrease your risk of falls. This may include seeing a physical therapist to learn to do exercises to improve movement and strength.  Where to find more information  Centers for  Disease Control and Prevention, STEADI: cdc.gov  National [place] on Aging: shahram.nih.gov  National [place] on Aging: shahram.nih.gov  Contact a health care provider if:  You are afraid of falling at home.  You feel weak, drowsy, or dizzy at home.  You fall at home.  Get help right away if you:  Lose consciousness or have trouble moving after a fall.  Have a fall that causes a head injury.  These symptoms may be an emergency. Get help right away. Call 911.  Do not wait to see if the symptoms will go away.  Do not drive yourself to the hospital.  This information is not intended to replace advice given to you by your health care provider. Make sure you discuss any questions you have with your health care provider.  Document Revised: 08/21/2023 Document Reviewed: 08/21/2023  [employer] Patient Education © 2024 [employer]  Sit-to-Stand Exercise    The sit-to-stand exercise (also known as the chair stand or chair rise exercise) strengthens your lower body and helps you maintain or improve your mobility and independence. The end goal is to do the sit-to-stand exercise without using your hands. This will be easier as you become stronger. You should always talk with your health care provider before starting any exercise program, especially if you have had recent surgery.  Do the exercise exactly as told by your health care provider and adjust it as directed. It is normal to feel mild stretching, pulling, tightness, or discomfort as you do this exercise, but you should stop right away if you feel sudden pain or your pain gets worse. Do not begin doing this exercise until told by your health care provider.  What the sit-to-stand exercise does  The sit-to-stand exercise helps to strengthen the muscles in your thighs and the muscles in the center of your body that give you stability (core muscles). This exercise is especially helpful if:  You have had knee or hip surgery.  You have trouble getting up from a chair, out of a car,  or off the toilet due to muscle weakness.  How to do the sit-to-stand exercise  Sit toward the front edge of a sturdy chair without armrests. Your knees should be bent and your feet should be flat on the floor and shoulder-width apart and underneath your hips.  Place your hands lightly on each side of the seat. Keep your back and neck as straight as possible, with your chest slightly forward.  Breathe in slowly. Lean forward and slightly shift your weight to the front of your feet.  Breathe out as you slowly stand up. Try not to support any weight with your hands.  Stand and pause for a full breath in and out.  Breathe in as you sit down slowly. Tighten your core and abdominal muscles to control your lowering as much as possible. You should lower yourself back to the chair slowly, not just drop back into the seat.  Breathe out slowly.  Do this exercise 10-15 times. If needed, do it fewer times until you build up strength.  Rest for 1 minute, then do another set of 10-15 repetitions.  To change the difficulty of the sit-to-stand exercise  If the exercise is too difficult, use a chair with sturdy armrests, and push off the armrests to help you come to the standing position. You can also use the armrests to help slowly lower yourself back to sitting. As this gets easier, try to use your arms less. You can also place a firm cushion or pillow on the chair to make the surface higher.  If this exercise is too easy, do not use your arms to help raise or lower yourself. You can also wear a weighted vest, use hand weights, increase your repetitions, or try a lower chair.  General tips  You may feel tired when starting an exercise routine. This is normal.  You may have muscle soreness that lasts a few days. This is normal. As you get stronger, you may not feel muscle soreness.  Use smooth, steady movements.  Do not  hold your breath during strength exercises. This can cause unsafe changes in your blood pressure.  Breathe in  slowly through your nose, and breathe out slowly through your mouth.  Summary  Strengthening your lower body is an important step to help you move safely and independently.  The sit-to-stand exercise helps strengthen the muscles in your thighs and core.  You should always talk with your health care provider before starting any exercise program, especially if you have had recent surgery.  This information is not intended to replace advice given to you by your health care provider. Make sure you discuss any questions you have with your health care provider.  Document Revised: 04/10/2022 Document Reviewed: 04/10/2022  "[employer] Patient Education © 2024 "[employer] Inc.  Exercising to Stay Healthy  To become healthy and stay healthy, it is recommended that you do moderate-intensity and vigorous-intensity exercise. You can tell that you are exercising at a moderate intensity if your heart starts beating faster and you start breathing faster but can still hold a conversation. You can tell that you are exercising at a vigorous intensity if you are breathing much harder and faster and cannot hold a conversation while exercising.  How can exercise benefit me?  Exercising regularly is important. It has many health benefits, such as:  Improving overall fitness, flexibility, and endurance.  Increasing bone density.  Helping with weight control.  Decreasing body fat.  Increasing muscle strength and endurance.  Reducing stress and tension, anxiety, depression, or anger.  Improving overall health.  What guidelines should I follow while exercising?  Before you start a new exercise program, talk with your health care provider.  Do not exercise so much that you hurt yourself, feel dizzy, or get very short of breath.  Wear comfortable clothes and wear shoes with good support.  Drink plenty of water while you exercise to prevent dehydration or heat stroke.  Work out until your breathing and your heartbeat get faster (moderate  intensity).  How often should I exercise?  Choose an activity that you enjoy, and set realistic goals. Your health care provider can help you make an activity plan that is individually designed and works best for you.  Exercise regularly as told by your health care provider. This may include:  Doing strength training two times a week, such as:  Lifting weights.  Using resistance bands.  Push-ups.  Sit-ups.  Yoga.  Doing a certain intensity of exercise for a given amount of time. Choose from these options:  A total of 150 minutes of moderate-intensity exercise every week.  A total of 75 minutes of vigorous-intensity exercise every week.  A mix of moderate-intensity and vigorous-intensity exercise every week.  Children, pregnant women, people who have not exercised regularly, people who are overweight, and older adults may need to talk with a health care provider about what activities are safe to perform. If you have a medical condition, be sure to talk with your health care provider before you start a new exercise program.  What are some exercise ideas?  Moderate-intensity exercise ideas include:  Walking 1 mile (1.6 km) in about 15 minutes.  Biking.  Hiking.  Golfing.  Dancing.  Water aerobics.  Vigorous-intensity exercise ideas include:  Walking 4.5 miles (7.2 km) or more in about 1 hour.  Jogging or running 5 miles (8 km) in about 1 hour.  Biking 10 miles (16.1 km) or more in about 1 hour.  Lap swimming.  Roller-skating or in-line skating.  Cross-country skiing.  Vigorous competitive sports, such as football, basketball, and soccer.  Jumping rope.  Aerobic dancing.  What are some everyday activities that can help me get exercise?  Yard work, such as:  Pushing a .  Raking and bagging leaves.  Washing your car.  Pushing a stroller.  Shoveling snow.  Gardening.  Washing windows or floors.  How can I be more active in my day-to-day activities?  Use stairs instead of an elevator.  Take a walk during your  lunch break.  If you drive, park your car farther away from your work or school.  If you take public transportation, get off one stop early and walk the rest of the way.  Stand up or walk around during all of your indoor phone calls.  Get up, stretch, and walk around every 30 minutes throughout the day.  Enjoy exercise with a friend. Support to continue exercising will help you keep a regular routine of activity.  Where to find more information  You can find more information about exercising to stay healthy from:  U.S. Department of Health and Human Services: www.hhs.gov  Centers for Disease Control and Prevention (CDC): www.cdc.gov  Summary  Exercising regularly is important. It will improve your overall fitness, flexibility, and endurance.  Regular exercise will also improve your overall health. It can help you control your weight, reduce stress, and improve your bone density.  Do not exercise so much that you hurt yourself, feel dizzy, or get very short of breath.  Before you start a new exercise program, talk with your health care provider.  This information is not intended to replace advice given to you by your health care provider. Make sure you discuss any questions you have with your health care provider.  Document Revised: 04/15/2022 Document Reviewed: 04/15/2022  [employer]vier Patient Education © 2024 [employer].

## 2025-02-12 NOTE — ASSESSMENT & PLAN NOTE
Stable.  Continue metoprolol XL 50 mg daily and losartan 100 mg daily.  Monitor BP and notify if persistently elevated above 130/80.  Follow-up with cardiology as scheduled.

## 2025-02-12 NOTE — PROGRESS NOTES
Subjective   The ABCs of the Annual Wellness Visit  Medicare Wellness Visit      Marion Anderson is a 74 y.o. patient who presents for a Medicare Wellness Visit.    The following portions of the patient's history were reviewed and   updated as appropriate: allergies, current medications, past family history, past medical history, past social history, past surgical history, and problem list.    Compared to one year ago, the patient's physical   health is worse.  Compared to one year ago, the patient's mental   health is the same.    Recent Hospitalizations:  She was not admitted to the hospital during the last year.     Current Medical Providers:  Patient Care Team:  Shazia Suarez APRN as PCP - General (Nurse Practitioner)  Shun Jacob MD as Consulting Physician (General Surgery)  Jonathon Almonte MD as Consulting Physician (Endocrinology)  Haley Rhodes MD as Consulting Physician (Hematology and Oncology)  Domonique Fernando MD as Consulting Physician (Cardiology)  Jimenez Prado MD as Surgeon (Vascular Surgery)    Outpatient Medications Prior to Visit   Medication Sig Dispense Refill    alendronate (FOSAMAX) 70 MG tablet Take 1 tablet by mouth 1 (One) Time Per Week.      aspirin 81 MG chewable tablet Chew 1 tablet Daily.      Cholecalciferol (VITAMIN D3) 25 MCG (1000 UT) capsule Take 1 capsule by mouth Every Other Day.      hydrOXYzine (ATARAX) 25 MG tablet Take 1 tablet by mouth At Night As Needed for Itching.      levothyroxine (SYNTHROID, LEVOTHROID) 125 MCG tablet Take 1 tablet by mouth Daily.      losartan (COZAAR) 100 MG tablet Take 1 tablet by mouth Daily. for blood pressure 90 tablet 3    metoprolol succinate XL (TOPROL-XL) 50 MG 24 hr tablet Take 1 tablet by mouth twice daily 180 tablet 2    Multiple Vitamin (MULTI-DAY PO) Take 1 tablet by mouth Daily.      nitroglycerin (NITROSTAT) 0.3 MG SL tablet Place 1 tablet under the tongue Every 5 (Five) Minutes As Needed.      Omega-3  Fatty Acids (FISH OIL) 1000 MG capsule capsule Take  by mouth Daily With Breakfast.      Repatha SureClick solution auto-injector SureClick injection INJECT 1 SYRINGE SUBCUTANEOUSLY EVERY TWO WEEKS      rosuvastatin (CRESTOR) 40 MG tablet Take 1 tablet by mouth Every Night. 90 tablet 0    tamoxifen (NOLVADEX) 10 MG tablet Take 0.5 tablets by mouth Daily.      traMADol (ULTRAM) 50 MG tablet Take 1 tablet by mouth As Needed.      traZODone (DESYREL) 50 MG tablet Daily.      ezetimibe (ZETIA) 10 MG tablet Take 1 tablet by mouth Daily.      amoxicillin (AMOXIL) 500 MG capsule Take 4 capsules by mouth See Admin Instructions. 4 capsules 1 hour prior to procedure (Patient not taking: Reported on 10/29/2024) 12 capsule 0    Ibuprofen 3 %, Gabapentin 10 %, Baclofen 2 %, lidocaine 4 % Apply 1-2 g topically to the appropriate area as directed 3 (Three) to 4 (Four) times daily. (Patient not taking: Reported on 2/12/2025) 90 g 2     No facility-administered medications prior to visit.     Opioid medication/s are on active medication list.  and I have evaluated her active treatment plan and pain score trends (see table).  Vitals:    02/12/25 1059   PainSc:   7     I have reviewed the chart for potential of high risk medication and harmful drug interactions in the elderly.        Aspirin is on active medication list. Aspirin use is indicated based on review of current medical condition/s. Pros and cons of this therapy have been discussed today. Benefits of this medication outweigh potential harm.  Patient has been encouraged to continue taking this medication.  .      Patient Active Problem List   Diagnosis    Coronary artery disease of native heart with stable angina pectoris    CAD (coronary artery disease)    S/P CABG x 4    S/P MVR (mitral valve repair)    Bimalleolar ankle fracture    Closed low lateral malleolus fracture    Essential hypertension    GERD (gastroesophageal reflux disease)    Hyperlipidemia LDL goal <70     "Hypothyroidism due to Hashimoto's thyroiditis    Inversion sprain of ankle    Long term current use of therapeutic drug    Obesity, unspecified    Pain in joint involving ankle and foot    S/P PTCA (percutaneous transluminal coronary angioplasty)    Vitamin D insufficiency    Insomnia    Bilateral carotid artery stenosis    Renal insufficiency    Ductal carcinoma in situ (DCIS) of left breast    Prediabetes    Osteoarthritis of knees, bilateral    Osteopenia of femoral neck, bilateral     Advance Care Planning Advance Directive is on file.  ACP discussion was held with the patient during this visit. Patient has an advance directive in EMR which is still valid.             Objective   Vitals:    02/12/25 1059   BP: 122/86   BP Location: Left arm   Patient Position: Sitting   Cuff Size: Adult   Pulse: 67   Temp: 97.8 °F (36.6 °C)   TempSrc: Oral   SpO2: 99%   Weight: 102 kg (225 lb 1.6 oz)   Height: 165.1 cm (65\")   PainSc:   7       Estimated body mass index is 37.46 kg/m² as calculated from the following:    Height as of this encounter: 165.1 cm (65\").    Weight as of this encounter: 102 kg (225 lb 1.6 oz).    Class 2 Severe Obesity (BMI >=35 and <=39.9). Obesity-related health conditions include the following: obstructive sleep apnea, hypertension, coronary heart disease, diabetes mellitus, dyslipidemias, osteoarthritis, urinary stress incontinence, and hepatic steatosis. Obesity is improving with lifestyle modifications. BMI is is above average; BMI management plan is completed. We discussed low calorie, low carb based diet program, portion control, and increasing exercise.           Does the patient have evidence of cognitive impairment? No                                                                                                Health  Risk Assessment    Smoking Status:  Social History     Tobacco Use   Smoking Status Never   Smokeless Tobacco Never     Alcohol Consumption:  Social History     Substance and " Sexual Activity   Alcohol Use Not Currently       Fall Risk Screen  STEADI Fall Risk Assessment was completed, and patient is at MODERATE risk for falls. Assessment completed on:2025    Depression Screening   Little interest or pleasure in doing things? Not at all   Feeling down, depressed, or hopeless? Several days   PHQ-2 Total Score 1      Health Habits and Functional and Cognitive Screenin/12/2025    11:00 AM   Functional & Cognitive Status   Do you have difficulty preparing food and eating? No   Do you have difficulty bathing yourself, getting dressed or grooming yourself? No   Do you have difficulty using the toilet? No   Do you have difficulty moving around from place to place? Yes   Do you have trouble with steps or getting out of a bed or a chair? Yes   Current Diet Low Fat Diet   Dental Exam Up to date   Eye Exam Not up to date   Exercise (times per week) 1 times per week   Current Exercises Include Cardiovascular Workout   Do you need help using the phone?  No   Are you deaf or do you have serious difficulty hearing?  No   Do you need help to go to places out of walking distance? No   Do you need help shopping? No   Do you need help preparing meals?  No   Do you need help with housework?  No   Do you need help with laundry? No   Do you need help taking your medications? No   Do you need help managing money? No   Do you ever drive or ride in a car without wearing a seat belt? No   Have you felt unusual stress, anger or loneliness in the last month? Yes   Who do you live with? Alone   If you need help, do you have trouble finding someone available to you? No   Have you been bothered in the last four weeks by sexual problems? No   Do you have difficulty concentrating, remembering or making decisions? No           Age-appropriate Screening Schedule:  Refer to the list below for future screening recommendations based on patient's age, sex and/or medical conditions. Orders for these recommended  tests are listed in the plan section. The patient has been provided with a written plan.    Health Maintenance List  Health Maintenance   Topic Date Due    COLORECTAL CANCER SCREENING  Never done    Pneumococcal Vaccine 50+ (1 of 2 - PCV) Never done    TDAP/TD VACCINES (1 - Tdap) Never done    ZOSTER VACCINE (1 of 2) Never done    ANNUAL WELLNESS VISIT  Never done    DXA SCAN  06/28/2024    INFLUENZA VACCINE  07/01/2024    COVID-19 Vaccine (4 - 2024-25 season) 09/01/2024    LIPID PANEL  11/21/2025    BMI FOLLOWUP  02/12/2026    MAMMOGRAM  05/31/2026    HEPATITIS C SCREENING  Completed                                                                                                                                                CMS Preventative Services Quick Reference  Risk Factors Identified During Encounter  Immunizations Discussed/Encouraged: Tdap, Influenza, Prevnar 20 (Pneumococcal 20-valent conjugate), and Shingrix  Dental Screening Recommended  Vision Screening Recommended    The above risks/problems have been discussed with the patient.  Pertinent information has been shared with the patient in the After Visit Summary.  An After Visit Summary and PPPS were made available to the patient.    Follow Up:   Next Medicare Wellness visit to be scheduled in 1 year.         Additional E&M Note during same encounter follows:  Patient has additional, significant, and separately identifiable condition(s)/problem(s) that require work above and beyond the Medicare Wellness Visit     Chief Complaint  Medicare Wellness-subsequent and Knee Pain (Pt c/o rt knee pain, Pt doing PT for it.)    Subjective   HPI  Marion is also being seen today for an annual adult preventative physical exam. She is feeling ok today. She is doing home PT exercises. She uses a walker in the morning when she first gets up to assist with ambulating. She uses a cane PRN.     HTN- Patient doing well with current medication regimen, compliant with  "medication schedule, denies adverse effects. Home BP running 130/80.   HLD/CAD- she is UTD with cardiology, Dr. Fernando. She is now on Repatha and rosuvastatin 40 mg daily, stopped Zetia.   Bilateral carotid artery stenosis- she is due to see Dr. Prado back with vascular for repeat U/S.   Renal insufficiency- most recent lab work showed elevated creatinine of 1.5. She has been trying to increase fluid intake, stopping sodas and limiting caffeine. She avoid all NSAIDs. She is due for repeat lab work with endocrinology in March.     Prediabetes- she is due for repeat lab work with endocrinology in march. She has been trying to reduce sugar intake.   Hypothyroidism due to Hashimoto's- she is followed by endocrinology, currently on levo 125 mcg daily.     DCIS of left breast- she is scheduled for mammogram and follow up with oncology in June.   Osteopenia- she is currently on fosamax 70 mg weekly. She is due for DEXA, managed by endocrinology.     Colon cancer screening- she is due for colon cancer screening. She has never had any colon polyps. No family hx of colon cancer or colon polyps. She denies any change in bowel habits, change in stool caliber, BRBPR, melena.     Review of Systems   Constitutional: Negative.    HENT: Negative.     Eyes: Negative.    Respiratory: Negative.     Cardiovascular: Negative.    Gastrointestinal: Negative.    Endocrine: Negative.    Genitourinary: Negative.    Musculoskeletal:  Positive for arthralgias (bilateral knees, chronic).   Skin: Negative.    Allergic/Immunologic: Negative.    Neurological: Negative.    Hematological: Negative.    Psychiatric/Behavioral: Negative.                Objective   Vital Signs:  /86 (BP Location: Left arm, Patient Position: Sitting, Cuff Size: Adult)   Pulse 67   Temp 97.8 °F (36.6 °C) (Oral)   Ht 165.1 cm (65\")   Wt 102 kg (225 lb 1.6 oz)   SpO2 99%   BMI 37.46 kg/m²   Physical Exam  Constitutional:       Appearance: Normal appearance. She is " well-developed.   HENT:      Head: Normocephalic and atraumatic.      Right Ear: Hearing, tympanic membrane, ear canal and external ear normal.      Left Ear: Hearing, tympanic membrane, ear canal and external ear normal.      Nose: Nose normal.      Right Sinus: No maxillary sinus tenderness or frontal sinus tenderness.      Left Sinus: No maxillary sinus tenderness or frontal sinus tenderness.      Mouth/Throat:      Lips: Pink.      Mouth: Mucous membranes are moist.      Dentition: Normal dentition.      Tongue: No lesions.      Pharynx: Oropharynx is clear. Uvula midline.      Tonsils: No tonsillar exudate.   Eyes:      General: Lids are normal.      Extraocular Movements: Extraocular movements intact.      Conjunctiva/sclera: Conjunctivae normal.      Pupils: Pupils are equal, round, and reactive to light.   Neck:      Thyroid: No thyroid mass, thyromegaly or thyroid tenderness.      Vascular: No carotid bruit.      Trachea: Trachea normal.   Cardiovascular:      Rate and Rhythm: Normal rate and regular rhythm.      Pulses: Normal pulses.           Radial pulses are 2+ on the right side and 2+ on the left side.        Popliteal pulses are 2+ on the right side and 2+ on the left side.        Dorsalis pedis pulses are 2+ on the right side and 2+ on the left side.        Posterior tibial pulses are 2+ on the right side and 2+ on the left side.      Heart sounds: S1 normal and S2 normal.   Pulmonary:      Effort: Pulmonary effort is normal.      Breath sounds: Normal breath sounds.   Abdominal:      General: Bowel sounds are normal. There is no distension or abdominal bruit.      Palpations: Abdomen is soft. There is no hepatomegaly or splenomegaly.      Tenderness: There is no abdominal tenderness.      Hernia: No hernia is present.   Musculoskeletal:      Cervical back: Normal range of motion and neck supple.      Right lower leg: No edema.      Left lower leg: No edema.   Lymphadenopathy:      Head:      Right  side of head: No submental, submandibular, tonsillar or occipital adenopathy.      Left side of head: No submental, submandibular, tonsillar or occipital adenopathy.      Cervical: No cervical adenopathy.      Upper Body:      Right upper body: No supraclavicular adenopathy.      Left upper body: No supraclavicular adenopathy.      Lower Body: No right inguinal adenopathy. No left inguinal adenopathy.   Skin:     General: Skin is warm and dry.      Findings: No rash.      Nails: There is no clubbing.   Neurological:      General: No focal deficit present.      Mental Status: She is alert and oriented to person, place, and time.      Cranial Nerves: Cranial nerves 2-12 are intact.      Sensory: Sensation is intact.      Motor: Motor function is intact.      Coordination: Coordination is intact.      Gait: Gait abnormal (antalgic gait).      Deep Tendon Reflexes:      Reflex Scores:       Patellar reflexes are 2+ on the right side and 2+ on the left side.  Psychiatric:         Attention and Perception: Attention and perception normal.         Mood and Affect: Mood and affect normal.         Speech: Speech normal.         Behavior: Behavior normal. Behavior is cooperative.         Thought Content: Thought content normal.         Cognition and Memory: Cognition and memory normal.         Judgment: Judgment normal.                       Assessment and Plan            Coronary artery disease involving native coronary artery of native heart without angina pectoris  Managed per cardiology.  Encouraged her to continue aspirin 81 mg daily, metoprolol XL 50 mg daily, losartan 100 mg daily, rosuvastatin 40 mg nightly, Repatha every 2 weeks.  Encouraged healthy eating and regular exercise as tolerated.  Notify for any chest pain or exertional symptoms.  Follow-up with cardiology as scheduled         Prediabetes  Recommend repeat lab work with endocrinology as scheduled.  Emphasized the importance of healthy eating, limiting  sugar and carbs, optimizing protein intake.       Hypothyroidism due to Hashimoto's thyroiditis  Managed per endocrinology.  Encouraged her to continue the levothyroxine 125 mcg daily.  Follow-up as scheduled in April.       Vitamin D insufficiency  Continue vitamin D3 1000 units every other day.       Essential hypertension  Stable.  Continue metoprolol XL 50 mg daily and losartan 100 mg daily.  Monitor BP and notify if persistently elevated above 130/80.  Follow-up with cardiology as scheduled.         Hyperlipidemia LDL goal <70  Continue high-dose rosuvastatin and Repatha along with Mediterranean-style eating and regular exercise as tolerated.  Repeat labs with endocrinology as scheduled next month.         Ductal carcinoma in situ (DCIS) of left breast  Managed per oncology.  Follow-up with oncology with mammogram as scheduled in June.       Osteopenia of femoral neck, bilateral  Managed per endocrinology.  Continue weekly Fosamax 70 mg along with vitamin D supplementation and weightbearing exercise.  Ensure adequate dietary intake of calcium.  She will discuss DEXA order with endocrinology in April.       Medicare annual wellness visit, subsequent         Healthcare maintenance  Encouraged healthy eating, regular exercise as tolerated.       Bilateral carotid artery stenosis  Due for follow-up and carotid ultrasound with vascular surgery.  Encouraged her to call to schedule this.  Continue aspirin 81 mg daily, high intensity rosuvastatin and Repatha.       Renal insufficiency    Encouraged her to continue to increase hydration with water, limit caffeine intake, avoid sodas and nephrotoxic agents.  Maintain good blood pressure control.  Repeat labs next month as scheduled with endocrinology.  If kidney function continues to decline recommend referral to nephrology.       Screen for colon cancer    Orders:    Cologuard - Stool, Per Rectum; Future            Follow Up   Return in about 1 year (around 2/12/2026)  for Medicare Wellness.  Patient was given instructions and counseling regarding her condition or for health maintenance advice. Please see specific information pulled into the AVS if appropriate.

## 2025-02-12 NOTE — ASSESSMENT & PLAN NOTE
Managed per endocrinology.  Encouraged her to continue the levothyroxine 125 mcg daily.  Follow-up as scheduled in April.

## 2025-02-12 NOTE — ASSESSMENT & PLAN NOTE
Recommend repeat lab work with endocrinology as scheduled.  Emphasized the importance of healthy eating, limiting sugar and carbs, optimizing protein intake.

## 2025-02-12 NOTE — ASSESSMENT & PLAN NOTE
Managed per cardiology.  Encouraged her to continue aspirin 81 mg daily, metoprolol XL 50 mg daily, losartan 100 mg daily, rosuvastatin 40 mg nightly, Repatha every 2 weeks.  Encouraged healthy eating and regular exercise as tolerated.  Notify for any chest pain or exertional symptoms.  Follow-up with cardiology as scheduled

## 2025-02-12 NOTE — ASSESSMENT & PLAN NOTE
Due for follow-up and carotid ultrasound with vascular surgery.  Encouraged her to call to schedule this.  Continue aspirin 81 mg daily, high intensity rosuvastatin and Repatha.

## 2025-02-12 NOTE — ASSESSMENT & PLAN NOTE
Managed per endocrinology.  Continue weekly Fosamax 70 mg along with vitamin D supplementation and weightbearing exercise.  Ensure adequate dietary intake of calcium.  She will discuss DEXA order with endocrinology in April.

## 2025-02-12 NOTE — ASSESSMENT & PLAN NOTE
Continue high-dose rosuvastatin and Repatha along with Mediterranean-style eating and regular exercise as tolerated.  Repeat labs with endocrinology as scheduled next month.

## 2025-02-12 NOTE — ASSESSMENT & PLAN NOTE
Encouraged her to continue to increase hydration with water, limit caffeine intake, avoid sodas and nephrotoxic agents.  Maintain good blood pressure control.  Repeat labs next month as scheduled with endocrinology.  If kidney function continues to decline recommend referral to nephrology.

## 2025-02-19 ENCOUNTER — TELEPHONE (OUTPATIENT)
Dept: INTERNAL MEDICINE | Facility: CLINIC | Age: 75
End: 2025-02-19
Payer: COMMERCIAL

## 2025-02-19 DIAGNOSIS — I10 ESSENTIAL HYPERTENSION: Primary | ICD-10-CM

## 2025-02-19 NOTE — TELEPHONE ENCOUNTER
Caller: Marion Anderson    Relationship: Self    Best call back number: [phone]     What orders are you requesting (i.e. lab or imaging):      In what timeframe would the patient need to come in:      Where will you receive your lab/imaging services:      Additional notes: PATIENT CALLED STATED AT HER LAST APPOINTMENT FOR HER WELLNESS CHECK HAD DISCUSSED ABOUT LABS FOR KIDNEYS.  DOES PANKAJ CALLOWAY WANT TO ORDER THE LABS OR DOES SHE NEED TO WAIT UNTIL SHE SEES HER ENDO DOCTOR AND GET HIM TO ORDER THE KIDNEY LABS.  PLEASE CALL AND LET HER KNOW.

## 2025-02-20 ENCOUNTER — TREATMENT (OUTPATIENT)
Dept: PHYSICAL THERAPY | Facility: CLINIC | Age: 75
End: 2025-02-20
Payer: MEDICARE

## 2025-02-20 DIAGNOSIS — R26.89 ANTALGIC GAIT: ICD-10-CM

## 2025-02-20 DIAGNOSIS — R29.898 COMPLAINTS OF LEG WEAKNESS: ICD-10-CM

## 2025-02-20 DIAGNOSIS — M25.561 PAIN OF RIGHT KNEE AFTER INJURY: Primary | ICD-10-CM

## 2025-02-20 PROCEDURE — 97110 THERAPEUTIC EXERCISES: CPT | Performed by: PHYSICAL THERAPIST

## 2025-02-20 NOTE — PROGRESS NOTES
Physical Therapy Daily Treatment Note    [facility]  [address] [place] [address]  [phone] (phone)  [phone] (fax)    Patient: Marion Anderson   : 1950  Diagnosis/ICD-10 Code:  Pain of right knee after injury [M25.561]  Referring practitioner: Liv Galvan MD  Today's Date: 2025  Patient seen for 32 sessions    Visit Diagnoses:    ICD-10-CM ICD-9-CM   1. Pain of right knee after injury  M25.561 719.46     959.7   2. Complaints of leg weakness  R29.898 729.89   3. Antalgic gait  R26.89 781.2              Subjective Marion reports that she has hurt more all over because of the cold weather.  She reports that her knee hurts on and off.     Objective   See Exercise, Manual, and Modality Logs for complete treatment.       Assessment/Plan    Plan to assess her ability to walk on track next visit   Exercises still limited by her pain level, muscle endurance and overall energy level        Timed:    Manual Therapy:    0     mins  09121;  Therapeutic Exercise:    38     mins  47668;     Neuromuscular Rina:    0    mins  54878;    Therapeutic Activity:     0     mins  95484;     Gait Trainin     mins  98434;     Ultrasound:     0     mins  37139;    Aquatic Therapy           mins     08138;  Self Care                               mins   25099        Untimed:  Electrical Stimulation:           mins  70441 ( );  Traction:           mins  91668;   Dry Needling  (1-2 muscles)                  mins  (Self-pay)  Dry Needling (3-4 muscles)  ____   (Self-pay)  Dry Needling Trial             DRYNDLTRIAL  (No Charge)    Timed Treatment:   38   mins   Total Treatment:     38   mins    Jennifer Moreno, PT  Physical Therapist    [place] License:[id number]

## 2025-02-21 DIAGNOSIS — M17.0 PRIMARY OSTEOARTHRITIS OF BOTH KNEES: Primary | ICD-10-CM

## 2025-02-21 DIAGNOSIS — M25.579 PAIN IN JOINT INVOLVING ANKLE AND FOOT, UNSPECIFIED LATERALITY: ICD-10-CM

## 2025-02-21 RX ORDER — TRAMADOL HYDROCHLORIDE 50 MG/1
50 TABLET ORAL DAILY PRN
Qty: 15 TABLET | Refills: 0 | Status: SHIPPED | OUTPATIENT
Start: 2025-02-21

## 2025-02-21 RX ORDER — NITROGLYCERIN 0.3 MG/1
0.3 TABLET SUBLINGUAL
Qty: 30 TABLET | Refills: 0 | Status: SHIPPED | OUTPATIENT
Start: 2025-02-21

## 2025-02-21 NOTE — TELEPHONE ENCOUNTER
Caller: JustinKatieMarion M    Relationship: Self    Best call back number: [phone]     Requested Prescriptions:   Requested Prescriptions     Pending Prescriptions Disp Refills    traMADol (ULTRAM) 50 MG tablet       Sig: Take 1 tablet by mouth As Needed.    nitroglycerin (NITROSTAT) 0.3 MG SL tablet       Sig: Place 1 tablet under the tongue Every 5 (Five) Minutes As Needed.        Pharmacy where request should be sent: [address] [phone] [facility] [phone]      Last office visit with prescribing clinician: 2/12/2025   Last telemedicine visit with prescribing clinician: Visit date not found   Next office visit with prescribing clinician: 2/18/2026     Additional details provided by patient:      Does the patient have less than a 3 day supply:  [] Yes  [x] No    Would you like a call back once the refill request has been completed: [x] Yes [] No    If the office needs to give you a call back, can they leave a voicemail: [x] Yes [] No    Chacha Gonzalez Rep   02/21/25 11:16 EST

## 2025-02-24 ENCOUNTER — OFFICE VISIT (OUTPATIENT)
Dept: INTERNAL MEDICINE | Facility: CLINIC | Age: 75
End: 2025-02-24
Payer: MEDICARE

## 2025-02-24 VITALS
DIASTOLIC BLOOD PRESSURE: 80 MMHG | HEART RATE: 67 BPM | TEMPERATURE: 98.2 F | WEIGHT: 224.4 LBS | BODY MASS INDEX: 37.39 KG/M2 | HEIGHT: 65 IN | OXYGEN SATURATION: 96 % | SYSTOLIC BLOOD PRESSURE: 130 MMHG

## 2025-02-24 DIAGNOSIS — M25.571 CHRONIC PAIN OF RIGHT ANKLE: Primary | ICD-10-CM

## 2025-02-24 DIAGNOSIS — Z79.899 HIGH RISK MEDICATION USE: ICD-10-CM

## 2025-02-24 DIAGNOSIS — G89.29 CHRONIC PAIN OF RIGHT ANKLE: Primary | ICD-10-CM

## 2025-02-24 DIAGNOSIS — M17.0 PRIMARY OSTEOARTHRITIS OF BOTH KNEES: ICD-10-CM

## 2025-02-24 PROCEDURE — 1160F RVW MEDS BY RX/DR IN RCRD: CPT | Performed by: NURSE PRACTITIONER

## 2025-02-24 PROCEDURE — 99214 OFFICE O/P EST MOD 30 MIN: CPT | Performed by: NURSE PRACTITIONER

## 2025-02-24 PROCEDURE — 1125F AMNT PAIN NOTED PAIN PRSNT: CPT | Performed by: NURSE PRACTITIONER

## 2025-02-24 PROCEDURE — 3075F SYST BP GE 130 - 139MM HG: CPT | Performed by: NURSE PRACTITIONER

## 2025-02-24 PROCEDURE — 1159F MED LIST DOCD IN RCRD: CPT | Performed by: NURSE PRACTITIONER

## 2025-02-24 PROCEDURE — 3079F DIAST BP 80-89 MM HG: CPT | Performed by: NURSE PRACTITIONER

## 2025-02-24 NOTE — PLAN OF CARE
Problem: Patient Care Overview (Adult)  Goal: Plan of Care Review  Outcome: Ongoing (interventions implemented as appropriate)    10/16/17 3293   Coping/Psychosocial Response Interventions   Plan Of Care Reviewed With patient   Patient Care Overview   Progress improving   Outcome Evaluation   Outcome Summary/Follow up Plan patient had repeat CT scan this morning...showed small SDH. anti-coagulants held today- to be addressed further in the morning. VSS with transient confusion/LOC changes. continues repetative speech and has some left side weakness. to have repeat CT scan in the morning. 1 unit of PRBC administered, to have another infusion yet tonight.        Goal: Adult Individualization and Mutuality  Outcome: Ongoing (interventions implemented as appropriate)  Goal: Discharge Needs Assessment  Outcome: Ongoing (interventions implemented as appropriate)    Problem: Acute Coronary Syndrome (ACS) (Adult)  Goal: Signs and Symptoms of Listed Potential Problems Will be Absent or Manageable (Acute Coronary Syndrome)  Outcome: Ongoing (interventions implemented as appropriate)    Problem: Pain, Acute (Adult)  Goal: Identify Related Risk Factors and Signs and Symptoms  Outcome: Ongoing (interventions implemented as appropriate)  Goal: Acceptable Pain Control/Comfort Level  Outcome: Ongoing (interventions implemented as appropriate)    Problem: Cardiac Surgery (Adult)  Goal: Signs and Symptoms of Listed Potential Problems Will be Absent or Manageable (Cardiac Surgery)  Outcome: Ongoing (interventions implemented as appropriate)       Subjective:     Patient ID: Char Savage is a 87 y.o. female.    Chief Complaint: Diabetes Mellitus (08/21/24 Dr Vickers) and Nail Care    Char is a 87 y.o. female who presents to the clinic for evaluation and treatment of high risk feet. Char has a past medical history of Benign hypertension with chronic kidney disease, stage III (4/14/2015), Hyperlipidemia, Hypertension, Left eye injury (12/98), Nevus of choroid, Osteopenia (3/10/2022), Type 2 diabetes mellitus with stage 3 chronic kidney disease, without long-term current use of insulin (10/17/2017), Type 2 diabetes mellitus without complication (10/17/2017), and Venous stasis dermatitis of both lower extremities (12/11/2019). The patient's chief complaint is long, thick toenails. This patient has documented high risk feet requiring routine maintenance secondary to diabetes mellitis and those secondary complications of diabetes, as mentioned..    PCP: Joselito Vickers MD    Date Last Seen by PCP: per above        Hemoglobin A1C   Date Value Ref Range Status   11/22/2024 6.1 (H) 4.0 - 5.6 % Final     Comment:     ADA Screening Guidelines:  5.7-6.4%  Consistent with prediabetes  >or=6.5%  Consistent with diabetes    High levels of fetal hemoglobin interfere with the HbA1C  assay. Heterozygous hemoglobin variants (HbS, HgC, etc)do  not significantly interfere with this assay.   However, presence of multiple variants may affect accuracy.     04/24/2024 6.5 (H) 4.0 - 5.6 % Final     Comment:     ADA Screening Guidelines:  5.7-6.4%  Consistent with prediabetes  >or=6.5%  Consistent with diabetes    High levels of fetal hemoglobin interfere with the HbA1C  assay. Heterozygous hemoglobin variants (HbS, HgC, etc)do  not significantly interfere with this assay.   However, presence of multiple variants may affect accuracy.     08/02/2023 6.5 (H) 4.0 - 5.6 % Final     Comment:     ADA Screening Guidelines:  5.7-6.4%  Consistent with prediabetes  >or=6.5%   Consistent with diabetes    High levels of fetal hemoglobin interfere with the HbA1C  assay. Heterozygous hemoglobin variants (HbS, HgC, etc)do  not significantly interfere with this assay.   However, presence of multiple variants may affect accuracy.         Review of Systems   Constitutional: Negative for chills.   Cardiovascular:  Negative for chest pain and claudication.   Respiratory:  Negative for cough.    Skin:  Positive for color change, dry skin and nail changes.   Musculoskeletal:  Positive for joint pain.   Gastrointestinal:  Negative for nausea.   Neurological:  Positive for paresthesias. Negative for numbness.   Psychiatric/Behavioral:  The patient is not nervous/anxious.         Objective:     Physical Exam  Constitutional:       Appearance: She is well-developed.      Comments: Oriented to time, place, and person.   Cardiovascular:      Comments: DP and PT pulses are palpable bilaterally. 3 sec capillary refill time and toes and feet are warm to touch proximally .  There is  hair growth on the feet and toes b/l. Edema noted B/L     Musculoskeletal:      Comments: Equinus noted b/l ankles with < 10 deg DF noted. MMT 5/5 in DF/PF/Inv/Ev resistance with no reproduction of pain in any direction. Passive range of motion of ankle and pedal joints is painless b/l.     Feet:      Right foot:      Skin integrity: No callus or dry skin.      Left foot:      Skin integrity: No callus or dry skin.   Lymphadenopathy:      Comments: Negative lymphadenopathy bilateral popliteal fossa and tarsal tunnel.   Skin:     Comments: Toenails 1-5 bilaterally are elongated by 2-3 mm, thickened by 2-3 mm, discolored/yellowed, dystrophic, brittle with subungual debris.     Neurological:      Mental Status: She is alert.      Comments: Light touch, proprioception, and sharp/dull sensation are all intact bilaterally. Protective threshold with the [place]field-Wienstein monofilament is intact bilaterally.    Psychiatric:          Behavior: Behavior is cooperative.           Assessment:      Encounter Diagnoses   Name Primary?    Lymphedema of both lower extremities Yes    Onychomycosis            Plan:     Char was seen today for diabetes mellitus and nail care.    Diagnoses and all orders for this visit:    Lymphedema of both lower extremities    Onychomycosis            I counseled the patient on her conditions, their implications and medical management.    - Shoe inspection. Diabetic Foot Education. Patient reminded of the importance of good nutrition and blood sugar control to help prevent podiatric complications of diabetes. Patient instructed on proper foot hygeine. We discussed wearing proper shoe gear, daily foot inspections, never walking without protective shoe gear, never putting sharp instruments to feet, routine podiatric nail visits every 2-3 months.   - With patient's permission, nails were aggressively reduced and debrided x 10 to their soft tissue attachment mechanically and with electric , removing all offending nail and debris. Patient relates relief following the procedure. He will continue to monitor the areas daily, inspect his feet, wear protective shoe gear when ambulatory, moisturizer to maintain skin integrity and follow in this office in approximately 2-3 months, sooner p.r.n.

## 2025-02-27 DIAGNOSIS — I65.23 BILATERAL CAROTID ARTERY STENOSIS: Primary | ICD-10-CM

## 2025-02-27 DIAGNOSIS — R19.5 POSITIVE COLORECTAL CANCER SCREENING USING COLOGUARD TEST: Primary | ICD-10-CM

## 2025-03-05 ENCOUNTER — TREATMENT (OUTPATIENT)
Dept: PHYSICAL THERAPY | Facility: CLINIC | Age: 75
End: 2025-03-05
Payer: MEDICARE

## 2025-03-05 ENCOUNTER — TELEPHONE (OUTPATIENT)
Dept: PHYSICAL THERAPY | Facility: CLINIC | Age: 75
End: 2025-03-05

## 2025-03-05 DIAGNOSIS — R26.89 ANTALGIC GAIT: ICD-10-CM

## 2025-03-05 DIAGNOSIS — R29.898 COMPLAINTS OF LEG WEAKNESS: ICD-10-CM

## 2025-03-05 DIAGNOSIS — M25.561 PAIN OF RIGHT KNEE AFTER INJURY: Primary | ICD-10-CM

## 2025-03-05 PROCEDURE — 97110 THERAPEUTIC EXERCISES: CPT | Performed by: PHYSICAL THERAPIST

## 2025-03-05 NOTE — PROGRESS NOTES
Physical Therapy Daily Treatment Note    [facility]  [address] [place] [address]  [phone] (phone)  [phone] (fax)    Patient: Marion Anderson   : 1950  Diagnosis/ICD-10 Code:  Pain of right knee after injury [M25.561]  Referring practitioner: Liv Galvan MD  Today's Date: 3/5/2025  Patient seen for 33 sessions    Visit Diagnoses:    ICD-10-CM ICD-9-CM   1. Pain of right knee after injury  M25.561 719.46     959.7   2. Complaints of leg weakness  R29.898 729.89   3. Antalgic gait  R26.89 781.2              Subjective Mairon thinks the leg press may have triggered chest pain for her last time.  She had not had to take a Nitroglycerin for 3 years.  She does not want to do that machine as it feels too hard. Sometimes the knee hurts at night and when I stand up in the morning like it does when I get up from the car after driving.  I have to stand and cannot walk right away.     Objective          Active Range of Motion   Left Knee   Flexion: 110 degrees   Extension: 0 degrees   Extensor lag: 10 degrees     Right Knee   Flexion: 108 degrees   Extension: 0 degrees   Extensor lag: 10 degrees     Strength/Myotome Testing     Left Knee   Flexion: 4+  Extension: 4-    Right Knee   Flexion: 3+  Extension: 4-      See Exercise, Manual, and Modality Logs for complete treatment.       Assessment/Plan    Right leg is still very weak and fatigues with exercises.  She would benefit to resume aquatic exercises when she is able to afford pool membership again.       Timed:    Manual Therapy:    0     mins  38796;  Therapeutic Exercise:    45     mins  39148;     Neuromuscular Rina:    0    mins  73635;    Therapeutic Activity:     0     mins  25406;     Gait Trainin     mins  05986;     Ultrasound:     0     mins  06092;    Aquatic Therapy           mins     63272;  Self Care                               mins   82262        Untimed:  Electrical  Stimulation:           mins  95937 ( );  Traction:           mins  09612;   Dry Needling  (1-2 muscles)                  mins 20560 (Self-pay)  Dry Needling (3-4 muscles)  ____  20561 (Self-pay)  Dry Needling Trial             DRYNDLTRIAL  (No Charge)    Timed Treatment:   45   mins   Total Treatment:     45   mins    Jennifer Moreno PT  Physical Therapist    [place] License:[id number]

## 2025-03-06 ENCOUNTER — PREP FOR SURGERY (OUTPATIENT)
Dept: SURGERY | Facility: SURGERY CENTER | Age: 75
End: 2025-03-06
Payer: COMMERCIAL

## 2025-03-06 DIAGNOSIS — Z12.11 ENCOUNTER FOR SCREENING FOR MALIGNANT NEOPLASM OF COLON: Primary | ICD-10-CM

## 2025-03-06 DIAGNOSIS — R19.5 POSITIVE COLORECTAL CANCER SCREENING USING COLOGUARD TEST: ICD-10-CM

## 2025-03-06 RX ORDER — SODIUM CHLORIDE, SODIUM LACTATE, POTASSIUM CHLORIDE, CALCIUM CHLORIDE 600; 310; 30; 20 MG/100ML; MG/100ML; MG/100ML; MG/100ML
30 INJECTION, SOLUTION INTRAVENOUS CONTINUOUS PRN
OUTPATIENT
Start: 2025-03-06 | End: 2025-03-06

## 2025-03-06 RX ORDER — SODIUM CHLORIDE 0.9 % (FLUSH) 0.9 %
10 SYRINGE (ML) INJECTION AS NEEDED
OUTPATIENT
Start: 2025-03-06

## 2025-03-06 RX ORDER — SODIUM CHLORIDE 0.9 % (FLUSH) 0.9 %
3 SYRINGE (ML) INJECTION EVERY 12 HOURS SCHEDULED
OUTPATIENT
Start: 2025-03-06

## 2025-03-12 ENCOUNTER — TREATMENT (OUTPATIENT)
Dept: PHYSICAL THERAPY | Facility: CLINIC | Age: 75
End: 2025-03-12
Payer: MEDICARE

## 2025-03-12 DIAGNOSIS — R29.898 COMPLAINTS OF LEG WEAKNESS: ICD-10-CM

## 2025-03-12 DIAGNOSIS — M25.561 PAIN OF RIGHT KNEE AFTER INJURY: Primary | ICD-10-CM

## 2025-03-12 DIAGNOSIS — R26.89 ANTALGIC GAIT: ICD-10-CM

## 2025-03-12 PROCEDURE — 97110 THERAPEUTIC EXERCISES: CPT | Performed by: PHYSICAL THERAPIST
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
## 2025-03-17 ENCOUNTER — HOSPITAL ENCOUNTER (OUTPATIENT)
Facility: HOSPITAL | Age: 75
Discharge: HOME | End: 2025-03-17
Admitting: SURGERY
Payer: MEDICARE

## 2025-03-17 ENCOUNTER — OFFICE VISIT (OUTPATIENT)
Age: 75
End: 2025-03-17
Payer: COMMERCIAL

## 2025-03-17 VITALS
HEIGHT: 65 IN | RESPIRATION RATE: 16 BRPM | BODY MASS INDEX: 37.42 KG/M2 | DIASTOLIC BLOOD PRESSURE: 76 MMHG | WEIGHT: 224.6 LBS | SYSTOLIC BLOOD PRESSURE: 134 MMHG

## 2025-03-17 DIAGNOSIS — I65.23 CAROTID STENOSIS, ASYMPTOMATIC, BILATERAL: Primary | ICD-10-CM

## 2025-03-17 DIAGNOSIS — I65.23 BILATERAL CAROTID ARTERY STENOSIS: ICD-10-CM

## 2025-03-17 LAB
BH CV XLRA MEAS LEFT CAROTID BULB PSV: 0 CM/SEC
BH CV XLRA MEAS LEFT DIST CCA EDV: -10.2 CM/SEC
BH CV XLRA MEAS LEFT DIST CCA PSV: -74.5 CM/SEC
BH CV XLRA MEAS LEFT DIST ICA PSV: 0 CM/SEC
BH CV XLRA MEAS LEFT MID CCA EDV: 11.8 CM/SEC
BH CV XLRA MEAS LEFT MID CCA PSV: 69 CM/SEC
BH CV XLRA MEAS LEFT MID ICA PSV: 0 CM/SEC
BH CV XLRA MEAS LEFT PROX CCA EDV: 12.5 CM/SEC
BH CV XLRA MEAS LEFT PROX CCA PSV: 69.8 CM/SEC
BH CV XLRA MEAS LEFT PROX ECA EDV: -20.8 CM/SEC
BH CV XLRA MEAS LEFT PROX ECA PSV: -142.1 CM/SEC
BH CV XLRA MEAS LEFT PROX ICA PSV: 0 CM/SEC
BH CV XLRA MEAS LEFT PROX SCLA PSV: 136.5 CM/SEC
BH CV XLRA MEAS LEFT VERTEBRAL A EDV: -26.3 CM/SEC
BH CV XLRA MEAS LEFT VERTEBRAL A PSV: -58.2 CM/SEC
BH CV XLRA MEAS RIGHT CAROTID BULB EDV: -11.8 CM/SEC
BH CV XLRA MEAS RIGHT CAROTID BULB PSV: -50.2 CM/SEC
BH CV XLRA MEAS RIGHT DIST CCA EDV: 13.3 CM/SEC
BH CV XLRA MEAS RIGHT DIST CCA PSV: 48.3 CM/SEC
BH CV XLRA MEAS RIGHT DIST ICA PSV: 0 CM/SEC
BH CV XLRA MEAS RIGHT MID CCA EDV: 9.8 CM/SEC
BH CV XLRA MEAS RIGHT MID CCA PSV: 52.5 CM/SEC
BH CV XLRA MEAS RIGHT MID ICA PSV: 0 CM/SEC
BH CV XLRA MEAS RIGHT PROX CCA EDV: 12.6 CM/SEC
BH CV XLRA MEAS RIGHT PROX CCA PSV: 76.5 CM/SEC
BH CV XLRA MEAS RIGHT PROX ECA EDV: -28.5 CM/SEC
BH CV XLRA MEAS RIGHT PROX ECA PSV: -353.4 CM/SEC
BH CV XLRA MEAS RIGHT PROX ICA PSV: 0 CM/SEC
BH CV XLRA MEAS RIGHT PROX SCLA PSV: 174.2 CM/SEC
BH CV XLRA MEAS RIGHT VERTEBRAL A EDV: 8.4 CM/SEC
BH CV XLRA MEAS RIGHT VERTEBRAL A PSV: 44.5 CM/SEC
LEFT ARM BP: NORMAL MMHG
RIGHT ARM BP: NORMAL MMHG

## 2025-03-17 PROCEDURE — 93880 EXTRACRANIAL BILAT STUDY: CPT

## 2025-03-17 PROCEDURE — 93880 EXTRACRANIAL BILAT STUDY: CPT | Performed by: SURGERY

## 2025-03-17 PROCEDURE — 99204 OFFICE O/P NEW MOD 45 MIN: CPT | Performed by: SURGERY

## 2025-03-17 NOTE — PROGRESS NOTES
Chief Complaint  Carotid Artery Disease    Subjective          HPI: Marion Anderson presents to [facility] who was referred to establish follow-up for carotid artery disease.  In fact, both internal carotid arteries have been known to be chronically occluded.  History of Present Illness  The patient presents for evaluation of carotid artery disease.    She reports no new symptoms related to her carotid artery disease, including the absence of any stroke or mini-stroke signs such as unilateral weakness, speech difficulties, or slurred speech. She recalls undergoing speech therapy following her bypass surgery, during which some speech issues were identified. She is uncertain if these issues were pre-existing or a consequence of the surgery. She has not experienced hypotensive episodes, dizziness, lightheadedness, or syncope. However, she does report a history of fluctuating blood pressure, which was managed by her cardiologist, Dr. Fernando, through regular monitoring and medication adjustments. Her blood pressure has since stabilized. She notes a slight difference in blood pressure readings between her arms but does not consider this significant. She experiences fatigue with prolonged walking but is able to perform necessary tasks such as grocery shopping. She attributes this fatigue to right knee issues, for which she has been receiving physical therapy at [facility]. She has been on Repatha for approximately 6 months, as recommended by Dr. Torres, to lower her cholesterol levels given her medical history. Her current medication regimen includes Crestor and aspirin, with no use of blood thinners such as Eliquis, Xarelto, or Coumadin. She undergoes biannual lab tests under the supervision of Dr. Torres.    SOCIAL HISTORY  She does not smoke.    MEDICATIONS  Crestor, aspirin, Repatha    Review of Systems     History Review Reviewed Comments   Past Medical History:  [x]  Coronary  "artery disease, carotid occlusions, hypertension, hyperlipidemia   Past Surgical History: [x]  CABG in 2017   Family History: [x]     Social History: [x]       Objective   Vital Signs:  /76 (BP Location: Left arm)   Resp 16   Ht 165.1 cm (65\")   Wt 102 kg (224 lb 9.6 oz)   BMI 37.38 kg/m²   Estimated body mass index is 37.38 kg/m² as calculated from the following:    Height as of this encounter: 165.1 cm (65\").    Weight as of this encounter: 102 kg (224 lb 9.6 oz).              Physical Exam  Physical Exam    Patient is obese.  No focal neurologic deficits.  I do not appreciate a carotid bruit on either side.  Radial pulses are palpable and symmetric.      Result Review :    Common labs          9/10/2024    08:39   Common Labs   WBC 6.89       Hemoglobin 13.9       Hematocrit 41.8       Platelets 212       Hemoglobin A1C 6.3          Details          This result is from an external source.             Results  Imaging  CT angiogram from October 2017 showed occluded carotid arteries. Ultrasound in 2021 showed continued occlusion of carotid arteries. Ultrasound today showed bilateral internal carotid arteries are occluded.     Most notable findings include: Hemoglobin 13, platelet count 212, A1c 6.3    Images Reviewed:  CT Angiogram Head With & Without Contrast (10/11/2017 11:47) bilateral ICA occlusions at this time                  Marion Anderson  reports that she has never smoked. She has never been exposed to tobacco smoke. She has never used smokeless tobacco..        Patient or patient representative verbalized consent for the use of Ambient Listening during the visit with  Jimenez Prado MD for chart documentation. 3/17/2025  12:45 EDT        Assessment and Plan     Assessment & Plan  Carotid stenosis, asymptomatic, bilateral            Assessment & Plan  1. Carotid artery disease.  Based on the patient's previous CT angiogram in 2017 and carotid duplex scans to follow this has been " present for at least 8 years. The patient's condition remains stable, with no significant changes observed in today's ultrasound compared to previous studies. Both internal carotid arteries are occluded, but she is currently asymptomatic. Blood flow is primarily through the posterior circulation, with what I presume to be an intact [place] of [place] compensating for the occlusion. There is no vascular surgical intervention that can improve her condition at this time or in the future. She is advised to continue her current regimen of aspirin and Crestor, and to maintain a healthy lifestyle, including not smoking , BP control, and blood sugar control. If symptoms such as lightheadedness, dizziness, or syncope develop, it may indicate insufficient blood flow from the posterior circulation, and a referral to a neurosurgeon should be considered.    Follow-up  The patient will follow up as needed.    PROCEDURE  The patient underwent bypass surgery in the past.       Follow Up     Return if symptoms worsen or fail to improve.  Patient was given instructions and counseling regarding her condition or for health maintenance advice. Please see specific information pulled into the AVS if appropriate.

## 2025-03-19 ENCOUNTER — TELEPHONE (OUTPATIENT)
Dept: INTERNAL MEDICINE | Facility: CLINIC | Age: 75
End: 2025-03-19
Payer: COMMERCIAL

## 2025-04-09 ENCOUNTER — TREATMENT (OUTPATIENT)
Dept: PHYSICAL THERAPY | Facility: CLINIC | Age: 75
End: 2025-04-09
Payer: MEDICARE

## 2025-04-09 DIAGNOSIS — M25.561 PAIN OF RIGHT KNEE AFTER INJURY: Primary | ICD-10-CM

## 2025-04-09 DIAGNOSIS — R29.898 COMPLAINTS OF LEG WEAKNESS: ICD-10-CM

## 2025-04-09 DIAGNOSIS — R26.89 ANTALGIC GAIT: ICD-10-CM

## 2025-04-09 PROCEDURE — 97110 THERAPEUTIC EXERCISES: CPT | Performed by: PHYSICAL THERAPIST

## 2025-04-09 PROCEDURE — 97530 THERAPEUTIC ACTIVITIES: CPT | Performed by: PHYSICAL THERAPIST

## 2025-04-09 NOTE — PROGRESS NOTES
Physical Therapy Daily Treatment Note    [facility]  [address] [place] [address]  [phone] (phone)  [phone] (fax)    Patient: Marion Anderson   : 1950  Diagnosis/ICD-10 Code:  Pain of right knee after injury [M25.561]  Referring practitioner: Liv Galvan MD  Today's Date: 2025  Patient seen for 35 sessions    Visit Diagnoses:    ICD-10-CM ICD-9-CM   1. Pain of right knee after injury  M25.561 719.46     959.7   2. Complaints of leg weakness  R29.898 729.89   3. Antalgic gait  R26.89 781.2              Subjective Knee has bothered her due to the weather,  It may feel weak when she gets up.  She still uses the walker when she gets up during the night or morning to go to the bathroom.  She will have to stand for a minute when she gets up from driving for 30-40 min.    Objective   See Exercise, Manual, and Modality Logs for complete treatment.       Assessment/Plan    Marion demonstrates slightly better strength and exercise tolerance with exercises.  She plans to try on her swim suit to perform aquatic exercises.  Marion walked 1 lap around track challenged by leg strength, balance and aerobic capacity e=requiring 3 standing breaks.       Timed:    Manual Therapy:         mins  18394;  Therapeutic Exercise:    35     mins  23451;     Neuromuscular Rina:        mins  74946;    Therapeutic Activity:     8     mins  91310;     Gait Training:           mins  38757;     Ultrasound:          mins  63869;    Aquatic Therapy           mins     39209;  Self Care                               mins   46732        Untimed:  Electrical Stimulation:           mins  32257 ( );  Traction:           mins  01001;   Dry Needling  (1-2 muscles)                  mins 39252 (Self-pay)  Dry Needling (3-4 muscles)  ____   (Self-pay)  Dry Needling Trial             DRYNDLTRIAL  (No Charge)    Timed Treatment:   43   mins   Total Treatment:      43   mins    Jennifer Moreno, PT  Physical Therapist    [place] License:[id number]

## 2025-04-14 ENCOUNTER — TELEPHONE (OUTPATIENT)
Dept: INTERNAL MEDICINE | Facility: CLINIC | Age: 75
End: 2025-04-14
Payer: COMMERCIAL

## 2025-04-14 DIAGNOSIS — J34.89 LESION OF NOSE: Primary | ICD-10-CM

## 2025-04-14 NOTE — TELEPHONE ENCOUNTER
Caller: Marion Anderson    Relationship: Self    Best call back number: [phone]     What is the medical concern/diagnosis: WART ON LEFT SIDE OF NOSE    What specialty or service is being requested: DERMATOLOGY    What is the provider, practice or medical service name: [facility]        Any additional details: PLEASE CALL WITH INFORMATION ONCE SUBMITTED

## 2025-04-25 ENCOUNTER — TREATMENT (OUTPATIENT)
Dept: PHYSICAL THERAPY | Facility: CLINIC | Age: 75
End: 2025-04-25
Payer: COMMERCIAL

## 2025-04-25 DIAGNOSIS — M25.561 PAIN OF RIGHT KNEE AFTER INJURY: Primary | ICD-10-CM

## 2025-04-25 DIAGNOSIS — R26.89 ANTALGIC GAIT: ICD-10-CM

## 2025-04-25 DIAGNOSIS — R29.898 COMPLAINTS OF LEG WEAKNESS: ICD-10-CM

## 2025-04-25 PROCEDURE — 97110 THERAPEUTIC EXERCISES: CPT | Performed by: PHYSICAL THERAPIST

## 2025-04-25 PROCEDURE — 97530 THERAPEUTIC ACTIVITIES: CPT | Performed by: PHYSICAL THERAPIST

## 2025-04-25 NOTE — PROGRESS NOTES
Physical Therapy Daily Treatment Note    [facility]  [address] [place] [address]  [phone] (phone)  [phone] (fax)    Patient: Marion Anderson   : 1950  Diagnosis/ICD-10 Code:  Pain of right knee after injury [M25.561]  Referring practitioner: Liv Galvan MD  Today's Date: 2025  Patient seen for 36 sessions    Visit Diagnoses:    ICD-10-CM ICD-9-CM   1. Pain of right knee after injury  M25.561 719.46     959.7   2. Complaints of leg weakness  R29.898 729.89   3. Antalgic gait  R26.89 781.2              Subjective Marion reports pain and stiffness in her legs traveling 9 hours in car this past weekend. She stayed in the basement of her daughter's house and took the stairs slowly.  As a result, her knees are more sore this week.    Objective   See Exercise, Manual, and Modality Logs for complete treatment.       Assessment/Plan    Marion demonstrates that she is able to walk 1 lap of track today with reduced standing rest to near end of lap.  She was able to perform strengthening exercises with increased repetitions.  Her knees do still hurt but she demonstrates better strength and muscle endurance today.       Timed:    Manual Therapy:         mins  76606;  Therapeutic Exercise:    35     mins  73179;     Neuromuscular Rina:        mins  37810;    Therapeutic Activity:     5     mins  35191;     Gait Training:           mins  50701;     Ultrasound:          mins  18135;    Aquatic Therapy           mins     02941;  Self Care                               mins   38987        Untimed:  Electrical Stimulation:           mins  31959 ( );  Traction:           mins  09891;   Dry Needling  (1-2 muscles)                  mins 45214 (Self-pay)  Dry Needling (3-4 muscles)  ____   (Self-pay)  Dry Needling Trial             DRYNDLTRIAL  (No Charge)    Timed Treatment:   40   mins   Total Treatment:     40   mins    Jennifer ALAS  Josh PT  Physical Therapist    [place] License:[id number]

## 2025-04-30 ENCOUNTER — TREATMENT (OUTPATIENT)
Dept: PHYSICAL THERAPY | Facility: CLINIC | Age: 75
End: 2025-04-30
Payer: MEDICARE

## 2025-04-30 DIAGNOSIS — M25.561 PAIN OF RIGHT KNEE AFTER INJURY: Primary | ICD-10-CM

## 2025-04-30 DIAGNOSIS — R29.898 COMPLAINTS OF LEG WEAKNESS: ICD-10-CM

## 2025-04-30 DIAGNOSIS — R26.89 ANTALGIC GAIT: ICD-10-CM

## 2025-04-30 PROCEDURE — 97110 THERAPEUTIC EXERCISES: CPT | Performed by: PHYSICAL THERAPIST
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
## 2025-05-06 ENCOUNTER — TREATMENT (OUTPATIENT)
Dept: PHYSICAL THERAPY | Facility: CLINIC | Age: 75
End: 2025-05-06
Payer: MEDICARE

## 2025-05-06 DIAGNOSIS — R26.89 ANTALGIC GAIT: ICD-10-CM

## 2025-05-06 DIAGNOSIS — M25.561 PAIN OF RIGHT KNEE AFTER INJURY: Primary | ICD-10-CM

## 2025-05-06 DIAGNOSIS — R29.898 COMPLAINTS OF LEG WEAKNESS: ICD-10-CM

## 2025-05-06 PROCEDURE — 97113 AQUATIC THERAPY/EXERCISES: CPT | Performed by: PHYSICAL THERAPIST
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
## 2025-05-21 ENCOUNTER — TELEPHONE (OUTPATIENT)
Dept: CARDIOLOGY | Age: 75
End: 2025-05-21
Payer: COMMERCIAL

## 2025-05-21 NOTE — TELEPHONE ENCOUNTER
It is fine for her to have colonoscopy.  Please let her know that she does not need to hold aspirin per Dr. Singh's office.

## 2025-05-21 NOTE — TELEPHONE ENCOUNTER
Pt called re: She is having Colonoscopy on 5/28/2025 with Dr Olivarez.  She said the note says to hold ASA 5 days prior.  She also wants to verify if she was ok to proceed with the Colonscopy.      I called Dr Olivarez's office and spoke with Carmen.  Pt does not need to hold any ASA.  No cardiac clearance is needed.      Please advise if pt is ok to have Colonoscopy.

## 2025-05-23 ENCOUNTER — OFFICE VISIT (OUTPATIENT)
Dept: INTERNAL MEDICINE | Facility: CLINIC | Age: 75
End: 2025-05-23
Payer: MEDICARE

## 2025-05-23 VITALS
BODY MASS INDEX: 37.74 KG/M2 | WEIGHT: 226.5 LBS | SYSTOLIC BLOOD PRESSURE: 120 MMHG | OXYGEN SATURATION: 96 % | HEART RATE: 71 BPM | HEIGHT: 65 IN | DIASTOLIC BLOOD PRESSURE: 66 MMHG | TEMPERATURE: 97.9 F

## 2025-05-23 DIAGNOSIS — Z12.31 ENCOUNTER FOR SCREENING MAMMOGRAM FOR MALIGNANT NEOPLASM OF BREAST: ICD-10-CM

## 2025-05-23 DIAGNOSIS — G89.29 CHRONIC PAIN OF RIGHT ANKLE: Primary | ICD-10-CM

## 2025-05-23 DIAGNOSIS — M17.0 PRIMARY OSTEOARTHRITIS OF BOTH KNEES: ICD-10-CM

## 2025-05-23 DIAGNOSIS — D05.12 DUCTAL CARCINOMA IN SITU (DCIS) OF LEFT BREAST: ICD-10-CM

## 2025-05-23 DIAGNOSIS — Z78.0 POSTMENOPAUSAL: ICD-10-CM

## 2025-05-23 DIAGNOSIS — M25.571 CHRONIC PAIN OF RIGHT ANKLE: Primary | ICD-10-CM

## 2025-05-23 RX ORDER — TRAMADOL HYDROCHLORIDE 50 MG/1
50 TABLET ORAL DAILY PRN
Qty: 30 TABLET | Refills: 0 | Status: SHIPPED | OUTPATIENT
Start: 2025-05-23

## 2025-05-23 NOTE — PROGRESS NOTES
Subjective   Marion Anderson is a 74 y.o. female.     Chief Complaint   Patient presents with    Pain     Pt is here to f/u on chronic pain.        History of Present Illness   She is here today for follow-up for high risk medication use.  She is currently prescribed tramadol 50 mg daily as needed for chronic bilateral knee pain and chronic right ankle pain s/p right ankle fracture. Patient doing well with current medication regimen, compliant with medication schedule, denies adverse effects. She notes improvement in pain with the tramadol.  Typically uses this a few days a week.  She denies any medication side effects.  She notes pain and stiffness in the knees and right ankle typically worse in the morning requiring her to use a walker to ambulate.  Pain and stiffness improves with the tramadol and activity.  She is planning to restart regular exercise.    She is also due for mammogram and DEXA.  She has a history of DCIS of the left breast and osteopenia.  She has mammograms and DEXA is completed at [place].    The following portions of the patient's history were reviewed and updated as appropriate: allergies, current medications, past family history, past medical history, past social history, past surgical history, and problem list.    Review of Systems   Constitutional: Negative.    Respiratory: Negative.     Cardiovascular: Negative.    Musculoskeletal:  Positive for arthralgias and gait problem.   Psychiatric/Behavioral: Negative.         Objective   Physical Exam  Constitutional:       Appearance: She is well-developed.   Neck:      Thyroid: No thyroid mass, thyromegaly or thyroid tenderness.      Vascular: No carotid bruit.      Trachea: Trachea normal.   Cardiovascular:      Rate and Rhythm: Normal rate and regular rhythm.      Chest Wall: PMI is not displaced.      Pulses:           Radial pulses are 2+ on the right side and 2+ on the left side.        Dorsalis pedis pulses are 2+ on the right side and  2+ on the left side.        Posterior tibial pulses are 2+ on the right side and 2+ on the left side.      Heart sounds: S1 normal and S2 normal.   Pulmonary:      Effort: Pulmonary effort is normal.      Breath sounds: Normal breath sounds.   Musculoskeletal:      Right knee: Crepitus present. No swelling or bony tenderness. Decreased range of motion. Tenderness present over the medial joint line and lateral joint line. Normal alignment, normal meniscus and normal patellar mobility. Normal pulse.      Left knee: Crepitus present. No swelling or bony tenderness. Decreased range of motion. Tenderness present over the medial joint line and lateral joint line. Normal alignment, normal meniscus and normal patellar mobility. Normal pulse.      Right lower leg: No edema.      Left lower leg: No edema.      Right ankle: Swelling present. No deformity or ecchymosis. Tenderness present over the lateral malleolus. Decreased range of motion. Normal pulse.      Right Achilles Tendon: Normal.   Lymphadenopathy:      Head:      Right side of head: No submental, submandibular, tonsillar or occipital adenopathy.      Left side of head: No submental, submandibular, tonsillar or occipital adenopathy.      Cervical: No cervical adenopathy.   Skin:     General: Skin is warm and dry.      Capillary Refill: Capillary refill takes less than 2 seconds.      Nails: There is no clubbing.   Neurological:      Mental Status: She is alert and oriented to person, place, and time.   Psychiatric:         Attention and Perception: Attention normal.         Mood and Affect: Mood and affect normal.         Speech: Speech normal.         Behavior: Behavior normal.         Thought Content: Thought content normal.         Cognition and Memory: Cognition normal.         Vitals:    05/23/25 1300   BP: 120/66   Pulse: 71   Temp: 97.9 °F (36.6 °C)   SpO2: 96%      Body mass index is 37.69 kg/m².    Assessment & Plan   Problems Addressed this Visit        Chronic pain of right ankle - Primary    Relevant Medications    traMADol (ULTRAM) 50 MG tablet    Ductal carcinoma in situ (DCIS) of left breast    Relevant Orders    Mammo Screening Digital Tomosynthesis Bilateral With CAD    Osteoarthritis of knees, bilateral    Relevant Medications    traMADol (ULTRAM) 50 MG tablet     Other Visit Diagnoses         Encounter for screening mammogram for malignant neoplasm of breast        Relevant Orders    Mammo Screening Digital Tomosynthesis Bilateral With CAD      Postmenopausal        Relevant Orders    DEXA Bone Density Axial          Diagnoses         Codes Comments      Chronic pain of right ankle    -  Primary ICD-10-CM: M25.571, G89.29  ICD-9-CM: 719.47, 338.29       Primary osteoarthritis of both knees     ICD-10-CM: M17.0  ICD-9-CM: 715.16       Ductal carcinoma in situ (DCIS) of left breast     ICD-10-CM: D05.12  ICD-9-CM: 233.0       Encounter for screening mammogram for malignant neoplasm of breast     ICD-10-CM: Z12.31  ICD-9-CM: V76.12       Postmenopausal     ICD-10-CM: Z78.0  ICD-9-CM: V49.81           1.  Chronic pain of right ankle/osteoarthritis of both knees-stable on tramadol 50 mg daily as needed for pain.  Patient is aware of appropriate use and adverse effects including the risk of dependence with prolonged use.  I have encouraged her to continue to limit tramadol use.  CSA up-to-date, PDMP reviewed by me today and UDS completed today.  Prescription refilled today.  Encouraged her to return to regular exercise with walking and aquatic therapy will plan to follow-up in 3 months for high risk medication use.  2.  DCIS of left breast/postmenopausal-screening mammogram and DEXA ordered.

## 2025-06-24 ENCOUNTER — OFFICE VISIT (OUTPATIENT)
Dept: INTERNAL MEDICINE | Facility: CLINIC | Age: 75
End: 2025-06-24
Payer: MEDICARE

## 2025-06-24 VITALS
DIASTOLIC BLOOD PRESSURE: 74 MMHG | WEIGHT: 231 LBS | BODY MASS INDEX: 38.49 KG/M2 | HEART RATE: 85 BPM | TEMPERATURE: 98.9 F | HEIGHT: 65 IN | SYSTOLIC BLOOD PRESSURE: 128 MMHG | OXYGEN SATURATION: 96 %

## 2025-06-24 DIAGNOSIS — M25.511 ACUTE PAIN OF RIGHT SHOULDER: Primary | ICD-10-CM

## 2025-06-24 PROCEDURE — 1160F RVW MEDS BY RX/DR IN RCRD: CPT | Performed by: NURSE PRACTITIONER

## 2025-06-24 PROCEDURE — 3078F DIAST BP <80 MM HG: CPT | Performed by: NURSE PRACTITIONER

## 2025-06-24 PROCEDURE — 1125F AMNT PAIN NOTED PAIN PRSNT: CPT | Performed by: NURSE PRACTITIONER

## 2025-06-24 PROCEDURE — 3074F SYST BP LT 130 MM HG: CPT | Performed by: NURSE PRACTITIONER

## 2025-06-24 PROCEDURE — 99213 OFFICE O/P EST LOW 20 MIN: CPT | Performed by: NURSE PRACTITIONER

## 2025-06-24 PROCEDURE — 1159F MED LIST DOCD IN RCRD: CPT | Performed by: NURSE PRACTITIONER

## 2025-06-24 NOTE — PROGRESS NOTES
Subjective   Marion Anderson is a 74 y.o. female.     Chief Complaint   Patient presents with    Arm Pain     Pt c/o rt arm pain that radiate up to shoulder X 3 weeks.        History of Present Illness   She is here today with concerns of right shoulder and upper arm pain.  Started 3 weeks ago.  She denies any acute trauma to the shoulder but does note that she has been doing a lot of heavy lifting with groceries recently.  She notes pain predominately along the right bicep radiating into the right anterior shoulder. Pain is worse with reaching and stretching the arm out. She notes mild pain in the right bicep and shoulder with bending. She is experiencing pain when laying on the right shoulder.   She has tried tramadol, aspirin and ice with mild relief.   She denies any weakness.   Pain is getting worse.     The following portions of the patient's history were reviewed and updated as appropriate: allergies, current medications, past family history, past medical history, past social history, past surgical history, and problem list.    Review of Systems   Constitutional: Negative.    Respiratory: Negative.     Cardiovascular: Negative.    Musculoskeletal:  Positive for arthralgias.   Neurological:  Negative for weakness and numbness.       Objective   Physical Exam  Constitutional:       Appearance: She is well-developed.   Neck:      Thyroid: No thyroid mass, thyromegaly or thyroid tenderness.      Vascular: No carotid bruit.      Trachea: Trachea normal.   Cardiovascular:      Rate and Rhythm: Normal rate and regular rhythm.      Chest Wall: PMI is not displaced.      Pulses:           Radial pulses are 2+ on the right side and 2+ on the left side.        Dorsalis pedis pulses are 2+ on the right side and 2+ on the left side.        Posterior tibial pulses are 2+ on the right side and 2+ on the left side.      Heart sounds: S1 normal and S2 normal.   Pulmonary:      Effort: Pulmonary effort is normal.       Breath sounds: Normal breath sounds.   Musculoskeletal:      Right shoulder: Tenderness present. No swelling, deformity, effusion, laceration, bony tenderness or crepitus. Decreased range of motion. Normal strength. Normal pulse.      Right upper arm: Tenderness (at the bicep) present.      Right lower leg: No edema.      Left lower leg: No edema.      Comments: Positive impingement sign right shoulder.  Positive painful arc test at 90 degrees right shoulder.  Negative empty can test right shoulder.   strength 4 out of 5 symmetric and equal bilaterally.   Lymphadenopathy:      Head:      Right side of head: No submental, submandibular, tonsillar or occipital adenopathy.      Left side of head: No submental, submandibular, tonsillar or occipital adenopathy.      Cervical: No cervical adenopathy.   Skin:     General: Skin is warm and dry.      Capillary Refill: Capillary refill takes less than 2 seconds.      Nails: There is no clubbing.   Neurological:      Mental Status: She is alert and oriented to person, place, and time.   Psychiatric:         Attention and Perception: Attention normal.         Mood and Affect: Mood and affect normal.         Speech: Speech normal.         Behavior: Behavior normal.         Thought Content: Thought content normal.         Cognition and Memory: Cognition normal.         Vitals:    06/24/25 1252   BP: 128/74   Pulse: 85   Temp: 98.9 °F (37.2 °C)   SpO2: 96%      Body mass index is 38.44 kg/m².    Assessment & Plan   Problems Addressed this Visit    None  Visit Diagnoses         Acute pain of right shoulder    -  Primary    Relevant Orders    Ambulatory Referral to Physical Therapy for Evaluation & Treatment          Diagnoses         Codes Comments      Acute pain of right shoulder    -  Primary ICD-10-CM: M25.511  ICD-9-CM: 719.41           1.  Right shoulder pain-with symptoms becoming worse recommend referral to physical therapy for evaluation and treatment.  Recommend  Tylenol arthritis and application of ice/heat with activity modification.  Can use the tramadol for more severe pain.  If no improvement with conservative treatment recommend referral to sports medicine versus orthopedics and x-ray imaging.

## 2025-07-15 ENCOUNTER — TELEPHONE (OUTPATIENT)
Dept: INTERNAL MEDICINE | Facility: CLINIC | Age: 75
End: 2025-07-15
Payer: MEDICARE

## 2025-07-15 NOTE — TELEPHONE ENCOUNTER
Caller: Marion Anderson    Relationship: Self    Best call back number: [phone]     What form or medical record are you requesting: NEEDS LIST OF COUNSELORS THAT ARE COVERED BY HER INSURANCE    Who is requesting this form or medical record from you: PATIENT    How would you like to receive the form or medical records (pick-up, mail, fax): MAILED TO PATIENT HOME    Timeframe paperwork needed: ASAP    Additional notes: DUE TO DEATH IN PATIENT FAMILY, SHE WOULD LIKE TO GO SEE A COUNSELOR TO HELP WITH THE GRIEF PROCESS. PLEASE SEND HER A LIST OF PROVIDERS THAT SHE CAN GO TO.

## 2025-07-30 ENCOUNTER — OFFICE VISIT (OUTPATIENT)
Age: 75
End: 2025-07-30
Payer: MEDICARE

## 2025-07-30 VITALS
DIASTOLIC BLOOD PRESSURE: 70 MMHG | RESPIRATION RATE: 18 BRPM | HEART RATE: 77 BPM | WEIGHT: 231 LBS | BODY MASS INDEX: 38.49 KG/M2 | OXYGEN SATURATION: 97 % | HEIGHT: 65 IN | SYSTOLIC BLOOD PRESSURE: 126 MMHG

## 2025-07-30 DIAGNOSIS — Z95.1 S/P CABG X 4: ICD-10-CM

## 2025-07-30 DIAGNOSIS — Z98.890 S/P MVR (MITRAL VALVE REPAIR): ICD-10-CM

## 2025-07-30 DIAGNOSIS — I10 ESSENTIAL HYPERTENSION: ICD-10-CM

## 2025-07-30 DIAGNOSIS — N28.9 RENAL INSUFFICIENCY: ICD-10-CM

## 2025-07-30 DIAGNOSIS — E78.5 HYPERLIPIDEMIA LDL GOAL <70: ICD-10-CM

## 2025-07-30 DIAGNOSIS — I25.708 CORONARY ARTERY DISEASE OF BYPASS GRAFT OF NATIVE HEART WITH STABLE ANGINA PECTORIS: Primary | ICD-10-CM

## 2025-07-30 DIAGNOSIS — D05.12 DUCTAL CARCINOMA IN SITU (DCIS) OF LEFT BREAST: ICD-10-CM

## 2025-07-30 DIAGNOSIS — I65.23 BILATERAL CAROTID ARTERY STENOSIS: ICD-10-CM

## 2025-07-30 PROCEDURE — 93000 ELECTROCARDIOGRAM COMPLETE: CPT | Performed by: NURSE PRACTITIONER

## 2025-07-30 PROCEDURE — 3074F SYST BP LT 130 MM HG: CPT | Performed by: NURSE PRACTITIONER

## 2025-07-30 PROCEDURE — 99214 OFFICE O/P EST MOD 30 MIN: CPT | Performed by: NURSE PRACTITIONER

## 2025-07-30 PROCEDURE — 3078F DIAST BP <80 MM HG: CPT | Performed by: NURSE PRACTITIONER

## 2025-07-30 RX ORDER — POLYETHYLENE GLYCOL 3350, SODIUM SULFATE ANHYDROUS, SODIUM BICARBONATE, SODIUM CHLORIDE, POTASSIUM CHLORIDE 236; 22.74; 6.74; 5.86; 2.97 G/4L; G/4L; G/4L; G/4L; G/4L
POWDER, FOR SOLUTION ORAL
COMMUNITY
Start: 2025-07-22 | End: 2025-07-30

## 2025-07-30 NOTE — PROGRESS NOTES
Date of Office Visit: 2025  Encounter Provider: PANKAJ Garner  Place of Service: [facility] CARDIOLOGY  Patient Name: Marion Anderson  :1950    Chief complaint  Coronary artery disease, hypertension, mitral regurgitation    History of Present Illness  Patient is a 74 y.o. year old female  patient of Dr. Fernando. Past medical history includes hypertension, hyperlipidemia, coronary artery disease. In  she was found to have LAD and right coronary artery stenosis for which she underwent stenting. Patient was seen in  for progressive chest pain. Cardiac catheterization showed multivessel coronary artery disease including left main disease. She developed ventricular fibrillation in the Cath Lab and was successfully cardioverted. On 2017 she had a LIMA graft to the LAD, vein graft to the right coronary artery, vein graft to ramus intermedius branch, vein graft to diagonal branch of the LAD. She also had mitral valve repair with a 24 mm ring. Postoperative course was complicated by encephalopathy CT imaging showed prior infarct. She was treated with Keppra for a short period of time. She had carotid Doppler imaging notes severe bilateral carotid artery disease/carotid occlusion which is felt to be difficult to revascularize and for which she was seen by Dr. Prado.     On 2023 with complaints of dyspnea and part of chemotherapy evaluation when she was diagnosed with left-sided breast cancer. She had an echocardiogram that showed an ejection fraction 71%, GLS -17.8% mild ventricular hypertrophy with normal wall motion and grade 2 diastolic dysfunction. This is present, there is mild mitral regurgitation with mitral valve ring in place and the mean gradient of 4 mmHg at a heart rate of 56 bpm, small tricuspid regurgitation normal right-sided pressures.Stress perfusion study was normal for ischemia. Abnormal septal motion consistent with prior  pericardiectomy. Patient underwent lumpectomy on 6/16/2023 and subsequently was treated with tamoxifen.     Interval history  Patient presents today for routine follow-up.  I will visit with her today and have reviewed her medical record.  Since last visit she denies palpitations, shortness of breath, dizziness, chest pain or chest pressure, syncope or presyncope.  She has chronic lower extremity edema that is unchanged.  She states fatigue is worse recently but notes her sister-in-law recently passed away and she has been notably distressed by this.  Blood pressure at home has been as it is in office today.  She is walking daily but intends to restart swimming soon.    Past Medical History:   Diagnosis Date    Arthritis     Bilateral carotid artery stenosis 2/22/2020    Coronary artery disease     Disease of thyroid gland     Hyperlipidemia     Hypertension     PONV (postoperative nausea and vomiting)     Spinal headache      Past Surgical History:   Procedure Laterality Date    CARDIAC CATHETERIZATION Left 10/06/2017    [facility], Dr. THAD Grant, coronary arteriography    CARDIAC SURGERY      CORONARY ARTERY BYPASS GRAFT N/A 10/12/2017    Procedure: FILEMON STERNOTOMY CORONARY ARTERY BYPASS GRAFT TIMES 4  USING LEFT INTERNAL MAMMARY ARTERY AND LEFT GREATER SAPHENOUS VEIN GRAFT PER  ENDOSCOPIC VEIN HARVESTING, MITRAL VALVE REPAIR AND PRP;  Surgeon: Ramos Muñoz MD;  Location: [facility];  Service:     FRACTURE SURGERY      Ankle/Leg    TONSILLECTOMY       Outpatient Medications Prior to Visit   Medication Sig Dispense Refill    alendronate (FOSAMAX) 70 MG tablet Take 1 tablet by mouth 1 (One) Time Per Week.      aspirin 81 MG chewable tablet Chew 1 tablet Daily.      Cholecalciferol (VITAMIN D3) 25 MCG (1000 UT) capsule Take 1 capsule by mouth Every Other Day.      hydrOXYzine (ATARAX) 25 MG tablet Take 1 tablet by mouth At Night As Needed for Itching.      Ibuprofen 3 %,  Gabapentin 10 %, Baclofen 2 %, lidocaine 4 % Apply 1-2 g topically to the appropriate area as directed 3 (Three) to 4 (Four) times daily. 90 g 2    levothyroxine (SYNTHROID, LEVOTHROID) 125 MCG tablet Take 1 tablet by mouth Daily.      losartan (COZAAR) 100 MG tablet Take 1 tablet by mouth Daily. for blood pressure 90 tablet 3    metoprolol succinate XL (TOPROL-XL) 50 MG 24 hr tablet Take 1 tablet by mouth twice daily 180 tablet 2    Multiple Vitamin (MULTI-DAY PO) Take 1 tablet by mouth Daily.      nitroglycerin (NITROSTAT) 0.3 MG SL tablet Place 1 tablet under the tongue Every 5 (Five) Minutes As Needed for Chest Pain. 30 tablet 0    Omega-3 Fatty Acids (FISH OIL) 1000 MG capsule capsule Take  by mouth Daily With Breakfast.      Repatha SureClick solution auto-injector SureClick injection INJECT 1 SYRINGE SUBCUTANEOUSLY EVERY TWO WEEKS      rosuvastatin (CRESTOR) 40 MG tablet Take 1 tablet by mouth Every Night. 90 tablet 0    tamoxifen (NOLVADEX) 10 MG tablet Take 0.5 tablets by mouth Daily.      traMADol (ULTRAM) 50 MG tablet Take 1 tablet by mouth Daily As Needed for Moderate Pain. 30 tablet 0    traZODone (DESYREL) 50 MG tablet Daily.      amoxicillin (AMOXIL) 500 MG capsule Take 4 capsules by mouth See Admin Instructions. 4 capsules 1 hour prior to procedure (Patient not taking: Reported on 5/23/2025) 12 capsule 0    PEG 3350-KCl-NaBcb-NaCl-NaSulf (PEG-3350/Electrolytes) 236 g reconstituted solution TAKE 4000 ML BY MOUTH AS A 1 TIME DOSE (Patient not taking: Reported on 7/30/2025)       No facility-administered medications prior to visit.       Allergies as of 07/30/2025 - Reviewed 07/30/2025   Allergen Reaction Noted    Hydroxyzine hcl Nausea Only 08/12/2024    Latex Itching and Other (See Comments) 12/03/2015     Social History     Socioeconomic History    Marital status: Single   Tobacco Use    Smoking status: Never     Passive exposure: Never    Smokeless tobacco: Never   Vaping Use    Vaping status: Never  "Used   Substance and Sexual Activity    Alcohol use: Not Currently    Drug use: Yes    Sexual activity: Defer     Birth control/protection: Post-menopausal     Family History   Problem Relation Age of Onset    Heart disease Mother     Hypertension Mother     Stroke Mother     Heart disease Father     Hypertension Father     Heart attack Father     Cancer Brother     Hypertension Brother     No Known Problems Daughter     No Known Problems Daughter     Colon cancer Neg Hx      Review of Systems   Constitutional: Positive for malaise/fatigue.   Cardiovascular:  Positive for leg swelling. Negative for chest pain, claudication, dyspnea on exertion, near-syncope, orthopnea, palpitations, paroxysmal nocturnal dyspnea and syncope.   Respiratory:  Negative for shortness of breath.    Neurological:  Negative for brief paralysis, dizziness, headaches and light-headedness.   All other systems reviewed and are negative.       Objective:     Vitals:    07/30/25 1346   BP: 126/70   Pulse: 77   Resp: 18   SpO2: 97%   Weight: 105 kg (231 lb)   Height: 165.1 cm (65\")     Body mass index is 38.44 kg/m².    Vitals reviewed.   Constitutional:       General: Not in acute distress.     Appearance: Well-developed and not in distress. Not diaphoretic.   HENT:      Head: Normocephalic.   Pulmonary:      Effort: Pulmonary effort is normal. No respiratory distress.      Breath sounds: Normal breath sounds. No wheezing. No rhonchi. No rales.   Cardiovascular:      Normal rate. Regular rhythm.      Murmurs: There is no murmur.   Pulses:     Radial: 2+ bilaterally.  Edema:     Peripheral edema present.  Skin:     General: Skin is warm and dry. There is no cyanosis.      Findings: No rash.   Neurological:      Mental Status: Alert and oriented to person, place, and time.   Psychiatric:         Behavior: Behavior normal. Behavior is cooperative.         Thought Content: Thought content normal.         Judgment: Judgment normal.       Lab Review: " "    Lab Results   Component Value Date     03/02/2023     05/05/2022    K 4.2 03/02/2023    K 4.8 05/05/2022     03/02/2023     05/05/2022    CO2 25 03/02/2023    CO2 26 05/05/2022    BUN 21 (H) 03/02/2023    BUN 24 (H) 05/05/2022    CREATININE 1.31 (H) 03/02/2023    CREATININE 1.34 (H) 05/05/2022    EGFRIFNONA 48 (L) 06/16/2021    EGFRIFNONA 53 (L) 03/04/2020    EGFRIFAFRI 49 (L) 03/02/2023    GLUCOSE 97 06/16/2021    GLUCOSE 104 (H) 03/04/2020    CALCIUM 9.2 03/02/2023    CALCIUM 10.1 05/05/2022    ALBUMIN 4.4 03/02/2023    ALBUMIN 4.4 05/05/2022    BILITOT 0.5 03/02/2023    BILITOT 0.5 05/05/2022    AST 38 (H) 03/02/2023    AST 30 05/05/2022    ALT 47 (H) 03/02/2023    ALT 28 05/05/2022     Lab Results   Component Value Date    WBC 6.89 09/10/2024    WBC 9.61 09/07/2023    HGB 13.9 09/10/2024    HGB 13.5 09/07/2023    HCT 41.8 09/10/2024    HCT 42.6 09/07/2023    MCV 91.9 09/10/2024    MCV 92.2 09/07/2023     09/10/2024     09/07/2023     No results found for: \"PROBNP\", \"BNP\"  No results found for: \"CKTOTAL\", \"CKMB\", \"CKMBINDEX\", \"TROPONINI\", \"TROPONINT\"  Lab Results   Component Value Date    TSH 8.740 (H) 05/05/2022    TSH 12.390 (H) 10/19/2021             ECG 12 Lead    Date/Time: 7/30/2025 4:01 PM  Performed by: Roberta Liu APRN    Authorized by: Roberta Liu, PANKAJ  Comparison: compared with previous ECG   Similar to previous ECG  Rhythm: sinus rhythm  Rate: normal  BPM: 77  QRS axis: normal  Other findings: low voltage  Comments: Similar to prior        Assessment:       Diagnosis Plan   1. Coronary artery disease of bypass graft of native heart with stable angina pectoris        2. S/P MVR (mitral valve repair)        3. Bilateral carotid artery stenosis        4. Ductal carcinoma in situ (DCIS) of left breast        5. Hyperlipidemia LDL goal <70        6. S/P CABG x 4        7. Essential hypertension        8. Renal insufficiency          Plan:       1.  " Left-sided breast cancer.  Status post lumpectomy 6/2023 and tamoxifen therapy and not radiation therapy.  No known recurrence  2.  Chest tightness. Stress test 2023 that was normal.  Chest discomfort has completely resolved.  She thinks that this was stress related.  3.  Coronary artery disease with prior stenting and subsequent coronary artery bypass grafting 2017. As above with negative stress test 6/2023 with abnormal septal motion with prior pericardiectomy.  She currently denies anginal symptoms.  She plans to increase her activity and resume swimming soon.  4.  Status post mitral valve repair.  Continue with SBE prophylaxis.  Clinically doing quite well and euvolemic on exam overall.  She has trace lower extremity edema that is chronic and at her baseline.  5.  Hypertension.  Controlled in office and at home.  6.  Hyperlipidemia.  Managed by PCP  7.  Renal insufficiency.  Stable at last check  8.  Hypothyroidism.  On levothyroxine  9.  Carotid artery disease, severe with bilateral occlusion.  Followed by Dr. Prado  9.  Prediabetes      Time Spent: I spent 30 minutes caring for Marion on this date of service. This time includes time spent by me in the following activities: preparing for the visit, reviewing tests, performing a medically appropriate examination and/or evaluations, counseling and educating the patient/family/caregiver, ordering medications, tests, or procedures, documenting information in the medical record, and independently interpreting results and communicating that information with the patient/family/caregiver.   I spent 1 minutes on the separately reported service of ECG. This time is not included in the time used to support the E/M service also reported today.        Your medication list            Accurate as of July 30, 2025  4:02 PM. If you have any questions, ask your nurse or doctor.                CONTINUE taking these medications        Instructions Last Dose Given Next Dose Due    alendronate 70 MG tablet  Commonly known as: FOSAMAX      Take 1 tablet by mouth 1 (One) Time Per Week.       amoxicillin 500 MG capsule  Commonly known as: AMOXIL      Take 4 capsules by mouth See Admin Instructions. 4 capsules 1 hour prior to procedure       aspirin 81 MG chewable tablet      Chew 1 tablet Daily.       fish oil 1000 MG capsule capsule      Take  by mouth Daily With Breakfast.       hydrOXYzine 25 MG tablet  Commonly known as: ATARAX      Take 1 tablet by mouth At Night As Needed for Itching.       Ibuprofen 3 %, Gabapentin 10 %, Baclofen 2 %, lidocaine 4 %      Apply 1-2 g topically to the appropriate area as directed 3 (Three) to 4 (Four) times daily.       levothyroxine 125 MCG tablet  Commonly known as: SYNTHROID, LEVOTHROID      Take 1 tablet by mouth Daily.       losartan 100 MG tablet  Commonly known as: COZAAR      Take 1 tablet by mouth Daily. for blood pressure       metoprolol succinate XL 50 MG 24 hr tablet  Commonly known as: TOPROL-XL      Take 1 tablet by mouth twice daily       multivitamin tablet tablet  Commonly known as: THERAGRAN      Take 1 tablet by mouth Daily.       nitroglycerin 0.3 MG SL tablet  Commonly known as: NITROSTAT      Place 1 tablet under the tongue Every 5 (Five) Minutes As Needed for Chest Pain.       Repatha SureClick solution auto-injector SureClick injection  Generic drug: Evolocumab      INJECT 1 SYRINGE SUBCUTANEOUSLY EVERY TWO WEEKS       rosuvastatin 40 MG tablet  Commonly known as: CRESTOR      Take 1 tablet by mouth Every Night.       tamoxifen 10 MG tablet  Commonly known as: NOLVADEX      Take 0.5 tablets by mouth Daily.       traMADol 50 MG tablet  Commonly known as: ULTRAM      Take 1 tablet by mouth Daily As Needed for Moderate Pain.       traZODone 50 MG tablet  Commonly known as: DESYREL      Daily.       Vitamin D3 25 MCG (1000 UT) capsule      Take 1 capsule by mouth Every Other Day.                Patient is no longer taking -.  I  corrected the med list to reflect this.  I did not stop these medications.    Return in about 6 months (around 1/30/2026) for with Dr. Fernando.      Dictated utilizing Dragon dictation

## 2025-08-22 ENCOUNTER — OFFICE VISIT (OUTPATIENT)
Dept: INTERNAL MEDICINE | Facility: CLINIC | Age: 75
End: 2025-08-22
Payer: MEDICARE

## 2025-08-22 VITALS
WEIGHT: 228.2 LBS | BODY MASS INDEX: 38.02 KG/M2 | DIASTOLIC BLOOD PRESSURE: 64 MMHG | TEMPERATURE: 97.9 F | HEART RATE: 69 BPM | SYSTOLIC BLOOD PRESSURE: 122 MMHG | HEIGHT: 65 IN | OXYGEN SATURATION: 96 %

## 2025-08-22 DIAGNOSIS — M25.571 CHRONIC PAIN OF RIGHT ANKLE: Primary | ICD-10-CM

## 2025-08-22 DIAGNOSIS — M17.0 PRIMARY OSTEOARTHRITIS OF BOTH KNEES: ICD-10-CM

## 2025-08-22 DIAGNOSIS — G89.29 CHRONIC PAIN OF RIGHT ANKLE: Primary | ICD-10-CM

## 2025-08-22 DIAGNOSIS — F43.21 GRIEF: ICD-10-CM

## 2025-08-22 PROCEDURE — 1125F AMNT PAIN NOTED PAIN PRSNT: CPT | Performed by: NURSE PRACTITIONER

## 2025-08-22 PROCEDURE — 99214 OFFICE O/P EST MOD 30 MIN: CPT | Performed by: NURSE PRACTITIONER

## 2025-08-22 PROCEDURE — 1160F RVW MEDS BY RX/DR IN RCRD: CPT | Performed by: NURSE PRACTITIONER

## 2025-08-22 PROCEDURE — 1159F MED LIST DOCD IN RCRD: CPT | Performed by: NURSE PRACTITIONER

## 2025-08-22 PROCEDURE — 3078F DIAST BP <80 MM HG: CPT | Performed by: NURSE PRACTITIONER

## 2025-08-22 PROCEDURE — 3074F SYST BP LT 130 MM HG: CPT | Performed by: NURSE PRACTITIONER

## 2025-08-22 RX ORDER — TRAMADOL HYDROCHLORIDE 50 MG/1
50 TABLET ORAL DAILY PRN
Qty: 30 TABLET | Refills: 0 | Status: SHIPPED | OUTPATIENT
Start: 2025-08-22

## 2025-08-22 RX ORDER — METOPROLOL SUCCINATE 100 MG/1
1 TABLET, EXTENDED RELEASE ORAL DAILY
COMMUNITY
Start: 2025-08-12

## 2025-08-26 ENCOUNTER — TREATMENT (OUTPATIENT)
Dept: PHYSICAL THERAPY | Facility: CLINIC | Age: 75
End: 2025-08-26
Payer: MEDICARE

## 2025-08-26 DIAGNOSIS — M25.511 ACUTE PAIN OF RIGHT SHOULDER: Primary | ICD-10-CM

## 2025-08-26 DIAGNOSIS — Z78.9 IMPAIRED MOBILITY AND ADLS: ICD-10-CM

## 2025-08-26 DIAGNOSIS — Z74.09 IMPAIRED MOBILITY AND ADLS: ICD-10-CM

## 2025-08-26 DIAGNOSIS — R29.898 WEAKNESS OF RIGHT SHOULDER: ICD-10-CM

## 2025-08-26 PROCEDURE — 97161 PT EVAL LOW COMPLEX 20 MIN: CPT | Performed by: PHYSICAL THERAPIST

## (undated) DEVICE — ROTATING SURGICAL PUNCHES, 1 PER POUCH: Brand: A&E MEDICAL / ROTATING SURGICAL PUNCHES

## (undated) DEVICE — Device

## (undated) DEVICE — LOU OPEN HEART DR POLLOCK: Brand: MEDLINE INDUSTRIES, INC.

## (undated) DEVICE — SENSR CERBRL O2 PK/2

## (undated) DEVICE — VASOVIEW HEMOPRO: Brand: VASOVIEW HEMOPRO

## (undated) DEVICE — HEMOCONCENTRATOR PERFUS LPS06

## (undated) DEVICE — OASIS DRAIN, DUAL, IN-LINE, ATS COMPATIBLE: Brand: OASIS

## (undated) DEVICE — BNDG ELAS ELITE V/CLOSE 6IN 5YD LF STRL

## (undated) DEVICE — PK PERFUS CUST W/CARDIOPLEGIA

## (undated) DEVICE — DRSNG WND GEL FIBR OPTICELL AG PLS W/SLV LF 4X5IN  STRL

## (undated) DEVICE — VESSEL LOOPS X-RAY DETECTABLE: Brand: DEROYAL

## (undated) DEVICE — SUT PROLN MO.5 7/0 DBLARM BV175 6 2X30 BX/12

## (undated) DEVICE — SOL IRR NACL 0.9PCT BT 1000ML

## (undated) DEVICE — ST. SORBAVIEW ULTIMATE IJ SYSTEM A,C: Brand: CENTURION

## (undated) DEVICE — GLV SURG SENSICARE ALOE LF PF SZ7.5 GRN

## (undated) DEVICE — 12 FOOT DISPOSABLE EXTENSION CABLE WITH SAFE CONNECT / SCREW-DOWN

## (undated) DEVICE — DRN WND CH RND FUL/FLUT NO/TROC 3/8IN 28F

## (undated) DEVICE — HARMONIC SYNERGY DISSECTING HOOK WITH TORQUE WRENCH. FOR USE WITH BLUE HAND PIECE ONLY: Brand: HARMONIC SYNERGY

## (undated) DEVICE — AMD ANTIMICROBIAL GAUZE SPONGES,12 PLY USP TYPE VII, 0.2% POLYHEXAMETHYLENE BIGUANIDE HCI (PHMB): Brand: CURITY

## (undated) DEVICE — SOL ISO/ALC RUB 70PCT 4OZ

## (undated) DEVICE — BLOWER/MISTER AXIOUS OPCAB W/TBG

## (undated) DEVICE — INSUFFLATION TUBING,LAPAROSCOPIC: Brand: DEROYAL

## (undated) DEVICE — SYS PERFUS SEP PLATLT W TIPS CUST

## (undated) DEVICE — CORONARY ARTERY BYPASS GRAFT MARKERS, STAINLESS STEEL, DISTAL, WITHOUT HOLDER: Brand: ANASTOMARK CORONARY ARTERY BYPASS GRAFT MARKERS, STAINLESS STEEL, DISTAL

## (undated) DEVICE — DRSNG SURESITE WNDW 2.38X2.75

## (undated) DEVICE — SOL IRR H2O BTL 1000ML STRL

## (undated) DEVICE — 3M™ MEDIPORE™ H SOFT CLOTH SURGICAL TAPE 2862, 2 INCH X 10 YARD (5CM X 9,1M), 12 ROLLS/CASE: Brand: 3M™ MEDIPORE™

## (undated) DEVICE — ADHS SKIN DERMABOND TOP ADVANCED

## (undated) DEVICE — CANN ART EOPA 3D NV W/CONN 20F

## (undated) DEVICE — BNDG ELAS ELITE V/CLOSE 4IN 5YD LF STRL

## (undated) DEVICE — PK HEART OPN 40

## (undated) DEVICE — BIOPATCH™ ANTIMICROBIAL DRESSING WITH CHLORHEXIDINE GLUCONATE IS A HYDROPHILLIC POLYURETHANE ABSORPTIVE FOAM WITH CHLORHEXIDINE GLUCONATE (CHG) WHICH INHIBITS BACTERIAL GROWTH UNDER THE DRESSING. THE DRESSING IS INTENDED TO BE USED TO ABSORB EXUDATE, COVER A WOUND CAUSED BY VASCULAR AND NONVASCULAR PERCUTANEOUS MEDICAL DEVICES DURING SURGERY, AS WELL AS REDUCE LOCAL INFECTION AND COLONIZATION OF MICROORGANISMS.: Brand: BIOPATCH

## (undated) DEVICE — CLAMP INSERT: Brand: STEALTH® CLAMP INSERT

## (undated) DEVICE — GLV SURG SENSICARE W/ALOE PF LF 7.5 STRL

## (undated) DEVICE — 32 FR RIGHT ANGLE – SOFT PVC CATHETER: Brand: PVC THORACIC CATHETERS

## (undated) DEVICE — DRSNG BRDR MEPILEX P/OP SIL 4X12IN

## (undated) DEVICE — PK ATS CUST W CARDIOTOMY RESEVOIR